# Patient Record
Sex: FEMALE | Race: WHITE | Employment: OTHER | URBAN - METROPOLITAN AREA
[De-identification: names, ages, dates, MRNs, and addresses within clinical notes are randomized per-mention and may not be internally consistent; named-entity substitution may affect disease eponyms.]

---

## 2019-04-12 ENCOUNTER — HOSPITAL ENCOUNTER (OUTPATIENT)
Dept: MAMMOGRAPHY | Age: 64
Discharge: HOME | End: 2019-04-14
Payer: COMMERCIAL

## 2019-04-12 ENCOUNTER — HOSPITAL ENCOUNTER (OUTPATIENT)
Age: 64
Discharge: HOME | End: 2019-04-14
Payer: COMMERCIAL

## 2019-04-12 DIAGNOSIS — Z12.31 ENCOUNTER FOR SCREENING MAMMOGRAM FOR MALIGNANT NEOPLASM OF BREAST: ICD-10-CM

## 2019-04-12 PROCEDURE — 77063 BREAST TOMOSYNTHESIS BI: CPT

## 2019-04-22 ENCOUNTER — TELEPHONE (OUTPATIENT)
Dept: ONCOLOGY | Age: 64
End: 2019-04-22

## 2019-04-22 NOTE — TELEPHONE ENCOUNTER
Call to patient in reference to her mammogram performed at [facility] on April 12, 2019. Instructed patient that the radiologist has recommended some additional breast imaging, in order to make a final determination/result. Informed her that a request for Rx has been faxed to the ordering physician. Once we receive the script a  from [facility] will contact her to schedule the additional imaging study/studies. Verbalizes understanding and is agreeable to proceed.        Beatrice Nelson, RN, BSN, [address]

## 2019-05-06 ENCOUNTER — HOSPITAL ENCOUNTER (OUTPATIENT)
Dept: GENERAL RADIOLOGY | Age: 64
Discharge: HOME | End: 2019-05-08
Payer: COMMERCIAL

## 2019-05-06 DIAGNOSIS — N63.20 MASS OF BREAST, LEFT: ICD-10-CM

## 2019-05-06 PROCEDURE — 77065 DX MAMMO INCL CAD UNI: CPT

## 2019-05-06 PROCEDURE — A4648 IMPLANTABLE TISSUE MARKER: HCPCS

## 2019-05-06 PROCEDURE — 2500000003 HC RX 250 WO HCPCS

## 2019-05-06 PROCEDURE — 88360 TUMOR IMMUNOHISTOCHEM/MANUAL: CPT

## 2019-05-06 PROCEDURE — 76642 ULTRASOUND BREAST LIMITED: CPT

## 2019-05-06 PROCEDURE — 88305 TISSUE EXAM BY PATHOLOGIST: CPT

## 2019-05-06 NOTE — PROGRESS NOTES
Met with patient and her  prior to her breast biopsy. Instructed on role of breast navigator and on breast biopsy procedure. Denies use of blood thinners or aspirin products within the past 5 days. I remained with her during the biopsy to provide instruction and emotional support. She tolerated procedure well. Upon questioning regarding results notification, patient indicates that she would like to receive breast biopsy results by phone via the breast navigator. Instructed that results will be available in approximately 3-5 days. Instructed that her physician will also be notified of results. Provided with folder containing my contact information, monthly breast self exam card, and post biopsy discharge instructions. Instructed to call me if she has any questions or concerns about her biopsy. Verbalizes understanding.  BUD Betancourt, OCN, CN-BN

## 2019-05-07 ENCOUNTER — TELEPHONE (OUTPATIENT)
Dept: GENERAL RADIOLOGY | Age: 64
End: 2019-05-07

## 2019-05-07 NOTE — TELEPHONE ENCOUNTER
Post breast biopsy call to patient. States she's feeling well since her breast biopsy yesterday. Denies bleeding or other breast problems. Instructed that she may resume her normal activity today. Instructed that she may remove breast dressing and shower (no baths) 24 hours after her procedure time. Instructed that bruising at the biopsy site may occur, and may last up to 30 days. Instructed to observe the site closely, and to call me if she notices an increase in redness, swelling, heat or any drainage from the breast.  Instructed that pathology results are typically available within 3-5 days, and per her request,  I will call her with the results once they are available. Instructed to call me prn with any questions or concerns. Verbalizes understanding.  Electronically signed by Maricel Laura RN, BSN on 5/7/2019 at 9:41 AM

## 2019-05-10 ENCOUNTER — TELEPHONE (OUTPATIENT)
Dept: GENERAL RADIOLOGY | Age: 64
End: 2019-05-10

## 2019-05-10 NOTE — TELEPHONE ENCOUNTER
Per patient request, I call with her recent left breast biopsy results. Instructed in detail on her breast biopsy pathology findings including cancer type (Invasive ductal carcinoma) and hormone receptor status (ER- [place]- Her2+). Instructed on next steps including following up with Dr. Gregorio Arroyo regarding a referral to surgeon and /or oncologist.   Carmen Bauer per NCCN guidelines that treatment recommendations may include surgery and chemotherapy with targeted therapy for Her2 (instructed that this may be recommended before surgery). She states her daughter is getting  in 1 month, and she expresses concern about timing of treatment. Instructed that she should have her consultations soon, but may be able to start treatment right after the wedding. Instructed to discuss at her consultation. She is agreeable to receive a packet of literature on Her2+ breast cancer treatment and resources. Biopsy results faxed to Dr. Byrnes Arm office. Electronically signed by Kaden Cruz RN, BSN on 5/10/2019 at 11:46 AM

## 2019-05-13 ENCOUNTER — CLINICAL DOCUMENTATION (OUTPATIENT)
Dept: GENERAL RADIOLOGY | Age: 64
End: 2019-05-13

## 2019-05-13 DIAGNOSIS — C50.912 INVASIVE DUCTAL CARCINOMA OF LEFT BREAST (HCC): Primary | ICD-10-CM

## 2019-05-13 NOTE — PROGRESS NOTES
Packet with information about the diagnosis and treatment of Her2 positive breast cancer mailed placed in outgoing mail for Kathy Keith.  Electronically signed by Farshad Gibson RN, BSN on 5/13/2019 at 10:05 AM

## 2019-05-16 ENCOUNTER — TELEPHONE (OUTPATIENT)
Dept: CASE MANAGEMENT | Age: 64
End: 2019-05-16

## 2019-05-16 NOTE — PROGRESS NOTES
Current Outpatient Medications   Medication Sig Dispense Refill    metoprolol succinate (TOPROL XL) 50 MG extended release tablet TAKE ONE TABLET BY MOUTH EVERY DAY  3    valsartan-hydrochlorothiazide (DIOVAN-HCT) 320-25 MG per tablet TAKE ONE TABLET BY MOUTH EVERY DAY  1    clonazePAM (KLONOPIN) 0.5 MG tablet Take by mouth as needed      Ospemifene 60 MG TABS Take 60 mg by mouth daily 30 tablet 3    aspirin 81 MG tablet Take 81 mg by mouth daily       No current facility-administered medications for this visit. Allergies   Allergen Reactions    Tetracyclines & Related Hives       Family History   Problem Relation Age of Onset    Bleeding Prob Sister     Bleeding Prob Sister     Heart Disease Father     Heart Attack Father 79    High Blood Pressure Father     Cancer Mother         lung    Stroke Maternal Grandmother     Stroke Paternal Grandfather        Social History     Socioeconomic History    Marital status:      Spouse name: Not on file    Number of children: Not on file    Years of education: Not on file    Highest education level: Not on file   Occupational History    Not on file   Social Needs    Financial resource strain: Not on file    Food insecurity:     Worry: Not on file     Inability: Not on file    Transportation needs:     Medical: Not on file     Non-medical: Not on file   Tobacco Use    Smoking status: Never Smoker    Smokeless tobacco: Never Used   Substance and Sexual Activity    Alcohol use:  Yes     Alcohol/week: 0.0 oz     Comment: socially    Drug use: No    Sexual activity: Not Currently     Partners: Male     Birth control/protection: Post-menopausal   Lifestyle    Physical activity:     Days per week: Not on file     Minutes per session: Not on file    Stress: Not on file   Relationships    Social connections:     Talks on phone: Not on file     Gets together: Not on file     Attends Scientologist service: Not on file     Active member of club or organization: Not on file     Attends meetings of clubs or organizations: Not on file     Relationship status: Not on file    Intimate partner violence:     Fear of current or ex partner: Not on file     Emotionally abused: Not on file     Physically abused: Not on file     Forced sexual activity: Not on file   Other Topics Concern    Not on file   Social History Narrative    Not on file       Occupation: Retired. 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 Review of Systems  CONSTITUTIONAL: No fever, chills. Good appetite and energy level. ENMT: Eyes: No diplopia; Nose: No epistaxis. Mouth: No sore throat. RESPIRATORY: No hemoptysis, shortness of breath, cough. CARDIOVASCULAR: No chest pain, pressure, or palpitations. GASTROINTESTINAL: No nausea/vomiting, abdominal pain, diarrhea/constipation. No blood in the stools. GENITOURINARY: No dysuria, urinary frequency, hematuria. NEURO: No syncope, presyncope, headache. Remainder:  ROS NEGATIVE      Patient denies previous history of DVT/PE. Objective:   Physical Exam   Constitutional: She is oriented to person, place, and time. She appears well-developed and well-nourished. No distress. HENT:   Head: Normocephalic and atraumatic. Eyes: Pupils are equal, round, and reactive to light. Conjunctivae and EOM are normal.   Neck: Normal range of motion. Neck supple. No tracheal deviation present. No thyromegaly present. Cardiovascular: Normal rate, regular rhythm and normal heart sounds. Pulmonary/Chest: Effort normal and breath sounds normal. No respiratory distress. Right breast exhibits no inverted nipple, no mass, no nipple discharge, no skin change and no tenderness. Left breast exhibits mass. Left breast exhibits no inverted nipple, no nipple discharge, no skin change and no tenderness. Breasts are symmetrical.       Abdominal: Soft. She exhibits no distension.    Musculoskeletal:        Right shoulder: She exhibits normal range of motion. Left shoulder: She exhibits normal range of motion. Lymphadenopathy:     She has no cervical adenopathy. She has no axillary adenopathy. Right: No supraclavicular adenopathy present. Left: No supraclavicular adenopathy present. Neurological: She is alert and oriented to person, place, and time. Skin: Skin is warm and dry. She is not diaphoretic. No erythema. No pallor. Psychiatric: She has a normal mood and affect. Her behavior is normal. Judgment and thought content normal.       Assessment:      62 y.o. woman who underwent an US guided left breast core biopsy at 2 o'clock position on May 6, 2019. Pathological evaluation completed at [facility]):  Mass, left breast at 2:00, core needle biopsy: Invasive ductal carcinoma, nuclear grade 3, see comment. 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 Breast Cancer Marker Studies:  Estrogen Receptors (ER): Negative  Progesterone Receptors (OH): Negative  Her-2/francisco (c-erb B-2) protein expression:  Positive/3+      4/18/19 - Screening Mammogram - [facility]:  TISSUE DENSITY:   The breasts are heterogeneously dense (Type 3 density).       MAMMOGRAM FINDINGS:   In the right breast, no suspicious masses, areas of suspicious architectural distortion, suspicious calcifications, or additional suspicious findings are identified.       Finding 1:   There is a high density, irregular mass with indistinct margins seen in the posterior upper outer quadrant of the left breast.       IMPRESSION:   Mass in the left breast requires additional evaluation.    An ultrasound is recommended with consideration for an ultrasound guided biopsy if the finding is considered suspicious.       =======================================   BI-RADS Category 0:  Incomplete: Need Additional Imaging Evaluation   =======================================       RISK ASSESSMENT:   During this patient's visit, information obtained was used to generate a lifetime risk assessment using the Tyrer-Cuzick model (also called the TERESE [International Breast Cancer Intervention Study] Breast Cancer Risk Evaluation Tool).     - LIFETIME RISK -   Patient has a Drew Parrot score of 9.9%       5/6/19 - Left breast ultrasound - [facility]:  Finding 1:   Sonography was performed in the left breast using a radial and anti-radial approach. Additional evaluation was performed for the irregular mass in the left breast, upper outer quadrant seen on 04/12/2019. On the present examination, there is an irregular    solid mass with angular margins measuring 25 x 20 x 21 mm in the left breast at 2 o'clock located 8 centimeters from the nipple. Internal echogenicity is hypoechoic.       There is no axillary lymphadenopathy.       IMPRESSION:   Solid mass in the left breast is highly suggestive of malignancy. An ultrasound guided biopsy is recommended.       =======================================   BI-RADS Category 5:  Highly Suggestive of Malignancy       Clinical stage II left upper outer quadrant HER-2 overexpression positive breast cancer. MRI indicated to evaluate extent of disease. We will place a referral for oncology consultation to discuss neoadjuvant chemotherapy. Patient would be an excellent candidate for lumpectomy and sentinel lymph node biopsy in the future. Questions answered to patient's satisfaction. Plan:      1) Metastatic workup to include CXR, CBC, CMP, completed with this visit. 2) Patient will meet with our Breast Navigator for information and to receive literature. 3) MRI will be ordered  4) She would likely be a candidate for neoadjuvant chemotherapy followed by conservative therapy. 5) We had a discussion of the treatment options for breast cancer including risks, benefits, and recurrence rates for breast conservation therapy versus mastectomy. We discussed reconstruction options.  We discussed the indications and risks of sentinel lymph node biopsy and possibility of axillary lymph node dissection. We also discussed the possible role of systemic and radiation therapy. The patient was given the appropriate contact numbers and will call with any questions or concerns. 6) Face-to-face time 50 minutes, greater than 50% in counseling, education, and coordination of care. Exercise encouraged. 7) My final recommendation will be communicated back to the referring physician by way of shared medical record and/or written letter via [address] [facility] mail. I personally and independently saw and examined patient and reviewed all pertinent laboratory data and imaging studies. I have reviewed and agree with the CNP history and review of systems as documented in the above note. Sana Yeung MD FACS  May 20, 2019

## 2019-05-16 NOTE — TELEPHONE ENCOUNTER
Patient breast surgery education packet assembled. Nurse Navigator is scheduled to met with patient at new patient appointment on 5/20/2019 with Dr. Adolfo Vieira.

## 2019-05-20 ENCOUNTER — HOSPITAL ENCOUNTER (OUTPATIENT)
Dept: GENERAL RADIOLOGY | Age: 64
Discharge: HOME | End: 2019-05-22
Payer: COMMERCIAL

## 2019-05-20 ENCOUNTER — TELEPHONE (OUTPATIENT)
Dept: CASE MANAGEMENT | Age: 64
End: 2019-05-20

## 2019-05-20 ENCOUNTER — HOSPITAL ENCOUNTER (OUTPATIENT)
Age: 64
Discharge: HOME | End: 2019-05-22
Payer: COMMERCIAL

## 2019-05-20 ENCOUNTER — OFFICE VISIT (OUTPATIENT)
Dept: BREAST CENTER | Age: 64
End: 2019-05-20
Payer: COMMERCIAL

## 2019-05-20 VITALS
HEIGHT: 63 IN | TEMPERATURE: 98 F | HEART RATE: 62 BPM | BODY MASS INDEX: 39.64 KG/M2 | WEIGHT: 223.7 LBS | OXYGEN SATURATION: 98 % | RESPIRATION RATE: 16 BRPM | SYSTOLIC BLOOD PRESSURE: 138 MMHG | DIASTOLIC BLOOD PRESSURE: 80 MMHG

## 2019-05-20 DIAGNOSIS — C50.912 INVASIVE DUCTAL CARCINOMA OF LEFT BREAST (HCC): ICD-10-CM

## 2019-05-20 LAB
ALBUMIN SERPL-MCNC: 4.4 G/DL (ref 3.5–5.2)
ALP BLD-CCNC: 67 U/L (ref 35–104)
ALT SERPL-CCNC: 26 U/L (ref 0–32)
ANION GAP SERPL CALCULATED.3IONS-SCNC: 15 MMOL/L (ref 7–16)
AST SERPL-CCNC: 24 U/L (ref 0–31)
BASOPHILS ABSOLUTE: 0.04 E9/L (ref 0–0.2)
BASOPHILS RELATIVE PERCENT: 0.6 % (ref 0–2)
BILIRUB SERPL-MCNC: 0.3 MG/DL (ref 0–1.2)
BUN BLDV-MCNC: 14 MG/DL (ref 8–23)
CALCIUM SERPL-MCNC: 10.6 MG/DL (ref 8.6–10.2)
CHLORIDE BLD-SCNC: 96 MMOL/L (ref 98–107)
CO2: 26 MMOL/L (ref 22–29)
CREAT SERPL-MCNC: 0.9 MG/DL (ref 0.5–1)
EOSINOPHILS ABSOLUTE: 0.09 E9/L (ref 0.05–0.5)
EOSINOPHILS RELATIVE PERCENT: 1.3 % (ref 0–6)
GFR AFRICAN AMERICAN: >60
GFR NON-AFRICAN AMERICAN: >60 ML/MIN/1.73
GLUCOSE BLD-MCNC: 106 MG/DL (ref 74–99)
HCT VFR BLD CALC: 40.2 % (ref 34–48)
HEMOGLOBIN: 13.3 G/DL (ref 11.5–15.5)
IMMATURE GRANULOCYTES #: 0.02 E9/L
IMMATURE GRANULOCYTES %: 0.3 % (ref 0–5)
LYMPHOCYTES ABSOLUTE: 2.34 E9/L (ref 1.5–4)
LYMPHOCYTES RELATIVE PERCENT: 33.6 % (ref 20–42)
MCH RBC QN AUTO: 28.6 PG (ref 26–35)
MCHC RBC AUTO-ENTMCNC: 33.1 % (ref 32–34.5)
MCV RBC AUTO: 86.5 FL (ref 80–99.9)
MONOCYTES ABSOLUTE: 0.47 E9/L (ref 0.1–0.95)
MONOCYTES RELATIVE PERCENT: 6.8 % (ref 2–12)
NEUTROPHILS ABSOLUTE: 4 E9/L (ref 1.8–7.3)
NEUTROPHILS RELATIVE PERCENT: 57.4 % (ref 43–80)
PDW BLD-RTO: 15.7 FL (ref 11.5–15)
PLATELET # BLD: 275 E9/L (ref 130–450)
PMV BLD AUTO: 11.6 FL (ref 7–12)
POTASSIUM SERPL-SCNC: 3.5 MMOL/L (ref 3.5–5)
RBC # BLD: 4.65 E12/L (ref 3.5–5.5)
SODIUM BLD-SCNC: 137 MMOL/L (ref 132–146)
TOTAL PROTEIN: 7.4 G/DL (ref 6.4–8.3)
WBC # BLD: 7 E9/L (ref 4.5–11.5)

## 2019-05-20 PROCEDURE — 85025 COMPLETE CBC W/AUTO DIFF WBC: CPT

## 2019-05-20 PROCEDURE — 80053 COMPREHEN METABOLIC PANEL: CPT

## 2019-05-20 PROCEDURE — 99204 OFFICE O/P NEW MOD 45 MIN: CPT | Performed by: SURGERY

## 2019-05-20 PROCEDURE — 71046 X-RAY EXAM CHEST 2 VIEWS: CPT

## 2019-05-20 PROCEDURE — 99203 OFFICE O/P NEW LOW 30 MIN: CPT | Performed by: SURGERY

## 2019-05-20 PROCEDURE — 36415 COLL VENOUS BLD VENIPUNCTURE: CPT | Performed by: SURGERY

## 2019-05-20 NOTE — LETTER
During this patient's visit, information obtained was used to generate a lifetime risk assessment using the Tyrer-Cuzick model (also called the TERESE [International Breast Cancer Intervention Study] Breast Cancer Risk Evaluation Tool).        LIFETIME RISK    Patient has a Tyrer Cuzick score of 9.9%       5/6/19 - Left breast ultrasound - [facility]:  Finding 1:   Sonography was performed in the left breast using a radial and anti-radial approach. Additional evaluation was performed for the irregular mass in the left breast, upper outer quadrant seen on 04/12/2019. On the present examination, there is an irregular    solid mass with angular margins measuring 25 x 20 x 21 mm in the left breast at 2 o'clock located 8 centimeters from the nipple. Internal echogenicity is hypoechoic.       There is no axillary lymphadenopathy.       IMPRESSION:   Solid mass in the left breast is highly suggestive of malignancy. An ultrasound guided biopsy is recommended.       =======================================   BI-RADS Category 5:  Highly Suggestive of Malignancy       Clinical stage II left upper outer quadrant HER-2 overexpression positive breast cancer. MRI indicated to evaluate extent of disease. We will place a referral for oncology consultation to discuss neoadjuvant chemotherapy. Patient would be an excellent candidate for lumpectomy and sentinel lymph node biopsy in the future. Questions answered to patient's satisfaction. Plan:     1) Metastatic workup to include CXR, CBC, CMP, completed with this visit. 2) Patient will meet with our Breast Navigator for information and to receive literature. 3) MRI will be ordered  4) She would likely be a candidate for neoadjuvant chemotherapy followed by conservative therapy.   5) We had a discussion of the treatment options for breast cancer including risks, benefits, and recurrence rates for breast conservation

## 2019-05-20 NOTE — PATIENT INSTRUCTIONS
Referral to medical oncology [facility]). Labs drawn today. Please call us if you have any questions or concerns.

## 2019-05-20 NOTE — LETTER
[facility] Breast  [address] [id number]  Phone: [phone]  Fax: [phone]    Julianne Fierro MD        May 20, 2019     Gerardo Myrick MD  [address] [facility], [address] [facility] [address]    Patient: Serena Foster  MR Number: [id number]  YOB: 1955  Date of Visit: 5/20/2019    Dear Dr. Gerardo Myrick: Thank you for the request for consultation for SABRINA Steinberg to me for the evaluation of her left breast cancer. Below are the relevant portions of my assessment and plan of care. Assessment:     62 y.o. woman who underwent an US guided left breast core biopsy at 2 o'clock position on May 6, 2019. Pathological evaluation completed at [facility]):  Mass, left breast at 2:00, core needle biopsy: Invasive ductal carcinoma, nuclear grade 3, see comment. 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 Breast Cancer Marker Studies:  Estrogen Receptors (ER): Negative  Progesterone Receptors (IN): Negative  Her-2/francisco (c-erb B-2) protein expression:  Positive/3+      4/18/19 - Screening Mammogram - [facility]:  TISSUE DENSITY:   The breasts are heterogeneously dense (Type 3 density).       MAMMOGRAM FINDINGS:   In the right breast, no suspicious masses, areas of suspicious architectural distortion, suspicious calcifications, or additional suspicious findings are identified.       Finding 1:   There is a high density, irregular mass with indistinct margins seen in the posterior upper outer quadrant of the left breast.       IMPRESSION:   Mass in the left breast requires additional evaluation.    An ultrasound is recommended with consideration for an ultrasound guided biopsy if the finding is considered suspicious.       =======================================   BI-RADS Category 0:  Incomplete: Need Additional Imaging Evaluation   =======================================       RISK ASSESSMENT: During this patient's visit, information obtained was used to generate a lifetime risk assessment using the Tyrer-Cuzick model (also called the TERESE [International Breast Cancer Intervention Study] Breast Cancer Risk Evaluation Tool).        LIFETIME RISK    Patient has a Tyrer Cuzick score of 9.9%       5/6/19 - Left breast ultrasound - [facility]:  Finding 1:   Sonography was performed in the left breast using a radial and anti-radial approach. Additional evaluation was performed for the irregular mass in the left breast, upper outer quadrant seen on 04/12/2019. On the present examination, there is an irregular    solid mass with angular margins measuring 25 x 20 x 21 mm in the left breast at 2 o'clock located 8 centimeters from the nipple. Internal echogenicity is hypoechoic.       There is no axillary lymphadenopathy.       IMPRESSION:   Solid mass in the left breast is highly suggestive of malignancy. An ultrasound guided biopsy is recommended.       =======================================   BI-RADS Category 5:  Highly Suggestive of Malignancy       Clinical stage II left upper outer quadrant HER-2 overexpression positive breast cancer. MRI indicated to evaluate extent of disease. We will place a referral for oncology consultation to discuss neoadjuvant chemotherapy. Patient would be an excellent candidate for lumpectomy and sentinel lymph node biopsy in the future. Questions answered to patient's satisfaction. Plan:     1) Metastatic workup to include CXR, CBC, CMP, completed with this visit. 2) Patient will meet with our Breast Navigator for information and to receive literature. 3) MRI will be ordered  4) She would likely be a candidate for neoadjuvant chemotherapy followed by conservative therapy.   5) We had a discussion of the treatment options for breast cancer including risks, benefits, and recurrence rates for breast conservation

## 2019-05-20 NOTE — COMMUNICATION BODY
present examination, there is an irregular    solid mass with angular margins measuring 25 x 20 x 21 mm in the left breast at 2 o'clock located 8 centimeters from the nipple. Internal echogenicity is hypoechoic.       There is no axillary lymphadenopathy.       IMPRESSION:   Solid mass in the left breast is highly suggestive of malignancy. An ultrasound guided biopsy is recommended.       =======================================   BI-RADS Category 5:  Highly Suggestive of Malignancy       Clinical stage II left upper outer quadrant HER-2 overexpression positive breast cancer. MRI indicated to evaluate extent of disease. We will place a referral for oncology consultation to discuss neoadjuvant chemotherapy. Patient would be an excellent candidate for lumpectomy and sentinel lymph node biopsy in the future. Questions answered to patient's satisfaction. Plan:     1) Metastatic workup to include CXR, CBC, CMP, completed with this visit. 2) Patient will meet with our Breast Navigator for information and to receive literature. 3) MRI will be ordered  4) She would likely be a candidate for neoadjuvant chemotherapy followed by conservative therapy. 5) We had a discussion of the treatment options for breast cancer including risks, benefits, and recurrence rates for breast conservation therapy versus mastectomy. We discussed reconstruction options. We discussed the indications and risks of sentinel lymph node biopsy and possibility of axillary lymph node dissection. We also discussed the possible role of systemic and radiation therapy. The patient was given the appropriate contact numbers and will call with any questions or concerns.

## 2019-05-21 ENCOUNTER — TELEPHONE (OUTPATIENT)
Dept: BREAST CENTER | Age: 64
End: 2019-05-21

## 2019-05-21 NOTE — TELEPHONE ENCOUNTER
Authorization has been obtained for breast MRI [id number] -- Authorization # [id number]  Valid until 7/5/19 per Julisa Mayberry Tommie/ [employer] [place]

## 2019-05-22 DIAGNOSIS — Z79.899 NEED FOR PROPHYLACTIC CHEMOTHERAPY: Primary | ICD-10-CM

## 2019-05-23 ENCOUNTER — HOSPITAL ENCOUNTER (OUTPATIENT)
Dept: MRI IMAGING | Age: 64
Discharge: HOME | End: 2019-05-25
Payer: COMMERCIAL

## 2019-05-23 ENCOUNTER — TELEPHONE (OUTPATIENT)
Dept: SURGERY | Age: 64
End: 2019-05-23

## 2019-05-23 DIAGNOSIS — C50.912 INVASIVE DUCTAL CARCINOMA OF LEFT BREAST (HCC): ICD-10-CM

## 2019-05-23 PROCEDURE — A9577 INJ MULTIHANCE: HCPCS | Performed by: RADIOLOGY

## 2019-05-23 PROCEDURE — 77049 MRI BREAST C-+ W/CAD BI: CPT

## 2019-05-23 PROCEDURE — 6360000004 HC RX CONTRAST MEDICATION: Performed by: RADIOLOGY

## 2019-05-23 RX ADMIN — GADOBENATE DIMEGLUMINE 20 ML: 529 INJECTION, SOLUTION INTRAVENOUS at 09:10

## 2019-05-23 NOTE — TELEPHONE ENCOUNTER
Called with imaging results-isolated left breast lesion. Next step neoadjuvant chemotherapy referral/[facility].

## 2019-06-03 ENCOUNTER — OFFICE VISIT (OUTPATIENT)
Dept: ONCOLOGY | Age: 64
End: 2019-06-03
Payer: COMMERCIAL

## 2019-06-03 ENCOUNTER — HOSPITAL ENCOUNTER (OUTPATIENT)
Dept: INFUSION THERAPY | Age: 64
Discharge: HOME | End: 2019-06-03
Payer: COMMERCIAL

## 2019-06-03 VITALS
HEIGHT: 63 IN | HEART RATE: 74 BPM | SYSTOLIC BLOOD PRESSURE: 177 MMHG | BODY MASS INDEX: 38.96 KG/M2 | DIASTOLIC BLOOD PRESSURE: 81 MMHG | TEMPERATURE: 97.6 F | WEIGHT: 219.9 LBS

## 2019-06-03 DIAGNOSIS — C50.912 INVASIVE DUCTAL CARCINOMA OF LEFT BREAST (HCC): Primary | ICD-10-CM

## 2019-06-03 PROCEDURE — 99244 OFF/OP CNSLTJ NEW/EST MOD 40: CPT | Performed by: INTERNAL MEDICINE

## 2019-06-03 PROCEDURE — 99214 OFFICE O/P EST MOD 30 MIN: CPT | Performed by: INTERNAL MEDICINE

## 2019-06-03 RX ORDER — SODIUM CHLORIDE 0.9 % (FLUSH) 0.9 %
10 SYRINGE (ML) INJECTION PRN
Status: CANCELLED | OUTPATIENT
Start: 2019-06-17

## 2019-06-03 RX ORDER — HEPARIN SODIUM (PORCINE) LOCK FLUSH IV SOLN 100 UNIT/ML 100 UNIT/ML
500 SOLUTION INTRAVENOUS PRN
Status: CANCELLED | OUTPATIENT
Start: 2019-06-17

## 2019-06-03 SDOH — ECONOMIC STABILITY: HOUSING INSECURITY: PLEASE ASSESS YOUR PATIENT'S LEVEL OF DISTRESS CONCERNING HOUSING (SCALE FROM 1-10): 0 (NONE)

## 2019-06-03 NOTE — PROGRESS NOTES
Department of [facility] Oncology  Attending Consult Note    Reason for Visit:  Consultation on a patient with Left Breast Cancer    Referring Physician:  Julianne Fierro MD    PCP:  Gerardo Myrick MD    History of Present Illness: The mass was located in the 2 o'clock position of left breast.     Breast cancer risk factors include age, gender, menarche before age 16. Age of menarche was 7. Age of menopause was 48. Patient was on birth control for 6 months in her 25s. Patient is . Age of first live birth was 30. Patient did breast feed. Bilateral screening mammogram on 2019: There is a high density, irregular mass with indistinct margins seen in the posterior upper outer quadrant of left breast.     Left Breast U/S on 2019:  Irregular solid mass with angular margins measuring 25 x 20 x 21 mm in the left breast at 2 o'clock located 8 centimeters from the nipple. No axillary LN. On 2019:  Mass, left breast at 2:00, core needle biopsy: Invasive ductal carcinoma,nuclear grade 3, see comment. 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 Breast Cancer Marker Studies:  ER: Negative  NH: Negative  Her-2/francisco Positive/3+    CXR PA/Lateral on 2019:  Normal chest.    MRI Bilateral Breast 2019: An irregular, enhancing mass is again noted in the left breast 2:00 which measures 2.0 x 2.2 x 2.6 cm. No axillary LN. No evidence of neoplasm on right. Review of Systems;  CONSTITUTIONAL: No fever, chills. Good appetite and energy level. ENMT: Eyes: No diplopia; Nose: No epistaxis. Mouth: No sore throat. RESPIRATORY: No hemoptysis, shortness of breath, cough. CARDIOVASCULAR: No chest pain, palpitations. GASTROINTESTINAL: No nausea/vomiting, abdominal pain, diarrhea/constipation. GENITOURINARY: No dysuria, urinary frequency, hematuria. NEURO: No syncope, presyncope, headache.   Remainder:  ROS NEGATIVE    Past Medical History:

## 2019-06-03 NOTE — PROGRESS NOTES
Rakesh Ish   62 y.o. Referring Physician: Dr. Payton    PCP: Alvin Newberry MD    Vitals:    19 1330   BP: (!) 177/81   Pulse: 74   Temp: 97.6 °F (36.4 °C)        Wt Readings from Last 3 Encounters:   19 219 lb 14.4 oz (99.7 kg)   19 223 lb 11.2 oz (101.5 kg)   17 229 lb 6.4 oz (104.1 kg)        Body mass index is 38.95 kg/m². Chief Complaint: Patient referred d/t left breast invasive ductal carcinoma ER negative, UT negative, Her 2 positive. Chief Complaint   Patient presents with    New Patient         Cancer Staging  No matching staging information was found for the patient. Prior Radiation Therapy? NO    Concurrent Chemo/radiation? NO    Prior Chemotherapy? NO    Prior Hormonal Therapy? NO    Head and Neck Cancer? No, patient does NOT have HN cancer.       LMP:     Age at first Menses: 7 yrs    : 3    Para: 2          Current Outpatient Medications:     metoprolol succinate (TOPROL XL) 50 MG extended release tablet, TAKE ONE TABLET BY MOUTH EVERY DAY, Disp: , Rfl: 3    valsartan-hydrochlorothiazide (DIOVAN-HCT) 320-25 MG per tablet, TAKE ONE TABLET BY MOUTH EVERY DAY, Disp: , Rfl: 1    clonazePAM (KLONOPIN) 0.5 MG tablet, Take by mouth as needed, Disp: , Rfl:     aspirin 81 MG tablet, Take 81 mg by mouth daily, Disp: , Rfl:        Past Medical History:   Diagnosis Date    Anxiety     Cancer ([facility] [address]     Heart murmur     Hypertension     Post-menopausal bleeding     S/P hyst/ polypectomy 6/10/14       Past Surgical History:   Procedure Laterality Date    BREAST BIOPSY       SECTION      COLONOSCOPY      normal - Dr Bill Flowers x 10 years    DILATION AND CURETTAGE         Family History   Problem Relation Age of Onset    Bleeding Prob Sister     Bleeding Prob Sister     Heart Disease Father     Heart Attack Father 79    High Blood Pressure Father     Cancer Mother 61        lung    Stroke Maternal Grandmother     Stroke Paternal Grandfather     Cancer Paternal Grandfather 48        GI cancer       Social History     Socioeconomic History    Marital status:      Spouse name: Not on file    Number of children: Not on file    Years of education: Not on file    Highest education level: Not on file   Occupational History    Not on file   Social Needs    Financial resource strain: Not on file    Food insecurity:     Worry: Not on file     Inability: Not on file    Transportation needs:     Medical: Not on file     Non-medical: Not on file   Tobacco Use    Smoking status: Never Smoker    Smokeless tobacco: Never Used   Substance and Sexual Activity    Alcohol use: Yes     Alcohol/week: 0.0 oz     Comment: socially    Drug use: No    Sexual activity: Not Currently     Partners: Male     Birth control/protection: Post-menopausal   Lifestyle    Physical activity:     Days per week: Not on file     Minutes per session: Not on file    Stress: Not on file   Relationships    Social connections:     Talks on phone: Not on file     Gets together: Not on file     Attends Hoahaoism service: Not on file     Active member of club or organization: Not on file     Attends meetings of clubs or organizations: Not on file     Relationship status: Not on file    Intimate partner violence:     Fear of current or ex partner: Not on file     Emotionally abused: Not on file     Physically abused: Not on file     Forced sexual activity: Not on file   Other Topics Concern    Not on file   Social History Narrative    Not on file           Occupation:   Retired:  YES: Patient is retired from [employer]. REVIEW OF SYSTEMS: Patient states a history of hypertension, anxiety, breast cancer. Pacemaker/Defibulator/ICD:  No    Mediport: No           FALLS RISK SCREENING ASSESSMENT    Instructions:  Assess the patient and [place] the appropriate indicators that are present for fall risk identification.    Total the numbers circled and assign a fall risk score from Table 2.  Reassess patient at a minimum every 12 weeks or with status change. Assessment   Date  6/3/2019     1. Mental Ability: confusion/cognitively impaired No - 0       2. Elimination Issues: incontinence, frequency No - 0       3. Ambulatory: use of assistive devices (walker, cane, off-loading devices), attached to equipment (IV pole, oxygen) No - 0     4. Sensory Limitations: dizziness, vertigo, impaired vision No - 0       5. Age Less than 65 years - 0       6. Medication: diuretics, strong analgesics, hypnotics, sedatives, antihypertensive agents   Yes - 3   7. Falls:  recent history of falls within the last 3 months (not to include slipping or tripping)   No - 0   TOTAL 3    If score of 4 or greater was education given? No       TABLE 2   Risk Score Risk Level Plan of Care   0-3 Little or  No Risk 1. Provide assistance as indicated for ambulation activities  2. Reorient confused/cognitively impaired patient  3. Call-light/bell within patient's reach  4. Chair/bed in low position, stretcher/bed with siderails up except when performing patient care activities  5. Educate patient/family/caregiver on falls prevention  6.  Reassess in 12 weeks or with any noted change in patient condition which places them at a risk for a fall   4-6 Moderate Risk 1. Provide assistance as indicated for ambulation activities  2. Reorient confused/cognitively impaired patient  3. Call-light/bell within patient's reach  4. Chair/bed in low position, stretcher/bed with siderails up except when performing patient care activities  5. Educate patient/family/caregiver on falls prevention  6. Falls risk precaution (Yellow sticker Level II) placed on patient chart   7 or   Higher High Risk 1. Place patient in easily observable treatment room  2. Patient attended at all times by family member or staff  3. Provide assistance as indicated for ambulation activities  4.   Reorient confused/cognitively impaired patient  5. Call-light/bell within patient's reach  6. Chair/bed in low position, stretcher/bed with siderails up except when performing patient care activities  7. Educate patient/family/caregiver on falls prevention  8. Falls risk precaution (Yellow sticker Level III) placed on patient chart           MALNUTRITION RISK SCREENING ASSESSMENT    Instructions:  Assess the patient and enter the appropriate indicators that are present for nutrition risk identification. Total the numbers entered and assign a risk score. Follow the appropriate action for total score listed below. Assessment   Date  6/3/2019     1. Have you lost weight without trying? 0- No     2. Have you been eating poorly because of a decreased appetite? 0- No   3. Do you have a diagnosis of head and neck cancer?       0- No                                                                                    TOTAL 0        Score of 0-1: No action  Score 2 or greater:  · For Non-Diabetic Patient: Recommend adding Ensure Complete 2 x daily and provide patient with Ensure wellness bag with coupons  · For Diabetic Patient: Recommend adding Glucerna Shake 2 x daily and provide patient with Glucerna Wellness bag with coupons  · Route to the dietitian via [address]

## 2019-06-05 ENCOUNTER — TELEPHONE (OUTPATIENT)
Dept: SURGERY | Age: 64
End: 2019-06-05

## 2019-06-05 NOTE — TELEPHONE ENCOUNTER
JERAD Jack called and spoke to [facility] and scheduled PT for medi-port placement on 06/18/19 @ 9am with Dr. Margaux Anderson. PT has stopped taking her ASA products. PT verbalized she understood prep instructions, and NPO after midnight. PT verbalized that she understood appointment date/time, as well as to arrive 2 hours prior to procedure. PT advised on where to park and enter the hospital at for procedure. PT has been told to make sure they have a ride to and from procedure as they are not allowed to drive after procedure. PT given procedure letter in office or mailed to them with all instructions also on it. MA instructed PT to call the office at [phone] with any questions, comments, or concerns about the procedure.        Electronically signed by Jeannine Tyler MA on 6/5/19 at 9:05 AM

## 2019-06-06 ENCOUNTER — TELEPHONE (OUTPATIENT)
Dept: BREAST CENTER | Age: 64
End: 2019-06-06

## 2019-06-06 DIAGNOSIS — C50.912 INVASIVE DUCTAL CARCINOMA OF LEFT BREAST (HCC): Primary | ICD-10-CM

## 2019-06-06 NOTE — TELEPHONE ENCOUNTER
Patient is scheduled for 2D Echo 6/13/19 @ 7:30am [facility] - Arrival 7:00am -- No prior authorization required - call Ref# [id number] -- patient has been notified.

## 2019-06-13 ENCOUNTER — HOSPITAL ENCOUNTER (OUTPATIENT)
Dept: NON INVASIVE DIAGNOSTICS | Age: 64
Discharge: HOME | End: 2019-06-13
Payer: COMMERCIAL

## 2019-06-13 LAB
LV EF: 53 %
LVEF MODALITY: NORMAL

## 2019-06-13 PROCEDURE — 93306 TTE W/DOPPLER COMPLETE: CPT

## 2019-06-14 RX ORDER — ANTIARTHRITIC COMBINATION NO.2 900 MG
5000 TABLET ORAL 2 TIMES DAILY
COMMUNITY

## 2019-06-14 NOTE — PROGRESS NOTES
Josefina 37 PRE-ADMISSION TESTING GENERAL INSTRUCTIONS- [facility]-phone number:[phone]    GENERAL INSTRUCTIONS  [x] Antibacterial Soap shower Night before and/or AM of Surgery  [] Loi wipe instruction sheet and wipes given. [x] Nothing by mouth after midnight, including gum, candy, mints, or water. [x] You may brush your teeth, gargle, but do NOT swallow water. []Hibiclens shower  the night before and the morning of surgery. Do not use             Hibiclens on your face or head. [x]No smoking, chewing tobacco, illegal drugs, or alcohol within 24 hours of your surgery. [x] Jewelry, valuables or body piercing's should not be brought to the hospital. All body and/or tongue piercing's must be removed prior to arriving to hospital.  ALL hair pins must be removed. [x] Do not wear makeup, lotions, powders, deodorant. Nail polish as directed by the nurse. [x] Arrange transportation with a responsible adult  to and from the hospital. If you do not have a responsible adult  to transport you, you will need to make arrangements with a medical transportation company (i.e. [employer]. A Uber/taxi/bus is not appropriate unless you are accompanied by a responsible adult ). Arrange for someone to be with you for the remainder of the day and for 24 hours after your procedure due to having had anesthesia. Who will be your  for transportation?__________husband______   Who will be staying with you for 24 hrs after your procedure?____husband______________  [x] Bring insurance card and photo ID.  [] Transfusion Bracelet: Please bring with you to hospital, day of surgery  [] Bring urine specimen day of surgery. Any small container is acceptable. [] Use inhalers the morning of surgery and bring with you to hospital.  [] Bring copy of living will or healthcare power of  papers to be placed in your electronic record.   [] CPAP/BI-PAP: Please bring your machine if you are to spend the night in the hospital.     PARKING INSTRUCTIONS:   [x] Arrival Time:__0730___________  · [x] Parking lot '\"I\"  is located on [facility] (the corner of [facility] and [facility]). To enter, press the button and the gate will lift. A free token will be provided to exit the lot. One car per patient is allowed to park in this lot. All other cars are to park on [address] [place] [address] either in the parking garage or the handicap lot. [] To reach the [facility] lobby from [address] [place] [address], upon entering the hospital, take elevator B to the 3rd floor. EDUCATION INSTRUCTIONS:      [] Knee or hip replacement booklet & exercise pamphlets given. [] [address] placed in chart. [] Pre-admission Testing educational folder given  [] Incentive Spirometry,coughing & deep breathing exercises reviewed. []Medication information sheet(s)   []Fluoroscopy-Xray used in surgery reviewed with patient. Educational pamphlet placed in chart. []Pain: Post-op pain is normal and to be expected. You will be asked to rate your pain from 0-10(a zero is not acceptable-education is needed). Your post-op pain goal is:  [] Ask your nurse for your pain medication. [] Joint camp offered. [] Joint replacement booklets given. [] Other:___________________________    MEDICATION INSTRUCTIONS:   [x]Bring a complete list of your medications, please write the last time you took the medicine, give this list to the nurse. [x] Take the following medications the morning of surgery with 1-2 ounces of water:   [x] Stop herbal supplements and vitamins 5 days before your surgery. [] DO NOT take any diabetic medicine the morning of surgery. Follow instructions for insulin the day before surgery. [] If you are diabetic and your blood sugar is low or you feel symptomatic, you may drink 1-2 ounces of apple juice or take a glucose tablet.   The morning of your procedure, you may call the pre-op area

## 2019-06-15 ENCOUNTER — PREP FOR PROCEDURE (OUTPATIENT)
Dept: SURGERY | Age: 64
End: 2019-06-15

## 2019-06-15 RX ORDER — SODIUM CHLORIDE 9 MG/ML
INJECTION, SOLUTION INTRAVENOUS CONTINUOUS
Status: CANCELLED | OUTPATIENT
Start: 2019-06-15

## 2019-06-15 RX ORDER — SODIUM CHLORIDE 0.9 % (FLUSH) 0.9 %
10 SYRINGE (ML) INJECTION PRN
Status: CANCELLED | OUTPATIENT
Start: 2019-06-15

## 2019-06-15 RX ORDER — SODIUM CHLORIDE 0.9 % (FLUSH) 0.9 %
10 SYRINGE (ML) INJECTION EVERY 12 HOURS SCHEDULED
Status: CANCELLED | OUTPATIENT
Start: 2019-06-15

## 2019-06-18 ENCOUNTER — ANESTHESIA (OUTPATIENT)
Dept: OPERATING ROOM | Age: 64
End: 2019-06-18
Payer: COMMERCIAL

## 2019-06-18 ENCOUNTER — ANESTHESIA EVENT (OUTPATIENT)
Dept: OPERATING ROOM | Age: 64
End: 2019-06-18
Payer: COMMERCIAL

## 2019-06-18 ENCOUNTER — APPOINTMENT (OUTPATIENT)
Dept: GENERAL RADIOLOGY | Age: 64
End: 2019-06-18
Attending: SURGERY
Payer: COMMERCIAL

## 2019-06-18 ENCOUNTER — HOSPITAL ENCOUNTER (OUTPATIENT)
Age: 64
Setting detail: OUTPATIENT SURGERY
Discharge: HOME | End: 2019-06-18
Attending: SURGERY | Admitting: SURGERY
Payer: COMMERCIAL

## 2019-06-18 VITALS
HEART RATE: 69 BPM | OXYGEN SATURATION: 96 % | DIASTOLIC BLOOD PRESSURE: 64 MMHG | TEMPERATURE: 98 F | WEIGHT: 219 LBS | BODY MASS INDEX: 38.8 KG/M2 | RESPIRATION RATE: 18 BRPM | SYSTOLIC BLOOD PRESSURE: 132 MMHG | HEIGHT: 63 IN

## 2019-06-18 VITALS — SYSTOLIC BLOOD PRESSURE: 113 MMHG | DIASTOLIC BLOOD PRESSURE: 52 MMHG | OXYGEN SATURATION: 99 %

## 2019-06-18 DIAGNOSIS — G89.18 POST-OP PAIN: Primary | ICD-10-CM

## 2019-06-18 PROCEDURE — 6360000002 HC RX W HCPCS: Performed by: SURGERY

## 2019-06-18 PROCEDURE — 77001 FLUOROGUIDE FOR VEIN DEVICE: CPT | Performed by: SURGERY

## 2019-06-18 PROCEDURE — 3700000001 HC ADD 15 MINUTES (ANESTHESIA): Performed by: SURGERY

## 2019-06-18 PROCEDURE — 2580000003 HC RX 258: Performed by: NURSE ANESTHETIST, CERTIFIED REGISTERED

## 2019-06-18 PROCEDURE — 76937 US GUIDE VASCULAR ACCESS: CPT | Performed by: SURGERY

## 2019-06-18 PROCEDURE — C1788 PORT, INDWELLING, IMP: HCPCS | Performed by: SURGERY

## 2019-06-18 PROCEDURE — 36561 INSERT TUNNELED CV CATH: CPT | Performed by: SURGERY

## 2019-06-18 PROCEDURE — 2500000003 HC RX 250 WO HCPCS: Performed by: SURGERY

## 2019-06-18 PROCEDURE — 7100000011 HC PHASE II RECOVERY - ADDTL 15 MIN: Performed by: SURGERY

## 2019-06-18 PROCEDURE — 6360000002 HC RX W HCPCS: Performed by: NURSE ANESTHETIST, CERTIFIED REGISTERED

## 2019-06-18 PROCEDURE — 7100000010 HC PHASE II RECOVERY - FIRST 15 MIN: Performed by: SURGERY

## 2019-06-18 PROCEDURE — 3600000003 HC SURGERY LEVEL 3 BASE: Performed by: SURGERY

## 2019-06-18 PROCEDURE — 3700000000 HC ANESTHESIA ATTENDED CARE: Performed by: SURGERY

## 2019-06-18 PROCEDURE — 3600000013 HC SURGERY LEVEL 3 ADDTL 15MIN: Performed by: SURGERY

## 2019-06-18 PROCEDURE — 3209999900 FLUORO FOR SURGICAL PROCEDURES

## 2019-06-18 PROCEDURE — 71045 X-RAY EXAM CHEST 1 VIEW: CPT

## 2019-06-18 PROCEDURE — 2580000003 HC RX 258: Performed by: SURGERY

## 2019-06-18 DEVICE — PORT SMARTPORT 8FR CT TI W/VLV SHTH: Type: IMPLANTABLE DEVICE | Site: CHEST | Status: FUNCTIONAL

## 2019-06-18 RX ORDER — BUPIVACAINE HYDROCHLORIDE AND EPINEPHRINE 2.5; 5 MG/ML; UG/ML
INJECTION, SOLUTION EPIDURAL; INFILTRATION; INTRACAUDAL; PERINEURAL PRN
Status: DISCONTINUED | OUTPATIENT
Start: 2019-06-18 | End: 2019-06-18 | Stop reason: ALTCHOICE

## 2019-06-18 RX ORDER — PROPOFOL 10 MG/ML
INJECTION, EMULSION INTRAVENOUS CONTINUOUS PRN
Status: DISCONTINUED | OUTPATIENT
Start: 2019-06-18 | End: 2019-06-18 | Stop reason: SDUPTHER

## 2019-06-18 RX ORDER — SODIUM CHLORIDE 0.9 % (FLUSH) 0.9 %
10 SYRINGE (ML) INJECTION EVERY 12 HOURS SCHEDULED
Status: DISCONTINUED | OUTPATIENT
Start: 2019-06-18 | End: 2019-06-18 | Stop reason: HOSPADM

## 2019-06-18 RX ORDER — PROPOFOL 10 MG/ML
INJECTION, EMULSION INTRAVENOUS PRN
Status: DISCONTINUED | OUTPATIENT
Start: 2019-06-18 | End: 2019-06-18 | Stop reason: SDUPTHER

## 2019-06-18 RX ORDER — MIDAZOLAM HYDROCHLORIDE 1 MG/ML
INJECTION INTRAMUSCULAR; INTRAVENOUS PRN
Status: DISCONTINUED | OUTPATIENT
Start: 2019-06-18 | End: 2019-06-18 | Stop reason: SDUPTHER

## 2019-06-18 RX ORDER — SODIUM CHLORIDE 0.9 % (FLUSH) 0.9 %
10 SYRINGE (ML) INJECTION PRN
Status: DISCONTINUED | OUTPATIENT
Start: 2019-06-18 | End: 2019-06-18 | Stop reason: HOSPADM

## 2019-06-18 RX ORDER — OXYCODONE HYDROCHLORIDE AND ACETAMINOPHEN 5; 325 MG/1; MG/1
1 TABLET ORAL EVERY 6 HOURS PRN
Qty: 5 TABLET | Refills: 0 | Status: SHIPPED | OUTPATIENT
Start: 2019-06-18 | End: 2019-06-20

## 2019-06-18 RX ORDER — SODIUM CHLORIDE 9 MG/ML
INJECTION, SOLUTION INTRAVENOUS CONTINUOUS
Status: DISCONTINUED | OUTPATIENT
Start: 2019-06-18 | End: 2019-06-18 | Stop reason: HOSPADM

## 2019-06-18 RX ORDER — SODIUM CHLORIDE 9 MG/ML
INJECTION, SOLUTION INTRAVENOUS CONTINUOUS PRN
Status: DISCONTINUED | OUTPATIENT
Start: 2019-06-18 | End: 2019-06-18 | Stop reason: SDUPTHER

## 2019-06-18 RX ADMIN — SODIUM CHLORIDE: 9 INJECTION, SOLUTION INTRAVENOUS at 08:48

## 2019-06-18 RX ADMIN — PROPOFOL 40 MG: 10 INJECTION, EMULSION INTRAVENOUS at 10:39

## 2019-06-18 RX ADMIN — SODIUM CHLORIDE: 0.9 INJECTION, SOLUTION INTRAVENOUS at 10:32

## 2019-06-18 RX ADMIN — Medication 2 G: at 10:37

## 2019-06-18 RX ADMIN — PROPOFOL 100 MCG/KG/MIN: 10 INJECTION, EMULSION INTRAVENOUS at 10:39

## 2019-06-18 RX ADMIN — PROPOFOL 30 MG: 10 INJECTION, EMULSION INTRAVENOUS at 10:57

## 2019-06-18 RX ADMIN — MIDAZOLAM HYDROCHLORIDE 2 MG: 1 INJECTION, SOLUTION INTRAMUSCULAR; INTRAVENOUS at 10:32

## 2019-06-18 RX ADMIN — PROPOFOL 50 MG: 10 INJECTION, EMULSION INTRAVENOUS at 10:51

## 2019-06-18 ASSESSMENT — PAIN SCALES - GENERAL
PAINLEVEL_OUTOF10: 0

## 2019-06-18 ASSESSMENT — PULMONARY FUNCTION TESTS
PIF_VALUE: 0

## 2019-06-18 ASSESSMENT — PAIN - FUNCTIONAL ASSESSMENT: PAIN_FUNCTIONAL_ASSESSMENT: 0-10

## 2019-06-18 NOTE — ANESTHESIA POSTPROCEDURE EVALUATION
Department of Anesthesiology  Postprocedure Note    Patient: Tania Fam  MRN: [id number]  YOB: 1955  Date of evaluation: 6/18/2019  Time:  11:38 AM     Procedure Summary     Date:  06/18/19 Room / Location:  [facility] OR 12 / [facility] OR    Anesthesia Start:  1032 Anesthesia Stop:      Procedure:  MEDI PORT PLACEMENT (N/A Chest) Diagnosis:  (BREAST CANCER)    Surgeon:  Donis Lennox, MD Responsible Provider:  Jas Servin MD    Anesthesia Type:  MAC ASA Status:  3          Anesthesia Type: MAC    Alisa Phase I: Alisa Score: 10    Alisa Phase II:      Last vitals: Reviewed and per EMR flowsheets.        Anesthesia Post Evaluation    Patient location during evaluation: PACU  Patient participation: complete - patient participated  Level of consciousness: awake and alert  Pain score: 0  Airway patency: patent  Nausea & Vomiting: no vomiting and no nausea  Complications: no  Cardiovascular status: hemodynamically stable  Respiratory status: spontaneous ventilation  Hydration status: stable

## 2019-06-18 NOTE — H&P
GENERAL SURGERY  HISTORY AND PHYSICAL  2019        HPI  Marva Ji is a 62 y.o. female who presents for Mediport placement. She has a hx of left breast invasive ductal carcinoma. She denies any current complains. Denies any blood thinners and has been off ASA for > 1 week. Past Medical History:   Diagnosis Date    Anxiety     Cancer [facility])     breast    Heart murmur     Hypertension     Post-menopausal bleeding     S/P hyst/ polypectomy 6/10/14       Past Surgical History:   Procedure Laterality Date    BREAST BIOPSY       SECTION      COLONOSCOPY      normal - Dr Julio Negro x 10 years    DILATION AND CURETTAGE         Prior to Admission medications    Medication Sig Start Date End Date Taking?  Authorizing Provider   Multiple Vitamins-Minerals (MULTIVITAMIN WOMEN 50+ PO) Take by mouth STOP PREOP MED   Yes Historical Provider, MD   BIOTIN PO Take by mouth STOP PREOP MED   Yes Historical Provider, MD   metoprolol succinate (TOPROL XL) 50 MG extended release tablet TAKE ONE TABLET BY MOUTH EVERY night 3/7/17  Yes Historical Provider, MD   valsartan-hydrochlorothiazide (DIOVAN-HCT) 320-25 MG per tablet TAKE ONE TABLET BY MOUTH EVERY DAY 3/7/17  Yes Historical Provider, MD   clonazePAM (KLONOPIN) 0.5 MG tablet Take by mouth as needed   Yes Historical Provider, MD   aspirin 81 MG tablet Take 81 mg by mouth daily   Yes Historical Provider, MD       Allergies   Allergen Reactions    Tetracyclines & Related Hives       Family History   Problem Relation Age of Onset    Bleeding Prob Sister     Bleeding Prob Sister     Heart Disease Father     Heart Attack Father 79    High Blood Pressure Father     Cancer Mother 61        lung    Stroke Maternal Grandmother     Stroke Paternal Grandfather     Cancer Paternal Grandfather 48        GI cancer       Social History     Tobacco Use    Smoking status: Never Smoker    Smokeless tobacco: Never Used   Substance Use Topics    Alcohol use: Yes     Alcohol/week: 0.0 oz     Comment: socially    Drug use: No         Review of Systems - History obtained from the patient  General ROS: negative for - chills, fatigue or fever  Breast ROS: Lt sided breast cancer  Respiratory ROS: no cough, shortness of breath, or wheezing  Cardiovascular ROS: no chest pain or dyspnea on exertion  Gastrointestinal ROS: no abdominal pain, change in bowel habits, or black or bloody stools  Genito-Urinary ROS: negative        PHYSICAL EXAM:    Vitals:    06/18/19 0818   BP: (!) 154/75   Pulse: 65   Resp: 20   Temp: 97.3 °F (36.3 °C)   SpO2: 97%       General Appearance:  awake, alert, oriented, in no acute distress  Skin:  Skin color, texture, turgor normal. No rashes or lesions. Head/face:  NCAT  Eyes:  No gross abnormalities. Lungs:  Normal expansion. Clear to auscultation. Heart:  Heart regular rate  No scars or prior operations to upper chest    LABS:  CBC  No results for input(s): WBC, HGB, HCT, PLT in the last 72 hours. BMP  No results for input(s): NA, K, CL, CO2, BUN, CREATININE, CALCIUM in the last 72 hours. Invalid input(s): GLU  Liver Function  No results for input(s): AMYLASE, LIPASE, BILITOT, BILIDIR, AST, ALT, ALKPHOS, PROT, LABALBU in the last 72 hours. No results found for: LACTA      No results for input(s): INR, PTT in the last 72 hours. Invalid input(s): PT    RADIOLOGY    No results found. ASSESSMENT:  62 y.o. female with left sided breast cancer    PLAN:  Mediport placement  All questions answered.       Electronically signed by Shira Rodriguez MD on 6/18/19 at 8:51 AM

## 2019-06-18 NOTE — OP NOTE
Clista Prom  [birth year]      DATE OF PROCEDURE: 6/18/2019     SURGEON: Augusta Casey M.D.     ASSISTANT: Sampson Spear PGY-1     PREOPERATIVE DIAGNOSIS: left sided breast cancer. POSTOPERATIVE DIAGNOSIS: Same    OPERATION: Insertion of right internal jugular vein central venous catheter with subcutaneous reservoir using ultrasound guidance, fluoroscopy      ANESTHESIA: LMAC     ESTIMATED BLOOD LOSS: Minimal     COMPLICATIONS: None    DESCRIPTION OF PROCEDURE: The patient was identified and the procedure was confirmed. The right neck and chest were prepped and draped in the usual sterile fashion. Next, 1% lidocaine mixed with 0.25% Marcaine was used for local anesthesia. The right internal jugular vein was percutaneously entered using ultrasound guidance and followed into the internal jugular vein, then the guidewire was advanced into the superior vena cava under fluoroscopic guidance. A small incision was made around the wire. A second skin incision was made below the clavicle and a pocket was made in the subcutaneous tissue for the reservoir. The catheter was pulled through a subcutaneous tunnel from the inferior chest incision to the neck incision. The introducer was passed over the wire then the catheter was passed through the introducer and the tip was positioned in the superior vena cava right atrial junction under fluoroscopic guidance. The catheter was connected to the reservoir and the locking hub was engaged. The port was noted to withdrawal and flush blood easily and was flushed with heparinized saline solution. The reservoir was secured in the pocket with two Prolene sutures. Hemostasis was assured and the incisions were irrigated with saline solution. The incisions were approximated with Vicryl sutures and Dermabond was applied to the incisions in the operating room. Needle, sponge, and instrument counts were reported as correct times two.   The patient tolerated the procedure and was transferred to the recovery area in satisfactory condition.      Electronically signed by Ravindra Wong MD on 6/18/2019 at 11:43 AM

## 2019-06-18 NOTE — ANESTHESIA PRE PROCEDURE
Department of Anesthesiology  Preprocedure Note       Name:  Everett Dominguez   Age:  62 y.o.  :  1955                                          MRN:  [id number]         Date:  2019      Surgeon: Jose Roberto Hollins):  Yuri Martinez MD    Procedure: MEDI PORT PLACEMENT (N/A )    Medications prior to admission:   Prior to Admission medications    Medication Sig Start Date End Date Taking? Authorizing Provider   Multiple Vitamins-Minerals (MULTIVITAMIN WOMEN 50+ PO) Take by mouth STOP PREOP MED   Yes Historical Provider, MD   BIOTIN PO Take by mouth STOP PREOP MED   Yes Historical Provider, MD   metoprolol succinate (TOPROL XL) 50 MG extended release tablet TAKE ONE TABLET BY MOUTH EVERY night 3/7/17  Yes Historical Provider, MD   valsartan-hydrochlorothiazide (DIOVAN-HCT) 320-25 MG per tablet TAKE ONE TABLET BY MOUTH EVERY DAY 3/7/17  Yes Historical Provider, MD   clonazePAM (KLONOPIN) 0.5 MG tablet Take by mouth as needed   Yes Historical Provider, MD   aspirin 81 MG tablet Take 81 mg by mouth daily   Yes Historical Provider, MD       Current medications:    Current Facility-Administered Medications   Medication Dose Route Frequency Provider Last Rate Last Dose    0.9 % sodium chloride infusion   Intravenous Continuous Yuri Martinez MD        ceFAZolin (ANCEF) 2 g in dextrose 5 % 50 mL IVPB  2 g Intravenous On Call to [address], MD        sodium chloride flush 0.9 % injection 10 mL  10 mL Intravenous 2 times per day Yuri Martinez MD        sodium chloride flush 0.9 % injection 10 mL  10 mL Intravenous PRN Yuri Martinez MD           Allergies:     Allergies   Allergen Reactions    Tetracyclines & Related Hives       Problem List:    Patient Active Problem List   Diagnosis Code    Invasive ductal carcinoma of left breast ([facility] [address] C50.912       Past Medical History:        Diagnosis Date    Anxiety     Cancer ([facility] [address] 2019    breast    Heart murmur     Hypertension     Post-menopausal bleeding     S/P hyst/ polypectomy 6/10/14       Past Surgical History:        Procedure Laterality Date    BREAST BIOPSY       SECTION      COLONOSCOPY  2008    normal - Dr Kaitlynn Grover x 10 years    DILATION AND CURETTAGE         Social History:    Social History     Tobacco Use    Smoking status: Never Smoker    Smokeless tobacco: Never Used   Substance Use Topics    Alcohol use: Yes     Alcohol/week: 0.0 oz     Comment: socially                                Counseling given: Not Answered      Vital Signs (Current):   Vitals:    19 0818   BP: (!) 154/75   Pulse: 65   Resp: 20   Temp: 97.3 °F (36.3 °C)   TempSrc: Temporal   SpO2: 97%   Weight: 219 lb (99.3 kg)   Height: 5' 3\" (1.6 m)                                              BP Readings from Last 3 Encounters:   19 (!) 154/75   19 (!) 177/81   19 138/80       NPO Status:                                                                                 BMI:   Wt Readings from Last 3 Encounters:   19 219 lb (99.3 kg)   19 219 lb 14.4 oz (99.7 kg)   19 223 lb 11.2 oz (101.5 kg)     Body mass index is 38.79 kg/m². CBC:   Lab Results   Component Value Date    WBC 7.0 2019    RBC 4.65 2019    HGB 13.3 2019    HCT 40.2 2019    MCV 86.5 2019    RDW 15.7 2019     2019       CMP:   Lab Results   Component Value Date     2019    K 3.5 2019    CL 96 2019    CO2 26 2019    BUN 14 2019    CREATININE 0.9 2019    GFRAA >60 2019    LABGLOM >60 2019    GLUCOSE 106 2019    GLUCOSE 96 2011    PROT 7.4 2019    CALCIUM 10.6 2019    BILITOT 0.3 2019    ALKPHOS 67 2019    AST 24 2019    ALT 26 2019       POC Tests: No results for input(s): POCGLU, POCNA, POCK, POCCL, POCBUN, POCHEMO, POCHCT in the last 72 hours.     Coags: No results found for: PROTIME, INR, APTT    HCG (If Applicable): No results found for: PREGTESTUR, PREGSERUM, HCG, HCGQUANT     ABGs: No results found for: PHART, PO2ART, ATX6IUA, ZMD3AAJ, BEART, C5BUPHZC     Type & Screen (If Applicable):  No results found for: LABABO, [address]    Anesthesia Evaluation  Patient summary reviewed and Nursing notes reviewed no history of anesthetic complications:   Airway: Mallampati: I  TM distance: >3 FB   Neck ROM: full  Mouth opening: > = 3 FB Dental: normal exam         Pulmonary:Negative Pulmonary ROS breath sounds clear to auscultation                             Cardiovascular:    (+) hypertension:,         Rhythm: regular  Rate: normal                    Neuro/Psych:   (+) depression/anxiety             GI/Hepatic/Renal: Neg GI/Hepatic/Renal ROS            Endo/Other:    (+) malignancy/cancer (breast). Abdominal:           Vascular:                                      Anesthesia Plan      MAC     ASA 3       Induction: intravenous. Anesthetic plan and risks discussed with patient. Plan discussed with CRNA.                   Dale Galvez MD   6/18/2019

## 2019-06-19 ENCOUNTER — OFFICE VISIT (OUTPATIENT)
Dept: ONCOLOGY | Age: 64
End: 2019-06-19
Payer: COMMERCIAL

## 2019-06-19 ENCOUNTER — TELEPHONE (OUTPATIENT)
Dept: CASE MANAGEMENT | Age: 64
End: 2019-06-19

## 2019-06-19 DIAGNOSIS — T45.1X5A CHEMOTHERAPY-INDUCED NAUSEA AND VOMITING: ICD-10-CM

## 2019-06-19 DIAGNOSIS — R11.2 CHEMOTHERAPY-INDUCED NAUSEA AND VOMITING: ICD-10-CM

## 2019-06-19 DIAGNOSIS — C50.912 INVASIVE DUCTAL CARCINOMA OF LEFT BREAST (HCC): Primary | ICD-10-CM

## 2019-06-19 PROCEDURE — 99999 PR OFFICE/OUTPT VISIT,PROCEDURE ONLY: CPT | Performed by: INTERNAL MEDICINE

## 2019-06-19 PROCEDURE — 99215 OFFICE O/P EST HI 40 MIN: CPT | Performed by: INTERNAL MEDICINE

## 2019-06-19 RX ORDER — AMOXICILLIN 500 MG
1 CAPSULE ORAL DAILY
COMMUNITY
End: 2019-12-16

## 2019-06-19 RX ORDER — LORATADINE 10 MG/1
10 TABLET ORAL DAILY
Qty: 30 TABLET | Refills: 5 | Status: SHIPPED | OUTPATIENT
Start: 2019-06-19 | End: 2019-12-16

## 2019-06-19 RX ORDER — LIDOCAINE AND PRILOCAINE 25; 25 MG/G; MG/G
CREAM TOPICAL
Qty: 30 G | Refills: 5 | Status: SHIPPED | OUTPATIENT
Start: 2019-06-19 | End: 2019-12-16

## 2019-06-19 RX ORDER — DEXAMETHASONE 4 MG/1
4 TABLET ORAL 2 TIMES DAILY
Qty: 40 TABLET | Refills: 0 | Status: SHIPPED | OUTPATIENT
Start: 2019-06-19 | End: 2019-12-16

## 2019-06-19 RX ORDER — ONDANSETRON 4 MG/1
4 TABLET, FILM COATED ORAL EVERY 8 HOURS PRN
Qty: 60 TABLET | Refills: 1 | Status: SHIPPED | OUTPATIENT
Start: 2019-06-19 | End: 2019-10-21

## 2019-06-19 RX ORDER — PROCHLORPERAZINE MALEATE 10 MG
10 TABLET ORAL EVERY 6 HOURS PRN
Qty: 120 TABLET | Refills: 1 | Status: SHIPPED | OUTPATIENT
Start: 2019-06-19 | End: 2019-12-16

## 2019-06-19 RX ORDER — VITAMIN B COMPLEX
1 CAPSULE ORAL DAILY
COMMUNITY

## 2019-06-19 NOTE — TELEPHONE ENCOUNTER
Met with patient regarding chemotherapy for their recent breast cancer diagnosis. Reviewed Hair loss side effects. Patient also provided with Cranial prothesis script, TLC booklet, [facility] pamphlet, and local list of wig suppliers. ACS cook book provided to patient as well as your beauty program information from [facility]. Patient had the opportunity to ask questions with all questions being answered to their satisfaction. Patient recieved navigator contact information to take home and encouraged patient/family to call if they had any additional questions or concerns. The patient verbalized understanding of hair loss side effects and was appreciative of visit. NN Will continue to follow.       Electronically signed by Mary Beth Lees RN on 6/19/2019 at 12:04 PM

## 2019-06-19 NOTE — PROGRESS NOTES
Spoke with patient about NACT chemotherapy (TC). We went over the protocol to be used, what to expect as far as side effects, and when to call with problems. This was intended to reinforce the education done by Dr. Nathalie Grady. Patient was given an [address] provided by [employer]. Patient was provide lab requisitions for blood work prior to chemotherapy, Rx for cranial prothsis, and sent in the following prescriptions for patient to have prior to start of chemotherapy - compazine, zofran, emla cream for port access for treatment days. Patient was provided medication and dietary handouts to the patient to take home and told patient to call if there are any questions once they had a chance to absorb the information. Patient had the opportunity to ask questions with all questions being answered to their satisfaction. The patient expresses understanding and acceptance of instructions.     Electronically signed by Flora Adrian [address] [place] [address] on 6/19/2019 at 12:55 PM

## 2019-06-19 NOTE — PROGRESS NOTES
Met with patient today briefly while NN giving patient a tour of the facility for her chemotherapy teaching. Introduced myself and spent time reviewing the safe protocol for eating during chemo, as well as ways to help combat side effects. She will need monitored for side effects that arise and need for intervention. Mailed patient information on diarrhea and diet as well as hydration options. She states she does have Ensure high protein on hand at home ready to drink. Follow.  Fahad Heath RD,,lD

## 2019-06-22 ENCOUNTER — HOSPITAL ENCOUNTER (OUTPATIENT)
Age: 64
Discharge: HOME | End: 2019-06-22
Payer: COMMERCIAL

## 2019-06-22 DIAGNOSIS — C50.912 INVASIVE DUCTAL CARCINOMA OF LEFT BREAST (HCC): ICD-10-CM

## 2019-06-22 LAB
ALBUMIN SERPL-MCNC: 4.3 G/DL (ref 3.5–5.2)
ALP BLD-CCNC: 67 U/L (ref 35–104)
ALT SERPL-CCNC: 24 U/L (ref 0–32)
ANION GAP SERPL CALCULATED.3IONS-SCNC: 11 MMOL/L (ref 7–16)
AST SERPL-CCNC: 21 U/L (ref 0–31)
BASOPHILS ABSOLUTE: 0.05 E9/L (ref 0–0.2)
BASOPHILS RELATIVE PERCENT: 0.5 % (ref 0–2)
BILIRUB SERPL-MCNC: 0.3 MG/DL (ref 0–1.2)
BUN BLDV-MCNC: 17 MG/DL (ref 8–23)
CALCIUM SERPL-MCNC: 9.8 MG/DL (ref 8.6–10.2)
CHLORIDE BLD-SCNC: 96 MMOL/L (ref 98–107)
CO2: 28 MMOL/L (ref 22–29)
CREAT SERPL-MCNC: 1 MG/DL (ref 0.5–1)
EOSINOPHILS ABSOLUTE: 0.24 E9/L (ref 0.05–0.5)
EOSINOPHILS RELATIVE PERCENT: 2.4 % (ref 0–6)
GFR AFRICAN AMERICAN: >60
GFR NON-AFRICAN AMERICAN: 56 ML/MIN/1.73
GLUCOSE BLD-MCNC: 92 MG/DL (ref 74–99)
HCT VFR BLD CALC: 38.4 % (ref 34–48)
HEMOGLOBIN: 12.8 G/DL (ref 11.5–15.5)
IMMATURE GRANULOCYTES #: 0.04 E9/L
IMMATURE GRANULOCYTES %: 0.4 % (ref 0–5)
LYMPHOCYTES ABSOLUTE: 3.4 E9/L (ref 1.5–4)
LYMPHOCYTES RELATIVE PERCENT: 33.8 % (ref 20–42)
MAGNESIUM: 1.9 MG/DL (ref 1.6–2.6)
MCH RBC QN AUTO: 28.8 PG (ref 26–35)
MCHC RBC AUTO-ENTMCNC: 33.3 % (ref 32–34.5)
MCV RBC AUTO: 86.3 FL (ref 80–99.9)
MONOCYTES ABSOLUTE: 0.74 E9/L (ref 0.1–0.95)
MONOCYTES RELATIVE PERCENT: 7.4 % (ref 2–12)
NEUTROPHILS ABSOLUTE: 5.59 E9/L (ref 1.8–7.3)
NEUTROPHILS RELATIVE PERCENT: 55.5 % (ref 43–80)
PDW BLD-RTO: 16 FL (ref 11.5–15)
PLATELET # BLD: 252 E9/L (ref 130–450)
PMV BLD AUTO: 11.5 FL (ref 7–12)
POTASSIUM SERPL-SCNC: 3.9 MMOL/L (ref 3.5–5)
RBC # BLD: 4.45 E12/L (ref 3.5–5.5)
SODIUM BLD-SCNC: 135 MMOL/L (ref 132–146)
TOTAL PROTEIN: 7 G/DL (ref 6.4–8.3)
WBC # BLD: 10.1 E9/L (ref 4.5–11.5)

## 2019-06-22 PROCEDURE — 85025 COMPLETE CBC W/AUTO DIFF WBC: CPT

## 2019-06-22 PROCEDURE — 83735 ASSAY OF MAGNESIUM: CPT

## 2019-06-22 PROCEDURE — 80053 COMPREHEN METABOLIC PANEL: CPT

## 2019-06-22 PROCEDURE — 36415 COLL VENOUS BLD VENIPUNCTURE: CPT

## 2019-06-24 ENCOUNTER — OFFICE VISIT (OUTPATIENT)
Dept: ONCOLOGY | Age: 64
End: 2019-06-24
Payer: COMMERCIAL

## 2019-06-24 ENCOUNTER — TELEPHONE (OUTPATIENT)
Dept: CASE MANAGEMENT | Age: 64
End: 2019-06-24

## 2019-06-24 ENCOUNTER — HOSPITAL ENCOUNTER (OUTPATIENT)
Dept: INFUSION THERAPY | Age: 64
Discharge: HOME | End: 2019-06-24
Payer: COMMERCIAL

## 2019-06-24 VITALS
RESPIRATION RATE: 18 BRPM | SYSTOLIC BLOOD PRESSURE: 137 MMHG | TEMPERATURE: 97.5 F | DIASTOLIC BLOOD PRESSURE: 63 MMHG | HEART RATE: 53 BPM

## 2019-06-24 VITALS
BODY MASS INDEX: 38.97 KG/M2 | HEART RATE: 68 BPM | SYSTOLIC BLOOD PRESSURE: 164 MMHG | DIASTOLIC BLOOD PRESSURE: 77 MMHG | WEIGHT: 220 LBS

## 2019-06-24 DIAGNOSIS — C50.912 INVASIVE DUCTAL CARCINOMA OF LEFT BREAST (HCC): Primary | ICD-10-CM

## 2019-06-24 DIAGNOSIS — T45.1X5A ALOPECIA DUE TO CYTOTOXIC DRUG: ICD-10-CM

## 2019-06-24 DIAGNOSIS — Z09 FOLLOW UP: Primary | ICD-10-CM

## 2019-06-24 DIAGNOSIS — L65.8 ALOPECIA DUE TO CYTOTOXIC DRUG: ICD-10-CM

## 2019-06-24 PROCEDURE — 2580000003 HC RX 258: Performed by: INTERNAL MEDICINE

## 2019-06-24 PROCEDURE — 1036F TOBACCO NON-USER: CPT | Performed by: INTERNAL MEDICINE

## 2019-06-24 PROCEDURE — 6370000000 HC RX 637 (ALT 250 FOR IP): Performed by: INTERNAL MEDICINE

## 2019-06-24 PROCEDURE — G8427 DOCREV CUR MEDS BY ELIG CLIN: HCPCS | Performed by: INTERNAL MEDICINE

## 2019-06-24 PROCEDURE — 96375 TX/PRO/DX INJ NEW DRUG ADDON: CPT

## 2019-06-24 PROCEDURE — 96366 THER/PROPH/DIAG IV INF ADDON: CPT

## 2019-06-24 PROCEDURE — G8417 CALC BMI ABV UP PARAM F/U: HCPCS | Performed by: INTERNAL MEDICINE

## 2019-06-24 PROCEDURE — 2580000003 HC RX 258

## 2019-06-24 PROCEDURE — 96417 CHEMO IV INFUS EACH ADDL SEQ: CPT

## 2019-06-24 PROCEDURE — 96415 CHEMO IV INFUSION ADDL HR: CPT

## 2019-06-24 PROCEDURE — 96413 CHEMO IV INFUSION 1 HR: CPT

## 2019-06-24 PROCEDURE — 3017F COLORECTAL CA SCREEN DOC REV: CPT | Performed by: INTERNAL MEDICINE

## 2019-06-24 PROCEDURE — 99214 OFFICE O/P EST MOD 30 MIN: CPT | Performed by: INTERNAL MEDICINE

## 2019-06-24 PROCEDURE — 6360000002 HC RX W HCPCS: Performed by: INTERNAL MEDICINE

## 2019-06-24 RX ORDER — PALONOSETRON HYDROCHLORIDE 0.05 MG/ML
0.25 INJECTION, SOLUTION INTRAVENOUS ONCE
Status: COMPLETED | OUTPATIENT
Start: 2019-06-24 | End: 2019-06-24

## 2019-06-24 RX ORDER — 0.9 % SODIUM CHLORIDE 0.9 %
10 VIAL (ML) INJECTION ONCE
Status: CANCELLED | OUTPATIENT
Start: 2019-06-24

## 2019-06-24 RX ORDER — LOPERAMIDE HYDROCHLORIDE 2 MG/1
4 CAPSULE ORAL ONCE
Status: COMPLETED | OUTPATIENT
Start: 2019-06-24 | End: 2019-06-24

## 2019-06-24 RX ORDER — MEPERIDINE HYDROCHLORIDE 25 MG/ML
12.5 INJECTION INTRAMUSCULAR; INTRAVENOUS; SUBCUTANEOUS ONCE
Status: CANCELLED | OUTPATIENT
Start: 2019-06-24

## 2019-06-24 RX ORDER — DEXAMETHASONE SODIUM PHOSPHATE 10 MG/ML
10 INJECTION INTRAMUSCULAR; INTRAVENOUS ONCE
Status: COMPLETED | OUTPATIENT
Start: 2019-06-24 | End: 2019-06-24

## 2019-06-24 RX ORDER — SODIUM CHLORIDE 9 MG/ML
20 INJECTION, SOLUTION INTRAVENOUS ONCE
Status: CANCELLED | OUTPATIENT
Start: 2019-06-24

## 2019-06-24 RX ORDER — SODIUM CHLORIDE 0.9 % (FLUSH) 0.9 %
10 SYRINGE (ML) INJECTION PRN
Status: DISCONTINUED | OUTPATIENT
Start: 2019-06-24 | End: 2019-06-25 | Stop reason: HOSPADM

## 2019-06-24 RX ORDER — HEPARIN SODIUM (PORCINE) LOCK FLUSH IV SOLN 100 UNIT/ML 100 UNIT/ML
500 SOLUTION INTRAVENOUS PRN
Status: CANCELLED | OUTPATIENT
Start: 2019-06-24

## 2019-06-24 RX ORDER — DIPHENHYDRAMINE HYDROCHLORIDE 50 MG/ML
50 INJECTION INTRAMUSCULAR; INTRAVENOUS ONCE
Status: CANCELLED | OUTPATIENT
Start: 2019-06-24

## 2019-06-24 RX ORDER — PALONOSETRON 0.05 MG/ML
0.25 INJECTION, SOLUTION INTRAVENOUS ONCE
Status: CANCELLED | OUTPATIENT
Start: 2019-06-24

## 2019-06-24 RX ORDER — METHYLPREDNISOLONE SODIUM SUCCINATE 125 MG/2ML
125 INJECTION, POWDER, LYOPHILIZED, FOR SOLUTION INTRAMUSCULAR; INTRAVENOUS ONCE
Status: CANCELLED | OUTPATIENT
Start: 2019-06-24

## 2019-06-24 RX ORDER — SODIUM CHLORIDE 9 MG/ML
20 INJECTION, SOLUTION INTRAVENOUS ONCE
Status: DISCONTINUED | OUTPATIENT
Start: 2019-06-24 | End: 2019-06-25 | Stop reason: HOSPADM

## 2019-06-24 RX ORDER — DEXAMETHASONE SODIUM PHOSPHATE 10 MG/ML
10 INJECTION, SOLUTION INTRAMUSCULAR; INTRAVENOUS ONCE
Status: CANCELLED | OUTPATIENT
Start: 2019-06-24

## 2019-06-24 RX ORDER — HEPARIN SODIUM (PORCINE) LOCK FLUSH IV SOLN 100 UNIT/ML 100 UNIT/ML
500 SOLUTION INTRAVENOUS PRN
Status: DISCONTINUED | OUTPATIENT
Start: 2019-06-24 | End: 2019-06-25 | Stop reason: HOSPADM

## 2019-06-24 RX ORDER — LOPERAMIDE HYDROCHLORIDE 2 MG/1
4 CAPSULE ORAL ONCE
Status: CANCELLED
Start: 2019-06-24

## 2019-06-24 RX ORDER — EPINEPHRINE 1 MG/ML
0.3 INJECTION, SOLUTION, CONCENTRATE INTRAVENOUS PRN
Status: CANCELLED | OUTPATIENT
Start: 2019-06-24

## 2019-06-24 RX ORDER — SODIUM CHLORIDE 9 MG/ML
INJECTION, SOLUTION INTRAVENOUS CONTINUOUS
Status: CANCELLED | OUTPATIENT
Start: 2019-06-24

## 2019-06-24 RX ORDER — SODIUM CHLORIDE 9 MG/ML
INJECTION, SOLUTION INTRAVENOUS
Status: DISCONTINUED
Start: 2019-06-24 | End: 2019-06-25 | Stop reason: HOSPADM

## 2019-06-24 RX ORDER — SODIUM CHLORIDE 0.9 % (FLUSH) 0.9 %
10 SYRINGE (ML) INJECTION PRN
Status: CANCELLED | OUTPATIENT
Start: 2019-06-24

## 2019-06-24 RX ORDER — SODIUM CHLORIDE 0.9 % (FLUSH) 0.9 %
5 SYRINGE (ML) INJECTION PRN
Status: CANCELLED | OUTPATIENT
Start: 2019-06-24

## 2019-06-24 RX ADMIN — Medication 10 ML: at 15:54

## 2019-06-24 RX ADMIN — PERTUZUMAB 840 MG: 30 INJECTION, SOLUTION, CONCENTRATE INTRAVENOUS at 11:12

## 2019-06-24 RX ADMIN — DOCETAXEL 160 MG: 20 INJECTION, SOLUTION INTRAVENOUS at 13:46

## 2019-06-24 RX ADMIN — TRASTUZUMAB 798 MG: 150 INJECTION, POWDER, LYOPHILIZED, FOR SOLUTION INTRAVENOUS at 09:17

## 2019-06-24 RX ADMIN — PALONOSETRON 0.25 MG: 0.25 INJECTION, SOLUTION INTRAVENOUS at 09:01

## 2019-06-24 RX ADMIN — SODIUM CHLORIDE 20 ML/HR: 9 INJECTION, SOLUTION INTRAVENOUS at 08:50

## 2019-06-24 RX ADMIN — Medication 10 ML: at 09:04

## 2019-06-24 RX ADMIN — SODIUM CHLORIDE 150 MG: 9 INJECTION, SOLUTION INTRAVENOUS at 12:30

## 2019-06-24 RX ADMIN — CARBOPLATIN 650 MG: 10 INJECTION, SOLUTION INTRAVENOUS at 15:04

## 2019-06-24 RX ADMIN — SODIUM CHLORIDE, PRESERVATIVE FREE 500 UNITS: 5 INJECTION INTRAVENOUS at 15:54

## 2019-06-24 RX ADMIN — Medication 10 ML: at 08:49

## 2019-06-24 RX ADMIN — DEXAMETHASONE SODIUM PHOSPHATE 10 MG: 10 INJECTION INTRAMUSCULAR; INTRAVENOUS at 09:03

## 2019-06-24 RX ADMIN — LOPERAMIDE HYDROCHLORIDE 4 MG: 2 CAPSULE ORAL at 08:56

## 2019-06-24 NOTE — TELEPHONE ENCOUNTER
Met with patient and  during her follow up with Dr. Kira Dias for her recent breast cancer diagnosis. Reviewed with patient symptom management and resources available. Patient was friendly and receptive. Today is cycle #1 TCHP, patient prepared for side effect management at home. She was in good spirits today. Encouraged patient to call with questions or concerns between office visits. Verbalizes understanding. Patient appreciative of visit. NN Will continue to follow.

## 2019-06-25 ENCOUNTER — HOSPITAL ENCOUNTER (OUTPATIENT)
Dept: INFUSION THERAPY | Age: 64
Discharge: HOME | End: 2019-06-25
Payer: COMMERCIAL

## 2019-06-25 ENCOUNTER — TELEPHONE (OUTPATIENT)
Dept: CASE MANAGEMENT | Age: 64
End: 2019-06-25

## 2019-06-25 DIAGNOSIS — C50.912 INVASIVE DUCTAL CARCINOMA OF LEFT BREAST (HCC): Primary | ICD-10-CM

## 2019-06-25 PROCEDURE — 6360000002 HC RX W HCPCS: Performed by: INTERNAL MEDICINE

## 2019-06-25 PROCEDURE — 96372 THER/PROPH/DIAG INJ SC/IM: CPT

## 2019-06-25 RX ADMIN — PEGFILGRASTIM-CBQV 6 MG: 6 INJECTION, SOLUTION SUBCUTANEOUS at 16:16

## 2019-06-27 ENCOUNTER — TELEPHONE (OUTPATIENT)
Dept: RADIATION ONCOLOGY | Age: 64
End: 2019-06-27

## 2019-06-27 NOTE — TELEPHONE ENCOUNTER
Called pt for follow up, pt started [facility] on Monday. Pt reported that she feels like she has the flu today. Discussed that the steroid and anti-emetic medications given to her via IV generally wear off after three days. Pt stated that she is slightly nauseous but that it is not very bad and she is able to eat and drink still. She commented that she initially started to take anti-emetics as instructed to get ahead of the nausea but that the Compazine caused her to become very drowsy and the Zofran caused her to become lightheaded. She is now just drinking na tea and eating crackers to control the nausea. Encouraged her to continue this and also discussed making sure that she is getting protein in her diet. Encouraged her to avoid spicy, acidic, or high fat foods and to eat small frequent meals. Additionally discussed [employer] pressure wrist bands. Pt also reported that she has been constipated since Monday. She added that she took Imodium as directed on Monday to prevent diarrhea as she tends to already have loose stools twice daily. However she stated that she has not taken any since and that today her had her first BM in 4 days. Encouraged pt to not take any more unless she does start to have diarrhea. Informed Mely Case, pharmacist of this. All questions were answered.      Cnog Major

## 2019-07-02 DIAGNOSIS — K52.1 CHEMOTHERAPY-INDUCED DIARRHEA: ICD-10-CM

## 2019-07-02 DIAGNOSIS — T45.1X5A CHEMOTHERAPY-INDUCED DIARRHEA: ICD-10-CM

## 2019-07-02 DIAGNOSIS — C50.912 INVASIVE DUCTAL CARCINOMA OF LEFT BREAST (HCC): Primary | ICD-10-CM

## 2019-07-02 RX ORDER — DIPHENOXYLATE HYDROCHLORIDE AND ATROPINE SULFATE 2.5; .025 MG/1; MG/1
2 TABLET ORAL 4 TIMES DAILY PRN
Qty: 120 TABLET | Refills: 0 | OUTPATIENT
Start: 2019-07-02 | End: 2019-07-17

## 2019-07-23 ENCOUNTER — HOSPITAL ENCOUNTER (OUTPATIENT)
Age: 64
Discharge: HOME | End: 2019-07-23
Payer: COMMERCIAL

## 2019-07-23 DIAGNOSIS — C50.912 INVASIVE DUCTAL CARCINOMA OF LEFT BREAST (HCC): ICD-10-CM

## 2019-07-23 LAB
ALBUMIN SERPL-MCNC: 4.3 G/DL (ref 3.5–5.2)
ALP BLD-CCNC: 72 U/L (ref 35–104)
ALT SERPL-CCNC: 32 U/L (ref 0–32)
ANION GAP SERPL CALCULATED.3IONS-SCNC: 12 MMOL/L (ref 7–16)
AST SERPL-CCNC: 24 U/L (ref 0–31)
BASOPHILS ABSOLUTE: 0.06 E9/L (ref 0–0.2)
BASOPHILS RELATIVE PERCENT: 0.9 % (ref 0–2)
BILIRUB SERPL-MCNC: 0.3 MG/DL (ref 0–1.2)
BUN BLDV-MCNC: 16 MG/DL (ref 8–23)
CALCIUM SERPL-MCNC: 9.8 MG/DL (ref 8.6–10.2)
CHLORIDE BLD-SCNC: 100 MMOL/L (ref 98–107)
CO2: 29 MMOL/L (ref 22–29)
CREAT SERPL-MCNC: 1 MG/DL (ref 0.5–1)
EOSINOPHILS ABSOLUTE: 0.45 E9/L (ref 0.05–0.5)
EOSINOPHILS RELATIVE PERCENT: 6.5 % (ref 0–6)
GFR AFRICAN AMERICAN: >60
GFR NON-AFRICAN AMERICAN: 56 ML/MIN/1.73
GLUCOSE BLD-MCNC: 115 MG/DL (ref 74–99)
HCT VFR BLD CALC: 35.5 % (ref 34–48)
HEMOGLOBIN: 11.8 G/DL (ref 11.5–15.5)
IMMATURE GRANULOCYTES #: 0.02 E9/L
IMMATURE GRANULOCYTES %: 0.3 % (ref 0–5)
LYMPHOCYTES ABSOLUTE: 2.82 E9/L (ref 1.5–4)
LYMPHOCYTES RELATIVE PERCENT: 40.5 % (ref 20–42)
MAGNESIUM: 2.2 MG/DL (ref 1.6–2.6)
MCH RBC QN AUTO: 29.3 PG (ref 26–35)
MCHC RBC AUTO-ENTMCNC: 33.2 % (ref 32–34.5)
MCV RBC AUTO: 88.1 FL (ref 80–99.9)
MONOCYTES ABSOLUTE: 0.59 E9/L (ref 0.1–0.95)
MONOCYTES RELATIVE PERCENT: 8.5 % (ref 2–12)
NEUTROPHILS ABSOLUTE: 3.02 E9/L (ref 1.8–7.3)
NEUTROPHILS RELATIVE PERCENT: 43.3 % (ref 43–80)
PDW BLD-RTO: 17.3 FL (ref 11.5–15)
PLATELET # BLD: 329 E9/L (ref 130–450)
PMV BLD AUTO: 10.2 FL (ref 7–12)
POTASSIUM SERPL-SCNC: 3.5 MMOL/L (ref 3.5–5)
RBC # BLD: 4.03 E12/L (ref 3.5–5.5)
SODIUM BLD-SCNC: 141 MMOL/L (ref 132–146)
TOTAL PROTEIN: 7.1 G/DL (ref 6.4–8.3)
WBC # BLD: 7 E9/L (ref 4.5–11.5)

## 2019-07-23 PROCEDURE — 36415 COLL VENOUS BLD VENIPUNCTURE: CPT

## 2019-07-23 PROCEDURE — 85025 COMPLETE CBC W/AUTO DIFF WBC: CPT

## 2019-07-23 PROCEDURE — 83735 ASSAY OF MAGNESIUM: CPT

## 2019-07-23 PROCEDURE — 80053 COMPREHEN METABOLIC PANEL: CPT

## 2019-07-24 ENCOUNTER — TELEPHONE (OUTPATIENT)
Dept: CASE MANAGEMENT | Age: 64
End: 2019-07-24

## 2019-07-24 ENCOUNTER — OFFICE VISIT (OUTPATIENT)
Dept: ONCOLOGY | Age: 64
End: 2019-07-24
Payer: COMMERCIAL

## 2019-07-24 ENCOUNTER — HOSPITAL ENCOUNTER (OUTPATIENT)
Dept: INFUSION THERAPY | Age: 64
Discharge: HOME | End: 2019-07-24
Payer: COMMERCIAL

## 2019-07-24 VITALS
HEIGHT: 63 IN | HEART RATE: 85 BPM | WEIGHT: 213.3 LBS | SYSTOLIC BLOOD PRESSURE: 170 MMHG | DIASTOLIC BLOOD PRESSURE: 75 MMHG | TEMPERATURE: 97.9 F | BODY MASS INDEX: 37.79 KG/M2 | RESPIRATION RATE: 16 BRPM

## 2019-07-24 VITALS
TEMPERATURE: 97.9 F | DIASTOLIC BLOOD PRESSURE: 65 MMHG | SYSTOLIC BLOOD PRESSURE: 139 MMHG | HEART RATE: 54 BPM | RESPIRATION RATE: 16 BRPM

## 2019-07-24 DIAGNOSIS — T45.1X5A CHEMOTHERAPY-INDUCED NAUSEA AND VOMITING: Primary | ICD-10-CM

## 2019-07-24 DIAGNOSIS — R11.2 CHEMOTHERAPY-INDUCED NAUSEA AND VOMITING: Primary | ICD-10-CM

## 2019-07-24 DIAGNOSIS — C50.912 INVASIVE DUCTAL CARCINOMA OF LEFT BREAST (HCC): Primary | ICD-10-CM

## 2019-07-24 PROCEDURE — 1036F TOBACCO NON-USER: CPT | Performed by: INTERNAL MEDICINE

## 2019-07-24 PROCEDURE — 3017F COLORECTAL CA SCREEN DOC REV: CPT | Performed by: INTERNAL MEDICINE

## 2019-07-24 PROCEDURE — 2580000003 HC RX 258: Performed by: INTERNAL MEDICINE

## 2019-07-24 PROCEDURE — 6360000002 HC RX W HCPCS: Performed by: INTERNAL MEDICINE

## 2019-07-24 PROCEDURE — G8417 CALC BMI ABV UP PARAM F/U: HCPCS | Performed by: INTERNAL MEDICINE

## 2019-07-24 PROCEDURE — G8427 DOCREV CUR MEDS BY ELIG CLIN: HCPCS | Performed by: INTERNAL MEDICINE

## 2019-07-24 PROCEDURE — 96366 THER/PROPH/DIAG IV INF ADDON: CPT

## 2019-07-24 PROCEDURE — 6360000002 HC RX W HCPCS

## 2019-07-24 PROCEDURE — 96417 CHEMO IV INFUS EACH ADDL SEQ: CPT

## 2019-07-24 PROCEDURE — 96375 TX/PRO/DX INJ NEW DRUG ADDON: CPT

## 2019-07-24 PROCEDURE — 96413 CHEMO IV INFUSION 1 HR: CPT

## 2019-07-24 PROCEDURE — 2580000003 HC RX 258

## 2019-07-24 PROCEDURE — 99214 OFFICE O/P EST MOD 30 MIN: CPT | Performed by: INTERNAL MEDICINE

## 2019-07-24 RX ORDER — MEPERIDINE HYDROCHLORIDE 25 MG/ML
12.5 INJECTION INTRAMUSCULAR; INTRAVENOUS; SUBCUTANEOUS ONCE
Status: CANCELLED | OUTPATIENT
Start: 2019-07-24

## 2019-07-24 RX ORDER — EPINEPHRINE 1 MG/ML
0.3 INJECTION, SOLUTION, CONCENTRATE INTRAVENOUS PRN
Status: CANCELLED | OUTPATIENT
Start: 2019-07-24

## 2019-07-24 RX ORDER — HEPARIN SODIUM (PORCINE) LOCK FLUSH IV SOLN 100 UNIT/ML 100 UNIT/ML
500 SOLUTION INTRAVENOUS PRN
Status: DISCONTINUED | OUTPATIENT
Start: 2019-07-24 | End: 2019-07-25 | Stop reason: HOSPADM

## 2019-07-24 RX ORDER — SODIUM CHLORIDE 0.9 % (FLUSH) 0.9 %
10 SYRINGE (ML) INJECTION PRN
Status: DISCONTINUED | OUTPATIENT
Start: 2019-07-24 | End: 2019-07-25 | Stop reason: HOSPADM

## 2019-07-24 RX ORDER — DEXAMETHASONE SODIUM PHOSPHATE 10 MG/ML
10 INJECTION INTRAMUSCULAR; INTRAVENOUS ONCE
Status: COMPLETED | OUTPATIENT
Start: 2019-07-24 | End: 2019-07-24

## 2019-07-24 RX ORDER — SODIUM CHLORIDE 0.9 % (FLUSH) 0.9 %
SYRINGE (ML) INJECTION
Status: COMPLETED
Start: 2019-07-24 | End: 2019-07-24

## 2019-07-24 RX ORDER — SODIUM CHLORIDE 9 MG/ML
20 INJECTION, SOLUTION INTRAVENOUS ONCE
Status: DISCONTINUED | OUTPATIENT
Start: 2019-07-24 | End: 2019-07-25 | Stop reason: HOSPADM

## 2019-07-24 RX ORDER — 0.9 % SODIUM CHLORIDE 0.9 %
10 VIAL (ML) INJECTION ONCE
Status: CANCELLED | OUTPATIENT
Start: 2019-07-24

## 2019-07-24 RX ORDER — SCOLOPAMINE TRANSDERMAL SYSTEM 1 MG/1
1 PATCH, EXTENDED RELEASE TRANSDERMAL
Qty: 10 PATCH | Refills: 11 | Status: SHIPPED
Start: 2019-07-24 | End: 2020-05-27

## 2019-07-24 RX ORDER — SODIUM CHLORIDE 9 MG/ML
20 INJECTION, SOLUTION INTRAVENOUS ONCE
Status: CANCELLED | OUTPATIENT
Start: 2019-07-24

## 2019-07-24 RX ORDER — SODIUM CHLORIDE 0.9 % (FLUSH) 0.9 %
10 SYRINGE (ML) INJECTION PRN
Status: CANCELLED | OUTPATIENT
Start: 2019-07-24

## 2019-07-24 RX ORDER — PALONOSETRON 0.05 MG/ML
0.25 INJECTION, SOLUTION INTRAVENOUS ONCE
Status: CANCELLED | OUTPATIENT
Start: 2019-07-24

## 2019-07-24 RX ORDER — SODIUM CHLORIDE 9 MG/ML
INJECTION, SOLUTION INTRAVENOUS CONTINUOUS
Status: CANCELLED | OUTPATIENT
Start: 2019-07-24

## 2019-07-24 RX ORDER — PALONOSETRON HYDROCHLORIDE 0.05 MG/ML
0.25 INJECTION, SOLUTION INTRAVENOUS ONCE
Status: COMPLETED | OUTPATIENT
Start: 2019-07-24 | End: 2019-07-24

## 2019-07-24 RX ORDER — DEXAMETHASONE SODIUM PHOSPHATE 10 MG/ML
10 INJECTION, SOLUTION INTRAMUSCULAR; INTRAVENOUS ONCE
Status: CANCELLED | OUTPATIENT
Start: 2019-07-24

## 2019-07-24 RX ORDER — HEPARIN SODIUM (PORCINE) LOCK FLUSH IV SOLN 100 UNIT/ML 100 UNIT/ML
500 SOLUTION INTRAVENOUS PRN
Status: CANCELLED | OUTPATIENT
Start: 2019-07-24

## 2019-07-24 RX ORDER — DIPHENHYDRAMINE HYDROCHLORIDE 50 MG/ML
50 INJECTION INTRAMUSCULAR; INTRAVENOUS ONCE
Status: CANCELLED | OUTPATIENT
Start: 2019-07-24

## 2019-07-24 RX ORDER — METHYLPREDNISOLONE SODIUM SUCCINATE 125 MG/2ML
125 INJECTION, POWDER, LYOPHILIZED, FOR SOLUTION INTRAMUSCULAR; INTRAVENOUS ONCE
Status: CANCELLED | OUTPATIENT
Start: 2019-07-24

## 2019-07-24 RX ORDER — SODIUM CHLORIDE 0.9 % (FLUSH) 0.9 %
5 SYRINGE (ML) INJECTION PRN
Status: CANCELLED | OUTPATIENT
Start: 2019-07-24

## 2019-07-24 RX ORDER — HEPARIN SODIUM (PORCINE) LOCK FLUSH IV SOLN 100 UNIT/ML 100 UNIT/ML
SOLUTION INTRAVENOUS
Status: COMPLETED
Start: 2019-07-24 | End: 2019-07-24

## 2019-07-24 RX ADMIN — DOCETAXEL 160 MG: 20 INJECTION, SOLUTION INTRAVENOUS at 12:17

## 2019-07-24 RX ADMIN — Medication 10 ML: at 08:50

## 2019-07-24 RX ADMIN — PALONOSETRON 0.25 MG: 0.25 INJECTION, SOLUTION INTRAVENOUS at 08:52

## 2019-07-24 RX ADMIN — PERTUZUMAB 420 MG: 30 INJECTION, SOLUTION, CONCENTRATE INTRAVENOUS at 11:31

## 2019-07-24 RX ADMIN — SODIUM CHLORIDE 150 MG: 9 INJECTION, SOLUTION INTRAVENOUS at 10:40

## 2019-07-24 RX ADMIN — Medication 10 ML: at 09:03

## 2019-07-24 RX ADMIN — TRASTUZUMAB 588 MG: 150 INJECTION, POWDER, LYOPHILIZED, FOR SOLUTION INTRAVENOUS at 09:11

## 2019-07-24 RX ADMIN — HEPARIN 500 UNITS: 100 SYRINGE at 14:19

## 2019-07-24 RX ADMIN — SODIUM CHLORIDE 20 ML/HR: 9 INJECTION, SOLUTION INTRAVENOUS at 08:56

## 2019-07-24 RX ADMIN — DEXAMETHASONE SODIUM PHOSPHATE 10 MG: 10 INJECTION INTRAMUSCULAR; INTRAVENOUS at 08:54

## 2019-07-24 RX ADMIN — CARBOPLATIN 650 MG: 10 INJECTION, SOLUTION INTRAVENOUS at 13:33

## 2019-07-25 ENCOUNTER — HOSPITAL ENCOUNTER (OUTPATIENT)
Dept: INFUSION THERAPY | Age: 64
Discharge: HOME | End: 2019-07-25
Payer: COMMERCIAL

## 2019-07-25 DIAGNOSIS — C50.912 INVASIVE DUCTAL CARCINOMA OF LEFT BREAST (HCC): Primary | ICD-10-CM

## 2019-07-25 PROCEDURE — 96372 THER/PROPH/DIAG INJ SC/IM: CPT

## 2019-07-25 PROCEDURE — 6360000002 HC RX W HCPCS: Performed by: INTERNAL MEDICINE

## 2019-07-25 RX ADMIN — PEGFILGRASTIM-CBQV 6 MG: 6 INJECTION, SOLUTION SUBCUTANEOUS at 14:21

## 2019-08-13 ENCOUNTER — HOSPITAL ENCOUNTER (OUTPATIENT)
Age: 64
Discharge: HOME | End: 2019-08-13
Payer: COMMERCIAL

## 2019-08-13 DIAGNOSIS — C50.912 INVASIVE DUCTAL CARCINOMA OF LEFT BREAST (HCC): ICD-10-CM

## 2019-08-13 LAB
ALBUMIN SERPL-MCNC: 4.4 G/DL (ref 3.5–5.2)
ALP BLD-CCNC: 72 U/L (ref 35–104)
ALT SERPL-CCNC: 41 U/L (ref 0–32)
ANION GAP SERPL CALCULATED.3IONS-SCNC: 11 MMOL/L (ref 7–16)
AST SERPL-CCNC: 27 U/L (ref 0–31)
BASOPHILS ABSOLUTE: 0.05 E9/L (ref 0–0.2)
BASOPHILS RELATIVE PERCENT: 0.8 % (ref 0–2)
BILIRUB SERPL-MCNC: 0.3 MG/DL (ref 0–1.2)
BUN BLDV-MCNC: 13 MG/DL (ref 8–23)
CALCIUM SERPL-MCNC: 10 MG/DL (ref 8.6–10.2)
CHLORIDE BLD-SCNC: 94 MMOL/L (ref 98–107)
CO2: 28 MMOL/L (ref 22–29)
CREAT SERPL-MCNC: 0.9 MG/DL (ref 0.5–1)
EOSINOPHILS ABSOLUTE: 0.02 E9/L (ref 0.05–0.5)
EOSINOPHILS RELATIVE PERCENT: 0.3 % (ref 0–6)
GFR AFRICAN AMERICAN: >60
GFR NON-AFRICAN AMERICAN: >60 ML/MIN/1.73
GLUCOSE BLD-MCNC: 124 MG/DL (ref 74–99)
HCT VFR BLD CALC: 36.2 % (ref 34–48)
HEMOGLOBIN: 12.2 G/DL (ref 11.5–15.5)
IMMATURE GRANULOCYTES #: 0.01 E9/L
IMMATURE GRANULOCYTES %: 0.2 % (ref 0–5)
LYMPHOCYTES ABSOLUTE: 1.87 E9/L (ref 1.5–4)
LYMPHOCYTES RELATIVE PERCENT: 28.7 % (ref 20–42)
MAGNESIUM: 2 MG/DL (ref 1.6–2.6)
MCH RBC QN AUTO: 29.5 PG (ref 26–35)
MCHC RBC AUTO-ENTMCNC: 33.7 % (ref 32–34.5)
MCV RBC AUTO: 87.4 FL (ref 80–99.9)
MONOCYTES ABSOLUTE: 0.34 E9/L (ref 0.1–0.95)
MONOCYTES RELATIVE PERCENT: 5.2 % (ref 2–12)
NEUTROPHILS ABSOLUTE: 4.23 E9/L (ref 1.8–7.3)
NEUTROPHILS RELATIVE PERCENT: 64.8 % (ref 43–80)
PDW BLD-RTO: 18.5 FL (ref 11.5–15)
PLATELET # BLD: 294 E9/L (ref 130–450)
PMV BLD AUTO: 9.4 FL (ref 7–12)
POTASSIUM SERPL-SCNC: 3.5 MMOL/L (ref 3.5–5)
RBC # BLD: 4.14 E12/L (ref 3.5–5.5)
SODIUM BLD-SCNC: 133 MMOL/L (ref 132–146)
TOTAL PROTEIN: 7.3 G/DL (ref 6.4–8.3)
WBC # BLD: 6.5 E9/L (ref 4.5–11.5)

## 2019-08-13 PROCEDURE — 83735 ASSAY OF MAGNESIUM: CPT

## 2019-08-13 PROCEDURE — 85025 COMPLETE CBC W/AUTO DIFF WBC: CPT

## 2019-08-13 PROCEDURE — 80053 COMPREHEN METABOLIC PANEL: CPT

## 2019-08-13 PROCEDURE — 36415 COLL VENOUS BLD VENIPUNCTURE: CPT

## 2019-08-14 ENCOUNTER — HOSPITAL ENCOUNTER (OUTPATIENT)
Dept: INFUSION THERAPY | Age: 64
Discharge: HOME | End: 2019-08-14
Payer: COMMERCIAL

## 2019-08-14 ENCOUNTER — OFFICE VISIT (OUTPATIENT)
Dept: ONCOLOGY | Age: 64
End: 2019-08-14
Payer: COMMERCIAL

## 2019-08-14 VITALS
SYSTOLIC BLOOD PRESSURE: 166 MMHG | TEMPERATURE: 98.4 F | RESPIRATION RATE: 18 BRPM | WEIGHT: 210.6 LBS | BODY MASS INDEX: 37.32 KG/M2 | HEIGHT: 63 IN | DIASTOLIC BLOOD PRESSURE: 74 MMHG | HEART RATE: 72 BPM

## 2019-08-14 VITALS
SYSTOLIC BLOOD PRESSURE: 156 MMHG | DIASTOLIC BLOOD PRESSURE: 66 MMHG | TEMPERATURE: 97.7 F | RESPIRATION RATE: 18 BRPM | HEART RATE: 57 BPM

## 2019-08-14 DIAGNOSIS — C50.912 INVASIVE DUCTAL CARCINOMA OF LEFT BREAST (HCC): Primary | ICD-10-CM

## 2019-08-14 PROCEDURE — 6360000002 HC RX W HCPCS: Performed by: INTERNAL MEDICINE

## 2019-08-14 PROCEDURE — 96417 CHEMO IV INFUS EACH ADDL SEQ: CPT

## 2019-08-14 PROCEDURE — G8417 CALC BMI ABV UP PARAM F/U: HCPCS | Performed by: INTERNAL MEDICINE

## 2019-08-14 PROCEDURE — G8427 DOCREV CUR MEDS BY ELIG CLIN: HCPCS | Performed by: INTERNAL MEDICINE

## 2019-08-14 PROCEDURE — 96413 CHEMO IV INFUSION 1 HR: CPT

## 2019-08-14 PROCEDURE — 99214 OFFICE O/P EST MOD 30 MIN: CPT | Performed by: INTERNAL MEDICINE

## 2019-08-14 PROCEDURE — 3017F COLORECTAL CA SCREEN DOC REV: CPT | Performed by: INTERNAL MEDICINE

## 2019-08-14 PROCEDURE — 96366 THER/PROPH/DIAG IV INF ADDON: CPT

## 2019-08-14 PROCEDURE — 96375 TX/PRO/DX INJ NEW DRUG ADDON: CPT

## 2019-08-14 PROCEDURE — 1036F TOBACCO NON-USER: CPT | Performed by: INTERNAL MEDICINE

## 2019-08-14 PROCEDURE — 2580000003 HC RX 258: Performed by: INTERNAL MEDICINE

## 2019-08-14 RX ORDER — SODIUM CHLORIDE 0.9 % (FLUSH) 0.9 %
10 SYRINGE (ML) INJECTION PRN
Status: CANCELLED | OUTPATIENT
Start: 2019-08-14

## 2019-08-14 RX ORDER — MEPERIDINE HYDROCHLORIDE 25 MG/ML
12.5 INJECTION INTRAMUSCULAR; INTRAVENOUS; SUBCUTANEOUS ONCE
Status: CANCELLED | OUTPATIENT
Start: 2019-08-14

## 2019-08-14 RX ORDER — SODIUM CHLORIDE 0.9 % (FLUSH) 0.9 %
10 SYRINGE (ML) INJECTION PRN
Status: DISCONTINUED | OUTPATIENT
Start: 2019-08-14 | End: 2019-08-15 | Stop reason: HOSPADM

## 2019-08-14 RX ORDER — PALONOSETRON 0.05 MG/ML
0.25 INJECTION, SOLUTION INTRAVENOUS ONCE
Status: CANCELLED | OUTPATIENT
Start: 2019-08-14

## 2019-08-14 RX ORDER — DEXAMETHASONE SODIUM PHOSPHATE 10 MG/ML
10 INJECTION, SOLUTION INTRAMUSCULAR; INTRAVENOUS ONCE
Status: CANCELLED | OUTPATIENT
Start: 2019-08-14

## 2019-08-14 RX ORDER — METHYLPREDNISOLONE SODIUM SUCCINATE 125 MG/2ML
125 INJECTION, POWDER, LYOPHILIZED, FOR SOLUTION INTRAMUSCULAR; INTRAVENOUS ONCE
Status: CANCELLED | OUTPATIENT
Start: 2019-08-14

## 2019-08-14 RX ORDER — SODIUM CHLORIDE 0.9 % (FLUSH) 0.9 %
5 SYRINGE (ML) INJECTION PRN
Status: CANCELLED | OUTPATIENT
Start: 2019-08-14

## 2019-08-14 RX ORDER — EPINEPHRINE 1 MG/ML
0.3 INJECTION, SOLUTION, CONCENTRATE INTRAVENOUS PRN
Status: CANCELLED | OUTPATIENT
Start: 2019-08-14

## 2019-08-14 RX ORDER — HEPARIN SODIUM (PORCINE) LOCK FLUSH IV SOLN 100 UNIT/ML 100 UNIT/ML
500 SOLUTION INTRAVENOUS PRN
Status: DISCONTINUED | OUTPATIENT
Start: 2019-08-14 | End: 2019-08-15 | Stop reason: HOSPADM

## 2019-08-14 RX ORDER — PALONOSETRON HYDROCHLORIDE 0.05 MG/ML
0.25 INJECTION, SOLUTION INTRAVENOUS ONCE
Status: COMPLETED | OUTPATIENT
Start: 2019-08-14 | End: 2019-08-14

## 2019-08-14 RX ORDER — SODIUM CHLORIDE 9 MG/ML
INJECTION, SOLUTION INTRAVENOUS CONTINUOUS
Status: CANCELLED | OUTPATIENT
Start: 2019-08-14

## 2019-08-14 RX ORDER — HEPARIN SODIUM (PORCINE) LOCK FLUSH IV SOLN 100 UNIT/ML 100 UNIT/ML
500 SOLUTION INTRAVENOUS PRN
Status: CANCELLED | OUTPATIENT
Start: 2019-08-14

## 2019-08-14 RX ORDER — DEXAMETHASONE SODIUM PHOSPHATE 10 MG/ML
10 INJECTION INTRAMUSCULAR; INTRAVENOUS ONCE
Status: COMPLETED | OUTPATIENT
Start: 2019-08-14 | End: 2019-08-14

## 2019-08-14 RX ORDER — SODIUM CHLORIDE 9 MG/ML
20 INJECTION, SOLUTION INTRAVENOUS ONCE
Status: COMPLETED | OUTPATIENT
Start: 2019-08-14 | End: 2019-08-14

## 2019-08-14 RX ORDER — 0.9 % SODIUM CHLORIDE 0.9 %
10 VIAL (ML) INJECTION ONCE
Status: CANCELLED | OUTPATIENT
Start: 2019-08-14

## 2019-08-14 RX ORDER — SODIUM CHLORIDE 9 MG/ML
20 INJECTION, SOLUTION INTRAVENOUS ONCE
Status: CANCELLED | OUTPATIENT
Start: 2019-08-14

## 2019-08-14 RX ORDER — DIPHENHYDRAMINE HYDROCHLORIDE 50 MG/ML
50 INJECTION INTRAMUSCULAR; INTRAVENOUS ONCE
Status: CANCELLED | OUTPATIENT
Start: 2019-08-14

## 2019-08-14 RX ADMIN — DEXAMETHASONE SODIUM PHOSPHATE 10 MG: 10 INJECTION INTRAMUSCULAR; INTRAVENOUS at 08:57

## 2019-08-14 RX ADMIN — HEPARIN 500 UNITS: 100 SYRINGE at 13:27

## 2019-08-14 RX ADMIN — Medication 10 ML: at 09:43

## 2019-08-14 RX ADMIN — Medication 10 ML: at 11:05

## 2019-08-14 RX ADMIN — TRASTUZUMAB 588 MG: 150 INJECTION, POWDER, LYOPHILIZED, FOR SOLUTION INTRAVENOUS at 09:05

## 2019-08-14 RX ADMIN — PALONOSETRON 0.25 MG: 0.25 INJECTION, SOLUTION INTRAVENOUS at 08:56

## 2019-08-14 RX ADMIN — Medication 10 ML: at 12:38

## 2019-08-14 RX ADMIN — Medication 10 ML: at 08:45

## 2019-08-14 RX ADMIN — CARBOPLATIN 700 MG: 10 INJECTION, SOLUTION INTRAVENOUS at 12:40

## 2019-08-14 RX ADMIN — SODIUM CHLORIDE 150 MG: 9 INJECTION, SOLUTION INTRAVENOUS at 10:28

## 2019-08-14 RX ADMIN — SODIUM CHLORIDE 20 ML/HR: 9 INJECTION, SOLUTION INTRAVENOUS at 08:50

## 2019-08-14 RX ADMIN — DOCETAXEL 160 MG: 20 INJECTION, SOLUTION INTRAVENOUS at 11:06

## 2019-08-14 RX ADMIN — Medication 10 ML: at 08:57

## 2019-08-14 RX ADMIN — Medication 10 ML: at 13:25

## 2019-08-14 RX ADMIN — Medication 10 ML: at 08:49

## 2019-08-14 RX ADMIN — PERTUZUMAB 420 MG: 30 INJECTION, SOLUTION, CONCENTRATE INTRAVENOUS at 09:41

## 2019-08-14 NOTE — PROGRESS NOTES
Department of [facility] Oncology  Attending Clinic Note    Reason for Visit: Follow-up on a patient with Left Breast Cancer    PCP:  Sherri Way MD    History of Present Illness: The mass was located in the 2 o'clock position of left breast.     Breast cancer risk factors include age, gender, menarche before age 16. Age of menarche was 7. Age of menopause was 48. Patient was on birth control for 6 months in her 25s. Patient is . Age of first live birth was 30. Patient did breast feed. Bilateral screening mammogram on 2019: There is a high density, irregular mass with indistinct margins seen in the posterior upper outer quadrant of left breast.     Left Breast U/S on 2019:  Irregular solid mass with angular margins measuring 25 x 20 x 21 mm in the left breast at 2 o'clock located 8 centimeters from the nipple. No axillary LN. On 2019:  Mass, left breast at 2:00, core needle biopsy: Invasive ductal carcinoma,nuclear grade 3, see comment. 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 Breast Cancer Marker Studies:  ER: Negative  NV: Negative  Her-2/francisco Positive/3+    CXR PA/Lateral on 2019:  Normal chest.    MRI Bilateral Breast 2019: An irregular, enhancing mass is again noted in the left breast 2:00 which measures 2.0 x 2.2 x 2.6 cm. No axillary LN. No evidence of neoplasm on right. T2 N0 M0  We recommended neoadjuvant chemotherapy consisting of 6 cycles of TCHP. Side effects of TCHP reviewed with patient. She agreed to proceed. 2D-ECHO EF50-55%. Mediport placed. Cycle # 1 TCHP was on 2019. Cycle # 2 TCHP was on 2019. Cycle # 3 TCHP is today 2019. Review of Systems;  CONSTITUTIONAL: No fever, chills. Good appetite and energy level. ENMT: Eyes: No diplopia; Nose: No epistaxis. Mouth: No sore throat. RESPIRATORY: No hemoptysis, shortness of breath, cough.    CARDIOVASCULAR: No chest on 07/24/2019. Cycle # 3 TCHP is today 08/14/2019. Continue Lomotil for diarrhea. Scopolamine patch for nausea. Labs reviewed, ok to proceed. RTC in 3 weeks for Cycle # 4 TCHP. 2D-echo after next visit.     Trino Hughes MD   [id number]  Board Certified Medical Oncologist

## 2019-08-14 NOTE — PROGRESS NOTES
Patient here for anti-cancer therapy. Tolerated well, denies any complaints. Laboratory requisition printed and given to patient. Instructed patient to obtain labs one to 2 days prior to next treatment. Verbalized understanding. Patient discharged in stable condition with belongings.  Tangela Reeves RN, OCN

## 2019-08-15 ENCOUNTER — HOSPITAL ENCOUNTER (OUTPATIENT)
Dept: INFUSION THERAPY | Age: 64
Discharge: HOME | End: 2019-08-15
Payer: COMMERCIAL

## 2019-08-15 DIAGNOSIS — C50.912 INVASIVE DUCTAL CARCINOMA OF LEFT BREAST (HCC): Primary | ICD-10-CM

## 2019-08-15 PROCEDURE — 96372 THER/PROPH/DIAG INJ SC/IM: CPT

## 2019-08-15 PROCEDURE — 6360000002 HC RX W HCPCS: Performed by: INTERNAL MEDICINE

## 2019-08-15 RX ADMIN — PEGFILGRASTIM-CBQV 6 MG: 6 INJECTION, SOLUTION SUBCUTANEOUS at 14:15

## 2019-08-16 ENCOUNTER — TELEPHONE (OUTPATIENT)
Dept: PHARMACY | Age: 64
End: 2019-08-16

## 2019-08-23 ENCOUNTER — TELEPHONE (OUTPATIENT)
Dept: BREAST CENTER | Age: 64
End: 2019-08-23

## 2019-08-23 ENCOUNTER — HOSPITAL ENCOUNTER (OUTPATIENT)
Dept: CARDIOLOGY | Age: 64
Discharge: HOME | End: 2019-08-23
Payer: COMMERCIAL

## 2019-08-23 DIAGNOSIS — C50.912 INVASIVE DUCTAL CARCINOMA OF LEFT BREAST (HCC): ICD-10-CM

## 2019-08-23 PROCEDURE — 93308 TTE F-UP OR LMTD: CPT | Performed by: PSYCHIATRY & NEUROLOGY

## 2019-08-23 NOTE — TELEPHONE ENCOUNTER
Left message on patient voice mail -- patient need to schedule a midpoint appointment for neoadjuvant -- awaiting a return call -- appointment is ready to be scheduled from waiting list tab.

## 2019-08-30 NOTE — PROGRESS NOTES
Subjective:      Patient ID: Carolina Gibson is a 62 y.o. female. HPI  History and Physical    Patient's Name/Date of Birth: Carolina Gibson / [birth year]    Date: September 3, 2019     Carolina Gibson presents for evaluation of left breast cancer. She is assisted through neoadjuvant chemotherapy. PCP: Jeff Kearney MD, Gynecologist: Dr. Rosie Sales. The mass is located in the 2 o'clock position of the left breast. Patient does routinely do self breast exams. Patient denies nipple discharge. Breast cancer risk factors include age, gender, menarche before age 16. Age of menarche was 7. Age of menopause was 48. Patient was on birth control for 6 months in her 25s. Patient is . Age of first live birth was 30. Patient did breast feed. Estimated body mass index is 37.31 kg/m² as calculated from the following:    Height as of 19: 5' 3\" (1.6 m). Weight as of 19: 210 lb 9.6 oz (95.5 kg). Bra Size: 40C    Because violence is so common, we ask all our patients: are you in a relationship or do you live with a person who threatens, hurts, or controls you:  Patient denies. 19 - Screening Mammogram - [facility]:  TISSUE DENSITY:   The breasts are heterogeneously dense (Type 3 density).       MAMMOGRAM FINDINGS:   In the right breast, no suspicious masses, areas of suspicious architectural distortion, suspicious calcifications, or additional suspicious findings are identified.       Finding 1:   There is a high density, irregular mass with indistinct margins seen in the posterior upper outer quadrant of the left breast.       IMPRESSION:   Mass in the left breast requires additional evaluation.    An ultrasound is recommended with consideration for an ultrasound guided biopsy if the finding is considered suspicious.       =======================================   BI-RADS Category 0:  Incomplete: Need Additional Imaging Evaluation   =======================================     RISK ASSESSMENT:   During this patient's visit, information obtained was used to generate a lifetime risk assessment using the Tyrer-Cuzick model (also called the TERESE [International Breast Cancer Intervention Study] Breast Cancer Risk Evaluation Tool).     - LIFETIME RISK -   Patient has a Jarrett Gabriel score of 9.9%       5/6/19 - Left breast ultrasound - [facility]:  Finding 1:   Sonography was performed in the left breast using a radial and anti-radial approach. Additional evaluation was performed for the irregular mass in the left breast, upper outer quadrant seen on 04/12/2019. On the present examination, there is an irregular    solid mass with angular margins measuring 25 x 20 x 21 mm in the left breast at 2 o'clock located 8 centimeters from the nipple. Internal echogenicity is hypoechoic.       There is no axillary lymphadenopathy.       IMPRESSION:   Solid mass in the left breast is highly suggestive of malignancy. An ultrasound guided biopsy is recommended.       =======================================   BI-RADS Category 5:  Highly Suggestive of Malignancy     All imaging reviewed with patient. She underwent an US guided left breast core biopsy at 2 o'clock position on May 6 , 2019. Pathological evaluation completed at [facility]):  Mass, left breast at 2:00, core needle biopsy: Invasive ductal carcinoma, nuclear grade 3, see comment. 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 Breast Cancer Marker Studies:  Estrogen Receptors (ER): Negative  Progesterone Receptors (IN): Negative  Her-2/francisco (c-erb B-2) protein expression:  Positive/3+    Patient denies  prior breast biopsy. 8/14/19 - Medical Oncology - Dr. Decker Gone:  Impression/Plan:  61 y/o female with Left Breast Cancer     Mass, left breast at 2:00, core needle biopsy: Invasive ductal carcinoma,nuclear grade 3, see comment.   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 Breast Cancer Marker Studies:  ER: Negative  MI: Negative  Her-2/francisco Positive/3+     MRI Bilateral Breast 2019: An irregular, enhancing mass is again noted in the left breast 2:00 which measures 2.0 x 2.2 x 2.6 cm. No axillary LN. No evidence of neoplasm on right.     T2 N0 M0  We recommended neoadjuvant chemotherapy consisting of 6 cycles of TCHP. Side effects of TCHP reviewed with patient. She agreed to proceed. 2D-ECHO EF50-55%. Mediport placed. Cycle # 1 TCHP was on 2019. Cycle # 2 TCHP was on 2019. Cycle # 3 TCHP is today 2019. Continue Lomotil for diarrhea. Scopolamine patch for nausea. Labs reviewed, ok to proceed.      RTC in 3 weeks for Cycle # 4 TCHP. 2D-echo after next visit. Receiving 4th cycle of TCHP this week. Diarrhea manageable with Lomotil; imodium ineffective. Reports 5 \"bad days\" per cycle, each one progressively more tolerable.        Past Medical History:   Diagnosis Date    Anxiety     Cancer ([address]     breast    Heart murmur     Hypertension     Post-menopausal bleeding     S/P hyst/ polypectomy 6/10/14       Past Surgical History:   Procedure Laterality Date    BREAST BIOPSY       SECTION      COLONOSCOPY      normal - Dr Gucci Stark x 10 years    DILATION AND CURETTAGE      INSERTION / REMOVAL / REPLACEMENT VENOUS ACCESS CATHETER N/A 2019    MEDI PORT PLACEMENT performed by Neftali Robbins MD at [facility] OR       Current Outpatient Medications   Medication Sig Dispense Refill    Diphenoxylate-Atropine (LOMOTIL PO) Take by mouth      scopolamine (TRANSDERM-SCOP, 1.5 MG,) transdermal patch Place 1 patch onto the skin every 72 hours 10 patch 11    prochlorperazine (COMPAZINE) 10 MG tablet Take 1 tablet by mouth every 6 hours as needed (nausea) 120 tablet 1    ondansetron (ZOFRAN) 4 MG tablet Take 1 tablet by mouth every 8 hours as needed for Nausea or Vomiting 60 tablet 1    dexamethasone Inability: Not on file    Transportation needs:     Medical: Not on file     Non-medical: Not on file   Tobacco Use    Smoking status: Never Smoker    Smokeless tobacco: Never Used   Substance and Sexual Activity    Alcohol use: Yes     Alcohol/week: 0.0 standard drinks     Comment: socially    Drug use: No    Sexual activity: Not Currently     Partners: Male     Birth control/protection: Post-menopausal   Lifestyle    Physical activity:     Days per week: Not on file     Minutes per session: Not on file    Stress: Not on file   Relationships    Social connections:     Talks on phone: Not on file     Gets together: Not on file     Attends Anabaptist service: Not on file     Active member of club or organization: Not on file     Attends meetings of clubs or organizations: Not on file     Relationship status: Not on file    Intimate partner violence:     Fear of current or ex partner: Not on file     Emotionally abused: Not on file     Physically abused: Not on file     Forced sexual activity: Not on file   Other Topics Concern    Not on file   Social History Narrative    Not on file       Occupation: Retired. 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 Good appetite and energy level. ENMT: Eyes: No diplopia; Nose: No epistaxis. Mouth: No sore throat. RESPIRATORY: No hemoptysis, shortness of breath, cough. CARDIOVASCULAR: No chest pain, pressure, or palpitations. GASTROINTESTINAL: No nausea/vomiting, abdominal pain, diarrhea/constipation. No blood in the stools. GENITOURINARY: No dysuria, urinary frequency, hematuria. NEURO: No syncope, presyncope, headache. Remainder:  ROS NEGATIVE      Patient denies previous history of DVT/PE. Objective:   Physical Exam   Constitutional: She is oriented to person, place, and time. She appears well-developed and well-nourished. No distress.    HENT:   Head:

## 2019-09-03 ENCOUNTER — HOSPITAL ENCOUNTER (OUTPATIENT)
Dept: INFUSION THERAPY | Age: 64
Discharge: HOME | End: 2019-09-03
Payer: COMMERCIAL

## 2019-09-03 ENCOUNTER — OFFICE VISIT (OUTPATIENT)
Dept: BREAST CENTER | Age: 64
End: 2019-09-03
Payer: COMMERCIAL

## 2019-09-03 VITALS
DIASTOLIC BLOOD PRESSURE: 70 MMHG | RESPIRATION RATE: 17 BRPM | TEMPERATURE: 98.1 F | OXYGEN SATURATION: 98 % | WEIGHT: 210 LBS | BODY MASS INDEX: 37.21 KG/M2 | HEIGHT: 63 IN | SYSTOLIC BLOOD PRESSURE: 140 MMHG | HEART RATE: 87 BPM

## 2019-09-03 DIAGNOSIS — C50.912 INVASIVE DUCTAL CARCINOMA OF LEFT BREAST (HCC): ICD-10-CM

## 2019-09-03 LAB
ALBUMIN SERPL-MCNC: 4.2 G/DL (ref 3.5–5.2)
ALP BLD-CCNC: 71 U/L (ref 35–104)
ALT SERPL-CCNC: 41 U/L (ref 0–32)
ANION GAP SERPL CALCULATED.3IONS-SCNC: 11 MMOL/L (ref 7–16)
ANISOCYTOSIS: ABNORMAL
AST SERPL-CCNC: 25 U/L (ref 0–31)
BASOPHILS ABSOLUTE: 0.04 E9/L (ref 0–0.2)
BASOPHILS RELATIVE PERCENT: 0.7 % (ref 0–2)
BILIRUB SERPL-MCNC: 0.3 MG/DL (ref 0–1.2)
BUN BLDV-MCNC: 17 MG/DL (ref 8–23)
CALCIUM SERPL-MCNC: 10.1 MG/DL (ref 8.6–10.2)
CHLORIDE BLD-SCNC: 102 MMOL/L (ref 98–107)
CO2: 29 MMOL/L (ref 22–29)
CREAT SERPL-MCNC: 0.9 MG/DL (ref 0.5–1)
EOSINOPHILS ABSOLUTE: 0.01 E9/L (ref 0.05–0.5)
EOSINOPHILS RELATIVE PERCENT: 0.2 % (ref 0–6)
GFR AFRICAN AMERICAN: >60
GFR NON-AFRICAN AMERICAN: >60 ML/MIN/1.73
GLUCOSE BLD-MCNC: 118 MG/DL (ref 74–99)
HCT VFR BLD CALC: 35 % (ref 34–48)
HEMOGLOBIN: 11.6 G/DL (ref 11.5–15.5)
IMMATURE GRANULOCYTES #: 0.02 E9/L
IMMATURE GRANULOCYTES %: 0.4 % (ref 0–5)
LYMPHOCYTES ABSOLUTE: 1.91 E9/L (ref 1.5–4)
LYMPHOCYTES RELATIVE PERCENT: 34.5 % (ref 20–42)
MAGNESIUM: 1.8 MG/DL (ref 1.6–2.6)
MCH RBC QN AUTO: 30.1 PG (ref 26–35)
MCHC RBC AUTO-ENTMCNC: 33.1 % (ref 32–34.5)
MCV RBC AUTO: 90.9 FL (ref 80–99.9)
MONOCYTES ABSOLUTE: 0.35 E9/L (ref 0.1–0.95)
MONOCYTES RELATIVE PERCENT: 6.3 % (ref 2–12)
NEUTROPHILS ABSOLUTE: 3.2 E9/L (ref 1.8–7.3)
NEUTROPHILS RELATIVE PERCENT: 57.9 % (ref 43–80)
PDW BLD-RTO: 20.4 FL (ref 11.5–15)
PLATELET # BLD: 192 E9/L (ref 130–450)
PMV BLD AUTO: 9.4 FL (ref 7–12)
POTASSIUM SERPL-SCNC: 3.9 MMOL/L (ref 3.5–5)
RBC # BLD: 3.85 E12/L (ref 3.5–5.5)
SODIUM BLD-SCNC: 142 MMOL/L (ref 132–146)
TOTAL PROTEIN: 7 G/DL (ref 6.4–8.3)
WBC # BLD: 5.5 E9/L (ref 4.5–11.5)

## 2019-09-03 PROCEDURE — 85025 COMPLETE CBC W/AUTO DIFF WBC: CPT

## 2019-09-03 PROCEDURE — 83735 ASSAY OF MAGNESIUM: CPT

## 2019-09-03 PROCEDURE — 1036F TOBACCO NON-USER: CPT | Performed by: SURGERY

## 2019-09-03 PROCEDURE — G8427 DOCREV CUR MEDS BY ELIG CLIN: HCPCS | Performed by: SURGERY

## 2019-09-03 PROCEDURE — 99213 OFFICE O/P EST LOW 20 MIN: CPT | Performed by: SURGERY

## 2019-09-03 PROCEDURE — 80053 COMPREHEN METABOLIC PANEL: CPT

## 2019-09-03 PROCEDURE — 3017F COLORECTAL CA SCREEN DOC REV: CPT | Performed by: SURGERY

## 2019-09-03 PROCEDURE — G8417 CALC BMI ABV UP PARAM F/U: HCPCS | Performed by: SURGERY

## 2019-09-03 PROCEDURE — 99214 OFFICE O/P EST MOD 30 MIN: CPT | Performed by: SURGERY

## 2019-09-04 ENCOUNTER — HOSPITAL ENCOUNTER (OUTPATIENT)
Dept: INFUSION THERAPY | Age: 64
Discharge: HOME | End: 2019-09-04
Payer: COMMERCIAL

## 2019-09-04 ENCOUNTER — OFFICE VISIT (OUTPATIENT)
Dept: ONCOLOGY | Age: 64
End: 2019-09-04
Payer: COMMERCIAL

## 2019-09-04 VITALS
HEIGHT: 63 IN | SYSTOLIC BLOOD PRESSURE: 143 MMHG | BODY MASS INDEX: 37.32 KG/M2 | TEMPERATURE: 98 F | WEIGHT: 210.6 LBS | DIASTOLIC BLOOD PRESSURE: 82 MMHG | HEART RATE: 76 BPM

## 2019-09-04 VITALS
RESPIRATION RATE: 20 BRPM | DIASTOLIC BLOOD PRESSURE: 74 MMHG | HEART RATE: 65 BPM | SYSTOLIC BLOOD PRESSURE: 149 MMHG | TEMPERATURE: 97.7 F

## 2019-09-04 DIAGNOSIS — C50.912 INVASIVE DUCTAL CARCINOMA OF LEFT BREAST (HCC): Primary | ICD-10-CM

## 2019-09-04 DIAGNOSIS — Z09 FOLLOW UP: Primary | ICD-10-CM

## 2019-09-04 PROCEDURE — 96413 CHEMO IV INFUSION 1 HR: CPT

## 2019-09-04 PROCEDURE — 96367 TX/PROPH/DG ADDL SEQ IV INF: CPT

## 2019-09-04 PROCEDURE — 3017F COLORECTAL CA SCREEN DOC REV: CPT | Performed by: INTERNAL MEDICINE

## 2019-09-04 PROCEDURE — 99215 OFFICE O/P EST HI 40 MIN: CPT | Performed by: INTERNAL MEDICINE

## 2019-09-04 PROCEDURE — G8427 DOCREV CUR MEDS BY ELIG CLIN: HCPCS | Performed by: INTERNAL MEDICINE

## 2019-09-04 PROCEDURE — 6360000002 HC RX W HCPCS: Performed by: INTERNAL MEDICINE

## 2019-09-04 PROCEDURE — G8417 CALC BMI ABV UP PARAM F/U: HCPCS | Performed by: INTERNAL MEDICINE

## 2019-09-04 PROCEDURE — 96417 CHEMO IV INFUS EACH ADDL SEQ: CPT

## 2019-09-04 PROCEDURE — 1036F TOBACCO NON-USER: CPT | Performed by: INTERNAL MEDICINE

## 2019-09-04 PROCEDURE — 96375 TX/PRO/DX INJ NEW DRUG ADDON: CPT

## 2019-09-04 PROCEDURE — 2580000003 HC RX 258: Performed by: INTERNAL MEDICINE

## 2019-09-04 RX ORDER — DIPHENHYDRAMINE HYDROCHLORIDE 50 MG/ML
50 INJECTION INTRAMUSCULAR; INTRAVENOUS ONCE
Status: CANCELLED | OUTPATIENT
Start: 2019-09-04

## 2019-09-04 RX ORDER — SODIUM CHLORIDE 0.9 % (FLUSH) 0.9 %
10 SYRINGE (ML) INJECTION PRN
Status: DISCONTINUED | OUTPATIENT
Start: 2019-09-04 | End: 2019-09-05 | Stop reason: HOSPADM

## 2019-09-04 RX ORDER — SODIUM CHLORIDE 9 MG/ML
INJECTION, SOLUTION INTRAVENOUS CONTINUOUS
Status: CANCELLED | OUTPATIENT
Start: 2019-09-04

## 2019-09-04 RX ORDER — MEPERIDINE HYDROCHLORIDE 25 MG/ML
12.5 INJECTION INTRAMUSCULAR; INTRAVENOUS; SUBCUTANEOUS ONCE
Status: CANCELLED | OUTPATIENT
Start: 2019-09-04

## 2019-09-04 RX ORDER — EPINEPHRINE 1 MG/ML
0.3 INJECTION, SOLUTION, CONCENTRATE INTRAVENOUS PRN
Status: CANCELLED | OUTPATIENT
Start: 2019-09-04

## 2019-09-04 RX ORDER — SODIUM CHLORIDE 0.9 % (FLUSH) 0.9 %
5 SYRINGE (ML) INJECTION PRN
Status: CANCELLED | OUTPATIENT
Start: 2019-09-04

## 2019-09-04 RX ORDER — SODIUM CHLORIDE 9 MG/ML
20 INJECTION, SOLUTION INTRAVENOUS ONCE
Status: COMPLETED | OUTPATIENT
Start: 2019-09-04 | End: 2019-09-04

## 2019-09-04 RX ORDER — PALONOSETRON 0.05 MG/ML
0.25 INJECTION, SOLUTION INTRAVENOUS ONCE
Status: CANCELLED | OUTPATIENT
Start: 2019-09-04

## 2019-09-04 RX ORDER — SODIUM CHLORIDE 9 MG/ML
20 INJECTION, SOLUTION INTRAVENOUS ONCE
Status: CANCELLED | OUTPATIENT
Start: 2019-09-04

## 2019-09-04 RX ORDER — METHYLPREDNISOLONE SODIUM SUCCINATE 125 MG/2ML
125 INJECTION, POWDER, LYOPHILIZED, FOR SOLUTION INTRAMUSCULAR; INTRAVENOUS ONCE
Status: CANCELLED | OUTPATIENT
Start: 2019-09-04

## 2019-09-04 RX ORDER — DEXAMETHASONE SODIUM PHOSPHATE 10 MG/ML
10 INJECTION INTRAMUSCULAR; INTRAVENOUS ONCE
Status: COMPLETED | OUTPATIENT
Start: 2019-09-04 | End: 2019-09-04

## 2019-09-04 RX ORDER — SODIUM CHLORIDE 0.9 % (FLUSH) 0.9 %
10 SYRINGE (ML) INJECTION PRN
Status: CANCELLED | OUTPATIENT
Start: 2019-09-04

## 2019-09-04 RX ORDER — HEPARIN SODIUM (PORCINE) LOCK FLUSH IV SOLN 100 UNIT/ML 100 UNIT/ML
500 SOLUTION INTRAVENOUS PRN
Status: DISCONTINUED | OUTPATIENT
Start: 2019-09-04 | End: 2019-09-05 | Stop reason: HOSPADM

## 2019-09-04 RX ORDER — HEPARIN SODIUM (PORCINE) LOCK FLUSH IV SOLN 100 UNIT/ML 100 UNIT/ML
500 SOLUTION INTRAVENOUS PRN
Status: CANCELLED | OUTPATIENT
Start: 2019-09-04

## 2019-09-04 RX ORDER — DEXAMETHASONE SODIUM PHOSPHATE 10 MG/ML
10 INJECTION, SOLUTION INTRAMUSCULAR; INTRAVENOUS ONCE
Status: CANCELLED | OUTPATIENT
Start: 2019-09-04

## 2019-09-04 RX ORDER — PALONOSETRON HYDROCHLORIDE 0.05 MG/ML
0.25 INJECTION, SOLUTION INTRAVENOUS ONCE
Status: COMPLETED | OUTPATIENT
Start: 2019-09-04 | End: 2019-09-04

## 2019-09-04 RX ORDER — 0.9 % SODIUM CHLORIDE 0.9 %
10 VIAL (ML) INJECTION ONCE
Status: CANCELLED | OUTPATIENT
Start: 2019-09-04

## 2019-09-04 RX ADMIN — Medication 10 ML: at 13:45

## 2019-09-04 RX ADMIN — DOCETAXEL 160 MG: 20 INJECTION, SOLUTION INTRAVENOUS at 11:40

## 2019-09-04 RX ADMIN — PERTUZUMAB 420 MG: 30 INJECTION, SOLUTION, CONCENTRATE INTRAVENOUS at 10:51

## 2019-09-04 RX ADMIN — TRASTUZUMAB 588 MG: 150 INJECTION, POWDER, LYOPHILIZED, FOR SOLUTION INTRAVENOUS at 10:07

## 2019-09-04 RX ADMIN — DEXAMETHASONE SODIUM PHOSPHATE 10 MG: 10 INJECTION INTRAMUSCULAR; INTRAVENOUS at 09:19

## 2019-09-04 RX ADMIN — HEPARIN 500 UNITS: 100 SYRINGE at 13:45

## 2019-09-04 RX ADMIN — Medication 10 ML: at 09:19

## 2019-09-04 RX ADMIN — Medication 10 ML: at 12:51

## 2019-09-04 RX ADMIN — Medication 10 ML: at 11:40

## 2019-09-04 RX ADMIN — Medication 10 ML: at 10:50

## 2019-09-04 RX ADMIN — Medication 10 ML: at 10:07

## 2019-09-04 RX ADMIN — Medication 10 ML: at 09:02

## 2019-09-04 RX ADMIN — PALONOSETRON 0.25 MG: 0.25 INJECTION, SOLUTION INTRAVENOUS at 09:18

## 2019-09-04 RX ADMIN — SODIUM CHLORIDE 150 MG: 9 INJECTION, SOLUTION INTRAVENOUS at 09:28

## 2019-09-04 RX ADMIN — SODIUM CHLORIDE 20 ML/HR: 9 INJECTION, SOLUTION INTRAVENOUS at 09:15

## 2019-09-04 RX ADMIN — CARBOPLATIN 700 MG: 10 INJECTION, SOLUTION INTRAVENOUS at 12:53

## 2019-09-05 ENCOUNTER — HOSPITAL ENCOUNTER (OUTPATIENT)
Dept: INFUSION THERAPY | Age: 64
Discharge: HOME | End: 2019-09-05
Payer: COMMERCIAL

## 2019-09-05 DIAGNOSIS — C50.912 INVASIVE DUCTAL CARCINOMA OF LEFT BREAST (HCC): Primary | ICD-10-CM

## 2019-09-05 PROCEDURE — 6360000002 HC RX W HCPCS: Performed by: INTERNAL MEDICINE

## 2019-09-05 PROCEDURE — 96372 THER/PROPH/DIAG INJ SC/IM: CPT

## 2019-09-05 RX ADMIN — PEGFILGRASTIM-CBQV 6 MG: 6 INJECTION, SOLUTION SUBCUTANEOUS at 14:56

## 2019-09-24 ENCOUNTER — HOSPITAL ENCOUNTER (OUTPATIENT)
Dept: INFUSION THERAPY | Age: 64
Discharge: HOME | End: 2019-09-24
Payer: COMMERCIAL

## 2019-09-24 DIAGNOSIS — C50.912 INVASIVE DUCTAL CARCINOMA OF LEFT BREAST (HCC): Primary | ICD-10-CM

## 2019-09-24 LAB
ALBUMIN SERPL-MCNC: 4.1 G/DL (ref 3.5–5.2)
ALP BLD-CCNC: 63 U/L (ref 35–104)
ALT SERPL-CCNC: 42 U/L (ref 0–32)
ANION GAP SERPL CALCULATED.3IONS-SCNC: 9 MMOL/L (ref 7–16)
ANISOCYTOSIS: ABNORMAL
AST SERPL-CCNC: 32 U/L (ref 0–31)
BASOPHILS ABSOLUTE: 0.03 E9/L (ref 0–0.2)
BASOPHILS RELATIVE PERCENT: 0.6 % (ref 0–2)
BILIRUB SERPL-MCNC: 0.3 MG/DL (ref 0–1.2)
BUN BLDV-MCNC: 14 MG/DL (ref 8–23)
CALCIUM SERPL-MCNC: 9.9 MG/DL (ref 8.6–10.2)
CHLORIDE BLD-SCNC: 99 MMOL/L (ref 98–107)
CO2: 30 MMOL/L (ref 22–29)
CREAT SERPL-MCNC: 0.8 MG/DL (ref 0.5–1)
EOSINOPHILS ABSOLUTE: 0 E9/L (ref 0.05–0.5)
EOSINOPHILS RELATIVE PERCENT: 0 % (ref 0–6)
GFR AFRICAN AMERICAN: >60
GFR NON-AFRICAN AMERICAN: >60 ML/MIN/1.73
GLUCOSE BLD-MCNC: 124 MG/DL (ref 74–99)
HCT VFR BLD CALC: 32.4 % (ref 34–48)
HEMOGLOBIN: 10.8 G/DL (ref 11.5–15.5)
IMMATURE GRANULOCYTES #: 0.01 E9/L
IMMATURE GRANULOCYTES %: 0.2 % (ref 0–5)
LYMPHOCYTES ABSOLUTE: 1.76 E9/L (ref 1.5–4)
LYMPHOCYTES RELATIVE PERCENT: 35.6 % (ref 20–42)
MAGNESIUM: 1.8 MG/DL (ref 1.6–2.6)
MCH RBC QN AUTO: 31 PG (ref 26–35)
MCHC RBC AUTO-ENTMCNC: 33.3 % (ref 32–34.5)
MCV RBC AUTO: 93.1 FL (ref 80–99.9)
MONOCYTES ABSOLUTE: 0.33 E9/L (ref 0.1–0.95)
MONOCYTES RELATIVE PERCENT: 6.7 % (ref 2–12)
NEUTROPHILS ABSOLUTE: 2.81 E9/L (ref 1.8–7.3)
NEUTROPHILS RELATIVE PERCENT: 56.9 % (ref 43–80)
OVALOCYTES: ABNORMAL
PDW BLD-RTO: 21.3 FL (ref 11.5–15)
PLATELET # BLD: 185 E9/L (ref 130–450)
PMV BLD AUTO: 9.4 FL (ref 7–12)
POIKILOCYTES: ABNORMAL
POLYCHROMASIA: ABNORMAL
POTASSIUM SERPL-SCNC: 3.4 MMOL/L (ref 3.5–5)
RBC # BLD: 3.48 E12/L (ref 3.5–5.5)
SODIUM BLD-SCNC: 138 MMOL/L (ref 132–146)
TEAR DROP CELLS: ABNORMAL
TOTAL PROTEIN: 6.8 G/DL (ref 6.4–8.3)
WBC # BLD: 4.9 E9/L (ref 4.5–11.5)

## 2019-09-24 PROCEDURE — 80053 COMPREHEN METABOLIC PANEL: CPT

## 2019-09-24 PROCEDURE — 85025 COMPLETE CBC W/AUTO DIFF WBC: CPT

## 2019-09-24 PROCEDURE — 83735 ASSAY OF MAGNESIUM: CPT

## 2019-09-24 RX ORDER — SODIUM CHLORIDE 0.9 % (FLUSH) 0.9 %
10 SYRINGE (ML) INJECTION PRN
Status: DISCONTINUED | OUTPATIENT
Start: 2019-09-24 | End: 2019-09-25 | Stop reason: HOSPADM

## 2019-09-24 RX ORDER — HEPARIN SODIUM (PORCINE) LOCK FLUSH IV SOLN 100 UNIT/ML 100 UNIT/ML
500 SOLUTION INTRAVENOUS PRN
Status: DISCONTINUED | OUTPATIENT
Start: 2019-09-24 | End: 2019-09-25 | Stop reason: HOSPADM

## 2019-09-24 RX ORDER — HEPARIN SODIUM (PORCINE) LOCK FLUSH IV SOLN 100 UNIT/ML 100 UNIT/ML
500 SOLUTION INTRAVENOUS PRN
Status: CANCELLED | OUTPATIENT
Start: 2019-09-24

## 2019-09-24 RX ORDER — SODIUM CHLORIDE 0.9 % (FLUSH) 0.9 %
10 SYRINGE (ML) INJECTION PRN
Status: CANCELLED | OUTPATIENT
Start: 2019-09-24

## 2019-09-24 NOTE — PROGRESS NOTES
throat. RESPIRATORY: No hemoptysis, shortness of breath, cough. CARDIOVASCULAR: No chest pain, palpitations. GASTROINTESTINAL: No nausea/vomiting abdominal pain. Intermittent diarrhea  GENITOURINARY: No dysuria, urinary frequency, hematuria. NEURO: No syncope, presyncope, headache. Remainder:  ROS NEGATIVE    Past Medical History:      Diagnosis Date    Anxiety     Cancer ([facility] [address] 2019    breast    Heart murmur     Hypertension     Post-menopausal bleeding     S/P hyst/ polypectomy 6/10/14     Medications:  Reviewed and reconciled. Allergies: Allergies   Allergen Reactions    Tetracyclines & Related Hives     Physical Exam:  BP (!) 151/71 (Site: Left Upper Arm, Position: Sitting, Cuff Size: Medium Adult)   Pulse 74   Temp 97.5 °F (36.4 °C) (Temporal)   Ht 5' 3\" (1.6 m)   Wt 208 lb 12.8 oz (94.7 kg)   BMI 36.99 kg/m²   GENERAL: Alert, oriented x 3, not in acute distress. HEENT: PERRLA; EOMI. Oropharynx clear. NECK: Supple. Without lymphadenopathy. LUNGS: Good air entry bilaterally. No wheezing, crackles or ronchi. CARDIOVASCULAR: Regular rate. No murmurs, rubs or gallops. ABDOMEN: Soft. Non-tender, non-distended. Positive bowel sounds. EXTREMITIES: Without clubbing, cyanosis, or edema. NEUROLOGIC: No focal deficits. ECOG PS 1    Impression/Plan:  62 y.o. female with Left Breast Cancer    Mass, left breast at 2:00, core needle biopsy: Invasive ductal carcinoma,nuclear grade 3, see comment. 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 Breast Cancer Marker Studies:  ER: Negative  CA: Negative  Her-2/francisco Positive/3+    MRI Bilateral Breast 05/23/2019: An irregular, enhancing mass is again noted in the left breast 2:00 which measures 2.0 x 2.2 x 2.6 cm. No axillary LN. No evidence of neoplasm on right. T2 N0 M0  We recommended neoadjuvant chemotherapy consisting of 6 cycles of TCHP. Side effects of TCHP reviewed with patient.  She agreed to proceed. 2D-ECHO EF50-55%. Mediport placed. Cycle # 1 TCHP was on 06/24/2019. Cycle # 2 TCHP was on 07/24/2019. Cycle # 3 TCHP was on 08/14/2019. 2D-ECHO 08/23/2019 EF 65%  Cycle # 4 TCHP was on 09/04/2019. Cycle # 5 TCHP is today 09/25/2019. HypoK to be replaced. Refilled her Lomotil    RTC in 3 weeks for Cycle # 6 TCHP.     Joan Walter MD   [id number]  Board Certified Medical Oncologist

## 2019-09-25 ENCOUNTER — OFFICE VISIT (OUTPATIENT)
Dept: ONCOLOGY | Age: 64
End: 2019-09-25
Payer: COMMERCIAL

## 2019-09-25 ENCOUNTER — HOSPITAL ENCOUNTER (OUTPATIENT)
Dept: INFUSION THERAPY | Age: 64
Discharge: HOME | End: 2019-09-25
Payer: COMMERCIAL

## 2019-09-25 VITALS
SYSTOLIC BLOOD PRESSURE: 161 MMHG | HEART RATE: 60 BPM | RESPIRATION RATE: 18 BRPM | TEMPERATURE: 98.4 F | DIASTOLIC BLOOD PRESSURE: 70 MMHG

## 2019-09-25 VITALS
TEMPERATURE: 97.5 F | SYSTOLIC BLOOD PRESSURE: 151 MMHG | WEIGHT: 208.8 LBS | HEIGHT: 63 IN | DIASTOLIC BLOOD PRESSURE: 71 MMHG | BODY MASS INDEX: 37 KG/M2 | HEART RATE: 74 BPM

## 2019-09-25 DIAGNOSIS — K52.1 DIARRHEA DUE TO DRUG: ICD-10-CM

## 2019-09-25 DIAGNOSIS — C50.912 INVASIVE DUCTAL CARCINOMA OF LEFT BREAST (HCC): Primary | ICD-10-CM

## 2019-09-25 DIAGNOSIS — Z09 FOLLOW UP: Primary | ICD-10-CM

## 2019-09-25 PROCEDURE — 96415 CHEMO IV INFUSION ADDL HR: CPT

## 2019-09-25 PROCEDURE — G8417 CALC BMI ABV UP PARAM F/U: HCPCS | Performed by: INTERNAL MEDICINE

## 2019-09-25 PROCEDURE — 96417 CHEMO IV INFUS EACH ADDL SEQ: CPT

## 2019-09-25 PROCEDURE — 96366 THER/PROPH/DIAG IV INF ADDON: CPT

## 2019-09-25 PROCEDURE — 6360000002 HC RX W HCPCS: Performed by: INTERNAL MEDICINE

## 2019-09-25 PROCEDURE — 99214 OFFICE O/P EST MOD 30 MIN: CPT | Performed by: INTERNAL MEDICINE

## 2019-09-25 PROCEDURE — 96413 CHEMO IV INFUSION 1 HR: CPT

## 2019-09-25 PROCEDURE — 2580000003 HC RX 258: Performed by: INTERNAL MEDICINE

## 2019-09-25 PROCEDURE — 6370000000 HC RX 637 (ALT 250 FOR IP): Performed by: NURSE PRACTITIONER

## 2019-09-25 PROCEDURE — 96375 TX/PRO/DX INJ NEW DRUG ADDON: CPT

## 2019-09-25 PROCEDURE — 1036F TOBACCO NON-USER: CPT | Performed by: INTERNAL MEDICINE

## 2019-09-25 PROCEDURE — 3017F COLORECTAL CA SCREEN DOC REV: CPT | Performed by: INTERNAL MEDICINE

## 2019-09-25 PROCEDURE — G8427 DOCREV CUR MEDS BY ELIG CLIN: HCPCS | Performed by: INTERNAL MEDICINE

## 2019-09-25 RX ORDER — SODIUM CHLORIDE 9 MG/ML
INJECTION, SOLUTION INTRAVENOUS CONTINUOUS
Status: CANCELLED | OUTPATIENT
Start: 2019-09-25

## 2019-09-25 RX ORDER — SODIUM CHLORIDE 0.9 % (FLUSH) 0.9 %
10 SYRINGE (ML) INJECTION PRN
Status: DISCONTINUED | OUTPATIENT
Start: 2019-09-25 | End: 2019-09-26 | Stop reason: HOSPADM

## 2019-09-25 RX ORDER — HEPARIN SODIUM (PORCINE) LOCK FLUSH IV SOLN 100 UNIT/ML 100 UNIT/ML
500 SOLUTION INTRAVENOUS PRN
Status: CANCELLED | OUTPATIENT
Start: 2019-09-25

## 2019-09-25 RX ORDER — SODIUM CHLORIDE 9 MG/ML
20 INJECTION, SOLUTION INTRAVENOUS ONCE
Status: CANCELLED | OUTPATIENT
Start: 2019-09-25

## 2019-09-25 RX ORDER — DEXAMETHASONE SODIUM PHOSPHATE 10 MG/ML
10 INJECTION, SOLUTION INTRAMUSCULAR; INTRAVENOUS ONCE
Status: CANCELLED | OUTPATIENT
Start: 2019-09-25

## 2019-09-25 RX ORDER — EPINEPHRINE 1 MG/ML
0.3 INJECTION, SOLUTION, CONCENTRATE INTRAVENOUS PRN
Status: CANCELLED | OUTPATIENT
Start: 2019-09-25

## 2019-09-25 RX ORDER — MEPERIDINE HYDROCHLORIDE 25 MG/ML
12.5 INJECTION INTRAMUSCULAR; INTRAVENOUS; SUBCUTANEOUS ONCE
Status: CANCELLED | OUTPATIENT
Start: 2019-09-25

## 2019-09-25 RX ORDER — PALONOSETRON HYDROCHLORIDE 0.05 MG/ML
0.25 INJECTION, SOLUTION INTRAVENOUS ONCE
Status: COMPLETED | OUTPATIENT
Start: 2019-09-25 | End: 2019-09-25

## 2019-09-25 RX ORDER — SODIUM CHLORIDE 0.9 % (FLUSH) 0.9 %
10 SYRINGE (ML) INJECTION PRN
Status: CANCELLED | OUTPATIENT
Start: 2019-09-25

## 2019-09-25 RX ORDER — 0.9 % SODIUM CHLORIDE 0.9 %
10 VIAL (ML) INJECTION ONCE
Status: CANCELLED | OUTPATIENT
Start: 2019-09-25

## 2019-09-25 RX ORDER — PALONOSETRON 0.05 MG/ML
0.25 INJECTION, SOLUTION INTRAVENOUS ONCE
Status: CANCELLED | OUTPATIENT
Start: 2019-09-25

## 2019-09-25 RX ORDER — METHYLPREDNISOLONE SODIUM SUCCINATE 125 MG/2ML
125 INJECTION, POWDER, LYOPHILIZED, FOR SOLUTION INTRAMUSCULAR; INTRAVENOUS ONCE
Status: CANCELLED | OUTPATIENT
Start: 2019-09-25

## 2019-09-25 RX ORDER — SODIUM CHLORIDE 9 MG/ML
20 INJECTION, SOLUTION INTRAVENOUS ONCE
Status: COMPLETED | OUTPATIENT
Start: 2019-09-25 | End: 2019-09-25

## 2019-09-25 RX ORDER — DIPHENHYDRAMINE HYDROCHLORIDE 50 MG/ML
50 INJECTION INTRAMUSCULAR; INTRAVENOUS ONCE
Status: CANCELLED | OUTPATIENT
Start: 2019-09-25

## 2019-09-25 RX ORDER — HEPARIN SODIUM (PORCINE) LOCK FLUSH IV SOLN 100 UNIT/ML 100 UNIT/ML
500 SOLUTION INTRAVENOUS PRN
Status: DISCONTINUED | OUTPATIENT
Start: 2019-09-25 | End: 2019-09-26 | Stop reason: HOSPADM

## 2019-09-25 RX ORDER — DEXAMETHASONE SODIUM PHOSPHATE 10 MG/ML
10 INJECTION INTRAMUSCULAR; INTRAVENOUS ONCE
Status: COMPLETED | OUTPATIENT
Start: 2019-09-25 | End: 2019-09-25

## 2019-09-25 RX ORDER — POTASSIUM CHLORIDE 20 MEQ/1
40 TABLET, EXTENDED RELEASE ORAL ONCE
Status: COMPLETED | OUTPATIENT
Start: 2019-09-25 | End: 2019-09-25

## 2019-09-25 RX ORDER — SODIUM CHLORIDE 0.9 % (FLUSH) 0.9 %
5 SYRINGE (ML) INJECTION PRN
Status: CANCELLED | OUTPATIENT
Start: 2019-09-25

## 2019-09-25 RX ORDER — POTASSIUM CHLORIDE 20 MEQ/1
40 TABLET, EXTENDED RELEASE ORAL PRN
Status: CANCELLED
Start: 2019-09-25

## 2019-09-25 RX ORDER — DIPHENOXYLATE HYDROCHLORIDE AND ATROPINE SULFATE 2.5; .025 MG/1; MG/1
2 TABLET ORAL 4 TIMES DAILY PRN
Qty: 120 TABLET | Refills: 0 | Status: SHIPPED | OUTPATIENT
Start: 2019-09-25 | End: 2019-10-10

## 2019-09-25 RX ADMIN — Medication 10 ML: at 09:20

## 2019-09-25 RX ADMIN — SODIUM CHLORIDE 20 ML/HR: 9 INJECTION, SOLUTION INTRAVENOUS at 09:21

## 2019-09-25 RX ADMIN — Medication 10 ML: at 10:17

## 2019-09-25 RX ADMIN — DOCETAXEL 160 MG: 20 INJECTION, SOLUTION INTRAVENOUS at 11:01

## 2019-09-25 RX ADMIN — TRASTUZUMAB 588 MG: 150 INJECTION, POWDER, LYOPHILIZED, FOR SOLUTION INTRAVENOUS at 10:17

## 2019-09-25 RX ADMIN — POTASSIUM CHLORIDE 40 MEQ: 20 TABLET, EXTENDED RELEASE ORAL at 09:38

## 2019-09-25 RX ADMIN — CARBOPLATIN 700 MG: 10 INJECTION, SOLUTION INTRAVENOUS at 12:17

## 2019-09-25 RX ADMIN — Medication 10 ML: at 12:17

## 2019-09-25 RX ADMIN — Medication 10 ML: at 13:08

## 2019-09-25 RX ADMIN — PALONOSETRON 0.25 MG: 0.25 INJECTION, SOLUTION INTRAVENOUS at 09:23

## 2019-09-25 RX ADMIN — Medication 10 ML: at 09:27

## 2019-09-25 RX ADMIN — Medication 500 UNITS: at 13:08

## 2019-09-25 RX ADMIN — DEXAMETHASONE SODIUM PHOSPHATE 10 MG: 10 INJECTION INTRAMUSCULAR; INTRAVENOUS at 09:27

## 2019-09-25 RX ADMIN — PERTUZUMAB 420 MG: 30 INJECTION, SOLUTION, CONCENTRATE INTRAVENOUS at 10:17

## 2019-09-25 RX ADMIN — SODIUM CHLORIDE 150 MG: 9 INJECTION, SOLUTION INTRAVENOUS at 09:41

## 2019-09-26 ENCOUNTER — TELEPHONE (OUTPATIENT)
Dept: INFUSION THERAPY | Age: 64
End: 2019-09-26

## 2019-09-26 ENCOUNTER — HOSPITAL ENCOUNTER (OUTPATIENT)
Dept: INFUSION THERAPY | Age: 64
Discharge: HOME | End: 2019-09-26
Payer: COMMERCIAL

## 2019-09-26 DIAGNOSIS — C50.912 INVASIVE DUCTAL CARCINOMA OF LEFT BREAST (HCC): Primary | ICD-10-CM

## 2019-09-26 PROCEDURE — 6360000002 HC RX W HCPCS: Performed by: INTERNAL MEDICINE

## 2019-09-26 PROCEDURE — 96372 THER/PROPH/DIAG INJ SC/IM: CPT

## 2019-09-26 RX ADMIN — PEGFILGRASTIM-CBQV 6 MG: 6 INJECTION, SOLUTION SUBCUTANEOUS at 15:25

## 2019-10-21 ENCOUNTER — HOSPITAL ENCOUNTER (OUTPATIENT)
Dept: RADIATION ONCOLOGY | Age: 64
Discharge: HOME | End: 2019-10-21
Payer: COMMERCIAL

## 2019-10-21 VITALS
TEMPERATURE: 98 F | DIASTOLIC BLOOD PRESSURE: 68 MMHG | RESPIRATION RATE: 18 BRPM | WEIGHT: 204.56 LBS | HEART RATE: 94 BPM | SYSTOLIC BLOOD PRESSURE: 126 MMHG | BODY MASS INDEX: 36.24 KG/M2 | OXYGEN SATURATION: 95 %

## 2019-10-21 DIAGNOSIS — C50.919 MALIGNANT NEOPLASM OF FEMALE BREAST, UNSPECIFIED ESTROGEN RECEPTOR STATUS, UNSPECIFIED LATERALITY, UNSPECIFIED SITE OF BREAST (HCC): Primary | ICD-10-CM

## 2019-10-21 PROCEDURE — 99205 OFFICE O/P NEW HI 60 MIN: CPT | Performed by: RADIOLOGY

## 2019-10-21 PROCEDURE — 99205 OFFICE O/P NEW HI 60 MIN: CPT

## 2019-10-21 RX ORDER — DIPHENOXYLATE HYDROCHLORIDE AND ATROPINE SULFATE 2.5; .025 MG/1; MG/1
1 TABLET ORAL 4 TIMES DAILY PRN
COMMUNITY
End: 2021-08-23

## 2019-10-22 ENCOUNTER — HOSPITAL ENCOUNTER (OUTPATIENT)
Dept: INFUSION THERAPY | Age: 64
Discharge: HOME | End: 2019-10-22
Payer: COMMERCIAL

## 2019-10-22 DIAGNOSIS — C50.912 INVASIVE DUCTAL CARCINOMA OF LEFT BREAST (HCC): ICD-10-CM

## 2019-10-22 LAB
ALBUMIN SERPL-MCNC: 4.2 G/DL (ref 3.5–5.2)
ALP BLD-CCNC: 70 U/L (ref 35–104)
ALT SERPL-CCNC: 32 U/L (ref 0–32)
ANION GAP SERPL CALCULATED.3IONS-SCNC: 9 MMOL/L (ref 7–16)
AST SERPL-CCNC: 24 U/L (ref 0–31)
BASOPHILS ABSOLUTE: 0.04 E9/L (ref 0–0.2)
BASOPHILS RELATIVE PERCENT: 0.8 % (ref 0–2)
BILIRUB SERPL-MCNC: 0.2 MG/DL (ref 0–1.2)
BUN BLDV-MCNC: 19 MG/DL (ref 8–23)
CALCIUM SERPL-MCNC: 9.9 MG/DL (ref 8.6–10.2)
CHLORIDE BLD-SCNC: 103 MMOL/L (ref 98–107)
CO2: 32 MMOL/L (ref 22–29)
CREAT SERPL-MCNC: 1 MG/DL (ref 0.5–1)
EOSINOPHILS ABSOLUTE: 0.12 E9/L (ref 0.05–0.5)
EOSINOPHILS RELATIVE PERCENT: 2.4 % (ref 0–6)
GFR AFRICAN AMERICAN: >60
GFR NON-AFRICAN AMERICAN: 56 ML/MIN/1.73
GLUCOSE BLD-MCNC: 123 MG/DL (ref 74–99)
HCT VFR BLD CALC: 33.8 % (ref 34–48)
HEMOGLOBIN: 11.3 G/DL (ref 11.5–15.5)
IMMATURE GRANULOCYTES #: 0.01 E9/L
IMMATURE GRANULOCYTES %: 0.2 % (ref 0–5)
LYMPHOCYTES ABSOLUTE: 2.15 E9/L (ref 1.5–4)
LYMPHOCYTES RELATIVE PERCENT: 43.4 % (ref 20–42)
MAGNESIUM: 1.8 MG/DL (ref 1.6–2.6)
MCH RBC QN AUTO: 32.9 PG (ref 26–35)
MCHC RBC AUTO-ENTMCNC: 33.4 % (ref 32–34.5)
MCV RBC AUTO: 98.5 FL (ref 80–99.9)
MONOCYTES ABSOLUTE: 0.43 E9/L (ref 0.1–0.95)
MONOCYTES RELATIVE PERCENT: 8.7 % (ref 2–12)
NEUTROPHILS ABSOLUTE: 2.2 E9/L (ref 1.8–7.3)
NEUTROPHILS RELATIVE PERCENT: 44.5 % (ref 43–80)
PDW BLD-RTO: 19.1 FL (ref 11.5–15)
PLATELET # BLD: 337 E9/L (ref 130–450)
PMV BLD AUTO: 9.4 FL (ref 7–12)
POTASSIUM SERPL-SCNC: 3.8 MMOL/L (ref 3.5–5)
RBC # BLD: 3.43 E12/L (ref 3.5–5.5)
SODIUM BLD-SCNC: 144 MMOL/L (ref 132–146)
TOTAL PROTEIN: 6.9 G/DL (ref 6.4–8.3)
WBC # BLD: 5 E9/L (ref 4.5–11.5)

## 2019-10-22 PROCEDURE — 80053 COMPREHEN METABOLIC PANEL: CPT

## 2019-10-22 PROCEDURE — 83735 ASSAY OF MAGNESIUM: CPT

## 2019-10-22 PROCEDURE — 85025 COMPLETE CBC W/AUTO DIFF WBC: CPT

## 2019-10-23 ENCOUNTER — OFFICE VISIT (OUTPATIENT)
Dept: ONCOLOGY | Age: 64
End: 2019-10-23
Payer: COMMERCIAL

## 2019-10-23 ENCOUNTER — HOSPITAL ENCOUNTER (OUTPATIENT)
Dept: INFUSION THERAPY | Age: 64
Discharge: HOME | End: 2019-10-23
Payer: COMMERCIAL

## 2019-10-23 ENCOUNTER — TELEPHONE (OUTPATIENT)
Dept: CASE MANAGEMENT | Age: 64
End: 2019-10-23

## 2019-10-23 VITALS
TEMPERATURE: 98.4 F | HEART RATE: 77 BPM | SYSTOLIC BLOOD PRESSURE: 174 MMHG | WEIGHT: 208.9 LBS | HEIGHT: 63 IN | DIASTOLIC BLOOD PRESSURE: 78 MMHG | BODY MASS INDEX: 37.02 KG/M2

## 2019-10-23 VITALS
TEMPERATURE: 98.8 F | DIASTOLIC BLOOD PRESSURE: 72 MMHG | SYSTOLIC BLOOD PRESSURE: 185 MMHG | RESPIRATION RATE: 18 BRPM | HEART RATE: 64 BPM

## 2019-10-23 DIAGNOSIS — Z09 FOLLOW UP: Primary | ICD-10-CM

## 2019-10-23 DIAGNOSIS — C50.912 INVASIVE DUCTAL CARCINOMA OF LEFT BREAST (HCC): Primary | ICD-10-CM

## 2019-10-23 PROCEDURE — G8427 DOCREV CUR MEDS BY ELIG CLIN: HCPCS | Performed by: INTERNAL MEDICINE

## 2019-10-23 PROCEDURE — 96375 TX/PRO/DX INJ NEW DRUG ADDON: CPT

## 2019-10-23 PROCEDURE — 2580000003 HC RX 258: Performed by: INTERNAL MEDICINE

## 2019-10-23 PROCEDURE — G8417 CALC BMI ABV UP PARAM F/U: HCPCS | Performed by: INTERNAL MEDICINE

## 2019-10-23 PROCEDURE — G8484 FLU IMMUNIZE NO ADMIN: HCPCS | Performed by: INTERNAL MEDICINE

## 2019-10-23 PROCEDURE — 3017F COLORECTAL CA SCREEN DOC REV: CPT | Performed by: INTERNAL MEDICINE

## 2019-10-23 PROCEDURE — 6360000002 HC RX W HCPCS: Performed by: INTERNAL MEDICINE

## 2019-10-23 PROCEDURE — 96417 CHEMO IV INFUS EACH ADDL SEQ: CPT

## 2019-10-23 PROCEDURE — 99214 OFFICE O/P EST MOD 30 MIN: CPT | Performed by: INTERNAL MEDICINE

## 2019-10-23 PROCEDURE — 96367 TX/PROPH/DG ADDL SEQ IV INF: CPT

## 2019-10-23 PROCEDURE — 96549 UNLISTED CHEMOTHERAPY PX: CPT

## 2019-10-23 PROCEDURE — 1036F TOBACCO NON-USER: CPT | Performed by: INTERNAL MEDICINE

## 2019-10-23 PROCEDURE — 96413 CHEMO IV INFUSION 1 HR: CPT

## 2019-10-23 RX ORDER — SODIUM CHLORIDE 9 MG/ML
INJECTION, SOLUTION INTRAVENOUS CONTINUOUS
Status: CANCELLED | OUTPATIENT
Start: 2019-10-23

## 2019-10-23 RX ORDER — HEPARIN SODIUM (PORCINE) LOCK FLUSH IV SOLN 100 UNIT/ML 100 UNIT/ML
500 SOLUTION INTRAVENOUS PRN
Status: CANCELLED | OUTPATIENT
Start: 2019-10-23

## 2019-10-23 RX ORDER — METHYLPREDNISOLONE SODIUM SUCCINATE 125 MG/2ML
125 INJECTION, POWDER, LYOPHILIZED, FOR SOLUTION INTRAMUSCULAR; INTRAVENOUS ONCE
Status: CANCELLED | OUTPATIENT
Start: 2019-10-23

## 2019-10-23 RX ORDER — PALONOSETRON 0.05 MG/ML
0.25 INJECTION, SOLUTION INTRAVENOUS ONCE
Status: CANCELLED | OUTPATIENT
Start: 2019-10-23

## 2019-10-23 RX ORDER — MEPERIDINE HYDROCHLORIDE 25 MG/ML
12.5 INJECTION INTRAMUSCULAR; INTRAVENOUS; SUBCUTANEOUS ONCE
Status: CANCELLED | OUTPATIENT
Start: 2019-10-23

## 2019-10-23 RX ORDER — SODIUM CHLORIDE 9 MG/ML
20 INJECTION, SOLUTION INTRAVENOUS ONCE
Status: CANCELLED | OUTPATIENT
Start: 2019-10-23

## 2019-10-23 RX ORDER — EPINEPHRINE 1 MG/ML
0.3 INJECTION, SOLUTION, CONCENTRATE INTRAVENOUS PRN
Status: CANCELLED | OUTPATIENT
Start: 2019-10-23

## 2019-10-23 RX ORDER — DEXAMETHASONE SODIUM PHOSPHATE 10 MG/ML
10 INJECTION, SOLUTION INTRAMUSCULAR; INTRAVENOUS ONCE
Status: CANCELLED | OUTPATIENT
Start: 2019-10-23

## 2019-10-23 RX ORDER — HEPARIN SODIUM (PORCINE) LOCK FLUSH IV SOLN 100 UNIT/ML 100 UNIT/ML
500 SOLUTION INTRAVENOUS PRN
Status: DISCONTINUED | OUTPATIENT
Start: 2019-10-23 | End: 2019-10-24 | Stop reason: HOSPADM

## 2019-10-23 RX ORDER — SODIUM CHLORIDE 0.9 % (FLUSH) 0.9 %
10 SYRINGE (ML) INJECTION PRN
Status: DISCONTINUED | OUTPATIENT
Start: 2019-10-23 | End: 2019-10-24 | Stop reason: HOSPADM

## 2019-10-23 RX ORDER — DEXAMETHASONE SODIUM PHOSPHATE 10 MG/ML
10 INJECTION INTRAMUSCULAR; INTRAVENOUS ONCE
Status: COMPLETED | OUTPATIENT
Start: 2019-10-23 | End: 2019-10-23

## 2019-10-23 RX ORDER — SODIUM CHLORIDE 0.9 % (FLUSH) 0.9 %
10 SYRINGE (ML) INJECTION PRN
Status: CANCELLED | OUTPATIENT
Start: 2019-10-23

## 2019-10-23 RX ORDER — PALONOSETRON HYDROCHLORIDE 0.05 MG/ML
0.25 INJECTION, SOLUTION INTRAVENOUS ONCE
Status: COMPLETED | OUTPATIENT
Start: 2019-10-23 | End: 2019-10-23

## 2019-10-23 RX ORDER — SODIUM CHLORIDE 0.9 % (FLUSH) 0.9 %
5 SYRINGE (ML) INJECTION PRN
Status: CANCELLED | OUTPATIENT
Start: 2019-10-23

## 2019-10-23 RX ORDER — 0.9 % SODIUM CHLORIDE 0.9 %
10 VIAL (ML) INJECTION ONCE
Status: CANCELLED | OUTPATIENT
Start: 2019-10-23

## 2019-10-23 RX ORDER — DIPHENHYDRAMINE HYDROCHLORIDE 50 MG/ML
50 INJECTION INTRAMUSCULAR; INTRAVENOUS ONCE
Status: CANCELLED | OUTPATIENT
Start: 2019-10-23

## 2019-10-23 RX ORDER — SODIUM CHLORIDE 9 MG/ML
20 INJECTION, SOLUTION INTRAVENOUS ONCE
Status: DISCONTINUED | OUTPATIENT
Start: 2019-10-23 | End: 2019-10-24 | Stop reason: HOSPADM

## 2019-10-23 RX ADMIN — FOSAPREPITANT 150 MG: 150 INJECTION, POWDER, LYOPHILIZED, FOR SOLUTION INTRAVENOUS at 09:39

## 2019-10-23 RX ADMIN — TRASTUZUMAB 588 MG: 150 INJECTION, POWDER, LYOPHILIZED, FOR SOLUTION INTRAVENOUS at 10:22

## 2019-10-23 RX ADMIN — SODIUM CHLORIDE 160 MG: 900 INJECTION, SOLUTION INTRAVENOUS at 11:11

## 2019-10-23 RX ADMIN — PERTUZUMAB 420 MG: 30 INJECTION, SOLUTION, CONCENTRATE INTRAVENOUS at 10:22

## 2019-10-23 RX ADMIN — DEXAMETHASONE SODIUM PHOSPHATE 10 MG: 10 INJECTION INTRAMUSCULAR; INTRAVENOUS at 09:22

## 2019-10-23 RX ADMIN — PALONOSETRON 0.25 MG: 0.25 INJECTION, SOLUTION INTRAVENOUS at 09:19

## 2019-10-23 RX ADMIN — SODIUM CHLORIDE 20 ML/HR: 9 INJECTION, SOLUTION INTRAVENOUS at 09:17

## 2019-10-23 RX ADMIN — Medication 10 ML: at 09:17

## 2019-10-23 RX ADMIN — CARBOPLATIN 700 MG: 10 INJECTION, SOLUTION INTRAVENOUS at 12:24

## 2019-10-23 RX ADMIN — Medication 10 ML: at 09:22

## 2019-10-23 RX ADMIN — Medication 10 ML: at 13:19

## 2019-10-23 RX ADMIN — HEPARIN 500 UNITS: 100 SYRINGE at 13:19

## 2019-10-23 NOTE — PROGRESS NOTES
Patient tolerated infusion well without complaint. Port flushed per protocol and de-accessed. Patient discharged ambulatory. Patient aware to return at 1:15 tomorrow for injection.

## 2019-10-24 ENCOUNTER — HOSPITAL ENCOUNTER (OUTPATIENT)
Dept: INFUSION THERAPY | Age: 64
Discharge: HOME | End: 2019-10-24
Payer: COMMERCIAL

## 2019-10-24 DIAGNOSIS — C50.912 INVASIVE DUCTAL CARCINOMA OF LEFT BREAST (HCC): Primary | ICD-10-CM

## 2019-10-24 PROCEDURE — 6360000002 HC RX W HCPCS: Performed by: INTERNAL MEDICINE

## 2019-10-24 PROCEDURE — 96372 THER/PROPH/DIAG INJ SC/IM: CPT

## 2019-10-24 RX ADMIN — PEGFILGRASTIM-CBQV 6 MG: 6 INJECTION, SOLUTION SUBCUTANEOUS at 14:44

## 2019-10-28 ENCOUNTER — TELEPHONE (OUTPATIENT)
Dept: BREAST CENTER | Age: 64
End: 2019-10-28

## 2019-10-30 ENCOUNTER — TELEPHONE (OUTPATIENT)
Dept: BREAST CENTER | Age: 64
End: 2019-10-30

## 2019-11-07 ENCOUNTER — HOSPITAL ENCOUNTER (OUTPATIENT)
Dept: MRI IMAGING | Age: 64
Discharge: HOME | End: 2019-11-09
Payer: COMMERCIAL

## 2019-11-07 DIAGNOSIS — C50.912 INVASIVE DUCTAL CARCINOMA OF LEFT BREAST (HCC): ICD-10-CM

## 2019-11-07 PROCEDURE — A9577 INJ MULTIHANCE: HCPCS | Performed by: RADIOLOGY

## 2019-11-07 PROCEDURE — 77049 MRI BREAST C-+ W/CAD BI: CPT

## 2019-11-07 PROCEDURE — 6360000004 HC RX CONTRAST MEDICATION: Performed by: RADIOLOGY

## 2019-11-07 RX ADMIN — GADOBENATE DIMEGLUMINE 19 ML: 529 INJECTION, SOLUTION INTRAVENOUS at 11:19

## 2019-11-12 ENCOUNTER — HOSPITAL ENCOUNTER (OUTPATIENT)
Dept: INFUSION THERAPY | Age: 64
Discharge: HOME | End: 2019-11-12
Payer: COMMERCIAL

## 2019-11-12 DIAGNOSIS — C50.912 INVASIVE DUCTAL CARCINOMA OF LEFT BREAST (HCC): Primary | ICD-10-CM

## 2019-11-12 LAB
ALBUMIN SERPL-MCNC: 4.1 G/DL (ref 3.5–5.2)
ALP BLD-CCNC: 71 U/L (ref 35–104)
ALT SERPL-CCNC: 44 U/L (ref 0–32)
ANION GAP SERPL CALCULATED.3IONS-SCNC: 6 MMOL/L (ref 7–16)
AST SERPL-CCNC: 32 U/L (ref 0–31)
BASOPHILS ABSOLUTE: 0.03 E9/L (ref 0–0.2)
BASOPHILS RELATIVE PERCENT: 0.6 % (ref 0–2)
BILIRUB SERPL-MCNC: <0.2 MG/DL (ref 0–1.2)
BUN BLDV-MCNC: 20 MG/DL (ref 8–23)
CALCIUM SERPL-MCNC: 9.8 MG/DL (ref 8.6–10.2)
CHLORIDE BLD-SCNC: 102 MMOL/L (ref 98–107)
CO2: 33 MMOL/L (ref 22–29)
CREAT SERPL-MCNC: 0.9 MG/DL (ref 0.5–1)
EOSINOPHILS ABSOLUTE: 0.01 E9/L (ref 0.05–0.5)
EOSINOPHILS RELATIVE PERCENT: 0.2 % (ref 0–6)
GFR AFRICAN AMERICAN: >60
GFR NON-AFRICAN AMERICAN: >60 ML/MIN/1.73
GLUCOSE BLD-MCNC: 112 MG/DL (ref 74–99)
HCT VFR BLD CALC: 32.6 % (ref 34–48)
HEMOGLOBIN: 10.5 G/DL (ref 11.5–15.5)
IMMATURE GRANULOCYTES #: 0.01 E9/L
IMMATURE GRANULOCYTES %: 0.2 % (ref 0–5)
LYMPHOCYTES ABSOLUTE: 2.2 E9/L (ref 1.5–4)
LYMPHOCYTES RELATIVE PERCENT: 43.7 % (ref 20–42)
MAGNESIUM: 1.7 MG/DL (ref 1.6–2.6)
MCH RBC QN AUTO: 32.2 PG (ref 26–35)
MCHC RBC AUTO-ENTMCNC: 32.2 % (ref 32–34.5)
MCV RBC AUTO: 100 FL (ref 80–99.9)
MONOCYTES ABSOLUTE: 0.57 E9/L (ref 0.1–0.95)
MONOCYTES RELATIVE PERCENT: 11.3 % (ref 2–12)
NEUTROPHILS ABSOLUTE: 2.22 E9/L (ref 1.8–7.3)
NEUTROPHILS RELATIVE PERCENT: 44 % (ref 43–80)
PDW BLD-RTO: 17.5 FL (ref 11.5–15)
PLATELET # BLD: 219 E9/L (ref 130–450)
PMV BLD AUTO: 9.8 FL (ref 7–12)
POTASSIUM SERPL-SCNC: 3.7 MMOL/L (ref 3.5–5)
RBC # BLD: 3.26 E12/L (ref 3.5–5.5)
SODIUM BLD-SCNC: 141 MMOL/L (ref 132–146)
TOTAL PROTEIN: 6.8 G/DL (ref 6.4–8.3)
WBC # BLD: 5 E9/L (ref 4.5–11.5)

## 2019-11-12 PROCEDURE — 80053 COMPREHEN METABOLIC PANEL: CPT

## 2019-11-12 PROCEDURE — 85025 COMPLETE CBC W/AUTO DIFF WBC: CPT

## 2019-11-12 PROCEDURE — 83735 ASSAY OF MAGNESIUM: CPT

## 2019-11-12 RX ORDER — HEPARIN SODIUM (PORCINE) LOCK FLUSH IV SOLN 100 UNIT/ML 100 UNIT/ML
500 SOLUTION INTRAVENOUS PRN
Status: DISCONTINUED | OUTPATIENT
Start: 2019-11-12 | End: 2019-11-13 | Stop reason: HOSPADM

## 2019-11-12 RX ORDER — SODIUM CHLORIDE 0.9 % (FLUSH) 0.9 %
10 SYRINGE (ML) INJECTION PRN
Status: CANCELLED | OUTPATIENT
Start: 2019-11-12

## 2019-11-12 RX ORDER — SODIUM CHLORIDE 0.9 % (FLUSH) 0.9 %
10 SYRINGE (ML) INJECTION PRN
Status: DISCONTINUED | OUTPATIENT
Start: 2019-11-12 | End: 2019-11-13 | Stop reason: HOSPADM

## 2019-11-12 RX ORDER — POTASSIUM CHLORIDE 20 MEQ/1
40 TABLET, EXTENDED RELEASE ORAL PRN
Status: CANCELLED
Start: 2019-11-12

## 2019-11-12 RX ORDER — HEPARIN SODIUM (PORCINE) LOCK FLUSH IV SOLN 100 UNIT/ML 100 UNIT/ML
500 SOLUTION INTRAVENOUS PRN
Status: CANCELLED | OUTPATIENT
Start: 2019-11-12

## 2019-11-13 ENCOUNTER — HOSPITAL ENCOUNTER (OUTPATIENT)
Dept: INFUSION THERAPY | Age: 64
Discharge: HOME | End: 2019-11-13
Payer: COMMERCIAL

## 2019-11-13 ENCOUNTER — OFFICE VISIT (OUTPATIENT)
Dept: ONCOLOGY | Age: 64
End: 2019-11-13
Payer: COMMERCIAL

## 2019-11-13 VITALS
DIASTOLIC BLOOD PRESSURE: 69 MMHG | TEMPERATURE: 97.8 F | RESPIRATION RATE: 18 BRPM | HEART RATE: 65 BPM | SYSTOLIC BLOOD PRESSURE: 148 MMHG

## 2019-11-13 VITALS
HEIGHT: 63 IN | TEMPERATURE: 97.7 F | HEART RATE: 83 BPM | BODY MASS INDEX: 37.33 KG/M2 | DIASTOLIC BLOOD PRESSURE: 71 MMHG | SYSTOLIC BLOOD PRESSURE: 157 MMHG | WEIGHT: 210.7 LBS

## 2019-11-13 DIAGNOSIS — C50.912 INVASIVE DUCTAL CARCINOMA OF LEFT BREAST (HCC): Primary | ICD-10-CM

## 2019-11-13 DIAGNOSIS — Z79.899 ENCOUNTER FOR MONITORING CARDIOTOXIC DRUG THERAPY: ICD-10-CM

## 2019-11-13 DIAGNOSIS — Z51.81 ENCOUNTER FOR MONITORING CARDIOTOXIC DRUG THERAPY: ICD-10-CM

## 2019-11-13 PROCEDURE — 3017F COLORECTAL CA SCREEN DOC REV: CPT | Performed by: INTERNAL MEDICINE

## 2019-11-13 PROCEDURE — G8484 FLU IMMUNIZE NO ADMIN: HCPCS | Performed by: INTERNAL MEDICINE

## 2019-11-13 PROCEDURE — 96413 CHEMO IV INFUSION 1 HR: CPT

## 2019-11-13 PROCEDURE — 6360000002 HC RX W HCPCS: Performed by: INTERNAL MEDICINE

## 2019-11-13 PROCEDURE — 99214 OFFICE O/P EST MOD 30 MIN: CPT | Performed by: INTERNAL MEDICINE

## 2019-11-13 PROCEDURE — 2580000003 HC RX 258: Performed by: INTERNAL MEDICINE

## 2019-11-13 PROCEDURE — G8427 DOCREV CUR MEDS BY ELIG CLIN: HCPCS | Performed by: INTERNAL MEDICINE

## 2019-11-13 PROCEDURE — 96549 UNLISTED CHEMOTHERAPY PX: CPT

## 2019-11-13 PROCEDURE — 1036F TOBACCO NON-USER: CPT | Performed by: INTERNAL MEDICINE

## 2019-11-13 PROCEDURE — G8417 CALC BMI ABV UP PARAM F/U: HCPCS | Performed by: INTERNAL MEDICINE

## 2019-11-13 RX ORDER — SODIUM CHLORIDE 9 MG/ML
20 INJECTION, SOLUTION INTRAVENOUS ONCE
Status: CANCELLED | OUTPATIENT
Start: 2019-11-13

## 2019-11-13 RX ORDER — MEPERIDINE HYDROCHLORIDE 25 MG/ML
12.5 INJECTION INTRAMUSCULAR; INTRAVENOUS; SUBCUTANEOUS ONCE
Status: CANCELLED | OUTPATIENT
Start: 2019-11-13

## 2019-11-13 RX ORDER — SODIUM CHLORIDE 0.9 % (FLUSH) 0.9 %
10 SYRINGE (ML) INJECTION PRN
Status: CANCELLED | OUTPATIENT
Start: 2019-11-13

## 2019-11-13 RX ORDER — SODIUM CHLORIDE 9 MG/ML
20 INJECTION, SOLUTION INTRAVENOUS ONCE
Status: DISCONTINUED | OUTPATIENT
Start: 2019-11-13 | End: 2019-11-14 | Stop reason: HOSPADM

## 2019-11-13 RX ORDER — SODIUM CHLORIDE 9 MG/ML
INJECTION, SOLUTION INTRAVENOUS CONTINUOUS
Status: CANCELLED | OUTPATIENT
Start: 2019-11-13

## 2019-11-13 RX ORDER — 0.9 % SODIUM CHLORIDE 0.9 %
10 VIAL (ML) INJECTION ONCE
Status: CANCELLED | OUTPATIENT
Start: 2019-11-13

## 2019-11-13 RX ORDER — EPINEPHRINE 1 MG/ML
0.3 INJECTION, SOLUTION, CONCENTRATE INTRAVENOUS PRN
Status: CANCELLED | OUTPATIENT
Start: 2019-11-13

## 2019-11-13 RX ORDER — METHYLPREDNISOLONE SODIUM SUCCINATE 125 MG/2ML
125 INJECTION, POWDER, LYOPHILIZED, FOR SOLUTION INTRAMUSCULAR; INTRAVENOUS ONCE
Status: CANCELLED | OUTPATIENT
Start: 2019-11-13

## 2019-11-13 RX ORDER — HEPARIN SODIUM (PORCINE) LOCK FLUSH IV SOLN 100 UNIT/ML 100 UNIT/ML
500 SOLUTION INTRAVENOUS PRN
Status: DISCONTINUED | OUTPATIENT
Start: 2019-11-13 | End: 2019-11-14 | Stop reason: HOSPADM

## 2019-11-13 RX ORDER — HEPARIN SODIUM (PORCINE) LOCK FLUSH IV SOLN 100 UNIT/ML 100 UNIT/ML
500 SOLUTION INTRAVENOUS PRN
Status: CANCELLED | OUTPATIENT
Start: 2019-11-13

## 2019-11-13 RX ORDER — SODIUM CHLORIDE 0.9 % (FLUSH) 0.9 %
5 SYRINGE (ML) INJECTION PRN
Status: CANCELLED | OUTPATIENT
Start: 2019-11-13

## 2019-11-13 RX ORDER — DIPHENHYDRAMINE HYDROCHLORIDE 50 MG/ML
50 INJECTION INTRAMUSCULAR; INTRAVENOUS ONCE
Status: CANCELLED | OUTPATIENT
Start: 2019-11-13

## 2019-11-13 RX ORDER — SODIUM CHLORIDE 0.9 % (FLUSH) 0.9 %
10 SYRINGE (ML) INJECTION PRN
Status: DISCONTINUED | OUTPATIENT
Start: 2019-11-13 | End: 2019-11-14 | Stop reason: HOSPADM

## 2019-11-13 RX ADMIN — PERTUZUMAB 420 MG: 30 INJECTION, SOLUTION, CONCENTRATE INTRAVENOUS at 14:41

## 2019-11-13 RX ADMIN — SODIUM CHLORIDE 20 ML/HR: 9 INJECTION, SOLUTION INTRAVENOUS at 15:30

## 2019-11-13 RX ADMIN — HEPARIN SODIUM (PORCINE) LOCK FLUSH IV SOLN 100 UNIT/ML 500 UNITS: 100 SOLUTION at 15:30

## 2019-11-13 RX ADMIN — Medication 10 ML: at 14:41

## 2019-11-13 RX ADMIN — TRASTUZUMAB 588 MG: 150 INJECTION, POWDER, LYOPHILIZED, FOR SOLUTION INTRAVENOUS at 14:41

## 2019-11-14 ENCOUNTER — TELEPHONE (OUTPATIENT)
Dept: BREAST CENTER | Age: 64
End: 2019-11-14

## 2019-11-21 ENCOUNTER — OFFICE VISIT (OUTPATIENT)
Dept: BREAST CENTER | Age: 64
End: 2019-11-21
Payer: COMMERCIAL

## 2019-11-21 VITALS
DIASTOLIC BLOOD PRESSURE: 74 MMHG | BODY MASS INDEX: 36.68 KG/M2 | TEMPERATURE: 97.9 F | SYSTOLIC BLOOD PRESSURE: 150 MMHG | OXYGEN SATURATION: 98 % | HEIGHT: 63 IN | RESPIRATION RATE: 16 BRPM | WEIGHT: 207 LBS | HEART RATE: 74 BPM

## 2019-11-21 DIAGNOSIS — C50.912 INVASIVE DUCTAL CARCINOMA OF LEFT BREAST (HCC): ICD-10-CM

## 2019-11-21 PROCEDURE — G8427 DOCREV CUR MEDS BY ELIG CLIN: HCPCS | Performed by: SURGERY

## 2019-11-21 PROCEDURE — G8417 CALC BMI ABV UP PARAM F/U: HCPCS | Performed by: SURGERY

## 2019-11-21 PROCEDURE — 1036F TOBACCO NON-USER: CPT | Performed by: SURGERY

## 2019-11-21 PROCEDURE — G8484 FLU IMMUNIZE NO ADMIN: HCPCS | Performed by: SURGERY

## 2019-11-21 PROCEDURE — 99213 OFFICE O/P EST LOW 20 MIN: CPT | Performed by: SURGERY

## 2019-11-21 PROCEDURE — 99214 OFFICE O/P EST MOD 30 MIN: CPT | Performed by: SURGERY

## 2019-11-21 PROCEDURE — 3017F COLORECTAL CA SCREEN DOC REV: CPT | Performed by: SURGERY

## 2019-11-25 DIAGNOSIS — C50.912 INVASIVE DUCTAL CARCINOMA OF LEFT BREAST (HCC): Primary | ICD-10-CM

## 2019-12-03 ENCOUNTER — HOSPITAL ENCOUNTER (OUTPATIENT)
Dept: INFUSION THERAPY | Age: 64
Discharge: HOME | End: 2019-12-03
Payer: COMMERCIAL

## 2019-12-03 DIAGNOSIS — C50.912 INVASIVE DUCTAL CARCINOMA OF LEFT BREAST (HCC): ICD-10-CM

## 2019-12-03 LAB
ALBUMIN SERPL-MCNC: 4.3 G/DL (ref 3.5–5.2)
ALP BLD-CCNC: 76 U/L (ref 35–104)
ALT SERPL-CCNC: 38 U/L (ref 0–32)
ANION GAP SERPL CALCULATED.3IONS-SCNC: 12 MMOL/L (ref 7–16)
AST SERPL-CCNC: 32 U/L (ref 0–31)
BASOPHILS ABSOLUTE: 0.04 E9/L (ref 0–0.2)
BASOPHILS RELATIVE PERCENT: 0.6 % (ref 0–2)
BILIRUB SERPL-MCNC: 0.3 MG/DL (ref 0–1.2)
BUN BLDV-MCNC: 19 MG/DL (ref 8–23)
CALCIUM SERPL-MCNC: 10.1 MG/DL (ref 8.6–10.2)
CHLORIDE BLD-SCNC: 102 MMOL/L (ref 98–107)
CO2: 27 MMOL/L (ref 22–29)
CREAT SERPL-MCNC: 0.9 MG/DL (ref 0.5–1)
EOSINOPHILS ABSOLUTE: 0.16 E9/L (ref 0.05–0.5)
EOSINOPHILS RELATIVE PERCENT: 2.4 % (ref 0–6)
GFR AFRICAN AMERICAN: >60
GFR NON-AFRICAN AMERICAN: >60 ML/MIN/1.73
GLUCOSE BLD-MCNC: 105 MG/DL (ref 74–99)
HCT VFR BLD CALC: 35.5 % (ref 34–48)
HEMOGLOBIN: 11.6 G/DL (ref 11.5–15.5)
IMMATURE GRANULOCYTES #: 0.01 E9/L
IMMATURE GRANULOCYTES %: 0.1 % (ref 0–5)
LYMPHOCYTES ABSOLUTE: 2.86 E9/L (ref 1.5–4)
LYMPHOCYTES RELATIVE PERCENT: 42.2 % (ref 20–42)
MAGNESIUM: 1.8 MG/DL (ref 1.6–2.6)
MCH RBC QN AUTO: 31.9 PG (ref 26–35)
MCHC RBC AUTO-ENTMCNC: 32.7 % (ref 32–34.5)
MCV RBC AUTO: 97.5 FL (ref 80–99.9)
MONOCYTES ABSOLUTE: 0.51 E9/L (ref 0.1–0.95)
MONOCYTES RELATIVE PERCENT: 7.5 % (ref 2–12)
NEUTROPHILS ABSOLUTE: 3.2 E9/L (ref 1.8–7.3)
NEUTROPHILS RELATIVE PERCENT: 47.2 % (ref 43–80)
PDW BLD-RTO: 14.4 FL (ref 11.5–15)
PLATELET # BLD: 232 E9/L (ref 130–450)
PMV BLD AUTO: 10.1 FL (ref 7–12)
POTASSIUM SERPL-SCNC: 3.5 MMOL/L (ref 3.5–5)
RBC # BLD: 3.64 E12/L (ref 3.5–5.5)
SODIUM BLD-SCNC: 141 MMOL/L (ref 132–146)
TOTAL PROTEIN: 7.2 G/DL (ref 6.4–8.3)
WBC # BLD: 6.8 E9/L (ref 4.5–11.5)

## 2019-12-03 PROCEDURE — 80053 COMPREHEN METABOLIC PANEL: CPT

## 2019-12-03 PROCEDURE — 83735 ASSAY OF MAGNESIUM: CPT

## 2019-12-03 PROCEDURE — 85025 COMPLETE CBC W/AUTO DIFF WBC: CPT

## 2019-12-04 ENCOUNTER — TELEPHONE (OUTPATIENT)
Dept: ONCOLOGY | Age: 64
End: 2019-12-04

## 2019-12-04 ENCOUNTER — HOSPITAL ENCOUNTER (OUTPATIENT)
Dept: INFUSION THERAPY | Age: 64
Discharge: HOME | End: 2019-12-04
Payer: COMMERCIAL

## 2019-12-04 ENCOUNTER — TELEPHONE (OUTPATIENT)
Dept: BREAST CENTER | Age: 64
End: 2019-12-04

## 2019-12-04 ENCOUNTER — OFFICE VISIT (OUTPATIENT)
Dept: ONCOLOGY | Age: 64
End: 2019-12-04
Payer: COMMERCIAL

## 2019-12-04 VITALS
OXYGEN SATURATION: 98 % | TEMPERATURE: 98.1 F | SYSTOLIC BLOOD PRESSURE: 180 MMHG | RESPIRATION RATE: 14 BRPM | HEART RATE: 78 BPM | DIASTOLIC BLOOD PRESSURE: 72 MMHG

## 2019-12-04 VITALS
HEART RATE: 85 BPM | TEMPERATURE: 98.3 F | OXYGEN SATURATION: 95 % | HEIGHT: 63 IN | DIASTOLIC BLOOD PRESSURE: 72 MMHG | BODY MASS INDEX: 36.94 KG/M2 | WEIGHT: 208.5 LBS | SYSTOLIC BLOOD PRESSURE: 175 MMHG

## 2019-12-04 DIAGNOSIS — C50.912 INVASIVE DUCTAL CARCINOMA OF LEFT BREAST (HCC): Primary | ICD-10-CM

## 2019-12-04 DIAGNOSIS — Z09 FOLLOW UP: Primary | ICD-10-CM

## 2019-12-04 PROCEDURE — 96413 CHEMO IV INFUSION 1 HR: CPT

## 2019-12-04 PROCEDURE — 6360000002 HC RX W HCPCS: Performed by: INTERNAL MEDICINE

## 2019-12-04 PROCEDURE — 99214 OFFICE O/P EST MOD 30 MIN: CPT | Performed by: INTERNAL MEDICINE

## 2019-12-04 PROCEDURE — G8427 DOCREV CUR MEDS BY ELIG CLIN: HCPCS | Performed by: INTERNAL MEDICINE

## 2019-12-04 PROCEDURE — 3017F COLORECTAL CA SCREEN DOC REV: CPT | Performed by: INTERNAL MEDICINE

## 2019-12-04 PROCEDURE — 96549 UNLISTED CHEMOTHERAPY PX: CPT

## 2019-12-04 PROCEDURE — 1036F TOBACCO NON-USER: CPT | Performed by: INTERNAL MEDICINE

## 2019-12-04 PROCEDURE — 2580000003 HC RX 258: Performed by: INTERNAL MEDICINE

## 2019-12-04 PROCEDURE — G8484 FLU IMMUNIZE NO ADMIN: HCPCS | Performed by: INTERNAL MEDICINE

## 2019-12-04 PROCEDURE — G8417 CALC BMI ABV UP PARAM F/U: HCPCS | Performed by: INTERNAL MEDICINE

## 2019-12-04 RX ORDER — METHYLPREDNISOLONE SODIUM SUCCINATE 125 MG/2ML
125 INJECTION, POWDER, LYOPHILIZED, FOR SOLUTION INTRAMUSCULAR; INTRAVENOUS ONCE
Status: CANCELLED | OUTPATIENT
Start: 2019-12-04

## 2019-12-04 RX ORDER — DIPHENHYDRAMINE HYDROCHLORIDE 50 MG/ML
50 INJECTION INTRAMUSCULAR; INTRAVENOUS ONCE
Status: CANCELLED | OUTPATIENT
Start: 2019-12-04

## 2019-12-04 RX ORDER — SODIUM CHLORIDE 9 MG/ML
20 INJECTION, SOLUTION INTRAVENOUS ONCE
Status: CANCELLED | OUTPATIENT
Start: 2019-12-04

## 2019-12-04 RX ORDER — HEPARIN SODIUM (PORCINE) LOCK FLUSH IV SOLN 100 UNIT/ML 100 UNIT/ML
500 SOLUTION INTRAVENOUS PRN
Status: CANCELLED | OUTPATIENT
Start: 2019-12-04

## 2019-12-04 RX ORDER — 0.9 % SODIUM CHLORIDE 0.9 %
10 VIAL (ML) INJECTION ONCE
Status: CANCELLED | OUTPATIENT
Start: 2019-12-04

## 2019-12-04 RX ORDER — SODIUM CHLORIDE 9 MG/ML
20 INJECTION, SOLUTION INTRAVENOUS ONCE
Status: DISCONTINUED | OUTPATIENT
Start: 2019-12-04 | End: 2019-12-05 | Stop reason: HOSPADM

## 2019-12-04 RX ORDER — EPINEPHRINE 1 MG/ML
0.3 INJECTION, SOLUTION, CONCENTRATE INTRAVENOUS PRN
Status: CANCELLED | OUTPATIENT
Start: 2019-12-04

## 2019-12-04 RX ORDER — SODIUM CHLORIDE 0.9 % (FLUSH) 0.9 %
10 SYRINGE (ML) INJECTION PRN
Status: DISCONTINUED | OUTPATIENT
Start: 2019-12-04 | End: 2019-12-05 | Stop reason: HOSPADM

## 2019-12-04 RX ORDER — SODIUM CHLORIDE 9 MG/ML
INJECTION, SOLUTION INTRAVENOUS CONTINUOUS
Status: CANCELLED | OUTPATIENT
Start: 2019-12-04

## 2019-12-04 RX ORDER — MEPERIDINE HYDROCHLORIDE 25 MG/ML
12.5 INJECTION INTRAMUSCULAR; INTRAVENOUS; SUBCUTANEOUS ONCE
Status: CANCELLED | OUTPATIENT
Start: 2019-12-04

## 2019-12-04 RX ORDER — SODIUM CHLORIDE 0.9 % (FLUSH) 0.9 %
5 SYRINGE (ML) INJECTION PRN
Status: CANCELLED | OUTPATIENT
Start: 2019-12-04

## 2019-12-04 RX ORDER — SODIUM CHLORIDE 0.9 % (FLUSH) 0.9 %
10 SYRINGE (ML) INJECTION PRN
Status: CANCELLED | OUTPATIENT
Start: 2019-12-04

## 2019-12-04 RX ORDER — HEPARIN SODIUM (PORCINE) LOCK FLUSH IV SOLN 100 UNIT/ML 100 UNIT/ML
500 SOLUTION INTRAVENOUS PRN
Status: DISCONTINUED | OUTPATIENT
Start: 2019-12-04 | End: 2019-12-05 | Stop reason: HOSPADM

## 2019-12-04 RX ADMIN — SODIUM CHLORIDE 20 ML/HR: 9 INJECTION, SOLUTION INTRAVENOUS at 14:35

## 2019-12-04 RX ADMIN — Medication 10 ML: at 14:35

## 2019-12-04 RX ADMIN — TRASTUZUMAB 588 MG: 150 INJECTION, POWDER, LYOPHILIZED, FOR SOLUTION INTRAVENOUS at 14:37

## 2019-12-04 RX ADMIN — PERTUZUMAB 420 MG: 30 INJECTION, SOLUTION, CONCENTRATE INTRAVENOUS at 14:37

## 2019-12-07 ENCOUNTER — PREP FOR PROCEDURE (OUTPATIENT)
Dept: SURGERY | Age: 64
End: 2019-12-07

## 2019-12-07 RX ORDER — SODIUM CHLORIDE 0.9 % (FLUSH) 0.9 %
10 SYRINGE (ML) INJECTION PRN
Status: CANCELLED | OUTPATIENT
Start: 2019-12-07

## 2019-12-07 RX ORDER — SODIUM CHLORIDE, SODIUM LACTATE, POTASSIUM CHLORIDE, CALCIUM CHLORIDE 600; 310; 30; 20 MG/100ML; MG/100ML; MG/100ML; MG/100ML
INJECTION, SOLUTION INTRAVENOUS CONTINUOUS
Status: CANCELLED | OUTPATIENT
Start: 2019-12-07

## 2019-12-07 RX ORDER — SODIUM CHLORIDE 0.9 % (FLUSH) 0.9 %
10 SYRINGE (ML) INJECTION EVERY 12 HOURS SCHEDULED
Status: CANCELLED | OUTPATIENT
Start: 2019-12-07

## 2019-12-16 ENCOUNTER — HOSPITAL ENCOUNTER (OUTPATIENT)
Dept: NON INVASIVE DIAGNOSTICS | Age: 64
Discharge: HOME | End: 2019-12-16
Payer: COMMERCIAL

## 2019-12-16 DIAGNOSIS — C50.912 INVASIVE DUCTAL CARCINOMA OF LEFT BREAST (HCC): ICD-10-CM

## 2019-12-16 DIAGNOSIS — Z79.899 ENCOUNTER FOR MONITORING CARDIOTOXIC DRUG THERAPY: ICD-10-CM

## 2019-12-16 DIAGNOSIS — Z51.81 ENCOUNTER FOR MONITORING CARDIOTOXIC DRUG THERAPY: ICD-10-CM

## 2019-12-16 PROCEDURE — 93308 TTE F-UP OR LMTD: CPT

## 2019-12-18 ENCOUNTER — HOSPITAL ENCOUNTER (OUTPATIENT)
Dept: GENERAL RADIOLOGY | Age: 64
Setting detail: OUTPATIENT SURGERY
Discharge: HOME | End: 2019-12-20
Attending: SURGERY
Payer: COMMERCIAL

## 2019-12-18 ENCOUNTER — HOSPITAL ENCOUNTER (OUTPATIENT)
Dept: NUCLEAR MEDICINE | Age: 64
Discharge: HOME | End: 2019-12-20
Payer: COMMERCIAL

## 2019-12-18 ENCOUNTER — ANESTHESIA (OUTPATIENT)
Dept: OPERATING ROOM | Age: 64
End: 2019-12-18
Payer: COMMERCIAL

## 2019-12-18 ENCOUNTER — HOSPITAL ENCOUNTER (OUTPATIENT)
Age: 64
Setting detail: OUTPATIENT SURGERY
Discharge: HOME | End: 2019-12-18
Attending: SURGERY | Admitting: SURGERY
Payer: COMMERCIAL

## 2019-12-18 ENCOUNTER — ANESTHESIA EVENT (OUTPATIENT)
Dept: OPERATING ROOM | Age: 64
End: 2019-12-18
Payer: COMMERCIAL

## 2019-12-18 ENCOUNTER — APPOINTMENT (OUTPATIENT)
Dept: GENERAL RADIOLOGY | Age: 64
End: 2019-12-18
Attending: SURGERY
Payer: COMMERCIAL

## 2019-12-18 VITALS
WEIGHT: 206 LBS | HEIGHT: 63 IN | SYSTOLIC BLOOD PRESSURE: 155 MMHG | BODY MASS INDEX: 36.5 KG/M2 | HEART RATE: 66 BPM | TEMPERATURE: 98 F | RESPIRATION RATE: 18 BRPM | DIASTOLIC BLOOD PRESSURE: 68 MMHG | OXYGEN SATURATION: 100 %

## 2019-12-18 VITALS — OXYGEN SATURATION: 94 % | SYSTOLIC BLOOD PRESSURE: 137 MMHG | DIASTOLIC BLOOD PRESSURE: 69 MMHG

## 2019-12-18 DIAGNOSIS — C50.912 INVASIVE DUCTAL CARCINOMA OF LEFT BREAST (HCC): ICD-10-CM

## 2019-12-18 DIAGNOSIS — G89.18 POST-OP PAIN: Primary | ICD-10-CM

## 2019-12-18 DIAGNOSIS — C50.912 MALIGNANT NEOPLASM OF LEFT FEMALE BREAST, UNSPECIFIED ESTROGEN RECEPTOR STATUS, UNSPECIFIED SITE OF BREAST (HCC): ICD-10-CM

## 2019-12-18 LAB
ANION GAP SERPL CALCULATED.3IONS-SCNC: 11 MMOL/L (ref 7–16)
BUN BLDV-MCNC: 16 MG/DL (ref 8–23)
CALCIUM SERPL-MCNC: 9.9 MG/DL (ref 8.6–10.2)
CHLORIDE BLD-SCNC: 103 MMOL/L (ref 98–107)
CO2: 30 MMOL/L (ref 22–29)
CREAT SERPL-MCNC: 0.8 MG/DL (ref 0.5–1)
GFR AFRICAN AMERICAN: >60
GFR NON-AFRICAN AMERICAN: >60 ML/MIN/1.73
GLUCOSE BLD-MCNC: 111 MG/DL (ref 74–99)
HCT VFR BLD CALC: 36.3 % (ref 34–48)
HEMOGLOBIN: 11.6 G/DL (ref 11.5–15.5)
MCH RBC QN AUTO: 30.6 PG (ref 26–35)
MCHC RBC AUTO-ENTMCNC: 32 % (ref 32–34.5)
MCV RBC AUTO: 95.8 FL (ref 80–99.9)
PDW BLD-RTO: 13.4 FL (ref 11.5–15)
PLATELET # BLD: 267 E9/L (ref 130–450)
PMV BLD AUTO: 10.2 FL (ref 7–12)
POTASSIUM SERPL-SCNC: 3.3 MMOL/L (ref 3.5–5)
RBC # BLD: 3.79 E12/L (ref 3.5–5.5)
SODIUM BLD-SCNC: 144 MMOL/L (ref 132–146)
WBC # BLD: 5.6 E9/L (ref 4.5–11.5)

## 2019-12-18 PROCEDURE — 38525 BIOPSY/REMOVAL LYMPH NODES: CPT | Performed by: SURGERY

## 2019-12-18 PROCEDURE — A9541 TC99M SULFUR COLLOID: HCPCS | Performed by: RADIOLOGY

## 2019-12-18 PROCEDURE — 3600000013 HC SURGERY LEVEL 3 ADDTL 15MIN: Performed by: SURGERY

## 2019-12-18 PROCEDURE — 2720000010 HC SURG SUPPLY STERILE: Performed by: SURGERY

## 2019-12-18 PROCEDURE — 6360000002 HC RX W HCPCS

## 2019-12-18 PROCEDURE — 7100000010 HC PHASE II RECOVERY - FIRST 15 MIN: Performed by: SURGERY

## 2019-12-18 PROCEDURE — 38900 IO MAP OF SENT LYMPH NODE: CPT | Performed by: SURGERY

## 2019-12-18 PROCEDURE — A4648 IMPLANTABLE TISSUE MARKER: HCPCS | Performed by: SURGERY

## 2019-12-18 PROCEDURE — 7100000000 HC PACU RECOVERY - FIRST 15 MIN: Performed by: SURGERY

## 2019-12-18 PROCEDURE — 3430000000 HC RX DIAGNOSTIC RADIOPHARMACEUTICAL: Performed by: RADIOLOGY

## 2019-12-18 PROCEDURE — 2500000003 HC RX 250 WO HCPCS

## 2019-12-18 PROCEDURE — 6360000002 HC RX W HCPCS: Performed by: SURGERY

## 2019-12-18 PROCEDURE — 88305 TISSUE EXAM BY PATHOLOGIST: CPT

## 2019-12-18 PROCEDURE — 76098 X-RAY EXAM SURGICAL SPECIMEN: CPT

## 2019-12-18 PROCEDURE — 6370000000 HC RX 637 (ALT 250 FOR IP): Performed by: ANESTHESIOLOGY

## 2019-12-18 PROCEDURE — 3700000001 HC ADD 15 MINUTES (ANESTHESIA): Performed by: SURGERY

## 2019-12-18 PROCEDURE — 88333 PATH CONSLTJ SURG CYTO XM 1: CPT

## 2019-12-18 PROCEDURE — 7100000011 HC PHASE II RECOVERY - ADDTL 15 MIN: Performed by: SURGERY

## 2019-12-18 PROCEDURE — 2709999900 HC NON-CHARGEABLE SUPPLY: Performed by: SURGERY

## 2019-12-18 PROCEDURE — 88307 TISSUE EXAM BY PATHOLOGIST: CPT

## 2019-12-18 PROCEDURE — 85027 COMPLETE CBC AUTOMATED: CPT

## 2019-12-18 PROCEDURE — 2580000003 HC RX 258: Performed by: SURGERY

## 2019-12-18 PROCEDURE — 19281 PERQ DEVICE BREAST 1ST IMAG: CPT

## 2019-12-18 PROCEDURE — 38792 RA TRACER ID OF SENTINL NODE: CPT

## 2019-12-18 PROCEDURE — 80048 BASIC METABOLIC PNL TOTAL CA: CPT

## 2019-12-18 PROCEDURE — 3600000003 HC SURGERY LEVEL 3 BASE: Performed by: SURGERY

## 2019-12-18 PROCEDURE — 19125 EXCISION BREAST LESION: CPT | Performed by: SURGERY

## 2019-12-18 PROCEDURE — 36415 COLL VENOUS BLD VENIPUNCTURE: CPT

## 2019-12-18 PROCEDURE — 7100000001 HC PACU RECOVERY - ADDTL 15 MIN: Performed by: SURGERY

## 2019-12-18 PROCEDURE — 88329 PATH CONSLTJ DRG SURG: CPT

## 2019-12-18 PROCEDURE — 3700000000 HC ANESTHESIA ATTENDED CARE: Performed by: SURGERY

## 2019-12-18 DEVICE — MARKER TISS W3XL4CM RADIOGRAPHIC BIOABSRB SPCR HLD RADPQ: Type: IMPLANTABLE DEVICE | Site: BREAST | Status: FUNCTIONAL

## 2019-12-18 RX ORDER — FENTANYL CITRATE 50 UG/ML
INJECTION, SOLUTION INTRAMUSCULAR; INTRAVENOUS PRN
Status: DISCONTINUED | OUTPATIENT
Start: 2019-12-18 | End: 2019-12-18 | Stop reason: SDUPTHER

## 2019-12-18 RX ORDER — NEOSTIGMINE METHYLSULFATE 1 MG/ML
INJECTION, SOLUTION INTRAVENOUS PRN
Status: DISCONTINUED | OUTPATIENT
Start: 2019-12-18 | End: 2019-12-18 | Stop reason: SDUPTHER

## 2019-12-18 RX ORDER — ROCURONIUM BROMIDE 10 MG/ML
INJECTION, SOLUTION INTRAVENOUS PRN
Status: DISCONTINUED | OUTPATIENT
Start: 2019-12-18 | End: 2019-12-18 | Stop reason: SDUPTHER

## 2019-12-18 RX ORDER — PROPOFOL 10 MG/ML
INJECTION, EMULSION INTRAVENOUS PRN
Status: DISCONTINUED | OUTPATIENT
Start: 2019-12-18 | End: 2019-12-18 | Stop reason: SDUPTHER

## 2019-12-18 RX ORDER — MIDAZOLAM HYDROCHLORIDE 1 MG/ML
INJECTION INTRAMUSCULAR; INTRAVENOUS PRN
Status: DISCONTINUED | OUTPATIENT
Start: 2019-12-18 | End: 2019-12-18 | Stop reason: SDUPTHER

## 2019-12-18 RX ORDER — DIPHENHYDRAMINE HYDROCHLORIDE 50 MG/ML
12.5 INJECTION INTRAMUSCULAR; INTRAVENOUS
Status: DISCONTINUED | OUTPATIENT
Start: 2019-12-18 | End: 2019-12-18 | Stop reason: HOSPADM

## 2019-12-18 RX ORDER — PROMETHAZINE HYDROCHLORIDE 25 MG/ML
6.25 INJECTION, SOLUTION INTRAMUSCULAR; INTRAVENOUS
Status: DISCONTINUED | OUTPATIENT
Start: 2019-12-18 | End: 2019-12-19 | Stop reason: HOSPADM

## 2019-12-18 RX ORDER — HYDROCODONE BITARTRATE AND ACETAMINOPHEN 5; 325 MG/1; MG/1
1 TABLET ORAL EVERY 6 HOURS PRN
Qty: 12 TABLET | Refills: 0 | Status: SHIPPED | OUTPATIENT
Start: 2019-12-18 | End: 2019-12-21

## 2019-12-18 RX ORDER — SODIUM CHLORIDE 0.9 % (FLUSH) 0.9 %
10 SYRINGE (ML) INJECTION PRN
Status: DISCONTINUED | OUTPATIENT
Start: 2019-12-18 | End: 2019-12-19 | Stop reason: HOSPADM

## 2019-12-18 RX ORDER — LABETALOL HYDROCHLORIDE 5 MG/ML
INJECTION, SOLUTION INTRAVENOUS PRN
Status: DISCONTINUED | OUTPATIENT
Start: 2019-12-18 | End: 2019-12-18 | Stop reason: SDUPTHER

## 2019-12-18 RX ORDER — SODIUM CHLORIDE 0.9 % (FLUSH) 0.9 %
10 SYRINGE (ML) INJECTION EVERY 12 HOURS SCHEDULED
Status: DISCONTINUED | OUTPATIENT
Start: 2019-12-18 | End: 2019-12-19 | Stop reason: HOSPADM

## 2019-12-18 RX ORDER — LIDOCAINE HYDROCHLORIDE 20 MG/ML
INJECTION, SOLUTION INTRAVENOUS PRN
Status: DISCONTINUED | OUTPATIENT
Start: 2019-12-18 | End: 2019-12-18 | Stop reason: SDUPTHER

## 2019-12-18 RX ORDER — ONDANSETRON 2 MG/ML
INJECTION INTRAMUSCULAR; INTRAVENOUS PRN
Status: DISCONTINUED | OUTPATIENT
Start: 2019-12-18 | End: 2019-12-18 | Stop reason: SDUPTHER

## 2019-12-18 RX ORDER — MEPERIDINE HYDROCHLORIDE 50 MG/ML
12.5 INJECTION INTRAMUSCULAR; INTRAVENOUS; SUBCUTANEOUS EVERY 5 MIN PRN
Status: DISCONTINUED | OUTPATIENT
Start: 2019-12-18 | End: 2019-12-19 | Stop reason: HOSPADM

## 2019-12-18 RX ORDER — OXYCODONE HYDROCHLORIDE AND ACETAMINOPHEN 5; 325 MG/1; MG/1
1 TABLET ORAL
Status: COMPLETED | OUTPATIENT
Start: 2019-12-18 | End: 2019-12-18

## 2019-12-18 RX ORDER — DEXAMETHASONE SODIUM PHOSPHATE 10 MG/ML
INJECTION INTRAMUSCULAR; INTRAVENOUS PRN
Status: DISCONTINUED | OUTPATIENT
Start: 2019-12-18 | End: 2019-12-18 | Stop reason: SDUPTHER

## 2019-12-18 RX ORDER — FENTANYL CITRATE 50 UG/ML
25 INJECTION, SOLUTION INTRAMUSCULAR; INTRAVENOUS EVERY 5 MIN PRN
Status: DISCONTINUED | OUTPATIENT
Start: 2019-12-18 | End: 2019-12-19 | Stop reason: HOSPADM

## 2019-12-18 RX ORDER — GLYCOPYRROLATE 1 MG/5 ML
SYRINGE (ML) INTRAVENOUS PRN
Status: DISCONTINUED | OUTPATIENT
Start: 2019-12-18 | End: 2019-12-18 | Stop reason: SDUPTHER

## 2019-12-18 RX ORDER — METHYLENE BLUE 10 MG/ML
INJECTION INTRAVENOUS PRN
Status: DISCONTINUED | OUTPATIENT
Start: 2019-12-18 | End: 2019-12-18 | Stop reason: ALTCHOICE

## 2019-12-18 RX ORDER — FENTANYL CITRATE 50 UG/ML
50 INJECTION, SOLUTION INTRAMUSCULAR; INTRAVENOUS EVERY 5 MIN PRN
Status: DISCONTINUED | OUTPATIENT
Start: 2019-12-18 | End: 2019-12-19 | Stop reason: HOSPADM

## 2019-12-18 RX ORDER — SODIUM CHLORIDE, SODIUM LACTATE, POTASSIUM CHLORIDE, CALCIUM CHLORIDE 600; 310; 30; 20 MG/100ML; MG/100ML; MG/100ML; MG/100ML
INJECTION, SOLUTION INTRAVENOUS CONTINUOUS
Status: DISCONTINUED | OUTPATIENT
Start: 2019-12-18 | End: 2019-12-19 | Stop reason: HOSPADM

## 2019-12-18 RX ORDER — CEFAZOLIN SODIUM 2 G/50ML
2 SOLUTION INTRAVENOUS
Status: COMPLETED | OUTPATIENT
Start: 2019-12-18 | End: 2019-12-18

## 2019-12-18 RX ADMIN — MIDAZOLAM 1 MG: 1 INJECTION INTRAMUSCULAR; INTRAVENOUS at 17:46

## 2019-12-18 RX ADMIN — ONDANSETRON HYDROCHLORIDE 4 MG: 2 INJECTION, SOLUTION INTRAMUSCULAR; INTRAVENOUS at 19:41

## 2019-12-18 RX ADMIN — DEXAMETHASONE SODIUM PHOSPHATE 10 MG: 10 INJECTION INTRAMUSCULAR; INTRAVENOUS at 17:56

## 2019-12-18 RX ADMIN — Medication 3 MG: at 19:48

## 2019-12-18 RX ADMIN — LIDOCAINE HYDROCHLORIDE 100 MG: 20 INJECTION, SOLUTION INTRAVENOUS at 17:46

## 2019-12-18 RX ADMIN — MIDAZOLAM 1 MG: 1 INJECTION INTRAMUSCULAR; INTRAVENOUS at 17:41

## 2019-12-18 RX ADMIN — FENTANYL CITRATE 25 MCG: 50 INJECTION, SOLUTION INTRAMUSCULAR; INTRAVENOUS at 19:51

## 2019-12-18 RX ADMIN — Medication 0.6 MG: at 19:48

## 2019-12-18 RX ADMIN — Medication 500 MICRO CURIE: at 12:15

## 2019-12-18 RX ADMIN — CEFAZOLIN SODIUM 2 G: 2 SOLUTION INTRAVENOUS at 17:54

## 2019-12-18 RX ADMIN — OXYCODONE HYDROCHLORIDE AND ACETAMINOPHEN 1 TABLET: 5; 325 TABLET ORAL at 21:07

## 2019-12-18 RX ADMIN — FENTANYL CITRATE 50 MCG: 50 INJECTION, SOLUTION INTRAMUSCULAR; INTRAVENOUS at 18:18

## 2019-12-18 RX ADMIN — ROCURONIUM BROMIDE 40 MG: 10 INJECTION, SOLUTION INTRAVENOUS at 17:46

## 2019-12-18 RX ADMIN — FENTANYL CITRATE 50 MCG: 50 INJECTION, SOLUTION INTRAMUSCULAR; INTRAVENOUS at 17:46

## 2019-12-18 RX ADMIN — SODIUM CHLORIDE, POTASSIUM CHLORIDE, SODIUM LACTATE AND CALCIUM CHLORIDE: 600; 310; 30; 20 INJECTION, SOLUTION INTRAVENOUS at 17:41

## 2019-12-18 RX ADMIN — PROPOFOL 200 MG: 10 INJECTION, EMULSION INTRAVENOUS at 17:46

## 2019-12-18 RX ADMIN — ROCURONIUM BROMIDE 10 MG: 10 INJECTION, SOLUTION INTRAVENOUS at 18:22

## 2019-12-18 RX ADMIN — LABETALOL HYDROCHLORIDE 5 MG: 5 INJECTION INTRAVENOUS at 18:51

## 2019-12-18 RX ADMIN — FENTANYL CITRATE 50 MCG: 50 INJECTION, SOLUTION INTRAMUSCULAR; INTRAVENOUS at 18:04

## 2019-12-18 ASSESSMENT — PULMONARY FUNCTION TESTS
PIF_VALUE: 25
PIF_VALUE: 24
PIF_VALUE: 24
PIF_VALUE: 23
PIF_VALUE: 25
PIF_VALUE: 23
PIF_VALUE: 24
PIF_VALUE: 26
PIF_VALUE: 24
PIF_VALUE: 2
PIF_VALUE: 0
PIF_VALUE: 22
PIF_VALUE: 24
PIF_VALUE: 25
PIF_VALUE: 24
PIF_VALUE: 26
PIF_VALUE: 24
PIF_VALUE: 23
PIF_VALUE: 24
PIF_VALUE: 24
PIF_VALUE: 4
PIF_VALUE: 23
PIF_VALUE: 23
PIF_VALUE: 24
PIF_VALUE: 26
PIF_VALUE: 24
PIF_VALUE: 24
PIF_VALUE: 23
PIF_VALUE: 2
PIF_VALUE: 24
PIF_VALUE: 23
PIF_VALUE: 26
PIF_VALUE: 24
PIF_VALUE: 24
PIF_VALUE: 1
PIF_VALUE: 23
PIF_VALUE: 22
PIF_VALUE: 23
PIF_VALUE: 24
PIF_VALUE: 23
PIF_VALUE: 23
PIF_VALUE: 0
PIF_VALUE: 24
PIF_VALUE: 24
PIF_VALUE: 0
PIF_VALUE: 12
PIF_VALUE: 15
PIF_VALUE: 23
PIF_VALUE: 27
PIF_VALUE: 1
PIF_VALUE: 27
PIF_VALUE: 23
PIF_VALUE: 24
PIF_VALUE: 24
PIF_VALUE: 26
PIF_VALUE: 27
PIF_VALUE: 24
PIF_VALUE: 25
PIF_VALUE: 24
PIF_VALUE: 23
PIF_VALUE: 1
PIF_VALUE: 24
PIF_VALUE: 23
PIF_VALUE: 23
PIF_VALUE: 24
PIF_VALUE: 23
PIF_VALUE: 24
PIF_VALUE: 24
PIF_VALUE: 26
PIF_VALUE: 24
PIF_VALUE: 25
PIF_VALUE: 24
PIF_VALUE: 25
PIF_VALUE: 25
PIF_VALUE: 24
PIF_VALUE: 31
PIF_VALUE: 25
PIF_VALUE: 23
PIF_VALUE: 0
PIF_VALUE: 29
PIF_VALUE: 24
PIF_VALUE: 23
PIF_VALUE: 24
PIF_VALUE: 23
PIF_VALUE: 22
PIF_VALUE: 37
PIF_VALUE: 23
PIF_VALUE: 24
PIF_VALUE: 25
PIF_VALUE: 3
PIF_VALUE: 0
PIF_VALUE: 23
PIF_VALUE: 1
PIF_VALUE: 23
PIF_VALUE: 24
PIF_VALUE: 24
PIF_VALUE: 25
PIF_VALUE: 2
PIF_VALUE: 24
PIF_VALUE: 24
PIF_VALUE: 1
PIF_VALUE: 23
PIF_VALUE: 24
PIF_VALUE: 3
PIF_VALUE: 28
PIF_VALUE: 24
PIF_VALUE: 25
PIF_VALUE: 24
PIF_VALUE: 25
PIF_VALUE: 24
PIF_VALUE: 23
PIF_VALUE: 24
PIF_VALUE: 25
PIF_VALUE: 23
PIF_VALUE: 24
PIF_VALUE: 23
PIF_VALUE: 30
PIF_VALUE: 0
PIF_VALUE: 0
PIF_VALUE: 24
PIF_VALUE: 24
PIF_VALUE: 25
PIF_VALUE: 2
PIF_VALUE: 24
PIF_VALUE: 25
PIF_VALUE: 2
PIF_VALUE: 2

## 2019-12-18 ASSESSMENT — PAIN SCALES - GENERAL
PAINLEVEL_OUTOF10: 7
PAINLEVEL_OUTOF10: 0
PAINLEVEL_OUTOF10: 0

## 2019-12-18 ASSESSMENT — PAIN DESCRIPTION - LOCATION: LOCATION: ABDOMEN

## 2019-12-18 ASSESSMENT — PAIN DESCRIPTION - DESCRIPTORS: DESCRIPTORS: ACHING;BURNING

## 2019-12-18 ASSESSMENT — PAIN DESCRIPTION - PAIN TYPE: TYPE: SURGICAL PAIN

## 2019-12-18 ASSESSMENT — PAIN - FUNCTIONAL ASSESSMENT: PAIN_FUNCTIONAL_ASSESSMENT: 0-10

## 2019-12-19 ENCOUNTER — TELEPHONE (OUTPATIENT)
Dept: SURGERY | Age: 64
End: 2019-12-19

## 2019-12-23 ENCOUNTER — HOSPITAL ENCOUNTER (OUTPATIENT)
Dept: INFUSION THERAPY | Age: 64
Discharge: HOME | End: 2019-12-23
Payer: COMMERCIAL

## 2019-12-23 DIAGNOSIS — C50.912 INVASIVE DUCTAL CARCINOMA OF LEFT BREAST (HCC): ICD-10-CM

## 2019-12-23 LAB
ALBUMIN SERPL-MCNC: 3.8 G/DL (ref 3.5–5.2)
ALP BLD-CCNC: 71 U/L (ref 35–104)
ALT SERPL-CCNC: 24 U/L (ref 0–32)
ANION GAP SERPL CALCULATED.3IONS-SCNC: 10 MMOL/L (ref 7–16)
AST SERPL-CCNC: 21 U/L (ref 0–31)
BASOPHILS ABSOLUTE: 0.03 E9/L (ref 0–0.2)
BASOPHILS RELATIVE PERCENT: 0.4 % (ref 0–2)
BILIRUB SERPL-MCNC: 0.2 MG/DL (ref 0–1.2)
BUN BLDV-MCNC: 16 MG/DL (ref 8–23)
CALCIUM SERPL-MCNC: 10.1 MG/DL (ref 8.6–10.2)
CHLORIDE BLD-SCNC: 100 MMOL/L (ref 98–107)
CO2: 32 MMOL/L (ref 22–29)
CREAT SERPL-MCNC: 1 MG/DL (ref 0.5–1)
EOSINOPHILS ABSOLUTE: 0.19 E9/L (ref 0.05–0.5)
EOSINOPHILS RELATIVE PERCENT: 2.8 % (ref 0–6)
GFR AFRICAN AMERICAN: >60
GFR NON-AFRICAN AMERICAN: 56 ML/MIN/1.73
GLUCOSE BLD-MCNC: 103 MG/DL (ref 74–99)
HCT VFR BLD CALC: 37.4 % (ref 34–48)
HEMOGLOBIN: 11.9 G/DL (ref 11.5–15.5)
IMMATURE GRANULOCYTES #: 0.02 E9/L
IMMATURE GRANULOCYTES %: 0.3 % (ref 0–5)
LYMPHOCYTES ABSOLUTE: 2.75 E9/L (ref 1.5–4)
LYMPHOCYTES RELATIVE PERCENT: 40.5 % (ref 20–42)
MAGNESIUM: 1.8 MG/DL (ref 1.6–2.6)
MCH RBC QN AUTO: 30.7 PG (ref 26–35)
MCHC RBC AUTO-ENTMCNC: 31.8 % (ref 32–34.5)
MCV RBC AUTO: 96.6 FL (ref 80–99.9)
MONOCYTES ABSOLUTE: 0.45 E9/L (ref 0.1–0.95)
MONOCYTES RELATIVE PERCENT: 6.6 % (ref 2–12)
NEUTROPHILS ABSOLUTE: 3.35 E9/L (ref 1.8–7.3)
NEUTROPHILS RELATIVE PERCENT: 49.4 % (ref 43–80)
PDW BLD-RTO: 13.3 FL (ref 11.5–15)
PLATELET # BLD: 265 E9/L (ref 130–450)
PMV BLD AUTO: 10.1 FL (ref 7–12)
POTASSIUM SERPL-SCNC: 3.9 MMOL/L (ref 3.5–5)
RBC # BLD: 3.87 E12/L (ref 3.5–5.5)
SODIUM BLD-SCNC: 142 MMOL/L (ref 132–146)
TOTAL PROTEIN: 6.8 G/DL (ref 6.4–8.3)
WBC # BLD: 6.8 E9/L (ref 4.5–11.5)

## 2019-12-23 PROCEDURE — 80053 COMPREHEN METABOLIC PANEL: CPT

## 2019-12-23 PROCEDURE — 83735 ASSAY OF MAGNESIUM: CPT

## 2019-12-23 PROCEDURE — 85025 COMPLETE CBC W/AUTO DIFF WBC: CPT

## 2019-12-26 ENCOUNTER — OFFICE VISIT (OUTPATIENT)
Dept: ONCOLOGY | Age: 64
End: 2019-12-26
Payer: COMMERCIAL

## 2019-12-26 ENCOUNTER — TELEPHONE (OUTPATIENT)
Dept: SURGERY | Age: 64
End: 2019-12-26

## 2019-12-26 ENCOUNTER — HOSPITAL ENCOUNTER (OUTPATIENT)
Dept: INFUSION THERAPY | Age: 64
Discharge: HOME | End: 2019-12-26
Payer: COMMERCIAL

## 2019-12-26 VITALS
DIASTOLIC BLOOD PRESSURE: 70 MMHG | HEART RATE: 89 BPM | SYSTOLIC BLOOD PRESSURE: 133 MMHG | WEIGHT: 209 LBS | BODY MASS INDEX: 37.03 KG/M2 | TEMPERATURE: 98.4 F | HEIGHT: 63 IN

## 2019-12-26 DIAGNOSIS — C50.912 INVASIVE DUCTAL CARCINOMA OF LEFT BREAST (HCC): Primary | ICD-10-CM

## 2019-12-26 PROCEDURE — G8417 CALC BMI ABV UP PARAM F/U: HCPCS | Performed by: INTERNAL MEDICINE

## 2019-12-26 PROCEDURE — 3017F COLORECTAL CA SCREEN DOC REV: CPT | Performed by: INTERNAL MEDICINE

## 2019-12-26 PROCEDURE — G8427 DOCREV CUR MEDS BY ELIG CLIN: HCPCS | Performed by: INTERNAL MEDICINE

## 2019-12-26 PROCEDURE — 1036F TOBACCO NON-USER: CPT | Performed by: INTERNAL MEDICINE

## 2019-12-26 PROCEDURE — 99215 OFFICE O/P EST HI 40 MIN: CPT | Performed by: INTERNAL MEDICINE

## 2019-12-26 PROCEDURE — 99212 OFFICE O/P EST SF 10 MIN: CPT

## 2019-12-26 PROCEDURE — G8484 FLU IMMUNIZE NO ADMIN: HCPCS | Performed by: INTERNAL MEDICINE

## 2019-12-26 RX ORDER — MEPERIDINE HYDROCHLORIDE 25 MG/ML
12.5 INJECTION INTRAMUSCULAR; INTRAVENOUS; SUBCUTANEOUS ONCE
Status: CANCELLED | OUTPATIENT
Start: 2020-01-06

## 2019-12-26 RX ORDER — EPINEPHRINE 1 MG/ML
0.3 INJECTION, SOLUTION, CONCENTRATE INTRAVENOUS PRN
Status: CANCELLED | OUTPATIENT
Start: 2020-01-06

## 2019-12-26 RX ORDER — HEPARIN SODIUM (PORCINE) LOCK FLUSH IV SOLN 100 UNIT/ML 100 UNIT/ML
500 SOLUTION INTRAVENOUS PRN
Status: DISCONTINUED | OUTPATIENT
Start: 2019-12-26 | End: 2019-12-27 | Stop reason: HOSPADM

## 2019-12-26 RX ORDER — DEXAMETHASONE SODIUM PHOSPHATE 10 MG/ML
8 INJECTION, SOLUTION INTRAMUSCULAR; INTRAVENOUS ONCE
Status: CANCELLED | OUTPATIENT
Start: 2020-01-06

## 2019-12-26 RX ORDER — SODIUM CHLORIDE 0.9 % (FLUSH) 0.9 %
10 SYRINGE (ML) INJECTION PRN
Status: DISCONTINUED | OUTPATIENT
Start: 2019-12-26 | End: 2019-12-27 | Stop reason: HOSPADM

## 2019-12-26 RX ORDER — SODIUM CHLORIDE 9 MG/ML
20 INJECTION, SOLUTION INTRAVENOUS ONCE
Status: CANCELLED | OUTPATIENT
Start: 2020-01-06

## 2019-12-26 RX ORDER — METHYLPREDNISOLONE SODIUM SUCCINATE 125 MG/2ML
125 INJECTION, POWDER, LYOPHILIZED, FOR SOLUTION INTRAMUSCULAR; INTRAVENOUS ONCE
Status: CANCELLED | OUTPATIENT
Start: 2020-01-06

## 2019-12-26 RX ORDER — HEPARIN SODIUM (PORCINE) LOCK FLUSH IV SOLN 100 UNIT/ML 100 UNIT/ML
500 SOLUTION INTRAVENOUS PRN
Status: CANCELLED | OUTPATIENT
Start: 2020-01-06

## 2019-12-26 RX ORDER — SODIUM CHLORIDE 0.9 % (FLUSH) 0.9 %
5 SYRINGE (ML) INJECTION PRN
Status: CANCELLED | OUTPATIENT
Start: 2020-01-06

## 2019-12-26 RX ORDER — HEPARIN SODIUM (PORCINE) LOCK FLUSH IV SOLN 100 UNIT/ML 100 UNIT/ML
500 SOLUTION INTRAVENOUS PRN
Status: CANCELLED | OUTPATIENT
Start: 2019-12-26

## 2019-12-26 RX ORDER — POTASSIUM CHLORIDE 20 MEQ/1
40 TABLET, EXTENDED RELEASE ORAL PRN
Status: CANCELLED
Start: 2019-12-26

## 2019-12-26 RX ORDER — SODIUM CHLORIDE 0.9 % (FLUSH) 0.9 %
10 SYRINGE (ML) INJECTION PRN
Status: CANCELLED | OUTPATIENT
Start: 2019-12-26

## 2019-12-26 RX ORDER — DIPHENHYDRAMINE HYDROCHLORIDE 50 MG/ML
50 INJECTION INTRAMUSCULAR; INTRAVENOUS ONCE
Status: CANCELLED | OUTPATIENT
Start: 2020-01-06

## 2019-12-26 RX ORDER — SODIUM CHLORIDE 0.9 % (FLUSH) 0.9 %
10 SYRINGE (ML) INJECTION PRN
Status: CANCELLED | OUTPATIENT
Start: 2020-01-06

## 2019-12-26 RX ORDER — SODIUM CHLORIDE 9 MG/ML
INJECTION, SOLUTION INTRAVENOUS CONTINUOUS
Status: CANCELLED | OUTPATIENT
Start: 2020-01-06

## 2020-01-03 ENCOUNTER — HOSPITAL ENCOUNTER (OUTPATIENT)
Dept: INFUSION THERAPY | Age: 65
Discharge: HOME | End: 2020-01-03
Payer: COMMERCIAL

## 2020-01-03 LAB
ALBUMIN SERPL-MCNC: 4.2 G/DL (ref 3.5–5.2)
ALP BLD-CCNC: 79 U/L (ref 35–104)
ALT SERPL-CCNC: 27 U/L (ref 0–32)
ANION GAP SERPL CALCULATED.3IONS-SCNC: 10 MMOL/L (ref 7–16)
AST SERPL-CCNC: 23 U/L (ref 0–31)
BASOPHILS ABSOLUTE: 0.03 E9/L (ref 0–0.2)
BASOPHILS RELATIVE PERCENT: 0.4 % (ref 0–2)
BILIRUB SERPL-MCNC: <0.2 MG/DL (ref 0–1.2)
BUN BLDV-MCNC: 16 MG/DL (ref 8–23)
CALCIUM SERPL-MCNC: 10.4 MG/DL (ref 8.6–10.2)
CHLORIDE BLD-SCNC: 99 MMOL/L (ref 98–107)
CO2: 32 MMOL/L (ref 22–29)
CREAT SERPL-MCNC: 0.9 MG/DL (ref 0.5–1)
EOSINOPHILS ABSOLUTE: 0.15 E9/L (ref 0.05–0.5)
EOSINOPHILS RELATIVE PERCENT: 2.2 % (ref 0–6)
GFR AFRICAN AMERICAN: >60
GFR NON-AFRICAN AMERICAN: >60 ML/MIN/1.73
GLUCOSE BLD-MCNC: 130 MG/DL (ref 74–99)
HCT VFR BLD CALC: 37.2 % (ref 34–48)
HEMOGLOBIN: 12.3 G/DL (ref 11.5–15.5)
IMMATURE GRANULOCYTES #: 0.01 E9/L
IMMATURE GRANULOCYTES %: 0.1 % (ref 0–5)
LYMPHOCYTES ABSOLUTE: 2.56 E9/L (ref 1.5–4)
LYMPHOCYTES RELATIVE PERCENT: 38.1 % (ref 20–42)
MAGNESIUM: 1.8 MG/DL (ref 1.6–2.6)
MCH RBC QN AUTO: 31.1 PG (ref 26–35)
MCHC RBC AUTO-ENTMCNC: 33.1 % (ref 32–34.5)
MCV RBC AUTO: 94.2 FL (ref 80–99.9)
MONOCYTES ABSOLUTE: 0.43 E9/L (ref 0.1–0.95)
MONOCYTES RELATIVE PERCENT: 6.4 % (ref 2–12)
NEUTROPHILS ABSOLUTE: 3.54 E9/L (ref 1.8–7.3)
NEUTROPHILS RELATIVE PERCENT: 52.8 % (ref 43–80)
PDW BLD-RTO: 13.2 FL (ref 11.5–15)
PLATELET # BLD: 295 E9/L (ref 130–450)
PMV BLD AUTO: 9.8 FL (ref 7–12)
POTASSIUM SERPL-SCNC: 3.4 MMOL/L (ref 3.5–5)
RBC # BLD: 3.95 E12/L (ref 3.5–5.5)
SODIUM BLD-SCNC: 141 MMOL/L (ref 132–146)
TOTAL PROTEIN: 7.5 G/DL (ref 6.4–8.3)
WBC # BLD: 6.7 E9/L (ref 4.5–11.5)

## 2020-01-03 PROCEDURE — 83735 ASSAY OF MAGNESIUM: CPT

## 2020-01-03 PROCEDURE — 80053 COMPREHEN METABOLIC PANEL: CPT

## 2020-01-03 PROCEDURE — 85025 COMPLETE CBC W/AUTO DIFF WBC: CPT

## 2020-01-06 ENCOUNTER — HOSPITAL ENCOUNTER (OUTPATIENT)
Dept: INFUSION THERAPY | Age: 65
Discharge: HOME | End: 2020-01-06
Payer: COMMERCIAL

## 2020-01-06 ENCOUNTER — OFFICE VISIT (OUTPATIENT)
Dept: ONCOLOGY | Age: 65
End: 2020-01-06
Payer: COMMERCIAL

## 2020-01-06 VITALS
HEART RATE: 84 BPM | OXYGEN SATURATION: 96 % | WEIGHT: 209 LBS | HEIGHT: 63 IN | DIASTOLIC BLOOD PRESSURE: 71 MMHG | BODY MASS INDEX: 37.03 KG/M2 | TEMPERATURE: 97.8 F | SYSTOLIC BLOOD PRESSURE: 159 MMHG

## 2020-01-06 VITALS
RESPIRATION RATE: 20 BRPM | TEMPERATURE: 98.9 F | SYSTOLIC BLOOD PRESSURE: 157 MMHG | DIASTOLIC BLOOD PRESSURE: 73 MMHG | HEART RATE: 71 BPM

## 2020-01-06 DIAGNOSIS — C50.912 INVASIVE DUCTAL CARCINOMA OF LEFT BREAST (HCC): Primary | ICD-10-CM

## 2020-01-06 PROCEDURE — 6360000002 HC RX W HCPCS: Performed by: INTERNAL MEDICINE

## 2020-01-06 PROCEDURE — 1036F TOBACCO NON-USER: CPT | Performed by: INTERNAL MEDICINE

## 2020-01-06 PROCEDURE — G8427 DOCREV CUR MEDS BY ELIG CLIN: HCPCS | Performed by: INTERNAL MEDICINE

## 2020-01-06 PROCEDURE — G8417 CALC BMI ABV UP PARAM F/U: HCPCS | Performed by: INTERNAL MEDICINE

## 2020-01-06 PROCEDURE — 96375 TX/PRO/DX INJ NEW DRUG ADDON: CPT

## 2020-01-06 PROCEDURE — 2580000003 HC RX 258: Performed by: INTERNAL MEDICINE

## 2020-01-06 PROCEDURE — 3017F COLORECTAL CA SCREEN DOC REV: CPT | Performed by: INTERNAL MEDICINE

## 2020-01-06 PROCEDURE — 96413 CHEMO IV INFUSION 1 HR: CPT

## 2020-01-06 PROCEDURE — 96415 CHEMO IV INFUSION ADDL HR: CPT

## 2020-01-06 PROCEDURE — 99214 OFFICE O/P EST MOD 30 MIN: CPT | Performed by: INTERNAL MEDICINE

## 2020-01-06 PROCEDURE — G8484 FLU IMMUNIZE NO ADMIN: HCPCS | Performed by: INTERNAL MEDICINE

## 2020-01-06 RX ORDER — DEXAMETHASONE SODIUM PHOSPHATE 10 MG/ML
8 INJECTION INTRAMUSCULAR; INTRAVENOUS ONCE
Status: COMPLETED | OUTPATIENT
Start: 2020-01-06 | End: 2020-01-06

## 2020-01-06 RX ORDER — SODIUM CHLORIDE 0.9 % (FLUSH) 0.9 %
10 SYRINGE (ML) INJECTION PRN
Status: DISCONTINUED | OUTPATIENT
Start: 2020-01-06 | End: 2020-01-07 | Stop reason: HOSPADM

## 2020-01-06 RX ORDER — HEPARIN SODIUM (PORCINE) LOCK FLUSH IV SOLN 100 UNIT/ML 100 UNIT/ML
500 SOLUTION INTRAVENOUS PRN
Status: DISCONTINUED | OUTPATIENT
Start: 2020-01-06 | End: 2020-01-07 | Stop reason: HOSPADM

## 2020-01-06 RX ORDER — SODIUM CHLORIDE 9 MG/ML
20 INJECTION, SOLUTION INTRAVENOUS ONCE
Status: COMPLETED | OUTPATIENT
Start: 2020-01-06 | End: 2020-01-06

## 2020-01-06 RX ADMIN — ADO-TRASTUZUMAB EMTANSINE 360 MG: 20 INJECTION, POWDER, LYOPHILIZED, FOR SOLUTION INTRAVENOUS at 15:20

## 2020-01-06 RX ADMIN — DEXAMETHASONE SODIUM PHOSPHATE 8 MG: 10 INJECTION INTRAMUSCULAR; INTRAVENOUS at 15:00

## 2020-01-06 RX ADMIN — HEPARIN 500 UNITS: 100 SYRINGE at 18:29

## 2020-01-06 RX ADMIN — SODIUM CHLORIDE, PRESERVATIVE FREE 10 ML: 5 INJECTION INTRAVENOUS at 18:29

## 2020-01-06 RX ADMIN — SODIUM CHLORIDE, PRESERVATIVE FREE 10 ML: 5 INJECTION INTRAVENOUS at 14:45

## 2020-01-06 RX ADMIN — SODIUM CHLORIDE 20 ML/HR: 9 INJECTION, SOLUTION INTRAVENOUS at 14:46

## 2020-01-06 NOTE — PROGRESS NOTES
lymphadenopathy. No evidence of neoplasm on the right. 2D-ECHO 12/16/2019 EF 60%  Left needle localized lumpectomy, blue dye injection, and left axillary sentinel lymph node excision was done on 12/18/2019:  A.  Left breast, new superior-lateral margin, excision:   -Focal adenosis, columnar cell changes and stromal fibrosis/hyalinosis, negative for malignancy;   -Rare microcalcifications in benign tubules. B.  [place] lymph node #1, biopsy: Reactive node, negative for malignancy. C.  [place] lymph node #2, biopsy: Reactive node, negative for malignancy. D.  Left breast, lumpectomy with needle localization:   - Invasive adenocarcinoma of no special type (ductal, NOS), grade 3;   - Ductal carcinoma in situ, high nuclear grade, comedo/solid with necrosis;   - Scar of previous biopsy site, see comment. Comment:  Although the invasive carcinoma is very close to the superior margin in specimen D (3 mm), additional superior-lateral margins in specimen A (at least 2.0 cm in thickness) is completely negative for carcinoma.      CANCER CASE SUMMARY  Procedure: Excision  Specimen Laterality: Left  Tumor Site: Invasive Carcinoma: 12:00 (per report: HES-)  Tumor Size: Size of Largest Invasive Carcinoma: 1.4 X 1.3 X1.3 cm  Histologic Type of Invasive Carcinoma: Invasive carcinoma of no special type (ductal, NOS)  Histologic Grade: Cowley Histologic Score  Glandular/tubular differentiation: Score 3  Nuclear pleomorphism: Score 3  Mitotic rate: Score 3  Overall grade: Grade 3 (scores of 9)  Tumor Focality: Single focus of invasive carcinoma  Ductal Carcinoma In Situ (DCIS): Present  Size of DCIS: Intermingled with invasive carcinoma  Number of blocks with DCIS: 3  Number of blocks examined: 14  Architectural patternS: Comedo/solid  Nuclear grade: High  Ncrosis present: Central and focal  Lobular Carcinoma In Situ (LCIS): Not identified  Margins: Uninvolved by invasive carcinoma and DCIS  Distance from closest RT.  Recommended Ado-Trastuzumab alone for 14 cycles. Side effects of Ado-Trastuzumab reviewed with patient. She agreed to proceed. Authorization obtained. Cycle # 1 Ado-Trastuzumab today 01/06/2020.     RTC in 3 weeks for Cycle # 2 Ado-Trastuzumab    Aline Espinosa MD   [id number]  Board Certified Medical Oncologist

## 2020-01-06 NOTE — PROGRESS NOTES
Subjective:      Patient ID: Ru Russell is a 62 y.o. female. HPI  Patient is here for a post-operative visit. She underwent a left breast needle localized lumpectomy, blue dye injection, left axillary sentinel lymph node excision, insertion of biozorb on December 18, 2019. Pathological evaluation completed at [facility]):    A.  Left breast, new superior-lateral margin, excision:   -Focal adenosis, columnar cell changes and stromal fibrosis/hyalinosis,  negative for malignancy;   -Rare microcalcifications in benign tubules. B.  [place] lymph node #1, biopsy: Reactive node, negative for  malignancy. C.  [place] lymph node #2, biopsy: Reactive node, negative for  malignancy. D.  Left breast, lumpectomy with needle localization:   - Invasive adenocarcinoma of no special type (ductal, NOS), grade 3;   - Ductal carcinoma in situ, high nuclear grade, comedo/solid with  necrosis;   - Scar of previous biopsy site, see comment. Comment:   Although the invasive carcinoma is very close to the superior  margin in specimen D (3 mm), additional superior-lateral margins in  specimen A (at least 2.0 cm in thickness) is completely negative for  carcinoma.  Intradepartmental consultation is obtained.       CANCER CASE SUMMARY  Procedure: Excision  Specimen Laterality: Left  Tumor Site: Invasive Carcinoma: 12:00 (per report: [facility]-)  Tumor Size: Size of Largest Invasive Carcinoma: 1.4 X 1.3 X1.3 cm  Histologic Type of Invasive Carcinoma: Invasive carcinoma of no special  type (ductal, NOS)  Histologic Grade: [place] Histologic Score       Glandular/tubular differentiation: Score 3        Nuclear pleomorphism: Score 3        Mitotic rate: Score 3        Overall grade: Grade 3 (scores of 9)       Tumor Focality: Single focus of invasive carcinoma       Ductal Carcinoma In Situ (DCIS): Present        Size of DCIS: Intermingled with invasive carcinoma        Number of blocks with DCIS: 3         Number of Take 1 capsule by mouth daily      Multiple Vitamins-Minerals (MULTIVITAMIN WOMEN 50+ PO) Take 1 tablet by mouth daily       Biotin 5000 MCG TABS Take 5,000 mcg by mouth 2 times daily       metoprolol succinate (TOPROL XL) 50 MG extended release tablet TAKE ONE TABLET BY MOUTH EVERY night  3    valsartan-hydrochlorothiazide (DIOVAN-HCT) 320-25 MG per tablet TAKE ONE TABLET BY MOUTH EVERY DAY  1    clonazePAM (KLONOPIN) 0.5 MG tablet Take 0.5 mg by mouth daily as needed for Anxiety. No current facility-administered medications for this visit.       Facility-Administered Medications Ordered in Other Visits   Medication Dose Route Frequency Provider Last Rate Last Dose    0.9 % sodium chloride infusion  20 mL/hr Intravenous Once Deepa Pinedo MD 20 mL/hr at 01/06/20 1446 20 mL/hr at 01/06/20 1446    dexamethasone (DECADRON) injection 8 mg  8 mg Intravenous Once Deepa Pinedo MD        [facility]) 360 mg in sodium chloride 0.9 % 250 mL chemo infusion  360 mg Intravenous Once Deepa Pinedo MD        heparin flush 100 UNIT/ML injection 500 Units  500 Units Intracatheter PRN Deepa Pinedo MD        sodium chloride flush 0.9 % injection 10 mL  10 mL Intravenous PRN Deepa Pinedo MD   10 mL at 01/06/20 1445       Allergies   Allergen Reactions    Tetracyclines & Related Hives       Family History   Problem Relation Age of Onset    Bleeding Prob Sister     Bleeding Prob Sister     Heart Disease Father     Heart Attack Father 79    High Blood Pressure Father     Cancer Mother 61        lung    Stroke Maternal Grandmother     Stroke Paternal Grandfather     Cancer Paternal Grandfather 48        GI cancer       Social History     Socioeconomic History    Marital status:      Spouse name: Not on file    Number of children: Not on file    Years of education: Not on file    Highest education level: Not on file   Occupational History    Not on file   Social Needs    Financial resource strain: Not on file    Food insecurity:     Worry: Not on file     Inability: Not on file    Transportation needs:     Medical: Not on file     Non-medical: Not on file   Tobacco Use    Smoking status: Never Smoker    Smokeless tobacco: Never Used   Substance and Sexual Activity    Alcohol use: Yes     Alcohol/week: 0.0 standard drinks     Comment: socially    Drug use: No    Sexual activity: Not Currently     Partners: Male     Birth control/protection: Post-menopausal   Lifestyle    Physical activity:     Days per week: Not on file     Minutes per session: Not on file    Stress: Not on file   Relationships    Social connections:     Talks on phone: Not on file     Gets together: Not on file     Attends Nondenominational service: Not on file     Active member of club or organization: Not on file     Attends meetings of clubs or organizations: Not on file     Relationship status: Not on file    Intimate partner violence:     Fear of current or ex partner: Not on file     Emotionally abused: Not on file     Physically abused: Not on file     Forced sexual activity: Not on file   Other Topics Concern    Not on file   Social History Narrative    Lives in [place]. Review of Systems  CONSTITUTIONAL: No fever, chills. Good appetite and energy level. ENMT: Eyes: No diplopia; Nose: No epistaxis. Mouth: No sore throat. RESPIRATORY: No hemoptysis, shortness of breath, cough. CARDIOVASCULAR: No chest pain, pressure, or palpitations. GASTROINTESTINAL: No nausea/vomiting, abdominal pain, diarrhea/constipation. No blood in the stools. GENITOURINARY: No dysuria, urinary frequency, hematuria. NEURO: No syncope, presyncope, headache. Remainder:  ROS NEGATIVE        The patient voices no complaints. With questioning, she denies significant pain, fever, chills, wound drainage or discharge. Objective:   Physical Exam    Well-healed breast and axillary incisions. Firmness in upper outer quadrant left breast consistent with BioSorb in tumor cavity. Complete range of motion both shoulders. Assessment:      62 y.o. woman who underwent a left breast needle localized lumpectomy, blue dye injection, left axillary sentinel lymph node excision, insertion of biozorb on December 18, 2019. Pathological evaluation completed at [facility]):    A.  Left breast, new superior-lateral margin, excision:   -Focal adenosis, columnar cell changes and stromal fibrosis/hyalinosis,  negative for malignancy;   -Rare microcalcifications in benign tubules. B.  [place] lymph node #1, biopsy: Reactive node, negative for  malignancy. C.  [place] lymph node #2, biopsy: Reactive node, negative for  malignancy. D.  Left breast, lumpectomy with needle localization:   - Invasive adenocarcinoma of no special type (ductal, NOS), grade 3;   - Ductal carcinoma in situ, high nuclear grade, comedo/solid with  necrosis;   - Scar of previous biopsy site, see comment. Comment:   Although the invasive carcinoma is very close to the superior  margin in specimen D (3 mm), additional superior-lateral margins in  specimen A (at least 2.0 cm in thickness) is completely negative for  carcinoma.  Intradepartmental consultation is obtained.       CANCER CASE SUMMARY  Procedure: Excision  Specimen Laterality: Left  Tumor Site: Invasive Carcinoma: 12:00 (per report: [facility]-)  Tumor Size: Size of Largest Invasive Carcinoma: 1.4 X 1.3 X1.3 cm  Histologic Type of Invasive Carcinoma: Invasive carcinoma of no special  type (ductal, NOS)  Histologic Grade: [place] Histologic Score       Glandular/tubular differentiation: Score 3        Nuclear pleomorphism: Score 3        Mitotic rate: Score 3        Overall grade: Grade 3 (scores of 9)       Tumor Focality: Single focus of invasive carcinoma       Ductal Carcinoma In Situ (DCIS): Present        Size of DCIS: Intermingled with invasive carcinoma        Number of blocks with DCIS: 3         Number of blocks examined: 14        Architectural patternS: Comedo/solid        Nuclear grade: High        Ncrosis present: Central and focal       Lobular Carcinoma In Situ (LCIS): Not identified       Margins: Uninvolved by invasive carcinoma and DCIS        Distance from closest margin: 3 mm        Specify closest margin: surperior (see comment)       Regional lymph Nodes: Uninvolved by tumor cells       Total number of lymph nodes examined: 2        Number of sentinel nodes examined: 2       Treatment Effect: No known presurgical therapy       Lymph-Vascular Invasion: Not identified       Pathologic Staging (pTNM, AJCC 8TH Edition)        Primary tumor: pT1c        Regional lymph nodes (modifier- (sn) sentinel nodes examined: (sn)0       Ancillary Studies: see report [facility]-       Microcalcifications: Not identified    Pathology report discussed. Copy of report given to patient. Patient already has her radiation oncology appointment scheduled. Follow-up here in 6 months and PRN. Questions answered to patient satisfaction. Plan:        1. Patient instructed to keep incision clean and dry well after bathing. Patient can start scar massage once incision is completely healed and strong enough to handle the motion (usually 10 - 14 days post operatively). Rub in a circular motion on and around the scar with firm, even pressure for 5 minutes four times per day. Use lotion or moisturizer to do the scar massage to allow ease with motion over the scar and prevent friction at the area. 2. Continue monthly breast self examination. Bring any changes to your physician's attention. 3. Routine screening imaging in November 2020. -Bilateral screening mammogram  4. Range of motion exercises for left shoulder encouraged. Lymphedema precautions discussed. 5. Education/Lifestyle recommendations: A regular exercise program is encouraged. Avoid excessive caffeine intake.

## 2020-01-06 NOTE — PROGRESS NOTES
Patient tolerated first dose of Kadcyla, denies any complaints. Instructed patient on 90 wait time after first dose. Patient verbalizes understanding.  Lana Veliz RN

## 2020-01-13 ENCOUNTER — OFFICE VISIT (OUTPATIENT)
Dept: BREAST CENTER | Age: 65
End: 2020-01-13
Payer: COMMERCIAL

## 2020-01-13 VITALS
RESPIRATION RATE: 17 BRPM | HEIGHT: 63 IN | TEMPERATURE: 98.8 F | HEART RATE: 76 BPM | OXYGEN SATURATION: 98 % | WEIGHT: 206 LBS | BODY MASS INDEX: 36.5 KG/M2 | SYSTOLIC BLOOD PRESSURE: 120 MMHG | DIASTOLIC BLOOD PRESSURE: 70 MMHG

## 2020-01-13 PROCEDURE — 99024 POSTOP FOLLOW-UP VISIT: CPT | Performed by: SURGERY

## 2020-01-13 NOTE — COMMUNICATION BODY
Assessment:  62 y.o. woman who underwent a left breast needle localized lumpectomy, blue dye injection, left axillary sentinel lymph node excision, insertion of biozorb on December 18, 2019. Pathological evaluation completed at [facility]):    A.  Left breast, new superior-lateral margin, excision:   -Focal adenosis, columnar cell changes and stromal fibrosis/hyalinosis,  negative for malignancy;   -Rare microcalcifications in benign tubules. B.  [place] lymph node #1, biopsy: Reactive node, negative for  malignancy. C.  [place] lymph node #2, biopsy: Reactive node, negative for  malignancy. D.  Left breast, lumpectomy with needle localization:   - Invasive adenocarcinoma of no special type (ductal, NOS), grade 3;   - Ductal carcinoma in situ, high nuclear grade, comedo/solid with  necrosis;   - Scar of previous biopsy site, see comment. Comment:   Although the invasive carcinoma is very close to the superior  margin in specimen D (3 mm), additional superior-lateral margins in  specimen A (at least 2.0 cm in thickness) is completely negative for  carcinoma.  Intradepartmental consultation is obtained.       CANCER CASE SUMMARY  Procedure: Excision  Specimen Laterality: Left  Tumor Site: Invasive Carcinoma: 12:00 (per report: [facility]-)  Tumor Size: Size of Largest Invasive Carcinoma: 1.4 X 1.3 X1.3 cm  Histologic Type of Invasive Carcinoma: Invasive carcinoma of no special  type (ductal, NOS)  Histologic Grade: [place] Histologic Score       Glandular/tubular differentiation: Score 3        Nuclear pleomorphism: Score 3        Mitotic rate: Score 3        Overall grade: Grade 3 (scores of 9)       Tumor Focality: Single focus of invasive carcinoma       Ductal Carcinoma In Situ (DCIS): Present        Size of DCIS: Intermingled with invasive carcinoma        Number of blocks with DCIS: 3         Number of blocks examined: 14        Architectural patternS: Comedo/solid        Nuclear grade: High        Ncrosis present: Central and focal       Lobular Carcinoma In Situ (LCIS): Not identified       Margins: Uninvolved by invasive carcinoma and DCIS        Distance from closest margin: 3 mm        Specify closest margin: surperior (see comment)       Regional lymph Nodes: Uninvolved by tumor cells       Total number of lymph nodes examined: 2        Number of sentinel nodes examined: 2       Treatment Effect: No known presurgical therapy       Lymph-Vascular Invasion: Not identified       Pathologic Staging (pTNM, AJCC 8TH Edition)        Primary tumor: pT1c        Regional lymph nodes (modifier- (sn) sentinel nodes examined: (sn)0       Ancillary Studies: see report [facility]-       Microcalcifications: Not identified    Pathology report discussed. Copy of report given to patient. Patient already has her radiation oncology appointment scheduled. Follow-up here in 6 months and PRN. Questions answered to patient satisfaction. Plan:  1. Patient instructed to keep incision clean and dry well after bathing. Patient can start scar massage once incision is completely healed and strong enough to handle the motion (usually 10 - 14 days post operatively). Rub in a circular motion on and around the scar with firm, even pressure for 5 minutes four times per day. Use lotion or moisturizer to do the scar massage to allow ease with motion over the scar and prevent friction at the area. 2. Continue monthly breast self examination. Bring any changes to your physician's attention. 3. Routine screening imaging in November 2020. -Bilateral screening mammogram  4. Range of motion exercises for left shoulder encouraged. Lymphedema precautions discussed. 5. Education/Lifestyle recommendations: A regular exercise program is encouraged. Avoid excessive caffeine intake. Maintain a diet rich in vegetables and fruits avoiding processed and fast food. 6. Consultations: Oncology appointments as scheduled   7.  Continue regular

## 2020-01-13 NOTE — LETTER
[address] [address]. [id number]  Phone: [phone]  Fax: [phone]    Jackelyn Goff MD        January 13, 2020     Rin Jarrett MD  [address] [facility], [address] [facility] [address]    Patient: Alvin Munoz  MR Number: [id number]  YOB: 1955  Date of Visit: 1/13/2020    Dear Dr. Rin Jarrett: Thank you for the request for consultation for Alvin Munoz to me for the evaluation and management of her left breast cancer. Below are the relevant portions of my assessment and plan of care. Assessment:  62 y.o. woman who underwent a left breast needle localized lumpectomy, blue dye injection, left axillary sentinel lymph node excision, insertion of biozorb on December 18, 2019. Pathological evaluation completed at [facility]):    A.  Left breast, new superior-lateral margin, excision:   -Focal adenosis, columnar cell changes and stromal fibrosis/hyalinosis,  negative for malignancy;   -Rare microcalcifications in benign tubules. B.  [place] lymph node #1, biopsy: Reactive node, negative for  malignancy. C.  [place] lymph node #2, biopsy: Reactive node, negative for  malignancy. D.  Left breast, lumpectomy with needle localization:   - Invasive adenocarcinoma of no special type (ductal, NOS), grade 3;   - Ductal carcinoma in situ, high nuclear grade, comedo/solid with  necrosis;   - Scar of previous biopsy site, see comment. Comment:   Although the invasive carcinoma is very close to the superior  margin in specimen D (3 mm), additional superior-lateral margins in  specimen A (at least 2.0 cm in thickness) is completely negative for  carcinoma.  Intradepartmental consultation is obtained.       CANCER CASE SUMMARY  Procedure: Excision  Specimen Laterality: Left  Tumor Site: Invasive Carcinoma: 12:00 (per report: [facility]-)  Tumor Size: Size of Largest Invasive Carcinoma: 1.4 X 1.3 X1.3 cm to do the scar massage to allow ease with motion over the scar and prevent friction at the area. 2. Continue monthly breast self examination. Bring any changes to your physician's attention. 3. Routine screening imaging in November 2020. -Bilateral screening mammogram  4. Range of motion exercises for left shoulder encouraged. Lymphedema precautions discussed. 5. Education/Lifestyle recommendations: A regular exercise program is encouraged. Avoid excessive caffeine intake. Maintain a diet rich in vegetables and fruits avoiding processed and fast food. 6. Consultations: Oncology appointments as scheduled   7. Continue regular appointments with PCP & Gynecologist.  8. Office follow-up visit should be scheduled in 6  months and PRN. If you have questions, please do not hesitate to call me. I look forward to following Ziyad Dasilva along with you.     Lydia Valverde MD

## 2020-01-17 ENCOUNTER — HOSPITAL ENCOUNTER (OUTPATIENT)
Dept: RADIATION ONCOLOGY | Age: 65
Discharge: HOME | End: 2020-01-17
Attending: RADIOLOGY
Payer: COMMERCIAL

## 2020-01-17 ENCOUNTER — TELEPHONE (OUTPATIENT)
Dept: RADIATION ONCOLOGY | Age: 65
End: 2020-01-17

## 2020-01-17 PROCEDURE — 77290 THER RAD SIMULAJ FIELD CPLX: CPT | Performed by: RADIOLOGY

## 2020-01-17 PROCEDURE — 77334 RADIATION TREATMENT AID(S): CPT | Performed by: RADIOLOGY

## 2020-01-22 ENCOUNTER — HOSPITAL ENCOUNTER (OUTPATIENT)
Dept: RADIATION ONCOLOGY | Age: 65
Discharge: HOME | End: 2020-01-22
Attending: RADIOLOGY
Payer: COMMERCIAL

## 2020-01-22 PROCEDURE — 77295 3-D RADIOTHERAPY PLAN: CPT | Performed by: RADIOLOGY

## 2020-01-22 PROCEDURE — 77334 RADIATION TREATMENT AID(S): CPT | Performed by: RADIOLOGY

## 2020-01-22 PROCEDURE — 77300 RADIATION THERAPY DOSE PLAN: CPT | Performed by: RADIOLOGY

## 2020-01-22 PROCEDURE — 77293 RESPIRATOR MOTION MGMT SIMUL: CPT | Performed by: RADIOLOGY

## 2020-01-24 ENCOUNTER — HOSPITAL ENCOUNTER (OUTPATIENT)
Dept: INFUSION THERAPY | Age: 65
Discharge: HOME | End: 2020-01-24
Payer: COMMERCIAL

## 2020-01-24 DIAGNOSIS — C50.912 INVASIVE DUCTAL CARCINOMA OF LEFT BREAST (HCC): ICD-10-CM

## 2020-01-24 LAB
ALBUMIN SERPL-MCNC: 4.3 G/DL (ref 3.5–5.2)
ALP BLD-CCNC: 86 U/L (ref 35–104)
ALT SERPL-CCNC: 47 U/L (ref 0–32)
ANION GAP SERPL CALCULATED.3IONS-SCNC: 13 MMOL/L (ref 7–16)
AST SERPL-CCNC: 38 U/L (ref 0–31)
BASOPHILS ABSOLUTE: 0.05 E9/L (ref 0–0.2)
BASOPHILS RELATIVE PERCENT: 0.8 % (ref 0–2)
BILIRUB SERPL-MCNC: 0.2 MG/DL (ref 0–1.2)
BUN BLDV-MCNC: 16 MG/DL (ref 8–23)
CALCIUM SERPL-MCNC: 10.3 MG/DL (ref 8.6–10.2)
CHLORIDE BLD-SCNC: 99 MMOL/L (ref 98–107)
CO2: 28 MMOL/L (ref 22–29)
CREAT SERPL-MCNC: 1 MG/DL (ref 0.5–1)
EOSINOPHILS ABSOLUTE: 0.17 E9/L (ref 0.05–0.5)
EOSINOPHILS RELATIVE PERCENT: 2.6 % (ref 0–6)
GFR AFRICAN AMERICAN: >60
GFR NON-AFRICAN AMERICAN: 56 ML/MIN/1.73
GLUCOSE BLD-MCNC: 113 MG/DL (ref 74–99)
HCT VFR BLD CALC: 38.1 % (ref 34–48)
HEMOGLOBIN: 12 G/DL (ref 11.5–15.5)
IMMATURE GRANULOCYTES #: 0.02 E9/L
IMMATURE GRANULOCYTES %: 0.3 % (ref 0–5)
LYMPHOCYTES ABSOLUTE: 1.73 E9/L (ref 1.5–4)
LYMPHOCYTES RELATIVE PERCENT: 26.9 % (ref 20–42)
MAGNESIUM: 1.8 MG/DL (ref 1.6–2.6)
MCH RBC QN AUTO: 28.7 PG (ref 26–35)
MCHC RBC AUTO-ENTMCNC: 31.5 % (ref 32–34.5)
MCV RBC AUTO: 91.1 FL (ref 80–99.9)
MONOCYTES ABSOLUTE: 0.57 E9/L (ref 0.1–0.95)
MONOCYTES RELATIVE PERCENT: 8.9 % (ref 2–12)
NEUTROPHILS ABSOLUTE: 3.89 E9/L (ref 1.8–7.3)
NEUTROPHILS RELATIVE PERCENT: 60.5 % (ref 43–80)
PDW BLD-RTO: 13.7 FL (ref 11.5–15)
PLATELET # BLD: 318 E9/L (ref 130–450)
PMV BLD AUTO: 10 FL (ref 7–12)
POTASSIUM SERPL-SCNC: 3.9 MMOL/L (ref 3.5–5)
RBC # BLD: 4.18 E12/L (ref 3.5–5.5)
SODIUM BLD-SCNC: 140 MMOL/L (ref 132–146)
TOTAL PROTEIN: 7.5 G/DL (ref 6.4–8.3)
WBC # BLD: 6.4 E9/L (ref 4.5–11.5)

## 2020-01-24 PROCEDURE — 83735 ASSAY OF MAGNESIUM: CPT

## 2020-01-24 PROCEDURE — 85025 COMPLETE CBC W/AUTO DIFF WBC: CPT

## 2020-01-24 PROCEDURE — 80053 COMPREHEN METABOLIC PANEL: CPT

## 2020-01-27 ENCOUNTER — OFFICE VISIT (OUTPATIENT)
Dept: ONCOLOGY | Age: 65
End: 2020-01-27
Payer: COMMERCIAL

## 2020-01-27 ENCOUNTER — HOSPITAL ENCOUNTER (OUTPATIENT)
Dept: RADIATION ONCOLOGY | Age: 65
Discharge: HOME | End: 2020-01-27
Attending: RADIOLOGY
Payer: COMMERCIAL

## 2020-01-27 ENCOUNTER — HOSPITAL ENCOUNTER (OUTPATIENT)
Dept: INFUSION THERAPY | Age: 65
Discharge: HOME | End: 2020-01-27
Payer: COMMERCIAL

## 2020-01-27 VITALS
RESPIRATION RATE: 20 BRPM | DIASTOLIC BLOOD PRESSURE: 67 MMHG | HEART RATE: 74 BPM | TEMPERATURE: 98.3 F | SYSTOLIC BLOOD PRESSURE: 143 MMHG

## 2020-01-27 VITALS
DIASTOLIC BLOOD PRESSURE: 69 MMHG | BODY MASS INDEX: 36.87 KG/M2 | SYSTOLIC BLOOD PRESSURE: 152 MMHG | HEART RATE: 78 BPM | WEIGHT: 208.1 LBS | TEMPERATURE: 97.2 F | HEIGHT: 63 IN | OXYGEN SATURATION: 98 %

## 2020-01-27 DIAGNOSIS — C50.912 INVASIVE DUCTAL CARCINOMA OF LEFT BREAST (HCC): Primary | ICD-10-CM

## 2020-01-27 PROCEDURE — 6360000002 HC RX W HCPCS: Performed by: INTERNAL MEDICINE

## 2020-01-27 PROCEDURE — 1036F TOBACCO NON-USER: CPT | Performed by: INTERNAL MEDICINE

## 2020-01-27 PROCEDURE — G8427 DOCREV CUR MEDS BY ELIG CLIN: HCPCS | Performed by: INTERNAL MEDICINE

## 2020-01-27 PROCEDURE — 77412 RADIATION TX DELIVERY LVL 3: CPT | Performed by: RADIOLOGY

## 2020-01-27 PROCEDURE — 96375 TX/PRO/DX INJ NEW DRUG ADDON: CPT

## 2020-01-27 PROCEDURE — 96413 CHEMO IV INFUSION 1 HR: CPT

## 2020-01-27 PROCEDURE — G8417 CALC BMI ABV UP PARAM F/U: HCPCS | Performed by: INTERNAL MEDICINE

## 2020-01-27 PROCEDURE — 3017F COLORECTAL CA SCREEN DOC REV: CPT | Performed by: INTERNAL MEDICINE

## 2020-01-27 PROCEDURE — 2580000003 HC RX 258: Performed by: INTERNAL MEDICINE

## 2020-01-27 PROCEDURE — G8484 FLU IMMUNIZE NO ADMIN: HCPCS | Performed by: INTERNAL MEDICINE

## 2020-01-27 PROCEDURE — 99214 OFFICE O/P EST MOD 30 MIN: CPT | Performed by: INTERNAL MEDICINE

## 2020-01-27 PROCEDURE — 77417 THER RADIOLOGY PORT IMAGE(S): CPT | Performed by: RADIOLOGY

## 2020-01-27 RX ORDER — SODIUM CHLORIDE 0.9 % (FLUSH) 0.9 %
5 SYRINGE (ML) INJECTION PRN
Status: CANCELLED | OUTPATIENT
Start: 2020-01-27

## 2020-01-27 RX ORDER — SODIUM CHLORIDE 0.9 % (FLUSH) 0.9 %
10 SYRINGE (ML) INJECTION PRN
Status: DISCONTINUED | OUTPATIENT
Start: 2020-01-27 | End: 2020-01-28 | Stop reason: HOSPADM

## 2020-01-27 RX ORDER — SODIUM CHLORIDE 0.9 % (FLUSH) 0.9 %
10 SYRINGE (ML) INJECTION PRN
Status: CANCELLED | OUTPATIENT
Start: 2020-01-27

## 2020-01-27 RX ORDER — DEXAMETHASONE SODIUM PHOSPHATE 10 MG/ML
8 INJECTION, SOLUTION INTRAMUSCULAR; INTRAVENOUS ONCE
Status: CANCELLED | OUTPATIENT
Start: 2020-01-27

## 2020-01-27 RX ORDER — EPINEPHRINE 1 MG/ML
0.3 INJECTION, SOLUTION, CONCENTRATE INTRAVENOUS PRN
Status: CANCELLED | OUTPATIENT
Start: 2020-01-27

## 2020-01-27 RX ORDER — MEPERIDINE HYDROCHLORIDE 25 MG/ML
12.5 INJECTION INTRAMUSCULAR; INTRAVENOUS; SUBCUTANEOUS ONCE
Status: CANCELLED | OUTPATIENT
Start: 2020-01-27

## 2020-01-27 RX ORDER — POTASSIUM CHLORIDE 20 MEQ/1
40 TABLET, EXTENDED RELEASE ORAL PRN
Status: CANCELLED
Start: 2020-01-27

## 2020-01-27 RX ORDER — METHYLPREDNISOLONE SODIUM SUCCINATE 125 MG/2ML
125 INJECTION, POWDER, LYOPHILIZED, FOR SOLUTION INTRAMUSCULAR; INTRAVENOUS ONCE
Status: CANCELLED | OUTPATIENT
Start: 2020-01-27

## 2020-01-27 RX ORDER — SODIUM CHLORIDE 9 MG/ML
20 INJECTION, SOLUTION INTRAVENOUS ONCE
Status: COMPLETED | OUTPATIENT
Start: 2020-01-27 | End: 2020-01-27

## 2020-01-27 RX ORDER — HEPARIN SODIUM (PORCINE) LOCK FLUSH IV SOLN 100 UNIT/ML 100 UNIT/ML
500 SOLUTION INTRAVENOUS PRN
Status: CANCELLED | OUTPATIENT
Start: 2020-01-27

## 2020-01-27 RX ORDER — DEXAMETHASONE SODIUM PHOSPHATE 10 MG/ML
8 INJECTION INTRAMUSCULAR; INTRAVENOUS ONCE
Status: COMPLETED | OUTPATIENT
Start: 2020-01-27 | End: 2020-01-27

## 2020-01-27 RX ORDER — HEPARIN SODIUM (PORCINE) LOCK FLUSH IV SOLN 100 UNIT/ML 100 UNIT/ML
500 SOLUTION INTRAVENOUS PRN
Status: DISCONTINUED | OUTPATIENT
Start: 2020-01-27 | End: 2020-01-28 | Stop reason: HOSPADM

## 2020-01-27 RX ORDER — SODIUM CHLORIDE 9 MG/ML
20 INJECTION, SOLUTION INTRAVENOUS ONCE
Status: CANCELLED | OUTPATIENT
Start: 2020-01-27

## 2020-01-27 RX ORDER — SODIUM CHLORIDE 9 MG/ML
INJECTION, SOLUTION INTRAVENOUS CONTINUOUS
Status: CANCELLED | OUTPATIENT
Start: 2020-01-27

## 2020-01-27 RX ORDER — DIPHENHYDRAMINE HYDROCHLORIDE 50 MG/ML
50 INJECTION INTRAMUSCULAR; INTRAVENOUS ONCE
Status: CANCELLED | OUTPATIENT
Start: 2020-01-27

## 2020-01-27 RX ADMIN — SODIUM CHLORIDE, PRESERVATIVE FREE 10 ML: 5 INJECTION INTRAVENOUS at 11:14

## 2020-01-27 RX ADMIN — SODIUM CHLORIDE, PRESERVATIVE FREE 10 ML: 5 INJECTION INTRAVENOUS at 11:09

## 2020-01-27 RX ADMIN — DEXAMETHASONE SODIUM PHOSPHATE 8 MG: 10 INJECTION INTRAMUSCULAR; INTRAVENOUS at 11:14

## 2020-01-27 RX ADMIN — ADO-TRASTUZUMAB EMTANSINE 360 MG: 20 INJECTION, POWDER, LYOPHILIZED, FOR SOLUTION INTRAVENOUS at 11:38

## 2020-01-27 RX ADMIN — HEPARIN 500 UNITS: 100 SYRINGE at 13:03

## 2020-01-27 RX ADMIN — SODIUM CHLORIDE, PRESERVATIVE FREE 10 ML: 5 INJECTION INTRAVENOUS at 13:03

## 2020-01-27 RX ADMIN — SODIUM CHLORIDE 20 ML/HR: 9 INJECTION, SOLUTION INTRAVENOUS at 11:14

## 2020-01-27 NOTE — PROGRESS NOTES
Department of [facility] Oncology  Attending Clinic Note    Reason for Visit: Follow-up on a patient with Left Breast Cancer    PCP:  Vasu Villarreal MD    History of Present Illness: The mass was located in the 2 o'clock position of left breast.     Breast cancer risk factors include age, gender, menarche before age 16. Age of menarche was 7. Age of menopause was 48. Patient was on birth control for 6 months in her 25s. Patient is . Age of first live birth was 30. Patient did breast feed. Bilateral screening mammogram on 2019: There is a high density, irregular mass with indistinct margins seen in the posterior upper outer quadrant of left breast.     Left Breast U/S on 2019:  Irregular solid mass with angular margins measuring 25 x 20 x 21 mm in the left breast at 2 o'clock located 8 centimeters from the nipple. No axillary LN. On 2019:  Mass, left breast at 2:00, core needle biopsy: Invasive ductal carcinoma,nuclear grade 3, see comment. 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 Breast Cancer Marker Studies:  ER: Negative  CT: Negative  Her-2/francisco Positive/3+    CXR PA/Lateral on 2019:  Normal chest.    MRI Bilateral Breast 2019: An irregular, enhancing mass is again noted in the left breast 2:00 which measures 2.0 x 2.2 x 2.6 cm. No axillary LN. No evidence of neoplasm on right. T2 N0 M0  We recommended neoadjuvant chemotherapy consisting of 6 cycles of TCHP. Side effects of TCHP reviewed with patient. She agreed to proceed. 2D-ECHO EF50-55%. Mediport placed. Cycle # 1 TCHP was on 2019. Cycle # 2 TCHP was on 2019. Cycle # 3 TCHP was on 2019. 2D-ECHO 2019 EF 65%  Cycle # 4 TCHP was on 2019  Cycle # 5 TCHP was on 2019. Cycle # 6 TCHP was on 10/23/2019. MRI Breast Bilateral 2019:   Near complete response to therapy on the left.    There is no axillary lymphadenopathy. No evidence of neoplasm on the right. 2D-ECHO 12/16/2019 EF 60%  Left needle localized lumpectomy, blue dye injection, and left axillary sentinel lymph node excision was done on 12/18/2019:  A.  Left breast, new superior-lateral margin, excision:   -Focal adenosis, columnar cell changes and stromal fibrosis/hyalinosis, negative for malignancy;   -Rare microcalcifications in benign tubules. B.  [place] lymph node #1, biopsy: Reactive node, negative for malignancy. C.  [place] lymph node #2, biopsy: Reactive node, negative for malignancy. D.  Left breast, lumpectomy with needle localization:   - Invasive adenocarcinoma of no special type (ductal, NOS), grade 3;   - Ductal carcinoma in situ, high nuclear grade, comedo/solid with necrosis;   - Scar of previous biopsy site, see comment. Comment:  Although the invasive carcinoma is very close to the superior margin in specimen D (3 mm), additional superior-lateral margins in specimen A (at least 2.0 cm in thickness) is completely negative for carcinoma.      CANCER CASE SUMMARY  Procedure: Excision  Specimen Laterality: Left  Tumor Site: Invasive Carcinoma: 12:00 (per report: [facility]-)  Tumor Size: Size of Largest Invasive Carcinoma: 1.4 X 1.3 X1.3 cm  Histologic Type of Invasive Carcinoma: Invasive carcinoma of no special type (ductal, NOS)  Histologic Grade: [place] Histologic Score  Glandular/tubular differentiation: Score 3  Nuclear pleomorphism: Score 3  Mitotic rate: Score 3  Overall grade: Grade 3 (scores of 9)  Tumor Focality: Single focus of invasive carcinoma  Ductal Carcinoma In Situ (DCIS): Present  Size of DCIS: Intermingled with invasive carcinoma  Number of blocks with DCIS: 3  Number of blocks examined: 14  Architectural patternS: [place]/solid  Nuclear grade: High  Ncrosis present: Central and focal  Lobular Carcinoma In Situ (LCIS): Not identified  Margins: Uninvolved by invasive carcinoma and DCIS  Distance from closest with needle localization:   - Invasive adenocarcinoma of no special type (ductal, NOS), grade 3;   - Ductal carcinoma in situ, high nuclear grade, comedo/solid with necrosis;   - Scar of previous biopsy site, see comment. Comment:  Although the invasive carcinoma is very close to the superior margin in specimen D (3 mm), additional superior-lateral margins in specimen A (at least 2.0 cm in thickness) is completely negative for carcinoma. CANCER CASE SUMMARY  Procedure: Excision  Specimen Laterality: Left  Tumor Site: Invasive Carcinoma: 12:00 (per report: [facility]-)  Tumor Size: Size of Largest Invasive Carcinoma: 1.4 X 1.3 X1.3 cm  Histologic Type of Invasive Carcinoma: Invasive carcinoma of no special type (ductal, NOS)  Histologic Grade: [place] Histologic Score  Glandular/tubular differentiation: Score 3  Nuclear pleomorphism: Score 3  Mitotic rate: Score 3  Overall grade: Grade 3 (scores of 9)  Tumor Focality: Single focus of invasive carcinoma  Ductal Carcinoma In Situ (DCIS): Present  Size of DCIS: Intermingled with invasive carcinoma  Number of blocks with DCIS: 3  Number of blocks examined: 14  Architectural patternS: [place]/solid  Nuclear grade: High  Ncrosis present: Central and focal  Lobular Carcinoma In Situ (LCIS): Not identified  Margins: Uninvolved by invasive carcinoma and DCIS  Distance from closest margin: 3 mm  Specify closest margin: surperior (see comment)  Regional lymph Nodes: Uninvolved by tumor cells  Total number of lymph nodes examined: 2  Number of sentinel nodes examined: 2  Treatment Effect: No known presurgical therapy  Lymph-Vascular Invasion: Not identified  Pathologic Staging (pTNM, AJCC 8TH Edition)  Primary tumor: pT1c  Regional lymph nodes (modifier- (sn) sentinel nodes examined: (sn)0  ER: Negative  NC: Negative  Her-2/francisco Positive/3+    Pathology report reviewed extensively with patient. Radiation Oncology team consulted for adjuvant RT.   Recommended

## 2020-01-28 ENCOUNTER — HOSPITAL ENCOUNTER (OUTPATIENT)
Dept: RADIATION ONCOLOGY | Age: 65
Discharge: HOME | End: 2020-01-28
Attending: RADIOLOGY
Payer: COMMERCIAL

## 2020-01-28 PROCEDURE — 77412 RADIATION TX DELIVERY LVL 3: CPT | Performed by: RADIOLOGY

## 2020-01-29 ENCOUNTER — HOSPITAL ENCOUNTER (OUTPATIENT)
Dept: RADIATION ONCOLOGY | Age: 65
Discharge: HOME | End: 2020-01-29
Attending: RADIOLOGY
Payer: COMMERCIAL

## 2020-01-29 VITALS
OXYGEN SATURATION: 94 % | SYSTOLIC BLOOD PRESSURE: 128 MMHG | HEART RATE: 73 BPM | BODY MASS INDEX: 36.67 KG/M2 | WEIGHT: 207 LBS | DIASTOLIC BLOOD PRESSURE: 70 MMHG | RESPIRATION RATE: 18 BRPM

## 2020-01-29 PROCEDURE — 77412 RADIATION TX DELIVERY LVL 3: CPT | Performed by: RADIOLOGY

## 2020-01-29 PROCEDURE — 99999 PR OFFICE/OUTPT VISIT,PROCEDURE ONLY: CPT | Performed by: RADIOLOGY

## 2020-01-29 NOTE — ADDENDUM NOTE
Encounter addended by: Lonnie Santamaria RN on: 1/29/2020 10:12 AM   Actions taken: Order Reconciliation Section accessed

## 2020-01-29 NOTE — PROGRESS NOTES
DEPARTMENT OF RADIATION ONCOLOGY ON TREATMENT VISIT         1/29/2020      NAME:  John Velazquez    YOB: 1955    Diagnosis:  Left breast cancer    SUBJECTIVE:   John Velazquez has now received 0 cGy in 3/30 fractions directed to the left breast + bst.    Past medical, surgical, social and family histories reviewed and updated as indicated. Pain: controlled    ALLERGIES:  Tetracyclines & related         Current Outpatient Medications   Medication Sig Dispense Refill    diphenoxylate-atropine (LOMOTIL) 2.5-0.025 MG per tablet Take 1 tablet by mouth 4 times daily as needed for Diarrhea.  scopolamine (TRANSDERM-SCOP, 1.5 MG,) transdermal patch Place 1 patch onto the skin every 72 hours 10 patch 11    b complex vitamins capsule Take 1 capsule by mouth daily      Multiple Vitamins-Minerals (MULTIVITAMIN WOMEN 50+ PO) Take 1 tablet by mouth daily       Biotin 5000 MCG TABS Take 5,000 mcg by mouth 2 times daily       metoprolol succinate (TOPROL XL) 50 MG extended release tablet TAKE ONE TABLET BY MOUTH EVERY night  3    valsartan-hydrochlorothiazide (DIOVAN-HCT) 320-25 MG per tablet TAKE ONE TABLET BY MOUTH EVERY DAY  1    clonazePAM (KLONOPIN) 0.5 MG tablet Take 0.5 mg by mouth daily as needed for Anxiety. No current facility-administered medications for this encounter. OBJECTIVE:  Alert and fully ambulatory. Pleasant and conversant. Physical Examination: General appearance - alert, well appearing, and in no distress. Wt Readings from Last 3 Encounters:   01/29/20 207 lb (93.9 kg)   01/27/20 208 lb 1.6 oz (94.4 kg)   01/13/20 206 lb (93.4 kg)         ASSESSMENT/PLAN:     Patient is tolerating treatments well with expected toxicities. RBA were reviewed prior to first fraction and PRN. Current and planned dose reviewed. Goals of treatment and potential side effects were reviewed with the patient PRN.  Treatment imaging has been personally

## 2020-01-30 ENCOUNTER — HOSPITAL ENCOUNTER (OUTPATIENT)
Dept: RADIATION ONCOLOGY | Age: 65
Discharge: HOME | End: 2020-01-30
Attending: RADIOLOGY
Payer: COMMERCIAL

## 2020-01-30 PROCEDURE — 77412 RADIATION TX DELIVERY LVL 3: CPT | Performed by: RADIOLOGY

## 2020-01-31 ENCOUNTER — APPOINTMENT (OUTPATIENT)
Dept: RADIATION ONCOLOGY | Age: 65
End: 2020-01-31
Attending: RADIOLOGY
Payer: COMMERCIAL

## 2020-02-03 ENCOUNTER — HOSPITAL ENCOUNTER (OUTPATIENT)
Dept: RADIATION ONCOLOGY | Age: 65
Discharge: HOME | End: 2020-02-03
Attending: RADIOLOGY
Payer: COMMERCIAL

## 2020-02-03 PROCEDURE — 77412 RADIATION TX DELIVERY LVL 3: CPT | Performed by: RADIOLOGY

## 2020-02-03 PROCEDURE — 77336 RADIATION PHYSICS CONSULT: CPT | Performed by: RADIOLOGY

## 2020-02-04 ENCOUNTER — HOSPITAL ENCOUNTER (OUTPATIENT)
Dept: RADIATION ONCOLOGY | Age: 65
Discharge: HOME | End: 2020-02-04
Attending: RADIOLOGY
Payer: COMMERCIAL

## 2020-02-04 PROCEDURE — 77417 THER RADIOLOGY PORT IMAGE(S): CPT | Performed by: RADIOLOGY

## 2020-02-04 PROCEDURE — 77412 RADIATION TX DELIVERY LVL 3: CPT | Performed by: RADIOLOGY

## 2020-02-05 ENCOUNTER — HOSPITAL ENCOUNTER (OUTPATIENT)
Dept: RADIATION ONCOLOGY | Age: 65
Discharge: HOME | End: 2020-02-05
Attending: RADIOLOGY
Payer: COMMERCIAL

## 2020-02-05 VITALS
DIASTOLIC BLOOD PRESSURE: 66 MMHG | SYSTOLIC BLOOD PRESSURE: 138 MMHG | OXYGEN SATURATION: 97 % | WEIGHT: 204.5 LBS | BODY MASS INDEX: 36.23 KG/M2 | RESPIRATION RATE: 18 BRPM | HEART RATE: 82 BPM

## 2020-02-05 PROCEDURE — 77412 RADIATION TX DELIVERY LVL 3: CPT | Performed by: RADIOLOGY

## 2020-02-05 PROCEDURE — 99999 PR OFFICE/OUTPT VISIT,PROCEDURE ONLY: CPT | Performed by: RADIOLOGY

## 2020-02-05 NOTE — PROGRESS NOTES
DEPARTMENT OF RADIATION ONCOLOGY ON TREATMENT VISIT         2/5/2020      NAME:  Sangeeta Min    YOB: 1955    Diagnosis:  Left breast cancer    SUBJECTIVE:   Sangeeta Min has now received 1400 cGy in 7/30 fractions directed to the L breast.    Past medical, surgical, social and family histories reviewed and updated as indicated. Pain: controlled    ALLERGIES:  Tetracyclines & related         Current Outpatient Medications   Medication Sig Dispense Refill    diphenoxylate-atropine (LOMOTIL) 2.5-0.025 MG per tablet Take 1 tablet by mouth 4 times daily as needed for Diarrhea.  scopolamine (TRANSDERM-SCOP, 1.5 MG,) transdermal patch Place 1 patch onto the skin every 72 hours 10 patch 11    b complex vitamins capsule Take 1 capsule by mouth daily      Multiple Vitamins-Minerals (MULTIVITAMIN WOMEN 50+ PO) Take 1 tablet by mouth daily       Biotin 5000 MCG TABS Take 5,000 mcg by mouth 2 times daily       metoprolol succinate (TOPROL XL) 50 MG extended release tablet TAKE ONE TABLET BY MOUTH EVERY night  3    valsartan-hydrochlorothiazide (DIOVAN-HCT) 320-25 MG per tablet TAKE ONE TABLET BY MOUTH EVERY DAY  1    clonazePAM (KLONOPIN) 0.5 MG tablet Take 0.5 mg by mouth daily as needed for Anxiety. No current facility-administered medications for this encounter. OBJECTIVE:  Alert and fully ambulatory. Pleasant and conversant. Physical Examination: General appearance - alert, well appearing, and in no distress. Wt Readings from Last 3 Encounters:   02/05/20 204 lb 8 oz (92.8 kg)   01/29/20 207 lb (93.9 kg)   01/27/20 208 lb 1.6 oz (94.4 kg)         ASSESSMENT/PLAN:     Patient is tolerating treatments well with expected toxicities. RBA were reviewed prior to first fraction and PRN. Current and planned dose reviewed. Goals of treatment and potential side effects were reviewed with the patient PRN.  Treatment imaging has been personally reviewed

## 2020-02-05 NOTE — PROGRESS NOTES
Geovani Radi  2/5/2020  Wt Readings from Last 3 Encounters:   01/29/20 207 lb (93.9 kg)   01/27/20 208 lb 1.6 oz (94.4 kg)   01/13/20 206 lb (93.4 kg)     There is no height or weight on file to calculate BMI. Treatment Area:left breast with boost    Patient was seen today for weekly visit. Comfort Alteration  KPS:90%  Fatigue: Mild    Nutritional Alteration  Anorexia: Yes   Nausea: No   Vomiting: No     Skin Alteration   Sensation:na    Radiation Dermatitis:  na    Mucous Membrane Alteration  Drainage: No  Lymphedema: No    Emotional  Coping: effective    Sexuality Alteration  na    Injury, potential bleeding or infection: na        Lab Results   Component Value Date    WBC 6.4 01/24/2020     01/24/2020         There were no vitals taken for this visit. BP within normal range? yes         Assessment/Plan: Patient has completed 7/30 fractions, 1400 cGy/6000.      Braulio Gibson

## 2020-02-06 ENCOUNTER — HOSPITAL ENCOUNTER (OUTPATIENT)
Dept: RADIATION ONCOLOGY | Age: 65
Discharge: HOME | End: 2020-02-06
Attending: RADIOLOGY
Payer: COMMERCIAL

## 2020-02-06 PROCEDURE — 77412 RADIATION TX DELIVERY LVL 3: CPT | Performed by: RADIOLOGY

## 2020-02-07 ENCOUNTER — HOSPITAL ENCOUNTER (OUTPATIENT)
Dept: RADIATION ONCOLOGY | Age: 65
Discharge: HOME | End: 2020-02-07
Attending: RADIOLOGY
Payer: COMMERCIAL

## 2020-02-07 PROCEDURE — 77412 RADIATION TX DELIVERY LVL 3: CPT | Performed by: RADIOLOGY

## 2020-02-10 ENCOUNTER — HOSPITAL ENCOUNTER (OUTPATIENT)
Dept: RADIATION ONCOLOGY | Age: 65
Discharge: HOME | End: 2020-02-10
Attending: RADIOLOGY
Payer: COMMERCIAL

## 2020-02-10 PROCEDURE — 77336 RADIATION PHYSICS CONSULT: CPT | Performed by: RADIOLOGY

## 2020-02-10 PROCEDURE — 77412 RADIATION TX DELIVERY LVL 3: CPT | Performed by: RADIOLOGY

## 2020-02-11 ENCOUNTER — HOSPITAL ENCOUNTER (OUTPATIENT)
Dept: RADIATION ONCOLOGY | Age: 65
Discharge: HOME | End: 2020-02-11
Attending: RADIOLOGY
Payer: COMMERCIAL

## 2020-02-11 PROCEDURE — 77412 RADIATION TX DELIVERY LVL 3: CPT | Performed by: RADIOLOGY

## 2020-02-11 PROCEDURE — 77417 THER RADIOLOGY PORT IMAGE(S): CPT | Performed by: RADIOLOGY

## 2020-02-12 ENCOUNTER — HOSPITAL ENCOUNTER (OUTPATIENT)
Dept: RADIATION ONCOLOGY | Age: 65
Discharge: HOME | End: 2020-02-12
Attending: RADIOLOGY
Payer: COMMERCIAL

## 2020-02-12 VITALS
HEART RATE: 86 BPM | OXYGEN SATURATION: 94 % | DIASTOLIC BLOOD PRESSURE: 68 MMHG | BODY MASS INDEX: 36.67 KG/M2 | WEIGHT: 207 LBS | RESPIRATION RATE: 18 BRPM | SYSTOLIC BLOOD PRESSURE: 132 MMHG | TEMPERATURE: 97.5 F

## 2020-02-12 PROCEDURE — 77412 RADIATION TX DELIVERY LVL 3: CPT | Performed by: RADIOLOGY

## 2020-02-12 PROCEDURE — 99999 PR OFFICE/OUTPT VISIT,PROCEDURE ONLY: CPT | Performed by: RADIOLOGY

## 2020-02-12 NOTE — PATIENT INSTRUCTIONS
Continue daily fractionated radiation therapy as scheduled. Please see weekly OTV note and intial consultation letter in [facility] for clinical details. Dejon Orr.  Gabby Soria MD [address] [place] [address]  Radiation Oncology  [place]:  [phone]   FAX:    [phone] [facility]: [address] [place] [address] Road:  [phone]

## 2020-02-12 NOTE — PROGRESS NOTES
Rayna Merino  2/12/2020  Wt Readings from Last 3 Encounters:   02/12/20 207 lb (93.9 kg)   02/05/20 204 lb 8 oz (92.8 kg)   01/29/20 207 lb (93.9 kg)     Body mass index is 36.67 kg/m². Treatment Area:ctv LEFT BREAST WITH BOOST    Patient was seen today for weekly visit. Comfort Alteration  KPS:90%  Fatigue: None    Nutritional Alteration  Anorexia: No   Nausea: No   Vomiting: No     Skin Alteration   Sensation:better-itchiness has improved    Radiation Dermatitis:  na    Mucous Membrane Alteration  Drainage: No  Lymphedema: No    Emotional  Coping: effective    Sexuality Alteration  na    Injury, potential bleeding or infection: na        Lab Results   Component Value Date    WBC 6.4 01/24/2020     01/24/2020         /68   Pulse 86   Temp 97.5 °F (36.4 °C)   Resp 18   Wt 207 lb (93.9 kg)   SpO2 94%   BMI 36.67 kg/m²   BP within normal range? yes         Assessment/Plan: patirnt has completed 12/30 fractions, 2400 cGy/6000.     Herson See

## 2020-02-12 NOTE — PROGRESS NOTES
DEPARTMENT OF RADIATION ONCOLOGY ON TREATMENT VISIT         2/12/2020      NAME:  Satya Tolbert    YOB: 1955    Diagnosis:  Left breast cancer    SUBJECTIVE:   Satya Tolbert has now received 2400 cGy in 12/30 fractions directed to the left breast.    Past medical, surgical, social and family histories reviewed and updated as indicated. Pain: controlled    ALLERGIES:  Tetracyclines & related         Current Outpatient Medications   Medication Sig Dispense Refill    diphenoxylate-atropine (LOMOTIL) 2.5-0.025 MG per tablet Take 1 tablet by mouth 4 times daily as needed for Diarrhea.  scopolamine (TRANSDERM-SCOP, 1.5 MG,) transdermal patch Place 1 patch onto the skin every 72 hours 10 patch 11    b complex vitamins capsule Take 1 capsule by mouth daily      Multiple Vitamins-Minerals (MULTIVITAMIN WOMEN 50+ PO) Take 1 tablet by mouth daily       Biotin 5000 MCG TABS Take 5,000 mcg by mouth 2 times daily       metoprolol succinate (TOPROL XL) 50 MG extended release tablet TAKE ONE TABLET BY MOUTH EVERY night  3    valsartan-hydrochlorothiazide (DIOVAN-HCT) 320-25 MG per tablet TAKE ONE TABLET BY MOUTH EVERY DAY  1    clonazePAM (KLONOPIN) 0.5 MG tablet Take 0.5 mg by mouth daily as needed for Anxiety. No current facility-administered medications for this encounter. OBJECTIVE:  Alert and fully ambulatory. Pleasant and conversant. Physical Examination: General appearance - alert, well appearing, and in no distress.  -skin grade 1          Wt Readings from Last 3 Encounters:   02/12/20 207 lb (93.9 kg)   02/05/20 204 lb 8 oz (92.8 kg)   01/29/20 207 lb (93.9 kg)         ASSESSMENT/PLAN:     Patient is tolerating treatments well with expected toxicities. RBA were reviewed prior to first fraction and PRN. Current and planned dose reviewed. Goals of treatment and potential side effects were reviewed with the patient PRN.  Treatment imaging has been

## 2020-02-13 ENCOUNTER — HOSPITAL ENCOUNTER (OUTPATIENT)
Dept: RADIATION ONCOLOGY | Age: 65
Discharge: HOME | End: 2020-02-13
Attending: RADIOLOGY
Payer: COMMERCIAL

## 2020-02-13 PROCEDURE — 77412 RADIATION TX DELIVERY LVL 3: CPT | Performed by: RADIOLOGY

## 2020-02-14 ENCOUNTER — HOSPITAL ENCOUNTER (OUTPATIENT)
Dept: INFUSION THERAPY | Age: 65
Discharge: HOME | End: 2020-02-14
Payer: COMMERCIAL

## 2020-02-14 ENCOUNTER — HOSPITAL ENCOUNTER (OUTPATIENT)
Dept: RADIATION ONCOLOGY | Age: 65
Discharge: HOME | End: 2020-02-14
Attending: RADIOLOGY
Payer: COMMERCIAL

## 2020-02-14 DIAGNOSIS — C50.912 INVASIVE DUCTAL CARCINOMA OF LEFT BREAST (HCC): Primary | ICD-10-CM

## 2020-02-14 LAB
ALBUMIN SERPL-MCNC: 4.1 G/DL (ref 3.5–5.2)
ALP BLD-CCNC: 82 U/L (ref 35–104)
ALT SERPL-CCNC: 31 U/L (ref 0–32)
ANION GAP SERPL CALCULATED.3IONS-SCNC: 10 MMOL/L (ref 7–16)
AST SERPL-CCNC: 31 U/L (ref 0–31)
BASOPHILS ABSOLUTE: 0.04 E9/L (ref 0–0.2)
BASOPHILS RELATIVE PERCENT: 0.7 % (ref 0–2)
BILIRUB SERPL-MCNC: 0.2 MG/DL (ref 0–1.2)
BUN BLDV-MCNC: 12 MG/DL (ref 8–23)
CALCIUM SERPL-MCNC: 10.1 MG/DL (ref 8.6–10.2)
CHLORIDE BLD-SCNC: 98 MMOL/L (ref 98–107)
CO2: 32 MMOL/L (ref 22–29)
CREAT SERPL-MCNC: 0.9 MG/DL (ref 0.5–1)
EOSINOPHILS ABSOLUTE: 0.22 E9/L (ref 0.05–0.5)
EOSINOPHILS RELATIVE PERCENT: 3.7 % (ref 0–6)
GFR AFRICAN AMERICAN: >60
GFR NON-AFRICAN AMERICAN: >60 ML/MIN/1.73
GLUCOSE BLD-MCNC: 107 MG/DL (ref 74–99)
HCT VFR BLD CALC: 37.1 % (ref 34–48)
HEMOGLOBIN: 11.4 G/DL (ref 11.5–15.5)
IMMATURE GRANULOCYTES #: 0.02 E9/L
IMMATURE GRANULOCYTES %: 0.3 % (ref 0–5)
LYMPHOCYTES ABSOLUTE: 1.74 E9/L (ref 1.5–4)
LYMPHOCYTES RELATIVE PERCENT: 29.6 % (ref 20–42)
MAGNESIUM: 1.8 MG/DL (ref 1.6–2.6)
MCH RBC QN AUTO: 27.2 PG (ref 26–35)
MCHC RBC AUTO-ENTMCNC: 30.7 % (ref 32–34.5)
MCV RBC AUTO: 88.5 FL (ref 80–99.9)
MONOCYTES ABSOLUTE: 0.56 E9/L (ref 0.1–0.95)
MONOCYTES RELATIVE PERCENT: 9.5 % (ref 2–12)
NEUTROPHILS ABSOLUTE: 3.3 E9/L (ref 1.8–7.3)
NEUTROPHILS RELATIVE PERCENT: 56.2 % (ref 43–80)
PDW BLD-RTO: 14.5 FL (ref 11.5–15)
PLATELET # BLD: 295 E9/L (ref 130–450)
PMV BLD AUTO: 9.5 FL (ref 7–12)
POTASSIUM SERPL-SCNC: 4 MMOL/L (ref 3.5–5)
RBC # BLD: 4.19 E12/L (ref 3.5–5.5)
SODIUM BLD-SCNC: 140 MMOL/L (ref 132–146)
TOTAL PROTEIN: 7.3 G/DL (ref 6.4–8.3)
WBC # BLD: 5.9 E9/L (ref 4.5–11.5)

## 2020-02-14 PROCEDURE — 80053 COMPREHEN METABOLIC PANEL: CPT

## 2020-02-14 PROCEDURE — 83735 ASSAY OF MAGNESIUM: CPT

## 2020-02-14 PROCEDURE — 85025 COMPLETE CBC W/AUTO DIFF WBC: CPT

## 2020-02-14 PROCEDURE — 77412 RADIATION TX DELIVERY LVL 3: CPT | Performed by: RADIOLOGY

## 2020-02-14 RX ORDER — HEPARIN SODIUM (PORCINE) LOCK FLUSH IV SOLN 100 UNIT/ML 100 UNIT/ML
500 SOLUTION INTRAVENOUS PRN
Status: CANCELLED | OUTPATIENT
Start: 2020-02-14

## 2020-02-14 RX ORDER — POTASSIUM CHLORIDE 20 MEQ/1
40 TABLET, EXTENDED RELEASE ORAL PRN
Status: CANCELLED
Start: 2020-02-14

## 2020-02-14 RX ORDER — SODIUM CHLORIDE 0.9 % (FLUSH) 0.9 %
10 SYRINGE (ML) INJECTION PRN
Status: DISCONTINUED | OUTPATIENT
Start: 2020-02-14 | End: 2020-02-15 | Stop reason: HOSPADM

## 2020-02-14 RX ORDER — SODIUM CHLORIDE 0.9 % (FLUSH) 0.9 %
10 SYRINGE (ML) INJECTION PRN
Status: CANCELLED | OUTPATIENT
Start: 2020-02-14

## 2020-02-14 RX ORDER — HEPARIN SODIUM (PORCINE) LOCK FLUSH IV SOLN 100 UNIT/ML 100 UNIT/ML
500 SOLUTION INTRAVENOUS PRN
Status: DISCONTINUED | OUTPATIENT
Start: 2020-02-14 | End: 2020-02-15 | Stop reason: HOSPADM

## 2020-02-17 ENCOUNTER — HOSPITAL ENCOUNTER (OUTPATIENT)
Dept: RADIATION ONCOLOGY | Age: 65
Discharge: HOME | End: 2020-02-17
Attending: RADIOLOGY
Payer: COMMERCIAL

## 2020-02-17 ENCOUNTER — HOSPITAL ENCOUNTER (OUTPATIENT)
Dept: INFUSION THERAPY | Age: 65
Discharge: HOME | End: 2020-02-17
Payer: COMMERCIAL

## 2020-02-17 ENCOUNTER — OFFICE VISIT (OUTPATIENT)
Dept: ONCOLOGY | Age: 65
End: 2020-02-17
Payer: COMMERCIAL

## 2020-02-17 VITALS — SYSTOLIC BLOOD PRESSURE: 153 MMHG | RESPIRATION RATE: 16 BRPM | DIASTOLIC BLOOD PRESSURE: 70 MMHG | HEART RATE: 67 BPM

## 2020-02-17 VITALS
BODY MASS INDEX: 36.61 KG/M2 | HEIGHT: 63 IN | OXYGEN SATURATION: 96 % | SYSTOLIC BLOOD PRESSURE: 146 MMHG | HEART RATE: 76 BPM | WEIGHT: 206.6 LBS | TEMPERATURE: 98 F | DIASTOLIC BLOOD PRESSURE: 70 MMHG

## 2020-02-17 DIAGNOSIS — C50.912 INVASIVE DUCTAL CARCINOMA OF LEFT BREAST (HCC): Primary | ICD-10-CM

## 2020-02-17 PROCEDURE — 99214 OFFICE O/P EST MOD 30 MIN: CPT | Performed by: INTERNAL MEDICINE

## 2020-02-17 PROCEDURE — 77412 RADIATION TX DELIVERY LVL 3: CPT | Performed by: RADIOLOGY

## 2020-02-17 PROCEDURE — 2580000003 HC RX 258: Performed by: INTERNAL MEDICINE

## 2020-02-17 PROCEDURE — 1036F TOBACCO NON-USER: CPT | Performed by: INTERNAL MEDICINE

## 2020-02-17 PROCEDURE — G8427 DOCREV CUR MEDS BY ELIG CLIN: HCPCS | Performed by: INTERNAL MEDICINE

## 2020-02-17 PROCEDURE — 77307 TELETHX ISODOSE PLAN CPLX: CPT | Performed by: RADIOLOGY

## 2020-02-17 PROCEDURE — G8417 CALC BMI ABV UP PARAM F/U: HCPCS | Performed by: INTERNAL MEDICINE

## 2020-02-17 PROCEDURE — 3017F COLORECTAL CA SCREEN DOC REV: CPT | Performed by: INTERNAL MEDICINE

## 2020-02-17 PROCEDURE — 77334 RADIATION TREATMENT AID(S): CPT | Performed by: RADIOLOGY

## 2020-02-17 PROCEDURE — 96413 CHEMO IV INFUSION 1 HR: CPT

## 2020-02-17 PROCEDURE — 6360000002 HC RX W HCPCS: Performed by: INTERNAL MEDICINE

## 2020-02-17 PROCEDURE — G8484 FLU IMMUNIZE NO ADMIN: HCPCS | Performed by: INTERNAL MEDICINE

## 2020-02-17 PROCEDURE — 77336 RADIATION PHYSICS CONSULT: CPT | Performed by: RADIOLOGY

## 2020-02-17 PROCEDURE — 96375 TX/PRO/DX INJ NEW DRUG ADDON: CPT

## 2020-02-17 RX ORDER — MEPERIDINE HYDROCHLORIDE 25 MG/ML
12.5 INJECTION INTRAMUSCULAR; INTRAVENOUS; SUBCUTANEOUS ONCE
Status: CANCELLED | OUTPATIENT
Start: 2020-02-17

## 2020-02-17 RX ORDER — SODIUM CHLORIDE 9 MG/ML
INJECTION, SOLUTION INTRAVENOUS CONTINUOUS
Status: CANCELLED | OUTPATIENT
Start: 2020-02-17

## 2020-02-17 RX ORDER — SODIUM CHLORIDE 9 MG/ML
20 INJECTION, SOLUTION INTRAVENOUS ONCE
Status: COMPLETED | OUTPATIENT
Start: 2020-02-17 | End: 2020-02-17

## 2020-02-17 RX ORDER — METHYLPREDNISOLONE SODIUM SUCCINATE 125 MG/2ML
125 INJECTION, POWDER, LYOPHILIZED, FOR SOLUTION INTRAMUSCULAR; INTRAVENOUS ONCE
Status: CANCELLED | OUTPATIENT
Start: 2020-02-17

## 2020-02-17 RX ORDER — DIPHENHYDRAMINE HYDROCHLORIDE 50 MG/ML
50 INJECTION INTRAMUSCULAR; INTRAVENOUS ONCE
Status: CANCELLED | OUTPATIENT
Start: 2020-02-17

## 2020-02-17 RX ORDER — DEXAMETHASONE SODIUM PHOSPHATE 10 MG/ML
8 INJECTION, SOLUTION INTRAMUSCULAR; INTRAVENOUS ONCE
Status: CANCELLED | OUTPATIENT
Start: 2020-02-17

## 2020-02-17 RX ORDER — DEXAMETHASONE SODIUM PHOSPHATE 10 MG/ML
8 INJECTION INTRAMUSCULAR; INTRAVENOUS ONCE
Status: COMPLETED | OUTPATIENT
Start: 2020-02-17 | End: 2020-02-17

## 2020-02-17 RX ORDER — SODIUM CHLORIDE 0.9 % (FLUSH) 0.9 %
10 SYRINGE (ML) INJECTION PRN
Status: DISCONTINUED | OUTPATIENT
Start: 2020-02-17 | End: 2020-02-18 | Stop reason: HOSPADM

## 2020-02-17 RX ORDER — EPINEPHRINE 1 MG/ML
0.3 INJECTION, SOLUTION, CONCENTRATE INTRAVENOUS PRN
Status: CANCELLED | OUTPATIENT
Start: 2020-02-17

## 2020-02-17 RX ORDER — SODIUM CHLORIDE 0.9 % (FLUSH) 0.9 %
5 SYRINGE (ML) INJECTION PRN
Status: CANCELLED | OUTPATIENT
Start: 2020-02-17

## 2020-02-17 RX ORDER — SODIUM CHLORIDE 0.9 % (FLUSH) 0.9 %
10 SYRINGE (ML) INJECTION PRN
Status: CANCELLED | OUTPATIENT
Start: 2020-02-17

## 2020-02-17 RX ORDER — SODIUM CHLORIDE 9 MG/ML
20 INJECTION, SOLUTION INTRAVENOUS ONCE
Status: CANCELLED | OUTPATIENT
Start: 2020-02-17

## 2020-02-17 RX ORDER — HEPARIN SODIUM (PORCINE) LOCK FLUSH IV SOLN 100 UNIT/ML 100 UNIT/ML
500 SOLUTION INTRAVENOUS PRN
Status: CANCELLED | OUTPATIENT
Start: 2020-02-17

## 2020-02-17 RX ORDER — HEPARIN SODIUM (PORCINE) LOCK FLUSH IV SOLN 100 UNIT/ML 100 UNIT/ML
500 SOLUTION INTRAVENOUS PRN
Status: DISCONTINUED | OUTPATIENT
Start: 2020-02-17 | End: 2020-02-18 | Stop reason: HOSPADM

## 2020-02-17 RX ADMIN — HEPARIN 500 UNITS: 100 SYRINGE at 12:32

## 2020-02-17 RX ADMIN — ADO-TRASTUZUMAB EMTANSINE 360 MG: 20 INJECTION, POWDER, LYOPHILIZED, FOR SOLUTION INTRAVENOUS at 11:18

## 2020-02-17 RX ADMIN — SODIUM CHLORIDE, PRESERVATIVE FREE 10 ML: 5 INJECTION INTRAVENOUS at 12:32

## 2020-02-17 RX ADMIN — DEXAMETHASONE SODIUM PHOSPHATE 8 MG: 10 INJECTION INTRAMUSCULAR; INTRAVENOUS at 11:10

## 2020-02-17 RX ADMIN — SODIUM CHLORIDE, PRESERVATIVE FREE 10 ML: 5 INJECTION INTRAVENOUS at 11:11

## 2020-02-17 RX ADMIN — SODIUM CHLORIDE 20 ML/HR: 9 INJECTION, SOLUTION INTRAVENOUS at 11:09

## 2020-02-17 RX ADMIN — SODIUM CHLORIDE, PRESERVATIVE FREE 10 ML: 5 INJECTION INTRAVENOUS at 10:59

## 2020-02-18 ENCOUNTER — HOSPITAL ENCOUNTER (OUTPATIENT)
Dept: RADIATION ONCOLOGY | Age: 65
Discharge: HOME | End: 2020-02-18
Attending: RADIOLOGY
Payer: COMMERCIAL

## 2020-02-18 PROCEDURE — 77417 THER RADIOLOGY PORT IMAGE(S): CPT | Performed by: RADIOLOGY

## 2020-02-18 PROCEDURE — 77412 RADIATION TX DELIVERY LVL 3: CPT | Performed by: RADIOLOGY

## 2020-02-19 ENCOUNTER — HOSPITAL ENCOUNTER (OUTPATIENT)
Dept: RADIATION ONCOLOGY | Age: 65
Discharge: HOME | End: 2020-02-19
Attending: RADIOLOGY
Payer: COMMERCIAL

## 2020-02-19 VITALS
WEIGHT: 206.6 LBS | BODY MASS INDEX: 36.6 KG/M2 | TEMPERATURE: 97.1 F | DIASTOLIC BLOOD PRESSURE: 64 MMHG | OXYGEN SATURATION: 95 % | SYSTOLIC BLOOD PRESSURE: 130 MMHG | HEART RATE: 76 BPM | RESPIRATION RATE: 18 BRPM

## 2020-02-19 PROCEDURE — 77412 RADIATION TX DELIVERY LVL 3: CPT | Performed by: RADIOLOGY

## 2020-02-19 PROCEDURE — 99999 PR OFFICE/OUTPT VISIT,PROCEDURE ONLY: CPT | Performed by: RADIOLOGY

## 2020-02-19 NOTE — PATIENT INSTRUCTIONS
Continue daily fractionated radiation therapy as scheduled. Please see weekly OTV note and intial consultation letter in [facility] for clinical details. Moon Childress.  Ilya Zeng MD [address] [place] [address]  Radiation Oncology  [place]:  [phone]   FAX:    [phone] [facility]: [address] [place] [address] Road:  [phone]

## 2020-02-19 NOTE — PROGRESS NOTES
Jann Jose Alfredo  2/19/2020  Wt Readings from Last 3 Encounters:   02/17/20 206 lb 9.6 oz (93.7 kg)   02/12/20 207 lb (93.9 kg)   02/05/20 204 lb 8 oz (92.8 kg)     There is no height or weight on file to calculate BMI. Treatment Area:left breast with boost    Patient was seen today for weekly visit. Comfort Alteration  KPS:90%  Fatigue: None    Nutritional Alteration  Anorexia: No   Nausea: No   Vomiting: No     Skin Alteration   Sensation: intermittent itchiness    Radiation Dermatitis:  Darkened/slight redness    Mucous Membrane Alteration  Drainage: No  Lymphedema: No    Emotional  Coping: effective    Sexuality Alteration  na    Injury, potential bleeding or infection: patient is experiencing muscle spasms to left side that she notes is new with position changes        Lab Results   Component Value Date    WBC 5.9 02/14/2020     02/14/2020         There were no vitals taken for this visit. BP within normal range? yes         Assessment/Plan: patient has completed 17/30 fractions, 3400cGy/6000.     Irvin Prado

## 2020-02-20 ENCOUNTER — HOSPITAL ENCOUNTER (OUTPATIENT)
Dept: RADIATION ONCOLOGY | Age: 65
Discharge: HOME | End: 2020-02-20
Attending: RADIOLOGY
Payer: COMMERCIAL

## 2020-02-20 PROCEDURE — 77412 RADIATION TX DELIVERY LVL 3: CPT | Performed by: RADIOLOGY

## 2020-02-21 ENCOUNTER — TELEPHONE (OUTPATIENT)
Dept: RADIATION ONCOLOGY | Age: 65
End: 2020-02-21

## 2020-02-21 ENCOUNTER — HOSPITAL ENCOUNTER (OUTPATIENT)
Dept: RADIATION ONCOLOGY | Age: 65
End: 2020-02-21
Attending: RADIOLOGY
Payer: COMMERCIAL

## 2020-02-24 ENCOUNTER — HOSPITAL ENCOUNTER (OUTPATIENT)
Dept: RADIATION ONCOLOGY | Age: 65
Discharge: HOME | End: 2020-02-24
Attending: RADIOLOGY
Payer: COMMERCIAL

## 2020-02-24 PROCEDURE — 77412 RADIATION TX DELIVERY LVL 3: CPT | Performed by: RADIOLOGY

## 2020-02-25 ENCOUNTER — HOSPITAL ENCOUNTER (OUTPATIENT)
Dept: RADIATION ONCOLOGY | Age: 65
Discharge: HOME | End: 2020-02-25
Attending: RADIOLOGY
Payer: COMMERCIAL

## 2020-02-25 VITALS
DIASTOLIC BLOOD PRESSURE: 64 MMHG | OXYGEN SATURATION: 97 % | RESPIRATION RATE: 18 BRPM | WEIGHT: 205.8 LBS | TEMPERATURE: 98.1 F | SYSTOLIC BLOOD PRESSURE: 138 MMHG | BODY MASS INDEX: 36.46 KG/M2 | HEART RATE: 73 BPM

## 2020-02-25 PROCEDURE — 77412 RADIATION TX DELIVERY LVL 3: CPT | Performed by: RADIOLOGY

## 2020-02-25 PROCEDURE — 77336 RADIATION PHYSICS CONSULT: CPT | Performed by: RADIOLOGY

## 2020-02-25 NOTE — PROGRESS NOTES
John Velazquez  2/25/2020  Wt Readings from Last 3 Encounters:   02/19/20 206 lb 9.6 oz (93.7 kg)   02/17/20 206 lb 9.6 oz (93.7 kg)   02/12/20 207 lb (93.9 kg)     There is no height or weight on file to calculate BMI. Treatment Area:left breast with boost    Patient was seen today for weekly visit. Comfort Alteration  KPS:90%  Fatigue: Mild    Nutritional Alteration  Anorexia: yes-related to pain-   Nausea: yes  Vomiting: No     Skin Alteration   Sensation:redness/painful to supraclav-also under breast-patient is utilizing Silvadene TID at N's suggestion    Radiation Dermatitis:  See above    Mucous Membrane Alteration  Drainage: No  Lymphedema: No    Emotional  Coping: somewhat effective    Sexuality Alteration  na    Injury, potential bleeding or infection: na        Lab Results   Component Value Date    WBC 5.9 02/14/2020     02/14/2020         There were no vitals taken for this visit. BP within normal range? yes         Assessment/Plan:   patient has completed 20/30 fractions, 4000 cGy/6000.    Gurdeep Seaman

## 2020-02-25 NOTE — PROGRESS NOTES
Jd Shore  2/25/2020  10:34 AM      Chief Complaint   Patient presents with    Cancer          Wt Readings from Last 3 Encounters:   02/25/20 205 lb 12.8 oz (93.4 kg)   02/19/20 206 lb 9.6 oz (93.7 kg)   02/17/20 206 lb 9.6 oz (93.7 kg)       Comments: Dose 4000 cGy to the left breast.    Patient is doing well without any complaints. She has no headache nausea or vomiting. She has no shortness of breath cough or hemoptysis. She has no nipple discharge or bleeding. On examination an area of moist desquamation over the left lateral edge of the left breast.  Patient did not want to take a break. She will have to be watched closely.           Plan: Radiation to continue as planned      Electronically signed by Verona Perez MD on 2/25/20 at 10:34 AM

## 2020-02-26 ENCOUNTER — HOSPITAL ENCOUNTER (OUTPATIENT)
Dept: RADIATION ONCOLOGY | Age: 65
Discharge: HOME | End: 2020-02-26
Attending: RADIOLOGY
Payer: COMMERCIAL

## 2020-02-26 PROCEDURE — 77412 RADIATION TX DELIVERY LVL 3: CPT | Performed by: RADIOLOGY

## 2020-02-27 ENCOUNTER — HOSPITAL ENCOUNTER (OUTPATIENT)
Dept: RADIATION ONCOLOGY | Age: 65
Discharge: HOME | End: 2020-02-27
Attending: RADIOLOGY
Payer: COMMERCIAL

## 2020-02-27 PROCEDURE — 77412 RADIATION TX DELIVERY LVL 3: CPT | Performed by: RADIOLOGY

## 2020-02-27 PROCEDURE — 77417 THER RADIOLOGY PORT IMAGE(S): CPT | Performed by: RADIOLOGY

## 2020-02-28 ENCOUNTER — APPOINTMENT (OUTPATIENT)
Dept: RADIATION ONCOLOGY | Age: 65
End: 2020-02-28
Attending: RADIOLOGY
Payer: COMMERCIAL

## 2020-03-02 ENCOUNTER — HOSPITAL ENCOUNTER (OUTPATIENT)
Dept: RADIATION ONCOLOGY | Age: 65
Discharge: HOME | End: 2020-03-02
Attending: RADIOLOGY
Payer: COMMERCIAL

## 2020-03-02 PROCEDURE — 77412 RADIATION TX DELIVERY LVL 3: CPT | Performed by: RADIOLOGY

## 2020-03-03 ENCOUNTER — HOSPITAL ENCOUNTER (OUTPATIENT)
Dept: RADIATION ONCOLOGY | Age: 65
Discharge: HOME | End: 2020-03-03
Attending: RADIOLOGY
Payer: COMMERCIAL

## 2020-03-03 PROCEDURE — 77412 RADIATION TX DELIVERY LVL 3: CPT | Performed by: RADIOLOGY

## 2020-03-04 ENCOUNTER — HOSPITAL ENCOUNTER (OUTPATIENT)
Dept: RADIATION ONCOLOGY | Age: 65
Discharge: HOME | End: 2020-03-04
Attending: RADIOLOGY
Payer: COMMERCIAL

## 2020-03-04 VITALS
OXYGEN SATURATION: 97 % | HEART RATE: 71 BPM | SYSTOLIC BLOOD PRESSURE: 162 MMHG | TEMPERATURE: 97.6 F | RESPIRATION RATE: 16 BRPM | WEIGHT: 210 LBS | BODY MASS INDEX: 37.2 KG/M2 | DIASTOLIC BLOOD PRESSURE: 88 MMHG

## 2020-03-04 PROCEDURE — 77412 RADIATION TX DELIVERY LVL 3: CPT | Performed by: RADIOLOGY

## 2020-03-04 PROCEDURE — 77336 RADIATION PHYSICS CONSULT: CPT | Performed by: RADIOLOGY

## 2020-03-04 PROCEDURE — 99999 PR OFFICE/OUTPT VISIT,PROCEDURE ONLY: CPT | Performed by: RADIOLOGY

## 2020-03-04 RX ORDER — IBUPROFEN 200 MG
400 TABLET ORAL EVERY 6 HOURS PRN
COMMUNITY
End: 2020-08-12 | Stop reason: ALTCHOICE

## 2020-03-04 ASSESSMENT — PAIN SCALES - GENERAL: PAINLEVEL_OUTOF10: 5

## 2020-03-04 ASSESSMENT — PAIN DESCRIPTION - PAIN TYPE: TYPE: ACUTE PAIN

## 2020-03-04 NOTE — PROGRESS NOTES
DEPARTMENT OF RADIATION ONCOLOGY ON TREATMENT VISIT         3/4/2020      NAME:  Nicole Cai    YOB: 1955    Diagnosis: left breast cancer    SUBJECTIVE:   Nicole Cai has now received 5000 cGy in 25/30 fractions directed to the left breast.    Past medical, surgical, social and family histories reviewed and updated as indicated. Pain: controlled    ALLERGIES:  Tetracyclines & related         Current Outpatient Medications   Medication Sig Dispense Refill    ibuprofen (ADVIL;MOTRIN) 200 MG tablet Take 400 mg by mouth every 6 hours as needed for Pain      silver sulfADIAZINE (SILVADENE) 1 % cream Apply to affected area 2 times per day. (Patient taking differently: Apply to affected area 3 times per day.) 50 g 1    diphenoxylate-atropine (LOMOTIL) 2.5-0.025 MG per tablet Take 1 tablet by mouth 4 times daily as needed for Diarrhea.  scopolamine (TRANSDERM-SCOP, 1.5 MG,) transdermal patch Place 1 patch onto the skin every 72 hours 10 patch 11    b complex vitamins capsule Take 1 capsule by mouth daily      Multiple Vitamins-Minerals (MULTIVITAMIN WOMEN 50+ PO) Take 1 tablet by mouth daily       Biotin 5000 MCG TABS Take 5,000 mcg by mouth 2 times daily       metoprolol succinate (TOPROL XL) 50 MG extended release tablet TAKE ONE TABLET BY MOUTH EVERY night  3    valsartan-hydrochlorothiazide (DIOVAN-HCT) 320-25 MG per tablet TAKE ONE TABLET BY MOUTH EVERY DAY  1    clonazePAM (KLONOPIN) 0.5 MG tablet Take 0.5 mg by mouth daily as needed for Anxiety. No current facility-administered medications for this encounter. OBJECTIVE:  Alert and fully ambulatory. Pleasant and conversant. Physical Examination: General appearance - alert, well appearing, and in no distress.             Wt Readings from Last 3 Encounters:   03/04/20 210 lb (95.3 kg)   02/25/20 205 lb 12.8 oz (93.4 kg)   02/19/20 206 lb 9.6 oz (93.7 kg)         ASSESSMENT/PLAN:     Patient is

## 2020-03-05 ENCOUNTER — HOSPITAL ENCOUNTER (OUTPATIENT)
Dept: RADIATION ONCOLOGY | Age: 65
Discharge: HOME | End: 2020-03-05
Attending: RADIOLOGY
Payer: COMMERCIAL

## 2020-03-05 PROCEDURE — 77412 RADIATION TX DELIVERY LVL 3: CPT | Performed by: RADIOLOGY

## 2020-03-06 ENCOUNTER — HOSPITAL ENCOUNTER (OUTPATIENT)
Dept: RADIATION ONCOLOGY | Age: 65
Discharge: HOME | End: 2020-03-06
Attending: RADIOLOGY
Payer: COMMERCIAL

## 2020-03-06 PROCEDURE — 77412 RADIATION TX DELIVERY LVL 3: CPT | Performed by: RADIOLOGY

## 2020-03-06 PROCEDURE — 77417 THER RADIOLOGY PORT IMAGE(S): CPT | Performed by: RADIOLOGY

## 2020-03-09 ENCOUNTER — HOSPITAL ENCOUNTER (OUTPATIENT)
Dept: RADIATION ONCOLOGY | Age: 65
Discharge: HOME | End: 2020-03-09
Attending: RADIOLOGY
Payer: COMMERCIAL

## 2020-03-09 PROCEDURE — 77412 RADIATION TX DELIVERY LVL 3: CPT | Performed by: RADIOLOGY

## 2020-03-10 ENCOUNTER — HOSPITAL ENCOUNTER (OUTPATIENT)
Dept: RADIATION ONCOLOGY | Age: 65
Discharge: HOME | End: 2020-03-10
Attending: RADIOLOGY
Payer: COMMERCIAL

## 2020-03-10 PROCEDURE — 77412 RADIATION TX DELIVERY LVL 3: CPT | Performed by: RADIOLOGY

## 2020-03-11 ENCOUNTER — HOSPITAL ENCOUNTER (OUTPATIENT)
Dept: RADIATION ONCOLOGY | Age: 65
Discharge: HOME | End: 2020-03-11
Attending: RADIOLOGY
Payer: COMMERCIAL

## 2020-03-11 VITALS
OXYGEN SATURATION: 98 % | SYSTOLIC BLOOD PRESSURE: 138 MMHG | DIASTOLIC BLOOD PRESSURE: 74 MMHG | HEART RATE: 75 BPM | WEIGHT: 210 LBS | RESPIRATION RATE: 18 BRPM | BODY MASS INDEX: 37.2 KG/M2

## 2020-03-11 PROCEDURE — 77412 RADIATION TX DELIVERY LVL 3: CPT | Performed by: RADIOLOGY

## 2020-03-11 PROCEDURE — 77336 RADIATION PHYSICS CONSULT: CPT | Performed by: RADIOLOGY

## 2020-03-11 NOTE — PROGRESS NOTES
Radha Castle  3/11/2020  12:15 PM      Chief Complaint   Patient presents with    Cancer          Wt Readings from Last 3 Encounters:   03/11/20 210 lb (95.3 kg)   03/04/20 210 lb (95.3 kg)   02/25/20 205 lb 12.8 oz (93.4 kg)       Comments: Dose 6000 cGy to the left breast.    Patient is doing well without any complaints. She completed treatment today. She has no headache nausea or vomiting. She has no shortness of breath cough or hemoptysis. She denies any nipple discharge or bleeding. She has no bony pain. On examination the skin over the treated area looks healed. Patient tolerated her treatment well.     She completed her treatment today      Plan: She was given a return appointment for follow-up      Electronically signed by Zi Neves MD on 3/11/20 at 12:15 PM EDT

## 2020-03-11 NOTE — PROGRESS NOTES
Myrtice Pellet  3/11/2020  7:52 AM          Current Outpatient Medications   Medication Sig Dispense Refill    ibuprofen (ADVIL;MOTRIN) 200 MG tablet Take 400 mg by mouth every 6 hours as needed for Pain      silver sulfADIAZINE (SILVADENE) 1 % cream Apply to affected area 2 times per day. (Patient taking differently: Apply to affected area 3 times per day.) 50 g 1    diphenoxylate-atropine (LOMOTIL) 2.5-0.025 MG per tablet Take 1 tablet by mouth 4 times daily as needed for Diarrhea.  scopolamine (TRANSDERM-SCOP, 1.5 MG,) transdermal patch Place 1 patch onto the skin every 72 hours 10 patch 11    b complex vitamins capsule Take 1 capsule by mouth daily      Multiple Vitamins-Minerals (MULTIVITAMIN WOMEN 50+ PO) Take 1 tablet by mouth daily       Biotin 5000 MCG TABS Take 5,000 mcg by mouth 2 times daily       metoprolol succinate (TOPROL XL) 50 MG extended release tablet TAKE ONE TABLET BY MOUTH EVERY night  3    valsartan-hydrochlorothiazide (DIOVAN-HCT) 320-25 MG per tablet TAKE ONE TABLET BY MOUTH EVERY DAY  1    clonazePAM (KLONOPIN) 0.5 MG tablet Take 0.5 mg by mouth daily as needed for Anxiety. No current facility-administered medications for this encounter. This is an up-to-date medication list.    Please take this list to your next care provider, and discard any previous medication lists.

## 2020-03-13 ENCOUNTER — HOSPITAL ENCOUNTER (OUTPATIENT)
Dept: INFUSION THERAPY | Age: 65
Discharge: HOME | End: 2020-03-13
Payer: COMMERCIAL

## 2020-03-13 DIAGNOSIS — C50.912 INVASIVE DUCTAL CARCINOMA OF LEFT BREAST (HCC): Primary | ICD-10-CM

## 2020-03-13 LAB
ALBUMIN SERPL-MCNC: 4.1 G/DL (ref 3.5–5.2)
ALP BLD-CCNC: 85 U/L (ref 35–104)
ALT SERPL-CCNC: 24 U/L (ref 0–32)
ANION GAP SERPL CALCULATED.3IONS-SCNC: 14 MMOL/L (ref 7–16)
AST SERPL-CCNC: 28 U/L (ref 0–31)
BASOPHILS ABSOLUTE: 0.05 E9/L (ref 0–0.2)
BASOPHILS RELATIVE PERCENT: 1 % (ref 0–2)
BILIRUB SERPL-MCNC: 0.3 MG/DL (ref 0–1.2)
BUN BLDV-MCNC: 19 MG/DL (ref 8–23)
CALCIUM SERPL-MCNC: 10.7 MG/DL (ref 8.6–10.2)
CHLORIDE BLD-SCNC: 101 MMOL/L (ref 98–107)
CO2: 24 MMOL/L (ref 22–29)
CREAT SERPL-MCNC: 0.8 MG/DL (ref 0.5–1)
EOSINOPHILS ABSOLUTE: 0.4 E9/L (ref 0.05–0.5)
EOSINOPHILS RELATIVE PERCENT: 7.6 % (ref 0–6)
GFR AFRICAN AMERICAN: >60
GFR NON-AFRICAN AMERICAN: >60 ML/MIN/1.73
GLUCOSE BLD-MCNC: 108 MG/DL (ref 74–99)
HCT VFR BLD CALC: 36.5 % (ref 34–48)
HEMOGLOBIN: 11.6 G/DL (ref 11.5–15.5)
IMMATURE GRANULOCYTES #: 0.01 E9/L
IMMATURE GRANULOCYTES %: 0.2 % (ref 0–5)
LYMPHOCYTES ABSOLUTE: 1.82 E9/L (ref 1.5–4)
LYMPHOCYTES RELATIVE PERCENT: 34.6 % (ref 20–42)
MAGNESIUM: 1.8 MG/DL (ref 1.6–2.6)
MCH RBC QN AUTO: 27.1 PG (ref 26–35)
MCHC RBC AUTO-ENTMCNC: 31.8 % (ref 32–34.5)
MCV RBC AUTO: 85.3 FL (ref 80–99.9)
MONOCYTES ABSOLUTE: 0.59 E9/L (ref 0.1–0.95)
MONOCYTES RELATIVE PERCENT: 11.2 % (ref 2–12)
NEUTROPHILS ABSOLUTE: 2.39 E9/L (ref 1.8–7.3)
NEUTROPHILS RELATIVE PERCENT: 45.4 % (ref 43–80)
PDW BLD-RTO: 16.3 FL (ref 11.5–15)
PLATELET # BLD: 305 E9/L (ref 130–450)
PMV BLD AUTO: 9.6 FL (ref 7–12)
POTASSIUM SERPL-SCNC: 3.8 MMOL/L (ref 3.5–5)
RBC # BLD: 4.28 E12/L (ref 3.5–5.5)
SODIUM BLD-SCNC: 139 MMOL/L (ref 132–146)
TOTAL PROTEIN: 7.6 G/DL (ref 6.4–8.3)
WBC # BLD: 5.3 E9/L (ref 4.5–11.5)

## 2020-03-13 PROCEDURE — 83735 ASSAY OF MAGNESIUM: CPT

## 2020-03-13 PROCEDURE — 80053 COMPREHEN METABOLIC PANEL: CPT

## 2020-03-13 PROCEDURE — 85025 COMPLETE CBC W/AUTO DIFF WBC: CPT

## 2020-03-13 PROCEDURE — 36591 DRAW BLOOD OFF VENOUS DEVICE: CPT

## 2020-03-13 RX ORDER — SODIUM CHLORIDE 0.9 % (FLUSH) 0.9 %
10 SYRINGE (ML) INJECTION PRN
Status: CANCELLED | OUTPATIENT
Start: 2020-03-13

## 2020-03-13 RX ORDER — HEPARIN SODIUM (PORCINE) LOCK FLUSH IV SOLN 100 UNIT/ML 100 UNIT/ML
500 SOLUTION INTRAVENOUS PRN
Status: DISCONTINUED | OUTPATIENT
Start: 2020-03-13 | End: 2020-03-14 | Stop reason: HOSPADM

## 2020-03-13 RX ORDER — HEPARIN SODIUM (PORCINE) LOCK FLUSH IV SOLN 100 UNIT/ML 100 UNIT/ML
500 SOLUTION INTRAVENOUS PRN
Status: CANCELLED | OUTPATIENT
Start: 2020-03-13

## 2020-03-13 RX ORDER — POTASSIUM CHLORIDE 20 MEQ/1
40 TABLET, EXTENDED RELEASE ORAL PRN
Status: CANCELLED
Start: 2020-03-13

## 2020-03-13 RX ORDER — SODIUM CHLORIDE 0.9 % (FLUSH) 0.9 %
10 SYRINGE (ML) INJECTION PRN
Status: DISCONTINUED | OUTPATIENT
Start: 2020-03-13 | End: 2020-03-14 | Stop reason: HOSPADM

## 2020-03-15 NOTE — PROGRESS NOTES
Department of [facility] Oncology      Attending Clinic Note    Reason for Visit: Follow-up on a patient with Left Breast Cancer    PCP:  Akua Gerber MD    History of Present Illness: The mass was located in the 2 o'clock position of left breast.     Breast cancer risk factors include age, gender, menarche before age 16. Age of menarche was 7. Age of menopause was 48. Patient was on birth control for 6 months in her 25s. Patient is . Age of first live birth was 30. Patient did breast feed. Bilateral screening mammogram on 2019: There is a high density, irregular mass with indistinct margins seen in the posterior upper outer quadrant of left breast.     Left Breast U/S on 2019:  Irregular solid mass with angular margins measuring 25 x 20 x 21 mm in the left breast at 2 o'clock located 8 centimeters from the nipple. No axillary LN. On 2019:  Mass, left breast at 2:00, core needle biopsy: Invasive ductal carcinoma,nuclear grade 3, see comment. 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 Breast Cancer Marker Studies:  ER: Negative  NC: Negative  Her-2/francisco Positive/3+    CXR PA/Lateral on 2019:  Normal chest.    MRI Bilateral Breast 2019: An irregular, enhancing mass is again noted in the left breast 2:00 which measures 2.0 x 2.2 x 2.6 cm. No axillary LN. No evidence of neoplasm on right. T2 N0 M0  We recommended neoadjuvant chemotherapy consisting of 6 cycles of TCHP. Side effects of TCHP reviewed with patient. She agreed to proceed. 2D-ECHO EF50-55%. Mediport placed. Cycle # 1 TCHP was on 2019. Cycle # 2 TCHP was on 2019. Cycle # 3 TCHP was on 2019. 2D-ECHO 2019 EF 65%  Cycle # 4 TCHP was on 2019  Cycle # 5 TCHP was on 2019. Cycle # 6 TCHP was on 10/23/2019. MRI Breast Bilateral 2019:   Near complete response to therapy on the left.    There is no axillary lymphadenopathy. No evidence of neoplasm on the right. 2D-ECHO 12/16/2019 EF 60%  Left needle localized lumpectomy, blue dye injection, and left axillary sentinel lymph node excision was done on 12/18/2019:  A.  Left breast, new superior-lateral margin, excision:   -Focal adenosis, columnar cell changes and stromal fibrosis/hyalinosis, negative for malignancy;   -Rare microcalcifications in benign tubules. B.  Wilson lymph node #1, biopsy: Reactive node, negative for malignancy. C.  Wilson lymph node #2, biopsy: Reactive node, negative for malignancy. D.  Left breast, lumpectomy with needle localization:   - Invasive adenocarcinoma of no special type (ductal, NOS), grade 3;   - Ductal carcinoma in situ, high nuclear grade, comedo/solid with necrosis;   - Scar of previous biopsy site, see comment. Comment:  Although the invasive carcinoma is very close to the superior margin in specimen D (3 mm), additional superior-lateral margins in specimen A (at least 2.0 cm in thickness) is completely negative for carcinoma.      CANCER CASE SUMMARY  Procedure: Excision  Specimen Laterality: Left  Tumor Site: Invasive Carcinoma: 12:00 (per report: HES-)  Tumor Size: Size of Largest Invasive Carcinoma: 1.4 X 1.3 X1.3 cm  Histologic Type of Invasive Carcinoma: Invasive carcinoma of no special type (ductal, NOS)  Histologic Grade: [place] Histologic Score  Glandular/tubular differentiation: Score 3  Nuclear pleomorphism: Score 3  Mitotic rate: Score 3  Overall grade: Grade 3 (scores of 9)  Tumor Focality: Single focus of invasive carcinoma  Ductal Carcinoma In Situ (DCIS): Present  Size of DCIS: Intermingled with invasive carcinoma  Number of blocks with DCIS: 3  Number of blocks examined: 14  Architectural patternS: Comedo/solid  Nuclear grade: High  Ncrosis present: Central and focal  Lobular Carcinoma In Situ (LCIS): Not identified  Margins: Uninvolved by invasive carcinoma and DCIS  Distance from closest (Temporal)   Ht 5' 3\" (1.6 m)   Wt 205 lb 12.8 oz (93.4 kg)   SpO2 97%   BMI 36.46 kg/m²   GENERAL: Alert, oriented x 3, not in acute distress. HEENT: PERRLA; EOMI. Oropharynx clear. NECK: Supple. Without lymphadenopathy. LUNGS: Good air entry bilaterally. No wheezing, crackles or ronchi. CARDIOVASCULAR: Regular rate. No murmurs, rubs or gallops. ABDOMEN: Soft. Non-tender, non-distended. Positive bowel sounds. EXTREMITIES: Without clubbing, cyanosis, or edema. NEUROLOGIC: No focal deficits. ECOG PS 1    Impression/Plan:  62 y.o. female with Left Breast Cancer    Mass, left breast at 2:00, core needle biopsy: Invasive ductal carcinoma,nuclear grade 3, see comment. 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 Breast Cancer Marker Studies:  ER: Negative  NV: Negative  Her-2/francisco Positive/3+    MRI Bilateral Breast 05/23/2019: An irregular, enhancing mass is again noted in the left breast 2:00 which measures 2.0 x 2.2 x 2.6 cm. No axillary LN. No evidence of neoplasm on right. T2 N0 M0  We recommended neoadjuvant chemotherapy consisting of 6 cycles of TCHP. Side effects of TCHP reviewed with patient. She agreed to proceed. 2D-ECHO EF50-55%. Mediport placed. Cycle # 1 TCHP was on 06/24/2019. Cycle # 2 TCHP was on 07/24/2019. Cycle # 3 TCHP was on 08/14/2019. 2D-ECHO 08/23/2019 EF 65%  Cycle # 4 TCHP was on 09/04/2019. Cycle # 5 TCHP was on 09/25/2019. Cycle # 6 TCHP was on 10/23/2019. MRI Breast Bilateral 11/07/2019:   Near complete response to therapy on the left. There is no axillary lymphadenopathy. No evidence of neoplasm on the right. Herceptin/Perjeta was on 12/04/2019.   2D-ECHO 12/16/2019 EF 60%    Left needle localized lumpectomy, blue dye injection, and left axillary sentinel lymph node excision on 12/18/2019:  A.  Left breast, new superior-lateral margin, excision:   -Focal adenosis, columnar cell changes and stromal

## 2020-03-16 ENCOUNTER — HOSPITAL ENCOUNTER (OUTPATIENT)
Dept: INFUSION THERAPY | Age: 65
Discharge: HOME | End: 2020-03-16
Payer: COMMERCIAL

## 2020-03-16 ENCOUNTER — OFFICE VISIT (OUTPATIENT)
Dept: ONCOLOGY | Age: 65
End: 2020-03-16
Payer: COMMERCIAL

## 2020-03-16 VITALS
RESPIRATION RATE: 16 BRPM | OXYGEN SATURATION: 95 % | TEMPERATURE: 96.8 F | SYSTOLIC BLOOD PRESSURE: 143 MMHG | DIASTOLIC BLOOD PRESSURE: 69 MMHG | HEART RATE: 70 BPM

## 2020-03-16 VITALS
WEIGHT: 205.8 LBS | TEMPERATURE: 97.6 F | HEART RATE: 85 BPM | OXYGEN SATURATION: 97 % | HEIGHT: 63 IN | DIASTOLIC BLOOD PRESSURE: 71 MMHG | SYSTOLIC BLOOD PRESSURE: 161 MMHG | BODY MASS INDEX: 36.46 KG/M2

## 2020-03-16 DIAGNOSIS — C50.912 INVASIVE DUCTAL CARCINOMA OF LEFT BREAST (HCC): Primary | ICD-10-CM

## 2020-03-16 PROCEDURE — 96375 TX/PRO/DX INJ NEW DRUG ADDON: CPT

## 2020-03-16 PROCEDURE — 6360000002 HC RX W HCPCS: Performed by: INTERNAL MEDICINE

## 2020-03-16 PROCEDURE — 96413 CHEMO IV INFUSION 1 HR: CPT

## 2020-03-16 PROCEDURE — 1036F TOBACCO NON-USER: CPT | Performed by: INTERNAL MEDICINE

## 2020-03-16 PROCEDURE — G8417 CALC BMI ABV UP PARAM F/U: HCPCS | Performed by: INTERNAL MEDICINE

## 2020-03-16 PROCEDURE — 2580000003 HC RX 258: Performed by: INTERNAL MEDICINE

## 2020-03-16 PROCEDURE — 3017F COLORECTAL CA SCREEN DOC REV: CPT | Performed by: INTERNAL MEDICINE

## 2020-03-16 PROCEDURE — G8427 DOCREV CUR MEDS BY ELIG CLIN: HCPCS | Performed by: INTERNAL MEDICINE

## 2020-03-16 PROCEDURE — G8484 FLU IMMUNIZE NO ADMIN: HCPCS | Performed by: INTERNAL MEDICINE

## 2020-03-16 PROCEDURE — 99214 OFFICE O/P EST MOD 30 MIN: CPT | Performed by: INTERNAL MEDICINE

## 2020-03-16 RX ORDER — METHYLPREDNISOLONE SODIUM SUCCINATE 125 MG/2ML
125 INJECTION, POWDER, LYOPHILIZED, FOR SOLUTION INTRAMUSCULAR; INTRAVENOUS ONCE
Status: CANCELLED | OUTPATIENT
Start: 2020-03-16

## 2020-03-16 RX ORDER — DIPHENHYDRAMINE HYDROCHLORIDE 50 MG/ML
50 INJECTION INTRAMUSCULAR; INTRAVENOUS ONCE
Status: CANCELLED | OUTPATIENT
Start: 2020-03-16

## 2020-03-16 RX ORDER — SODIUM CHLORIDE 0.9 % (FLUSH) 0.9 %
5 SYRINGE (ML) INJECTION PRN
Status: CANCELLED | OUTPATIENT
Start: 2020-03-16

## 2020-03-16 RX ORDER — DEXAMETHASONE SODIUM PHOSPHATE 10 MG/ML
8 INJECTION, SOLUTION INTRAMUSCULAR; INTRAVENOUS ONCE
Status: CANCELLED | OUTPATIENT
Start: 2020-03-16

## 2020-03-16 RX ORDER — SODIUM CHLORIDE 0.9 % (FLUSH) 0.9 %
10 SYRINGE (ML) INJECTION PRN
Status: CANCELLED | OUTPATIENT
Start: 2020-03-16

## 2020-03-16 RX ORDER — SODIUM CHLORIDE 9 MG/ML
20 INJECTION, SOLUTION INTRAVENOUS ONCE
Status: CANCELLED | OUTPATIENT
Start: 2020-03-16

## 2020-03-16 RX ORDER — HEPARIN SODIUM (PORCINE) LOCK FLUSH IV SOLN 100 UNIT/ML 100 UNIT/ML
500 SOLUTION INTRAVENOUS PRN
Status: CANCELLED | OUTPATIENT
Start: 2020-03-16

## 2020-03-16 RX ORDER — MEPERIDINE HYDROCHLORIDE 25 MG/ML
12.5 INJECTION INTRAMUSCULAR; INTRAVENOUS; SUBCUTANEOUS ONCE
Status: CANCELLED | OUTPATIENT
Start: 2020-03-16

## 2020-03-16 RX ORDER — SODIUM CHLORIDE 9 MG/ML
20 INJECTION, SOLUTION INTRAVENOUS ONCE
Status: DISCONTINUED | OUTPATIENT
Start: 2020-03-16 | End: 2020-03-17 | Stop reason: HOSPADM

## 2020-03-16 RX ORDER — EPINEPHRINE 1 MG/ML
0.3 INJECTION, SOLUTION, CONCENTRATE INTRAVENOUS PRN
Status: CANCELLED | OUTPATIENT
Start: 2020-03-16

## 2020-03-16 RX ORDER — DEXAMETHASONE SODIUM PHOSPHATE 10 MG/ML
8 INJECTION INTRAMUSCULAR; INTRAVENOUS ONCE
Status: COMPLETED | OUTPATIENT
Start: 2020-03-16 | End: 2020-03-16

## 2020-03-16 RX ORDER — SODIUM CHLORIDE 0.9 % (FLUSH) 0.9 %
10 SYRINGE (ML) INJECTION PRN
Status: DISCONTINUED | OUTPATIENT
Start: 2020-03-16 | End: 2020-03-17 | Stop reason: HOSPADM

## 2020-03-16 RX ORDER — HEPARIN SODIUM (PORCINE) LOCK FLUSH IV SOLN 100 UNIT/ML 100 UNIT/ML
500 SOLUTION INTRAVENOUS PRN
Status: DISCONTINUED | OUTPATIENT
Start: 2020-03-16 | End: 2020-03-17 | Stop reason: HOSPADM

## 2020-03-16 RX ORDER — SODIUM CHLORIDE 9 MG/ML
INJECTION, SOLUTION INTRAVENOUS CONTINUOUS
Status: CANCELLED | OUTPATIENT
Start: 2020-03-16

## 2020-03-16 RX ADMIN — SODIUM CHLORIDE, PRESERVATIVE FREE 10 ML: 5 INJECTION INTRAVENOUS at 10:34

## 2020-03-16 RX ADMIN — SODIUM CHLORIDE 20 ML/HR: 9 INJECTION, SOLUTION INTRAVENOUS at 10:43

## 2020-03-16 RX ADMIN — ADO-TRASTUZUMAB EMTANSINE 360 MG: 20 INJECTION, POWDER, LYOPHILIZED, FOR SOLUTION INTRAVENOUS at 10:53

## 2020-03-16 RX ADMIN — SODIUM CHLORIDE, PRESERVATIVE FREE 10 ML: 5 INJECTION INTRAVENOUS at 11:51

## 2020-03-16 RX ADMIN — HEPARIN 500 UNITS: 100 SYRINGE at 11:51

## 2020-03-16 RX ADMIN — SODIUM CHLORIDE, PRESERVATIVE FREE 10 ML: 5 INJECTION INTRAVENOUS at 10:44

## 2020-03-16 RX ADMIN — DEXAMETHASONE SODIUM PHOSPHATE 8 MG: 10 INJECTION INTRAMUSCULAR; INTRAVENOUS at 10:44

## 2020-03-20 ENCOUNTER — TELEPHONE (OUTPATIENT)
Dept: PHARMACY | Age: 65
End: 2020-03-20

## 2020-04-03 ENCOUNTER — HOSPITAL ENCOUNTER (OUTPATIENT)
Dept: INFUSION THERAPY | Age: 65
End: 2020-04-03

## 2020-04-15 ENCOUNTER — HOSPITAL ENCOUNTER (OUTPATIENT)
Dept: NON INVASIVE DIAGNOSTICS | Age: 65
Discharge: HOME | End: 2020-04-15
Payer: COMMERCIAL

## 2020-04-15 PROCEDURE — 93308 TTE F-UP OR LMTD: CPT

## 2020-04-17 ENCOUNTER — HOSPITAL ENCOUNTER (OUTPATIENT)
Dept: INFUSION THERAPY | Age: 65
Discharge: HOME | End: 2020-04-17
Payer: COMMERCIAL

## 2020-04-17 DIAGNOSIS — C50.912 INVASIVE DUCTAL CARCINOMA OF LEFT BREAST (HCC): Primary | ICD-10-CM

## 2020-04-17 LAB
ALBUMIN SERPL-MCNC: 4.3 G/DL (ref 3.5–5.2)
ALP BLD-CCNC: 81 U/L (ref 35–104)
ALT SERPL-CCNC: 34 U/L (ref 0–32)
ANION GAP SERPL CALCULATED.3IONS-SCNC: 11 MMOL/L (ref 7–16)
AST SERPL-CCNC: 33 U/L (ref 0–31)
BASOPHILS ABSOLUTE: 0.04 E9/L (ref 0–0.2)
BASOPHILS RELATIVE PERCENT: 0.6 % (ref 0–2)
BILIRUB SERPL-MCNC: 0.3 MG/DL (ref 0–1.2)
BUN BLDV-MCNC: 17 MG/DL (ref 8–23)
CALCIUM SERPL-MCNC: 10.6 MG/DL (ref 8.6–10.2)
CHLORIDE BLD-SCNC: 100 MMOL/L (ref 98–107)
CO2: 29 MMOL/L (ref 22–29)
CREAT SERPL-MCNC: 0.8 MG/DL (ref 0.5–1)
EOSINOPHILS ABSOLUTE: 0.21 E9/L (ref 0.05–0.5)
EOSINOPHILS RELATIVE PERCENT: 3.3 % (ref 0–6)
GFR AFRICAN AMERICAN: >60
GFR NON-AFRICAN AMERICAN: >60 ML/MIN/1.73
GLUCOSE BLD-MCNC: 104 MG/DL (ref 74–99)
HCT VFR BLD CALC: 36.7 % (ref 34–48)
HEMOGLOBIN: 11.9 G/DL (ref 11.5–15.5)
IMMATURE GRANULOCYTES #: 0.01 E9/L
IMMATURE GRANULOCYTES %: 0.2 % (ref 0–5)
LYMPHOCYTES ABSOLUTE: 2.52 E9/L (ref 1.5–4)
LYMPHOCYTES RELATIVE PERCENT: 39.1 % (ref 20–42)
MAGNESIUM: 1.6 MG/DL (ref 1.6–2.6)
MCH RBC QN AUTO: 28.1 PG (ref 26–35)
MCHC RBC AUTO-ENTMCNC: 32.4 % (ref 32–34.5)
MCV RBC AUTO: 86.6 FL (ref 80–99.9)
MONOCYTES ABSOLUTE: 0.52 E9/L (ref 0.1–0.95)
MONOCYTES RELATIVE PERCENT: 8.1 % (ref 2–12)
NEUTROPHILS ABSOLUTE: 3.15 E9/L (ref 1.8–7.3)
NEUTROPHILS RELATIVE PERCENT: 48.7 % (ref 43–80)
PDW BLD-RTO: 18.7 FL (ref 11.5–15)
PLATELET # BLD: 265 E9/L (ref 130–450)
PMV BLD AUTO: 9.7 FL (ref 7–12)
POTASSIUM SERPL-SCNC: 3.7 MMOL/L (ref 3.5–5)
RBC # BLD: 4.24 E12/L (ref 3.5–5.5)
SODIUM BLD-SCNC: 140 MMOL/L (ref 132–146)
TOTAL PROTEIN: 7.6 G/DL (ref 6.4–8.3)
WBC # BLD: 6.5 E9/L (ref 4.5–11.5)

## 2020-04-17 PROCEDURE — 85025 COMPLETE CBC W/AUTO DIFF WBC: CPT

## 2020-04-17 PROCEDURE — 83735 ASSAY OF MAGNESIUM: CPT

## 2020-04-17 PROCEDURE — 80053 COMPREHEN METABOLIC PANEL: CPT

## 2020-04-17 RX ORDER — POTASSIUM CHLORIDE 20 MEQ/1
40 TABLET, EXTENDED RELEASE ORAL PRN
Status: CANCELLED
Start: 2020-04-17

## 2020-04-17 RX ORDER — SODIUM CHLORIDE 0.9 % (FLUSH) 0.9 %
10 SYRINGE (ML) INJECTION PRN
Status: CANCELLED | OUTPATIENT
Start: 2020-04-17

## 2020-04-17 RX ORDER — HEPARIN SODIUM (PORCINE) LOCK FLUSH IV SOLN 100 UNIT/ML 100 UNIT/ML
500 SOLUTION INTRAVENOUS PRN
Status: DISCONTINUED | OUTPATIENT
Start: 2020-04-17 | End: 2020-04-18 | Stop reason: HOSPADM

## 2020-04-17 RX ORDER — HEPARIN SODIUM (PORCINE) LOCK FLUSH IV SOLN 100 UNIT/ML 100 UNIT/ML
500 SOLUTION INTRAVENOUS PRN
Status: CANCELLED | OUTPATIENT
Start: 2020-04-17

## 2020-04-17 RX ORDER — SODIUM CHLORIDE 0.9 % (FLUSH) 0.9 %
10 SYRINGE (ML) INJECTION PRN
Status: DISCONTINUED | OUTPATIENT
Start: 2020-04-17 | End: 2020-04-18 | Stop reason: HOSPADM

## 2020-04-20 ENCOUNTER — HOSPITAL ENCOUNTER (OUTPATIENT)
Dept: INFUSION THERAPY | Age: 65
Discharge: HOME | End: 2020-04-20
Payer: COMMERCIAL

## 2020-04-20 ENCOUNTER — OFFICE VISIT (OUTPATIENT)
Dept: ONCOLOGY | Age: 65
End: 2020-04-20
Payer: COMMERCIAL

## 2020-04-20 VITALS
SYSTOLIC BLOOD PRESSURE: 168 MMHG | HEIGHT: 63 IN | OXYGEN SATURATION: 98 % | HEART RATE: 77 BPM | WEIGHT: 213.2 LBS | DIASTOLIC BLOOD PRESSURE: 70 MMHG | BODY MASS INDEX: 37.78 KG/M2 | TEMPERATURE: 98.4 F

## 2020-04-20 VITALS
SYSTOLIC BLOOD PRESSURE: 153 MMHG | DIASTOLIC BLOOD PRESSURE: 69 MMHG | HEART RATE: 67 BPM | TEMPERATURE: 97.9 F | RESPIRATION RATE: 18 BRPM

## 2020-04-20 DIAGNOSIS — C50.912 INVASIVE DUCTAL CARCINOMA OF LEFT BREAST (HCC): Primary | ICD-10-CM

## 2020-04-20 PROCEDURE — 2580000003 HC RX 258: Performed by: INTERNAL MEDICINE

## 2020-04-20 PROCEDURE — G8427 DOCREV CUR MEDS BY ELIG CLIN: HCPCS | Performed by: INTERNAL MEDICINE

## 2020-04-20 PROCEDURE — 6360000002 HC RX W HCPCS: Performed by: INTERNAL MEDICINE

## 2020-04-20 PROCEDURE — 96375 TX/PRO/DX INJ NEW DRUG ADDON: CPT

## 2020-04-20 PROCEDURE — 1036F TOBACCO NON-USER: CPT | Performed by: INTERNAL MEDICINE

## 2020-04-20 PROCEDURE — 99214 OFFICE O/P EST MOD 30 MIN: CPT | Performed by: INTERNAL MEDICINE

## 2020-04-20 PROCEDURE — G8417 CALC BMI ABV UP PARAM F/U: HCPCS | Performed by: INTERNAL MEDICINE

## 2020-04-20 PROCEDURE — 3017F COLORECTAL CA SCREEN DOC REV: CPT | Performed by: INTERNAL MEDICINE

## 2020-04-20 PROCEDURE — 96413 CHEMO IV INFUSION 1 HR: CPT

## 2020-04-20 RX ORDER — METHYLPREDNISOLONE SODIUM SUCCINATE 125 MG/2ML
125 INJECTION, POWDER, LYOPHILIZED, FOR SOLUTION INTRAMUSCULAR; INTRAVENOUS ONCE
Status: CANCELLED | OUTPATIENT
Start: 2020-04-20

## 2020-04-20 RX ORDER — MEPERIDINE HYDROCHLORIDE 25 MG/ML
12.5 INJECTION INTRAMUSCULAR; INTRAVENOUS; SUBCUTANEOUS ONCE
Status: CANCELLED | OUTPATIENT
Start: 2020-04-20

## 2020-04-20 RX ORDER — SODIUM CHLORIDE 0.9 % (FLUSH) 0.9 %
5 SYRINGE (ML) INJECTION PRN
Status: CANCELLED | OUTPATIENT
Start: 2020-04-20

## 2020-04-20 RX ORDER — SODIUM CHLORIDE 9 MG/ML
20 INJECTION, SOLUTION INTRAVENOUS ONCE
Status: DISCONTINUED | OUTPATIENT
Start: 2020-04-20 | End: 2020-04-21 | Stop reason: HOSPADM

## 2020-04-20 RX ORDER — SODIUM CHLORIDE 0.9 % (FLUSH) 0.9 %
10 SYRINGE (ML) INJECTION PRN
Status: DISCONTINUED | OUTPATIENT
Start: 2020-04-20 | End: 2020-04-21 | Stop reason: HOSPADM

## 2020-04-20 RX ORDER — SODIUM CHLORIDE 9 MG/ML
INJECTION, SOLUTION INTRAVENOUS CONTINUOUS
Status: CANCELLED | OUTPATIENT
Start: 2020-04-20

## 2020-04-20 RX ORDER — HEPARIN SODIUM (PORCINE) LOCK FLUSH IV SOLN 100 UNIT/ML 100 UNIT/ML
500 SOLUTION INTRAVENOUS PRN
Status: DISCONTINUED | OUTPATIENT
Start: 2020-04-20 | End: 2020-04-21 | Stop reason: HOSPADM

## 2020-04-20 RX ORDER — HEPARIN SODIUM (PORCINE) LOCK FLUSH IV SOLN 100 UNIT/ML 100 UNIT/ML
500 SOLUTION INTRAVENOUS PRN
Status: CANCELLED | OUTPATIENT
Start: 2020-04-20

## 2020-04-20 RX ORDER — DEXAMETHASONE SODIUM PHOSPHATE 10 MG/ML
8 INJECTION INTRAMUSCULAR; INTRAVENOUS ONCE
Status: COMPLETED | OUTPATIENT
Start: 2020-04-20 | End: 2020-04-20

## 2020-04-20 RX ORDER — DEXAMETHASONE SODIUM PHOSPHATE 10 MG/ML
8 INJECTION, SOLUTION INTRAMUSCULAR; INTRAVENOUS ONCE
Status: CANCELLED | OUTPATIENT
Start: 2020-04-20

## 2020-04-20 RX ORDER — SODIUM CHLORIDE 9 MG/ML
20 INJECTION, SOLUTION INTRAVENOUS ONCE
Status: CANCELLED | OUTPATIENT
Start: 2020-04-20

## 2020-04-20 RX ORDER — SODIUM CHLORIDE 0.9 % (FLUSH) 0.9 %
10 SYRINGE (ML) INJECTION PRN
Status: CANCELLED | OUTPATIENT
Start: 2020-04-20

## 2020-04-20 RX ORDER — EPINEPHRINE 1 MG/ML
0.3 INJECTION, SOLUTION, CONCENTRATE INTRAVENOUS PRN
Status: CANCELLED | OUTPATIENT
Start: 2020-04-20

## 2020-04-20 RX ORDER — DIPHENHYDRAMINE HYDROCHLORIDE 50 MG/ML
50 INJECTION INTRAMUSCULAR; INTRAVENOUS ONCE
Status: CANCELLED | OUTPATIENT
Start: 2020-04-20

## 2020-04-20 RX ADMIN — SODIUM CHLORIDE, PRESERVATIVE FREE 10 ML: 5 INJECTION INTRAVENOUS at 13:44

## 2020-04-20 RX ADMIN — SODIUM CHLORIDE, PRESERVATIVE FREE 10 ML: 5 INJECTION INTRAVENOUS at 12:39

## 2020-04-20 RX ADMIN — DEXAMETHASONE SODIUM PHOSPHATE 8 MG: 10 INJECTION INTRAMUSCULAR; INTRAVENOUS at 12:39

## 2020-04-20 RX ADMIN — ADO-TRASTUZUMAB EMTANSINE 360 MG: 20 INJECTION, POWDER, LYOPHILIZED, FOR SOLUTION INTRAVENOUS at 12:56

## 2020-04-20 RX ADMIN — SODIUM CHLORIDE 20 ML/HR: 9 INJECTION, SOLUTION INTRAVENOUS at 12:38

## 2020-04-20 RX ADMIN — SODIUM CHLORIDE, PRESERVATIVE FREE 10 ML: 5 INJECTION INTRAVENOUS at 12:23

## 2020-04-20 RX ADMIN — HEPARIN SODIUM (PORCINE) LOCK FLUSH IV SOLN 100 UNIT/ML 500 UNITS: 100 SOLUTION at 13:44

## 2020-04-20 NOTE — PROGRESS NOTES
excision:   -Focal adenosis, columnar cell changes and stromal fibrosis/hyalinosis, negative for malignancy;   -Rare microcalcifications in benign tubules. B.  O'Brien lymph node #1, biopsy: Reactive node, negative for malignancy. C.  O'Brien lymph node #2, biopsy: Reactive node, negative for malignancy. D.  Left breast, lumpectomy with needle localization:   - Invasive adenocarcinoma of no special type (ductal, NOS), grade 3;   - Ductal carcinoma in situ, high nuclear grade, comedo/solid with necrosis;   - Scar of previous biopsy site, see comment. Comment:  Although the invasive carcinoma is very close to the superior margin in specimen D (3 mm), additional superior-lateral margins in specimen A (at least 2.0 cm in thickness) is completely negative for carcinoma.      CANCER CASE SUMMARY  Procedure: Excision  Specimen Laterality: Left  Tumor Site: Invasive Carcinoma: 12:00 (per report: HES-)  Tumor Size: Size of Largest Invasive Carcinoma: 1.4 X 1.3 X1.3 cm  Histologic Type of Invasive Carcinoma: Invasive carcinoma of no special type (ductal, NOS)  Histologic Grade: [place] Histologic Score  Glandular/tubular differentiation: Score 3  Nuclear pleomorphism: Score 3  Mitotic rate: Score 3  Overall grade: Grade 3 (scores of 9)  Tumor Focality: Single focus of invasive carcinoma  Ductal Carcinoma In Situ (DCIS): Present  Size of DCIS: Intermingled with invasive carcinoma  Number of blocks with DCIS: 3  Number of blocks examined: 14  Architectural patternS: Comedo/solid  Nuclear grade: High  Ncrosis present: Central and focal  Lobular Carcinoma In Situ (LCIS): Not identified  Margins: Uninvolved by invasive carcinoma and DCIS  Distance from closest margin: 3 mm  Specify closest margin: surperior (see comment)  Regional lymph Nodes: Uninvolved by tumor cells  Total number of lymph nodes examined: 2  Number of sentinel nodes examined: 2  Treatment Effect: No known presurgical therapy  Lymph-Vascular

## 2020-05-08 ENCOUNTER — HOSPITAL ENCOUNTER (OUTPATIENT)
Dept: INFUSION THERAPY | Age: 65
Discharge: HOME | End: 2020-05-08
Payer: COMMERCIAL

## 2020-05-08 DIAGNOSIS — C50.912 INVASIVE DUCTAL CARCINOMA OF LEFT BREAST (HCC): ICD-10-CM

## 2020-05-08 LAB
ALBUMIN SERPL-MCNC: 4.4 G/DL (ref 3.5–5.2)
ALP BLD-CCNC: 93 U/L (ref 35–104)
ALT SERPL-CCNC: 37 U/L (ref 0–32)
ANION GAP SERPL CALCULATED.3IONS-SCNC: 8 MMOL/L (ref 7–16)
AST SERPL-CCNC: 35 U/L (ref 0–31)
BASOPHILS ABSOLUTE: 0.04 E9/L (ref 0–0.2)
BASOPHILS RELATIVE PERCENT: 0.7 % (ref 0–2)
BILIRUB SERPL-MCNC: 0.3 MG/DL (ref 0–1.2)
BUN BLDV-MCNC: 13 MG/DL (ref 8–23)
CALCIUM SERPL-MCNC: 10.7 MG/DL (ref 8.6–10.2)
CHLORIDE BLD-SCNC: 95 MMOL/L (ref 98–107)
CO2: 35 MMOL/L (ref 22–29)
CREAT SERPL-MCNC: 0.9 MG/DL (ref 0.5–1)
EOSINOPHILS ABSOLUTE: 0.13 E9/L (ref 0.05–0.5)
EOSINOPHILS RELATIVE PERCENT: 2.3 % (ref 0–6)
GFR AFRICAN AMERICAN: >60
GFR NON-AFRICAN AMERICAN: >60 ML/MIN/1.73
GLUCOSE BLD-MCNC: 101 MG/DL (ref 74–99)
HCT VFR BLD CALC: 39.3 % (ref 34–48)
HEMOGLOBIN: 12.9 G/DL (ref 11.5–15.5)
IMMATURE GRANULOCYTES #: 0.02 E9/L
IMMATURE GRANULOCYTES %: 0.3 % (ref 0–5)
LYMPHOCYTES ABSOLUTE: 2.44 E9/L (ref 1.5–4)
LYMPHOCYTES RELATIVE PERCENT: 42.4 % (ref 20–42)
MAGNESIUM: 1.8 MG/DL (ref 1.6–2.6)
MCH RBC QN AUTO: 28.1 PG (ref 26–35)
MCHC RBC AUTO-ENTMCNC: 32.8 % (ref 32–34.5)
MCV RBC AUTO: 85.6 FL (ref 80–99.9)
MONOCYTES ABSOLUTE: 0.62 E9/L (ref 0.1–0.95)
MONOCYTES RELATIVE PERCENT: 10.8 % (ref 2–12)
NEUTROPHILS ABSOLUTE: 2.5 E9/L (ref 1.8–7.3)
NEUTROPHILS RELATIVE PERCENT: 43.5 % (ref 43–80)
PDW BLD-RTO: 18.3 FL (ref 11.5–15)
PLATELET # BLD: 300 E9/L (ref 130–450)
PMV BLD AUTO: 9.5 FL (ref 7–12)
POTASSIUM SERPL-SCNC: 3.9 MMOL/L (ref 3.5–5)
RBC # BLD: 4.59 E12/L (ref 3.5–5.5)
SODIUM BLD-SCNC: 138 MMOL/L (ref 132–146)
TOTAL PROTEIN: 7.9 G/DL (ref 6.4–8.3)
WBC # BLD: 5.8 E9/L (ref 4.5–11.5)

## 2020-05-08 PROCEDURE — 85025 COMPLETE CBC W/AUTO DIFF WBC: CPT

## 2020-05-08 PROCEDURE — 80053 COMPREHEN METABOLIC PANEL: CPT

## 2020-05-08 PROCEDURE — 36415 COLL VENOUS BLD VENIPUNCTURE: CPT

## 2020-05-08 PROCEDURE — 83735 ASSAY OF MAGNESIUM: CPT

## 2020-05-11 ENCOUNTER — OFFICE VISIT (OUTPATIENT)
Dept: ONCOLOGY | Age: 65
End: 2020-05-11
Payer: COMMERCIAL

## 2020-05-11 ENCOUNTER — HOSPITAL ENCOUNTER (OUTPATIENT)
Dept: INFUSION THERAPY | Age: 65
Discharge: HOME | End: 2020-05-11
Payer: COMMERCIAL

## 2020-05-11 VITALS
DIASTOLIC BLOOD PRESSURE: 66 MMHG | TEMPERATURE: 97.8 F | SYSTOLIC BLOOD PRESSURE: 153 MMHG | RESPIRATION RATE: 18 BRPM | HEART RATE: 70 BPM

## 2020-05-11 VITALS
SYSTOLIC BLOOD PRESSURE: 144 MMHG | DIASTOLIC BLOOD PRESSURE: 65 MMHG | HEART RATE: 80 BPM | OXYGEN SATURATION: 97 % | HEIGHT: 63 IN | WEIGHT: 212.7 LBS | TEMPERATURE: 98 F | BODY MASS INDEX: 37.69 KG/M2

## 2020-05-11 DIAGNOSIS — C50.912 INVASIVE DUCTAL CARCINOMA OF LEFT BREAST (HCC): Primary | ICD-10-CM

## 2020-05-11 PROCEDURE — 1036F TOBACCO NON-USER: CPT | Performed by: INTERNAL MEDICINE

## 2020-05-11 PROCEDURE — G8417 CALC BMI ABV UP PARAM F/U: HCPCS | Performed by: INTERNAL MEDICINE

## 2020-05-11 PROCEDURE — G8427 DOCREV CUR MEDS BY ELIG CLIN: HCPCS | Performed by: INTERNAL MEDICINE

## 2020-05-11 PROCEDURE — 96375 TX/PRO/DX INJ NEW DRUG ADDON: CPT

## 2020-05-11 PROCEDURE — 2580000003 HC RX 258: Performed by: INTERNAL MEDICINE

## 2020-05-11 PROCEDURE — 6360000002 HC RX W HCPCS: Performed by: INTERNAL MEDICINE

## 2020-05-11 PROCEDURE — 96413 CHEMO IV INFUSION 1 HR: CPT

## 2020-05-11 PROCEDURE — 99214 OFFICE O/P EST MOD 30 MIN: CPT | Performed by: INTERNAL MEDICINE

## 2020-05-11 PROCEDURE — 3017F COLORECTAL CA SCREEN DOC REV: CPT | Performed by: INTERNAL MEDICINE

## 2020-05-11 RX ORDER — HEPARIN SODIUM (PORCINE) LOCK FLUSH IV SOLN 100 UNIT/ML 100 UNIT/ML
500 SOLUTION INTRAVENOUS PRN
Status: CANCELLED | OUTPATIENT
Start: 2020-05-11

## 2020-05-11 RX ORDER — DEXAMETHASONE SODIUM PHOSPHATE 10 MG/ML
8 INJECTION INTRAMUSCULAR; INTRAVENOUS ONCE
Status: COMPLETED | OUTPATIENT
Start: 2020-05-11 | End: 2020-05-11

## 2020-05-11 RX ORDER — SODIUM CHLORIDE 0.9 % (FLUSH) 0.9 %
5 SYRINGE (ML) INJECTION PRN
Status: CANCELLED | OUTPATIENT
Start: 2020-05-11

## 2020-05-11 RX ORDER — DEXAMETHASONE SODIUM PHOSPHATE 10 MG/ML
8 INJECTION, SOLUTION INTRAMUSCULAR; INTRAVENOUS ONCE
Status: CANCELLED | OUTPATIENT
Start: 2020-05-11

## 2020-05-11 RX ORDER — SODIUM CHLORIDE 0.9 % (FLUSH) 0.9 %
10 SYRINGE (ML) INJECTION PRN
Status: DISCONTINUED | OUTPATIENT
Start: 2020-05-11 | End: 2020-05-12 | Stop reason: HOSPADM

## 2020-05-11 RX ORDER — HEPARIN SODIUM (PORCINE) LOCK FLUSH IV SOLN 100 UNIT/ML 100 UNIT/ML
500 SOLUTION INTRAVENOUS PRN
Status: DISCONTINUED | OUTPATIENT
Start: 2020-05-11 | End: 2020-05-12 | Stop reason: HOSPADM

## 2020-05-11 RX ORDER — CHLORAL HYDRATE 500 MG
3000 CAPSULE ORAL 3 TIMES DAILY
COMMUNITY

## 2020-05-11 RX ORDER — EPINEPHRINE 1 MG/ML
0.3 INJECTION, SOLUTION, CONCENTRATE INTRAVENOUS PRN
Status: CANCELLED | OUTPATIENT
Start: 2020-05-11

## 2020-05-11 RX ORDER — MEPERIDINE HYDROCHLORIDE 25 MG/ML
12.5 INJECTION INTRAMUSCULAR; INTRAVENOUS; SUBCUTANEOUS ONCE
Status: CANCELLED | OUTPATIENT
Start: 2020-05-11

## 2020-05-11 RX ORDER — SODIUM CHLORIDE 9 MG/ML
20 INJECTION, SOLUTION INTRAVENOUS ONCE
Status: DISCONTINUED | OUTPATIENT
Start: 2020-05-11 | End: 2020-05-12 | Stop reason: HOSPADM

## 2020-05-11 RX ORDER — SODIUM CHLORIDE 9 MG/ML
20 INJECTION, SOLUTION INTRAVENOUS ONCE
Status: CANCELLED | OUTPATIENT
Start: 2020-05-11

## 2020-05-11 RX ORDER — SODIUM CHLORIDE 9 MG/ML
INJECTION, SOLUTION INTRAVENOUS CONTINUOUS
Status: CANCELLED | OUTPATIENT
Start: 2020-05-11

## 2020-05-11 RX ORDER — DIPHENHYDRAMINE HYDROCHLORIDE 50 MG/ML
50 INJECTION INTRAMUSCULAR; INTRAVENOUS ONCE
Status: CANCELLED | OUTPATIENT
Start: 2020-05-11

## 2020-05-11 RX ORDER — METHYLPREDNISOLONE SODIUM SUCCINATE 125 MG/2ML
125 INJECTION, POWDER, LYOPHILIZED, FOR SOLUTION INTRAMUSCULAR; INTRAVENOUS ONCE
Status: CANCELLED | OUTPATIENT
Start: 2020-05-11

## 2020-05-11 RX ORDER — SODIUM CHLORIDE 0.9 % (FLUSH) 0.9 %
10 SYRINGE (ML) INJECTION PRN
Status: CANCELLED | OUTPATIENT
Start: 2020-05-11

## 2020-05-11 RX ADMIN — SODIUM CHLORIDE, PRESERVATIVE FREE 10 ML: 5 INJECTION INTRAVENOUS at 12:57

## 2020-05-11 RX ADMIN — SODIUM CHLORIDE, PRESERVATIVE FREE 10 ML: 5 INJECTION INTRAVENOUS at 12:06

## 2020-05-11 RX ADMIN — DEXAMETHASONE SODIUM PHOSPHATE 8 MG: 10 INJECTION INTRAMUSCULAR; INTRAVENOUS at 11:48

## 2020-05-11 RX ADMIN — ADO-TRASTUZUMAB EMTANSINE 360 MG: 20 INJECTION, POWDER, LYOPHILIZED, FOR SOLUTION INTRAVENOUS at 12:06

## 2020-05-11 RX ADMIN — HEPARIN SODIUM (PORCINE) LOCK FLUSH IV SOLN 100 UNIT/ML 500 UNITS: 100 SOLUTION at 12:57

## 2020-05-11 RX ADMIN — SODIUM CHLORIDE, PRESERVATIVE FREE 10 ML: 5 INJECTION INTRAVENOUS at 11:47

## 2020-05-11 RX ADMIN — SODIUM CHLORIDE 20 ML/HR: 9 INJECTION, SOLUTION INTRAVENOUS at 11:45

## 2020-05-11 NOTE — PROGRESS NOTES
Department of [facility] Oncology      Attending Clinic Note    Reason for Visit: Follow-up on a patient with Left Breast Cancer    PCP:  Rika Goddard MD    History of Present Illness: The mass was located in the 2 o'clock position of left breast.     Breast cancer risk factors include age, gender, menarche before age 16. Age of menarche was 7. Age of menopause was 48. Patient was on birth control for 6 months in her 25s. Patient is . Age of first live birth was 30. Patient did breast feed. Bilateral screening mammogram on 2019: There is a high density, irregular mass with indistinct margins seen in the posterior upper outer quadrant of left breast.     Left Breast U/S on 2019:  Irregular solid mass with angular margins measuring 25 x 20 x 21 mm in the left breast at 2 o'clock located 8 centimeters from the nipple. No axillary LN. On 2019:  Mass, left breast at 2:00, core needle biopsy: Invasive ductal carcinoma,nuclear grade 3, see comment. 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 Breast Cancer Marker Studies:  ER: Negative  RI: Negative  Her-2/francisco Positive/3+    CXR PA/Lateral on 2019:  Normal chest.    MRI Bilateral Breast 2019: An irregular, enhancing mass is again noted in the left breast 2:00 which measures 2.0 x 2.2 x 2.6 cm. No axillary LN. No evidence of neoplasm on right. T2 N0 M0  We recommended neoadjuvant chemotherapy consisting of 6 cycles of TCHP. Side effects of TCHP reviewed with patient. She agreed to proceed. 2D-ECHO EF50-55%. Mediport placed. Cycle # 1 TCHP was on 2019. Cycle # 2 TCHP was on 2019. Cycle # 3 TCHP was on 2019. 2D-ECHO 2019 EF 65%  Cycle # 4 TCHP was on 2019  Cycle # 5 TCHP was on 2019. Cycle # 6 TCHP was on 10/23/2019. MRI Breast Bilateral 2019:   Near complete response to therapy on the left.    There is no axillary injection, and left axillary sentinel lymph node excision on 12/18/2019:  A.  Left breast, new superior-lateral margin, excision:   -Focal adenosis, columnar cell changes and stromal fibrosis/hyalinosis, negative for malignancy;   -Rare microcalcifications in benign tubules. B.  [place] lymph node #1, biopsy: Reactive node, negative for malignancy. C.  [place] lymph node #2, biopsy: Reactive node, negative for malignancy. D.  Left breast, lumpectomy with needle localization:   - Invasive adenocarcinoma of no special type (ductal, NOS), grade 3;   - Ductal carcinoma in situ, high nuclear grade, comedo/solid with necrosis;   - Scar of previous biopsy site, see comment. Comment:  Although the invasive carcinoma is very close to the superior margin in specimen D (3 mm), additional superior-lateral margins in specimen A (at least 2.0 cm in thickness) is completely negative for carcinoma.      CANCER CASE SUMMARY  Procedure: Excision  Specimen Laterality: Left  Tumor Site: Invasive Carcinoma: 12:00 (per report: HES-)  Tumor Size: Size of Largest Invasive Carcinoma: 1.4 X 1.3 X1.3 cm  Histologic Type of Invasive Carcinoma: Invasive carcinoma of no special type (ductal, NOS)  Histologic Grade: [place] Histologic Score  Glandular/tubular differentiation: Score 3  Nuclear pleomorphism: Score 3  Mitotic rate: Score 3  Overall grade: Grade 3 (scores of 9)  Tumor Focality: Single focus of invasive carcinoma  Ductal Carcinoma In Situ (DCIS): Present  Size of DCIS: Intermingled with invasive carcinoma  Number of blocks with DCIS: 3  Number of blocks examined: 14  Architectural patternS: [place]/solid  Nuclear grade: High  Ncrosis present: Central and focal  Lobular Carcinoma In Situ (LCIS): Not identified  Margins: Uninvolved by invasive carcinoma and DCIS  Distance from closest margin: 3 mm  Specify closest margin: surperior (see comment)  Regional lymph Nodes: Uninvolved by tumor cells  Total number of lymph nodes

## 2020-05-12 ENCOUNTER — TELEPHONE (OUTPATIENT)
Dept: BREAST CENTER | Age: 65
End: 2020-05-12

## 2020-05-21 ENCOUNTER — HOSPITAL ENCOUNTER (OUTPATIENT)
Dept: GENERAL RADIOLOGY | Age: 65
Discharge: HOME | End: 2020-05-23
Payer: COMMERCIAL

## 2020-05-21 PROCEDURE — G0279 TOMOSYNTHESIS, MAMMO: HCPCS

## 2020-05-22 NOTE — PROGRESS NOTES
Occupational History    Not on file   Social Needs    Financial resource strain: Not on file    Food insecurity     Worry: Not on file     Inability: Not on file    Transportation needs     Medical: Not on file     Non-medical: Not on file   Tobacco Use    Smoking status: Never Smoker    Smokeless tobacco: Never Used   Substance and Sexual Activity    Alcohol use: Yes     Alcohol/week: 0.0 standard drinks     Comment: socially    Drug use: No    Sexual activity: Not Currently     Partners: Male     Birth control/protection: Post-menopausal   Lifestyle    Physical activity     Days per week: Not on file     Minutes per session: Not on file    Stress: Not on file   Relationships    Social connections     Talks on phone: Not on file     Gets together: Not on file     Attends Hindu service: Not on file     Active member of club or organization: Not on file     Attends meetings of clubs or organizations: Not on file     Relationship status: Not on file    Intimate partner violence     Fear of current or ex partner: Not on file     Emotionally abused: Not on file     Physically abused: Not on file     Forced sexual activity: Not on file   Other Topics Concern    Not on file   Social History Narrative    Lives in [facility].     5/21/20 Diagnostic Mammogram: Leon Luo      MAMMOGRAM FINDINGS:   Finding 1:   No suspicious masses, areas of suspicious architectural distortion, suspicious calcifications, or additional suspicious findings are identified.  There are no significant changes from the prior study.       IMPRESSION:   No mammographic evidence of malignancy.       Screening mammogram in 1 year is recommended.       =======================================   BI-RADS Category 1:  Negative   =======================================       RISK ASSESSMENT:     Review of Systems   Constitutional: Negative for activity change, appetite change, chills, fatigue, fever and unexpected weight change.         Doing motion and neck supple. Thyroid: No thyromegaly. Vascular: No JVD. Trachea: No tracheal deviation. Cardiovascular:      Rate and Rhythm: Normal rate and regular rhythm. Heart sounds: No murmur. No friction rub. No gallop. Pulmonary:      Effort: Pulmonary effort is normal. No respiratory distress or retractions. Breath sounds: Normal breath sounds. No stridor. No wheezing or rales. Chest:      Chest wall: No mass, lacerations, deformity, swelling, tenderness or edema. Breasts: Breasts are symmetrical.         Right: No inverted nipple, mass, nipple discharge, skin change or tenderness. Left: No inverted nipple, mass, nipple discharge, skin change or tenderness. Comments: Right breast exam is unremarkable. Left breast with skin thickening and faint erythema. No acute findings. She has an increased density and tenderness to palpation at the 12:00 position of the left breast.  No skin changes. No axillary adenopathy. Slight decreased ROM of the LUE. Abdominal:      General: There is no distension. Palpations: Abdomen is soft. Tenderness: There is no abdominal tenderness. There is no guarding or rebound. Musculoskeletal: Normal range of motion. General: No tenderness or deformity. Right shoulder: Normal.      Left shoulder: Normal.   Lymphadenopathy:      Cervical: No cervical adenopathy. Right cervical: No superficial, deep or posterior cervical adenopathy. Left cervical: No superficial, deep or posterior cervical adenopathy. Upper Body:      Right upper body: No pectoral adenopathy. Left upper body: No pectoral adenopathy. Skin:     General: Skin is warm and dry. Coloration: Skin is not pale. Findings: No erythema or rash. Neurological:      Mental Status: She is alert and oriented to person, place, and time.       Coordination: Coordination normal.   Psychiatric:         Behavior: Behavior normal.

## 2020-05-27 ENCOUNTER — OFFICE VISIT (OUTPATIENT)
Dept: BREAST CENTER | Age: 65
End: 2020-05-27
Payer: COMMERCIAL

## 2020-05-27 VITALS
DIASTOLIC BLOOD PRESSURE: 60 MMHG | HEIGHT: 63 IN | SYSTOLIC BLOOD PRESSURE: 160 MMHG | HEART RATE: 86 BPM | OXYGEN SATURATION: 98 % | TEMPERATURE: 97.8 F | WEIGHT: 215 LBS | BODY MASS INDEX: 38.09 KG/M2 | RESPIRATION RATE: 20 BRPM

## 2020-05-27 PROCEDURE — 1036F TOBACCO NON-USER: CPT | Performed by: NURSE PRACTITIONER

## 2020-05-27 PROCEDURE — 99214 OFFICE O/P EST MOD 30 MIN: CPT | Performed by: NURSE PRACTITIONER

## 2020-05-27 PROCEDURE — G8417 CALC BMI ABV UP PARAM F/U: HCPCS | Performed by: NURSE PRACTITIONER

## 2020-05-27 PROCEDURE — 99213 OFFICE O/P EST LOW 20 MIN: CPT | Performed by: NURSE PRACTITIONER

## 2020-05-27 PROCEDURE — G8427 DOCREV CUR MEDS BY ELIG CLIN: HCPCS | Performed by: NURSE PRACTITIONER

## 2020-05-27 PROCEDURE — 3017F COLORECTAL CA SCREEN DOC REV: CPT | Performed by: NURSE PRACTITIONER

## 2020-05-27 ASSESSMENT — ENCOUNTER SYMPTOMS
SINUS PRESSURE: 0
VOMITING: 0
SORE THROAT: 0
WHEEZING: 0
TROUBLE SWALLOWING: 0
DIARRHEA: 0
CHEST TIGHTNESS: 0
BACK PAIN: 0
EYE DISCHARGE: 0
VOICE CHANGE: 0
SHORTNESS OF BREATH: 0
ABDOMINAL DISTENTION: 0
CHOKING: 0
BLOOD IN STOOL: 0
SINUS PAIN: 0
CONSTIPATION: 0
RHINORRHEA: 0
EYE ITCHING: 0
COUGH: 0
NAUSEA: 0
ABDOMINAL PAIN: 0

## 2020-05-29 ENCOUNTER — HOSPITAL ENCOUNTER (OUTPATIENT)
Dept: INFUSION THERAPY | Age: 65
Discharge: HOME | End: 2020-05-29
Payer: COMMERCIAL

## 2020-05-29 ENCOUNTER — HOSPITAL ENCOUNTER (OUTPATIENT)
Dept: GENERAL RADIOLOGY | Age: 65
Discharge: HOME | End: 2020-05-31
Payer: COMMERCIAL

## 2020-05-29 DIAGNOSIS — C50.912 INVASIVE DUCTAL CARCINOMA OF LEFT BREAST (HCC): Primary | ICD-10-CM

## 2020-05-29 LAB
ALBUMIN SERPL-MCNC: 4.2 G/DL (ref 3.5–5.2)
ALP BLD-CCNC: 84 U/L (ref 35–104)
ALT SERPL-CCNC: 37 U/L (ref 0–32)
ANION GAP SERPL CALCULATED.3IONS-SCNC: 12 MMOL/L (ref 7–16)
AST SERPL-CCNC: 41 U/L (ref 0–31)
BASOPHILS ABSOLUTE: 0.05 E9/L (ref 0–0.2)
BASOPHILS RELATIVE PERCENT: 0.9 % (ref 0–2)
BILIRUB SERPL-MCNC: 0.3 MG/DL (ref 0–1.2)
BUN BLDV-MCNC: 18 MG/DL (ref 8–23)
CALCIUM SERPL-MCNC: 10.3 MG/DL (ref 8.6–10.2)
CHLORIDE BLD-SCNC: 95 MMOL/L (ref 98–107)
CO2: 28 MMOL/L (ref 22–29)
CREAT SERPL-MCNC: 0.9 MG/DL (ref 0.5–1)
EOSINOPHILS ABSOLUTE: 0.13 E9/L (ref 0.05–0.5)
EOSINOPHILS RELATIVE PERCENT: 2.2 % (ref 0–6)
GFR AFRICAN AMERICAN: >60
GFR NON-AFRICAN AMERICAN: >60 ML/MIN/1.73
GLUCOSE BLD-MCNC: 109 MG/DL (ref 74–99)
HCT VFR BLD CALC: 37.9 % (ref 34–48)
HEMOGLOBIN: 12.2 G/DL (ref 11.5–15.5)
IMMATURE GRANULOCYTES #: 0.02 E9/L
IMMATURE GRANULOCYTES %: 0.3 % (ref 0–5)
LYMPHOCYTES ABSOLUTE: 2.2 E9/L (ref 1.5–4)
LYMPHOCYTES RELATIVE PERCENT: 37.9 % (ref 20–42)
MAGNESIUM: 1.7 MG/DL (ref 1.6–2.6)
MCH RBC QN AUTO: 27.7 PG (ref 26–35)
MCHC RBC AUTO-ENTMCNC: 32.2 % (ref 32–34.5)
MCV RBC AUTO: 86.1 FL (ref 80–99.9)
MONOCYTES ABSOLUTE: 0.69 E9/L (ref 0.1–0.95)
MONOCYTES RELATIVE PERCENT: 11.9 % (ref 2–12)
NEUTROPHILS ABSOLUTE: 2.71 E9/L (ref 1.8–7.3)
NEUTROPHILS RELATIVE PERCENT: 46.8 % (ref 43–80)
PDW BLD-RTO: 17.5 FL (ref 11.5–15)
PLATELET # BLD: 272 E9/L (ref 130–450)
PMV BLD AUTO: 10.2 FL (ref 7–12)
POTASSIUM SERPL-SCNC: 4.2 MMOL/L (ref 3.5–5)
RBC # BLD: 4.4 E12/L (ref 3.5–5.5)
SODIUM BLD-SCNC: 135 MMOL/L (ref 132–146)
TOTAL PROTEIN: 7.8 G/DL (ref 6.4–8.3)
WBC # BLD: 5.8 E9/L (ref 4.5–11.5)

## 2020-05-29 PROCEDURE — 85025 COMPLETE CBC W/AUTO DIFF WBC: CPT

## 2020-05-29 PROCEDURE — 83735 ASSAY OF MAGNESIUM: CPT

## 2020-05-29 PROCEDURE — 76642 ULTRASOUND BREAST LIMITED: CPT

## 2020-05-29 PROCEDURE — 80053 COMPREHEN METABOLIC PANEL: CPT

## 2020-05-29 RX ORDER — HEPARIN SODIUM (PORCINE) LOCK FLUSH IV SOLN 100 UNIT/ML 100 UNIT/ML
500 SOLUTION INTRAVENOUS PRN
Status: CANCELLED | OUTPATIENT
Start: 2020-05-29

## 2020-05-29 RX ORDER — POTASSIUM CHLORIDE 20 MEQ/1
40 TABLET, EXTENDED RELEASE ORAL PRN
Status: CANCELLED
Start: 2020-05-29

## 2020-05-29 RX ORDER — SODIUM CHLORIDE 0.9 % (FLUSH) 0.9 %
10 SYRINGE (ML) INJECTION PRN
Status: DISCONTINUED | OUTPATIENT
Start: 2020-05-29 | End: 2020-05-30 | Stop reason: HOSPADM

## 2020-05-29 RX ORDER — SODIUM CHLORIDE 0.9 % (FLUSH) 0.9 %
10 SYRINGE (ML) INJECTION PRN
Status: CANCELLED | OUTPATIENT
Start: 2020-05-29

## 2020-05-29 RX ORDER — HEPARIN SODIUM (PORCINE) LOCK FLUSH IV SOLN 100 UNIT/ML 100 UNIT/ML
500 SOLUTION INTRAVENOUS PRN
Status: DISCONTINUED | OUTPATIENT
Start: 2020-05-29 | End: 2020-05-30 | Stop reason: HOSPADM

## 2020-06-01 ENCOUNTER — HOSPITAL ENCOUNTER (OUTPATIENT)
Dept: INFUSION THERAPY | Age: 65
Discharge: HOME | End: 2020-06-01
Payer: COMMERCIAL

## 2020-06-01 ENCOUNTER — OFFICE VISIT (OUTPATIENT)
Dept: ONCOLOGY | Age: 65
End: 2020-06-01
Payer: COMMERCIAL

## 2020-06-01 VITALS
BODY MASS INDEX: 37.92 KG/M2 | HEIGHT: 63 IN | TEMPERATURE: 98.7 F | WEIGHT: 214 LBS | SYSTOLIC BLOOD PRESSURE: 130 MMHG | DIASTOLIC BLOOD PRESSURE: 78 MMHG | HEART RATE: 97 BPM | OXYGEN SATURATION: 95 %

## 2020-06-01 VITALS — HEART RATE: 75 BPM | TEMPERATURE: 97.6 F | DIASTOLIC BLOOD PRESSURE: 68 MMHG | SYSTOLIC BLOOD PRESSURE: 153 MMHG

## 2020-06-01 DIAGNOSIS — C50.912 INVASIVE DUCTAL CARCINOMA OF LEFT BREAST (HCC): Primary | ICD-10-CM

## 2020-06-01 PROCEDURE — 96375 TX/PRO/DX INJ NEW DRUG ADDON: CPT

## 2020-06-01 PROCEDURE — 99214 OFFICE O/P EST MOD 30 MIN: CPT | Performed by: INTERNAL MEDICINE

## 2020-06-01 PROCEDURE — G8428 CUR MEDS NOT DOCUMENT: HCPCS | Performed by: INTERNAL MEDICINE

## 2020-06-01 PROCEDURE — 6360000002 HC RX W HCPCS: Performed by: INTERNAL MEDICINE

## 2020-06-01 PROCEDURE — G8417 CALC BMI ABV UP PARAM F/U: HCPCS | Performed by: INTERNAL MEDICINE

## 2020-06-01 PROCEDURE — 3017F COLORECTAL CA SCREEN DOC REV: CPT | Performed by: INTERNAL MEDICINE

## 2020-06-01 PROCEDURE — 1036F TOBACCO NON-USER: CPT | Performed by: INTERNAL MEDICINE

## 2020-06-01 PROCEDURE — 2580000003 HC RX 258: Performed by: INTERNAL MEDICINE

## 2020-06-01 PROCEDURE — 96413 CHEMO IV INFUSION 1 HR: CPT

## 2020-06-01 RX ORDER — SODIUM CHLORIDE 9 MG/ML
INJECTION, SOLUTION INTRAVENOUS CONTINUOUS
Status: CANCELLED | OUTPATIENT
Start: 2020-06-01

## 2020-06-01 RX ORDER — METHYLPREDNISOLONE SODIUM SUCCINATE 125 MG/2ML
125 INJECTION, POWDER, LYOPHILIZED, FOR SOLUTION INTRAMUSCULAR; INTRAVENOUS ONCE
Status: CANCELLED | OUTPATIENT
Start: 2020-06-01

## 2020-06-01 RX ORDER — EPINEPHRINE 1 MG/ML
0.3 INJECTION, SOLUTION, CONCENTRATE INTRAVENOUS PRN
Status: CANCELLED | OUTPATIENT
Start: 2020-06-01

## 2020-06-01 RX ORDER — DEXAMETHASONE SODIUM PHOSPHATE 10 MG/ML
8 INJECTION INTRAMUSCULAR; INTRAVENOUS ONCE
Status: COMPLETED | OUTPATIENT
Start: 2020-06-01 | End: 2020-06-01

## 2020-06-01 RX ORDER — SODIUM CHLORIDE 0.9 % (FLUSH) 0.9 %
10 SYRINGE (ML) INJECTION PRN
Status: DISCONTINUED | OUTPATIENT
Start: 2020-06-01 | End: 2020-06-02 | Stop reason: HOSPADM

## 2020-06-01 RX ORDER — SODIUM CHLORIDE 9 MG/ML
20 INJECTION, SOLUTION INTRAVENOUS ONCE
Status: CANCELLED | OUTPATIENT
Start: 2020-06-01

## 2020-06-01 RX ORDER — HEPARIN SODIUM (PORCINE) LOCK FLUSH IV SOLN 100 UNIT/ML 100 UNIT/ML
500 SOLUTION INTRAVENOUS PRN
Status: DISCONTINUED | OUTPATIENT
Start: 2020-06-01 | End: 2020-06-02 | Stop reason: HOSPADM

## 2020-06-01 RX ORDER — DIPHENHYDRAMINE HYDROCHLORIDE 50 MG/ML
50 INJECTION INTRAMUSCULAR; INTRAVENOUS ONCE
Status: CANCELLED | OUTPATIENT
Start: 2020-06-01

## 2020-06-01 RX ORDER — SODIUM CHLORIDE 0.9 % (FLUSH) 0.9 %
5 SYRINGE (ML) INJECTION PRN
Status: CANCELLED | OUTPATIENT
Start: 2020-06-01

## 2020-06-01 RX ORDER — SODIUM CHLORIDE 9 MG/ML
20 INJECTION, SOLUTION INTRAVENOUS ONCE
Status: DISCONTINUED | OUTPATIENT
Start: 2020-06-01 | End: 2020-06-02 | Stop reason: HOSPADM

## 2020-06-01 RX ORDER — MEPERIDINE HYDROCHLORIDE 25 MG/ML
12.5 INJECTION INTRAMUSCULAR; INTRAVENOUS; SUBCUTANEOUS ONCE
Status: CANCELLED | OUTPATIENT
Start: 2020-06-01

## 2020-06-01 RX ORDER — DEXAMETHASONE SODIUM PHOSPHATE 10 MG/ML
8 INJECTION, SOLUTION INTRAMUSCULAR; INTRAVENOUS ONCE
Status: CANCELLED | OUTPATIENT
Start: 2020-06-01

## 2020-06-01 RX ORDER — LIDOCAINE AND PRILOCAINE 25; 25 MG/G; MG/G
CREAM TOPICAL
Qty: 30 G | Refills: 5 | Status: ON HOLD | OUTPATIENT
Start: 2020-06-01 | End: 2021-08-25 | Stop reason: HOSPADM

## 2020-06-01 RX ORDER — HEPARIN SODIUM (PORCINE) LOCK FLUSH IV SOLN 100 UNIT/ML 100 UNIT/ML
500 SOLUTION INTRAVENOUS PRN
Status: CANCELLED | OUTPATIENT
Start: 2020-06-01

## 2020-06-01 RX ORDER — SODIUM CHLORIDE 0.9 % (FLUSH) 0.9 %
10 SYRINGE (ML) INJECTION PRN
Status: CANCELLED | OUTPATIENT
Start: 2020-06-01

## 2020-06-01 RX ADMIN — SODIUM CHLORIDE 20 ML/HR: 9 INJECTION, SOLUTION INTRAVENOUS at 12:10

## 2020-06-01 RX ADMIN — SODIUM CHLORIDE, PRESERVATIVE FREE 10 ML: 5 INJECTION INTRAVENOUS at 12:10

## 2020-06-01 RX ADMIN — SODIUM CHLORIDE, PRESERVATIVE FREE 10 ML: 5 INJECTION INTRAVENOUS at 13:25

## 2020-06-01 RX ADMIN — DEXAMETHASONE SODIUM PHOSPHATE 8 MG: 10 INJECTION INTRAMUSCULAR; INTRAVENOUS at 12:11

## 2020-06-01 RX ADMIN — HEPARIN SODIUM (PORCINE) LOCK FLUSH IV SOLN 100 UNIT/ML 500 UNITS: 100 SOLUTION at 13:26

## 2020-06-01 RX ADMIN — SODIUM CHLORIDE, PRESERVATIVE FREE 10 ML: 5 INJECTION INTRAVENOUS at 12:36

## 2020-06-01 RX ADMIN — ADO-TRASTUZUMAB EMTANSINE 360 MG: 20 INJECTION, POWDER, LYOPHILIZED, FOR SOLUTION INTRAVENOUS at 12:42

## 2020-06-01 NOTE — PROGRESS NOTES
lymphadenopathy. No evidence of neoplasm on the right. 2D-ECHO 12/16/2019 EF 60%  Left needle localized lumpectomy, blue dye injection, and left axillary sentinel lymph node excision was done on 12/18/2019:  A.  Left breast, new superior-lateral margin, excision:   -Focal adenosis, columnar cell changes and stromal fibrosis/hyalinosis, negative for malignancy;   -Rare microcalcifications in benign tubules. B.  [place] lymph node #1, biopsy: Reactive node, negative for malignancy. C.  [place] lymph node #2, biopsy: Reactive node, negative for malignancy. D.  Left breast, lumpectomy with needle localization:   - Invasive adenocarcinoma of no special type (ductal, NOS), grade 3;   - Ductal carcinoma in situ, high nuclear grade, comedo/solid with necrosis;   - Scar of previous biopsy site, see comment. Comment:  Although the invasive carcinoma is very close to the superior margin in specimen D (3 mm), additional superior-lateral margins in specimen A (at least 2.0 cm in thickness) is completely negative for carcinoma.      CANCER CASE SUMMARY  Procedure: Excision  Specimen Laterality: Left  Tumor Site: Invasive Carcinoma: 12:00 (per report: HES-)  Tumor Size: Size of Largest Invasive Carcinoma: 1.4 X 1.3 X1.3 cm  Histologic Type of Invasive Carcinoma: Invasive carcinoma of no special type (ductal, NOS)  Histologic Grade: [place] Histologic Score  Glandular/tubular differentiation: Score 3  Nuclear pleomorphism: Score 3  Mitotic rate: Score 3  Overall grade: Grade 3 (scores of 9)  Tumor Focality: Single focus of invasive carcinoma  Ductal Carcinoma In Situ (DCIS): Present  Size of DCIS: Intermingled with invasive carcinoma  Number of blocks with DCIS: 3  Number of blocks examined: 14  Architectural patternS: [place]/solid  Nuclear grade: High  Ncrosis present: Central and focal  Lobular Carcinoma In Situ (LCIS): Not identified  Margins: Uninvolved by invasive carcinoma and DCIS  Distance from closest

## 2020-06-19 ENCOUNTER — HOSPITAL ENCOUNTER (OUTPATIENT)
Dept: INFUSION THERAPY | Age: 65
Discharge: HOME | End: 2020-06-19
Payer: COMMERCIAL

## 2020-06-19 DIAGNOSIS — C50.912 INVASIVE DUCTAL CARCINOMA OF LEFT BREAST (HCC): Primary | ICD-10-CM

## 2020-06-19 LAB
ALBUMIN SERPL-MCNC: 4.3 G/DL (ref 3.5–5.2)
ALP BLD-CCNC: 79 U/L (ref 35–104)
ALT SERPL-CCNC: 38 U/L (ref 0–32)
ANION GAP SERPL CALCULATED.3IONS-SCNC: 12 MMOL/L (ref 7–16)
AST SERPL-CCNC: 42 U/L (ref 0–31)
BASOPHILS ABSOLUTE: 0.05 E9/L (ref 0–0.2)
BASOPHILS RELATIVE PERCENT: 0.8 % (ref 0–2)
BILIRUB SERPL-MCNC: 0.4 MG/DL (ref 0–1.2)
BUN BLDV-MCNC: 14 MG/DL (ref 8–23)
CALCIUM SERPL-MCNC: 10.9 MG/DL (ref 8.6–10.2)
CHLORIDE BLD-SCNC: 99 MMOL/L (ref 98–107)
CO2: 29 MMOL/L (ref 22–29)
CREAT SERPL-MCNC: 0.9 MG/DL (ref 0.5–1)
EOSINOPHILS ABSOLUTE: 0.11 E9/L (ref 0.05–0.5)
EOSINOPHILS RELATIVE PERCENT: 1.8 % (ref 0–6)
GFR AFRICAN AMERICAN: >60
GFR NON-AFRICAN AMERICAN: >60 ML/MIN/1.73
GLUCOSE BLD-MCNC: 117 MG/DL (ref 74–99)
HCT VFR BLD CALC: 37.3 % (ref 34–48)
HEMOGLOBIN: 12.3 G/DL (ref 11.5–15.5)
IMMATURE GRANULOCYTES #: 0.02 E9/L
IMMATURE GRANULOCYTES %: 0.3 % (ref 0–5)
LYMPHOCYTES ABSOLUTE: 2.05 E9/L (ref 1.5–4)
LYMPHOCYTES RELATIVE PERCENT: 33.8 % (ref 20–42)
MAGNESIUM: 1.8 MG/DL (ref 1.6–2.6)
MCH RBC QN AUTO: 28.1 PG (ref 26–35)
MCHC RBC AUTO-ENTMCNC: 33 % (ref 32–34.5)
MCV RBC AUTO: 85.4 FL (ref 80–99.9)
MONOCYTES ABSOLUTE: 0.8 E9/L (ref 0.1–0.95)
MONOCYTES RELATIVE PERCENT: 13.2 % (ref 2–12)
NEUTROPHILS ABSOLUTE: 3.03 E9/L (ref 1.8–7.3)
NEUTROPHILS RELATIVE PERCENT: 50.1 % (ref 43–80)
PDW BLD-RTO: 16.9 FL (ref 11.5–15)
PLATELET # BLD: 243 E9/L (ref 130–450)
PMV BLD AUTO: 10.1 FL (ref 7–12)
POTASSIUM SERPL-SCNC: 3.7 MMOL/L (ref 3.5–5)
RBC # BLD: 4.37 E12/L (ref 3.5–5.5)
SODIUM BLD-SCNC: 140 MMOL/L (ref 132–146)
TOTAL PROTEIN: 7.7 G/DL (ref 6.4–8.3)
WBC # BLD: 6.1 E9/L (ref 4.5–11.5)

## 2020-06-19 PROCEDURE — 80053 COMPREHEN METABOLIC PANEL: CPT

## 2020-06-19 PROCEDURE — 83735 ASSAY OF MAGNESIUM: CPT

## 2020-06-19 PROCEDURE — 85025 COMPLETE CBC W/AUTO DIFF WBC: CPT

## 2020-06-19 RX ORDER — HEPARIN SODIUM (PORCINE) LOCK FLUSH IV SOLN 100 UNIT/ML 100 UNIT/ML
500 SOLUTION INTRAVENOUS PRN
Status: DISCONTINUED | OUTPATIENT
Start: 2020-06-19 | End: 2020-06-20 | Stop reason: HOSPADM

## 2020-06-19 RX ORDER — HEPARIN SODIUM (PORCINE) LOCK FLUSH IV SOLN 100 UNIT/ML 100 UNIT/ML
500 SOLUTION INTRAVENOUS PRN
Status: CANCELLED | OUTPATIENT
Start: 2020-06-19

## 2020-06-19 RX ORDER — POTASSIUM CHLORIDE 20 MEQ/1
40 TABLET, EXTENDED RELEASE ORAL PRN
Status: CANCELLED
Start: 2020-06-19

## 2020-06-19 RX ORDER — SODIUM CHLORIDE 0.9 % (FLUSH) 0.9 %
10 SYRINGE (ML) INJECTION PRN
Status: DISCONTINUED | OUTPATIENT
Start: 2020-06-19 | End: 2020-06-20 | Stop reason: HOSPADM

## 2020-06-19 RX ORDER — SODIUM CHLORIDE 0.9 % (FLUSH) 0.9 %
10 SYRINGE (ML) INJECTION PRN
Status: CANCELLED | OUTPATIENT
Start: 2020-06-19

## 2020-06-22 ENCOUNTER — OFFICE VISIT (OUTPATIENT)
Dept: ONCOLOGY | Age: 65
End: 2020-06-22
Payer: COMMERCIAL

## 2020-06-22 ENCOUNTER — HOSPITAL ENCOUNTER (OUTPATIENT)
Dept: INFUSION THERAPY | Age: 65
Discharge: HOME | End: 2020-06-22
Payer: COMMERCIAL

## 2020-06-22 VITALS
SYSTOLIC BLOOD PRESSURE: 168 MMHG | OXYGEN SATURATION: 98 % | BODY MASS INDEX: 38.16 KG/M2 | HEIGHT: 63 IN | WEIGHT: 215.4 LBS | HEART RATE: 68 BPM | TEMPERATURE: 96.8 F | DIASTOLIC BLOOD PRESSURE: 54 MMHG

## 2020-06-22 VITALS — TEMPERATURE: 97.7 F | HEART RATE: 68 BPM | DIASTOLIC BLOOD PRESSURE: 67 MMHG | SYSTOLIC BLOOD PRESSURE: 162 MMHG

## 2020-06-22 DIAGNOSIS — C50.912 INVASIVE DUCTAL CARCINOMA OF LEFT BREAST (HCC): Primary | ICD-10-CM

## 2020-06-22 PROCEDURE — 3017F COLORECTAL CA SCREEN DOC REV: CPT | Performed by: INTERNAL MEDICINE

## 2020-06-22 PROCEDURE — 96375 TX/PRO/DX INJ NEW DRUG ADDON: CPT

## 2020-06-22 PROCEDURE — G8427 DOCREV CUR MEDS BY ELIG CLIN: HCPCS | Performed by: INTERNAL MEDICINE

## 2020-06-22 PROCEDURE — 6360000002 HC RX W HCPCS: Performed by: INTERNAL MEDICINE

## 2020-06-22 PROCEDURE — 96413 CHEMO IV INFUSION 1 HR: CPT

## 2020-06-22 PROCEDURE — G8417 CALC BMI ABV UP PARAM F/U: HCPCS | Performed by: INTERNAL MEDICINE

## 2020-06-22 PROCEDURE — 99214 OFFICE O/P EST MOD 30 MIN: CPT | Performed by: INTERNAL MEDICINE

## 2020-06-22 PROCEDURE — 1036F TOBACCO NON-USER: CPT | Performed by: INTERNAL MEDICINE

## 2020-06-22 PROCEDURE — 2580000003 HC RX 258: Performed by: INTERNAL MEDICINE

## 2020-06-22 RX ORDER — DIPHENHYDRAMINE HYDROCHLORIDE 50 MG/ML
50 INJECTION INTRAMUSCULAR; INTRAVENOUS ONCE
Status: CANCELLED | OUTPATIENT
Start: 2020-06-22

## 2020-06-22 RX ORDER — DEXAMETHASONE SODIUM PHOSPHATE 10 MG/ML
8 INJECTION INTRAMUSCULAR; INTRAVENOUS ONCE
Status: COMPLETED | OUTPATIENT
Start: 2020-06-22 | End: 2020-06-22

## 2020-06-22 RX ORDER — METHYLPREDNISOLONE SODIUM SUCCINATE 125 MG/2ML
125 INJECTION, POWDER, LYOPHILIZED, FOR SOLUTION INTRAMUSCULAR; INTRAVENOUS ONCE
Status: CANCELLED | OUTPATIENT
Start: 2020-06-22

## 2020-06-22 RX ORDER — SODIUM CHLORIDE 9 MG/ML
INJECTION, SOLUTION INTRAVENOUS CONTINUOUS
Status: CANCELLED | OUTPATIENT
Start: 2020-06-22

## 2020-06-22 RX ORDER — MEPERIDINE HYDROCHLORIDE 25 MG/ML
12.5 INJECTION INTRAMUSCULAR; INTRAVENOUS; SUBCUTANEOUS ONCE
Status: CANCELLED | OUTPATIENT
Start: 2020-06-22

## 2020-06-22 RX ORDER — SODIUM CHLORIDE 0.9 % (FLUSH) 0.9 %
5 SYRINGE (ML) INJECTION PRN
Status: CANCELLED | OUTPATIENT
Start: 2020-06-22

## 2020-06-22 RX ORDER — SODIUM CHLORIDE 9 MG/ML
20 INJECTION, SOLUTION INTRAVENOUS ONCE
Status: COMPLETED | OUTPATIENT
Start: 2020-06-22 | End: 2020-06-22

## 2020-06-22 RX ORDER — HEPARIN SODIUM (PORCINE) LOCK FLUSH IV SOLN 100 UNIT/ML 100 UNIT/ML
500 SOLUTION INTRAVENOUS PRN
Status: DISCONTINUED | OUTPATIENT
Start: 2020-06-22 | End: 2020-06-23 | Stop reason: HOSPADM

## 2020-06-22 RX ORDER — SODIUM CHLORIDE 9 MG/ML
20 INJECTION, SOLUTION INTRAVENOUS ONCE
Status: CANCELLED | OUTPATIENT
Start: 2020-06-22

## 2020-06-22 RX ORDER — SODIUM CHLORIDE 0.9 % (FLUSH) 0.9 %
10 SYRINGE (ML) INJECTION PRN
Status: CANCELLED | OUTPATIENT
Start: 2020-06-22

## 2020-06-22 RX ORDER — SODIUM CHLORIDE 0.9 % (FLUSH) 0.9 %
10 SYRINGE (ML) INJECTION PRN
Status: DISCONTINUED | OUTPATIENT
Start: 2020-06-22 | End: 2020-06-23 | Stop reason: HOSPADM

## 2020-06-22 RX ORDER — EPINEPHRINE 1 MG/ML
0.3 INJECTION, SOLUTION, CONCENTRATE INTRAVENOUS PRN
Status: CANCELLED | OUTPATIENT
Start: 2020-06-22

## 2020-06-22 RX ORDER — HEPARIN SODIUM (PORCINE) LOCK FLUSH IV SOLN 100 UNIT/ML 100 UNIT/ML
500 SOLUTION INTRAVENOUS PRN
Status: CANCELLED | OUTPATIENT
Start: 2020-06-22

## 2020-06-22 RX ORDER — DEXAMETHASONE SODIUM PHOSPHATE 10 MG/ML
8 INJECTION, SOLUTION INTRAMUSCULAR; INTRAVENOUS ONCE
Status: CANCELLED | OUTPATIENT
Start: 2020-06-22

## 2020-06-22 RX ADMIN — SODIUM CHLORIDE, PRESERVATIVE FREE 10 ML: 5 INJECTION INTRAVENOUS at 11:25

## 2020-06-22 RX ADMIN — SODIUM CHLORIDE 20 ML/HR: 9 INJECTION, SOLUTION INTRAVENOUS at 11:25

## 2020-06-22 RX ADMIN — ADO-TRASTUZUMAB EMTANSINE 360 MG: 20 INJECTION, POWDER, LYOPHILIZED, FOR SOLUTION INTRAVENOUS at 11:53

## 2020-06-22 RX ADMIN — DEXAMETHASONE SODIUM PHOSPHATE 8 MG: 10 INJECTION INTRAMUSCULAR; INTRAVENOUS at 11:33

## 2020-06-22 RX ADMIN — SODIUM CHLORIDE, PRESERVATIVE FREE 10 ML: 5 INJECTION INTRAVENOUS at 11:33

## 2020-06-25 ENCOUNTER — TELEPHONE (OUTPATIENT)
Dept: RADIATION ONCOLOGY | Age: 65
End: 2020-06-25

## 2020-06-25 ENCOUNTER — HOSPITAL ENCOUNTER (OUTPATIENT)
Dept: RADIATION ONCOLOGY | Age: 65
Discharge: HOME | End: 2020-06-25
Attending: RADIOLOGY
Payer: COMMERCIAL

## 2020-06-25 VITALS — BODY MASS INDEX: 37.55 KG/M2 | WEIGHT: 212 LBS

## 2020-06-25 PROCEDURE — 99442 PR PHYS/QHP TELEPHONE EVALUATION 11-20 MIN: CPT | Performed by: RADIOLOGY

## 2020-06-25 NOTE — PROGRESS NOTES
Radiation Oncology   Radiation Therapy Treatment Summary   Chucky Chand. Romayne Manor MD MS CARLOS Steinberg  [id number]                         60 y.o. Brief History:    Breast CA    Jude Monroe has completed radiation treatment to the left breast with mixed mode photons using a 3D technique. The patient was prescribed a dose of 5000 cGy in 25 fractions ( +/ - / including boost pending clinical characteristics and applicable NCCN guidelines; total dose recorded per [facility] record). If indicated; CBCT daily image guidance / motion management (IBNLT 4D planning) / ABC / surface guidance +/- DIBH - applied PRN per KEVIN and RTOG standards. Treatment Start Date:  1/27/20  Treatment Completion Date:  3/11/20    The treatments were tolerated, without significant interruption. RTOG Acute Toxicity Grade [ARMSC]: 1-2-. (skin)    We will look forward seeing the patient back 3 mi for follow-up with myself or our NP [scheduled and PRN toxicity checks in addition]. The patient knows to call with any questions or concerns. Please feel free to contact me at either office to discuss the care of this patient, or if I can be of any further assistance. Chucky Chand. Romayne Manor, MD [address] [place] [address] Physicians  Radiation Oncology     c:  [phone]  [email or web address]  -[facility] ([address] [place] [address]):  o: [phone]  f:  [phone]  [facility]):  o: [phone]  f:  [phone]  [facility]):  o: [phone]  f:  [phone]    NOTE: This report was transcribed using voice recognition software. Every effort was made to ensure accuracy; however, inadvertent computerized transcription errors may be present.

## 2020-06-29 RX ORDER — PROCHLORPERAZINE MALEATE 10 MG
10 TABLET ORAL EVERY 6 HOURS PRN
Qty: 30 TABLET | Refills: 2 | Status: SHIPPED | OUTPATIENT
Start: 2020-06-29 | End: 2021-01-26

## 2020-07-20 ENCOUNTER — HOSPITAL ENCOUNTER (OUTPATIENT)
Dept: NON INVASIVE DIAGNOSTICS | Age: 65
Discharge: HOME | End: 2020-07-20
Payer: COMMERCIAL

## 2020-07-20 PROCEDURE — 93308 TTE F-UP OR LMTD: CPT

## 2020-07-21 ENCOUNTER — HOSPITAL ENCOUNTER (OUTPATIENT)
Dept: INFUSION THERAPY | Age: 65
Discharge: HOME | End: 2020-07-21
Payer: COMMERCIAL

## 2020-07-21 DIAGNOSIS — C50.912 INVASIVE DUCTAL CARCINOMA OF LEFT BREAST (HCC): Primary | ICD-10-CM

## 2020-07-21 LAB
ALBUMIN SERPL-MCNC: 4.2 G/DL (ref 3.5–5.2)
ALP BLD-CCNC: 88 U/L (ref 35–104)
ALT SERPL-CCNC: 34 U/L (ref 0–32)
ANION GAP SERPL CALCULATED.3IONS-SCNC: 11 MMOL/L (ref 7–16)
AST SERPL-CCNC: 37 U/L (ref 0–31)
BASOPHILS ABSOLUTE: 0.06 E9/L (ref 0–0.2)
BASOPHILS RELATIVE PERCENT: 0.9 % (ref 0–2)
BILIRUB SERPL-MCNC: 0.3 MG/DL (ref 0–1.2)
BUN BLDV-MCNC: 18 MG/DL (ref 8–23)
CALCIUM SERPL-MCNC: 10.5 MG/DL (ref 8.6–10.2)
CHLORIDE BLD-SCNC: 99 MMOL/L (ref 98–107)
CO2: 29 MMOL/L (ref 22–29)
CREAT SERPL-MCNC: 0.9 MG/DL (ref 0.5–1)
EOSINOPHILS ABSOLUTE: 0.16 E9/L (ref 0.05–0.5)
EOSINOPHILS RELATIVE PERCENT: 2.5 % (ref 0–6)
GFR AFRICAN AMERICAN: >60
GFR NON-AFRICAN AMERICAN: >60 ML/MIN/1.73
GLUCOSE BLD-MCNC: 97 MG/DL (ref 74–99)
HCT VFR BLD CALC: 35.1 % (ref 34–48)
HEMOGLOBIN: 11.5 G/DL (ref 11.5–15.5)
IMMATURE GRANULOCYTES #: 0.02 E9/L
IMMATURE GRANULOCYTES %: 0.3 % (ref 0–5)
LYMPHOCYTES ABSOLUTE: 2.33 E9/L (ref 1.5–4)
LYMPHOCYTES RELATIVE PERCENT: 35.7 % (ref 20–42)
MAGNESIUM: 1.8 MG/DL (ref 1.6–2.6)
MCH RBC QN AUTO: 28.3 PG (ref 26–35)
MCHC RBC AUTO-ENTMCNC: 32.8 % (ref 32–34.5)
MCV RBC AUTO: 86.5 FL (ref 80–99.9)
MONOCYTES ABSOLUTE: 0.57 E9/L (ref 0.1–0.95)
MONOCYTES RELATIVE PERCENT: 8.7 % (ref 2–12)
NEUTROPHILS ABSOLUTE: 3.39 E9/L (ref 1.8–7.3)
NEUTROPHILS RELATIVE PERCENT: 51.9 % (ref 43–80)
PDW BLD-RTO: 17 FL (ref 11.5–15)
PLATELET # BLD: 259 E9/L (ref 130–450)
PMV BLD AUTO: 10 FL (ref 7–12)
POTASSIUM SERPL-SCNC: 4 MMOL/L (ref 3.5–5)
RBC # BLD: 4.06 E12/L (ref 3.5–5.5)
SODIUM BLD-SCNC: 139 MMOL/L (ref 132–146)
TOTAL PROTEIN: 7.1 G/DL (ref 6.4–8.3)
WBC # BLD: 6.5 E9/L (ref 4.5–11.5)

## 2020-07-21 PROCEDURE — 80053 COMPREHEN METABOLIC PANEL: CPT

## 2020-07-21 PROCEDURE — 85025 COMPLETE CBC W/AUTO DIFF WBC: CPT

## 2020-07-21 PROCEDURE — 83735 ASSAY OF MAGNESIUM: CPT

## 2020-07-21 RX ORDER — SODIUM CHLORIDE 0.9 % (FLUSH) 0.9 %
10 SYRINGE (ML) INJECTION PRN
Status: DISCONTINUED | OUTPATIENT
Start: 2020-07-21 | End: 2020-07-22 | Stop reason: HOSPADM

## 2020-07-21 RX ORDER — HEPARIN SODIUM (PORCINE) LOCK FLUSH IV SOLN 100 UNIT/ML 100 UNIT/ML
500 SOLUTION INTRAVENOUS PRN
Status: DISCONTINUED | OUTPATIENT
Start: 2020-07-21 | End: 2020-07-22 | Stop reason: HOSPADM

## 2020-07-21 RX ORDER — HEPARIN SODIUM (PORCINE) LOCK FLUSH IV SOLN 100 UNIT/ML 100 UNIT/ML
500 SOLUTION INTRAVENOUS PRN
Status: CANCELLED | OUTPATIENT
Start: 2020-07-21

## 2020-07-21 RX ORDER — POTASSIUM CHLORIDE 20 MEQ/1
40 TABLET, EXTENDED RELEASE ORAL PRN
Status: CANCELLED
Start: 2020-07-21

## 2020-07-21 RX ORDER — SODIUM CHLORIDE 0.9 % (FLUSH) 0.9 %
10 SYRINGE (ML) INJECTION PRN
Status: CANCELLED | OUTPATIENT
Start: 2020-07-21

## 2020-07-22 ENCOUNTER — HOSPITAL ENCOUNTER (OUTPATIENT)
Dept: INFUSION THERAPY | Age: 65
Discharge: HOME | End: 2020-07-22
Payer: COMMERCIAL

## 2020-07-22 ENCOUNTER — OFFICE VISIT (OUTPATIENT)
Dept: ONCOLOGY | Age: 65
End: 2020-07-22
Payer: COMMERCIAL

## 2020-07-22 VITALS
HEIGHT: 63 IN | OXYGEN SATURATION: 96 % | DIASTOLIC BLOOD PRESSURE: 73 MMHG | WEIGHT: 217.2 LBS | HEART RATE: 67 BPM | TEMPERATURE: 97.4 F | BODY MASS INDEX: 38.48 KG/M2 | SYSTOLIC BLOOD PRESSURE: 180 MMHG

## 2020-07-22 VITALS — HEART RATE: 63 BPM | TEMPERATURE: 97.8 F | SYSTOLIC BLOOD PRESSURE: 144 MMHG | DIASTOLIC BLOOD PRESSURE: 68 MMHG

## 2020-07-22 DIAGNOSIS — C50.912 INVASIVE DUCTAL CARCINOMA OF LEFT BREAST (HCC): Primary | ICD-10-CM

## 2020-07-22 PROCEDURE — 99214 OFFICE O/P EST MOD 30 MIN: CPT | Performed by: INTERNAL MEDICINE

## 2020-07-22 PROCEDURE — 6360000002 HC RX W HCPCS: Performed by: INTERNAL MEDICINE

## 2020-07-22 PROCEDURE — 96413 CHEMO IV INFUSION 1 HR: CPT

## 2020-07-22 PROCEDURE — 96375 TX/PRO/DX INJ NEW DRUG ADDON: CPT

## 2020-07-22 PROCEDURE — 3017F COLORECTAL CA SCREEN DOC REV: CPT | Performed by: INTERNAL MEDICINE

## 2020-07-22 PROCEDURE — G8417 CALC BMI ABV UP PARAM F/U: HCPCS | Performed by: INTERNAL MEDICINE

## 2020-07-22 PROCEDURE — 2580000003 HC RX 258: Performed by: INTERNAL MEDICINE

## 2020-07-22 PROCEDURE — G8427 DOCREV CUR MEDS BY ELIG CLIN: HCPCS | Performed by: INTERNAL MEDICINE

## 2020-07-22 PROCEDURE — 1036F TOBACCO NON-USER: CPT | Performed by: INTERNAL MEDICINE

## 2020-07-22 RX ORDER — DEXAMETHASONE SODIUM PHOSPHATE 10 MG/ML
8 INJECTION, SOLUTION INTRAMUSCULAR; INTRAVENOUS ONCE
Status: CANCELLED | OUTPATIENT
Start: 2020-07-22

## 2020-07-22 RX ORDER — MEPERIDINE HYDROCHLORIDE 25 MG/ML
12.5 INJECTION INTRAMUSCULAR; INTRAVENOUS; SUBCUTANEOUS ONCE
Status: CANCELLED | OUTPATIENT
Start: 2020-07-22

## 2020-07-22 RX ORDER — DEXAMETHASONE SODIUM PHOSPHATE 10 MG/ML
8 INJECTION INTRAMUSCULAR; INTRAVENOUS ONCE
Status: COMPLETED | OUTPATIENT
Start: 2020-07-22 | End: 2020-07-22

## 2020-07-22 RX ORDER — SODIUM CHLORIDE 0.9 % (FLUSH) 0.9 %
5 SYRINGE (ML) INJECTION PRN
Status: CANCELLED | OUTPATIENT
Start: 2020-07-22

## 2020-07-22 RX ORDER — SODIUM CHLORIDE 9 MG/ML
20 INJECTION, SOLUTION INTRAVENOUS ONCE
Status: CANCELLED | OUTPATIENT
Start: 2020-07-22

## 2020-07-22 RX ORDER — HEPARIN SODIUM (PORCINE) LOCK FLUSH IV SOLN 100 UNIT/ML 100 UNIT/ML
500 SOLUTION INTRAVENOUS PRN
Status: CANCELLED | OUTPATIENT
Start: 2020-07-22

## 2020-07-22 RX ORDER — HEPARIN SODIUM (PORCINE) LOCK FLUSH IV SOLN 100 UNIT/ML 100 UNIT/ML
500 SOLUTION INTRAVENOUS PRN
Status: DISCONTINUED | OUTPATIENT
Start: 2020-07-22 | End: 2020-07-23 | Stop reason: HOSPADM

## 2020-07-22 RX ORDER — DIPHENHYDRAMINE HYDROCHLORIDE 50 MG/ML
50 INJECTION INTRAMUSCULAR; INTRAVENOUS ONCE
Status: CANCELLED | OUTPATIENT
Start: 2020-07-22

## 2020-07-22 RX ORDER — METHYLPREDNISOLONE SODIUM SUCCINATE 125 MG/2ML
125 INJECTION, POWDER, LYOPHILIZED, FOR SOLUTION INTRAMUSCULAR; INTRAVENOUS ONCE
Status: CANCELLED | OUTPATIENT
Start: 2020-07-22

## 2020-07-22 RX ORDER — SODIUM CHLORIDE 9 MG/ML
20 INJECTION, SOLUTION INTRAVENOUS ONCE
Status: DISCONTINUED | OUTPATIENT
Start: 2020-07-22 | End: 2020-07-23 | Stop reason: HOSPADM

## 2020-07-22 RX ORDER — SODIUM CHLORIDE 0.9 % (FLUSH) 0.9 %
10 SYRINGE (ML) INJECTION PRN
Status: CANCELLED | OUTPATIENT
Start: 2020-07-22

## 2020-07-22 RX ORDER — EPINEPHRINE 1 MG/ML
0.3 INJECTION, SOLUTION, CONCENTRATE INTRAVENOUS PRN
Status: CANCELLED | OUTPATIENT
Start: 2020-07-22

## 2020-07-22 RX ORDER — SODIUM CHLORIDE 0.9 % (FLUSH) 0.9 %
10 SYRINGE (ML) INJECTION PRN
Status: DISCONTINUED | OUTPATIENT
Start: 2020-07-22 | End: 2020-07-23 | Stop reason: HOSPADM

## 2020-07-22 RX ORDER — SODIUM CHLORIDE 9 MG/ML
INJECTION, SOLUTION INTRAVENOUS CONTINUOUS
Status: CANCELLED | OUTPATIENT
Start: 2020-07-22

## 2020-07-22 RX ADMIN — ADO-TRASTUZUMAB EMTANSINE 360 MG: 20 INJECTION, POWDER, LYOPHILIZED, FOR SOLUTION INTRAVENOUS at 11:49

## 2020-07-22 RX ADMIN — SODIUM CHLORIDE 20 ML/HR: 9 INJECTION, SOLUTION INTRAVENOUS at 11:21

## 2020-07-22 RX ADMIN — HEPARIN SODIUM (PORCINE) LOCK FLUSH IV SOLN 100 UNIT/ML 500 UNITS: 100 SOLUTION at 12:34

## 2020-07-22 RX ADMIN — DEXAMETHASONE SODIUM PHOSPHATE 8 MG: 10 INJECTION INTRAMUSCULAR; INTRAVENOUS at 11:26

## 2020-07-22 RX ADMIN — SODIUM CHLORIDE, PRESERVATIVE FREE 10 ML: 5 INJECTION INTRAVENOUS at 11:26

## 2020-07-22 RX ADMIN — SODIUM CHLORIDE, PRESERVATIVE FREE 10 ML: 5 INJECTION INTRAVENOUS at 11:12

## 2020-07-22 NOTE — PROGRESS NOTES
Department of [facility] Oncology       Attending Clinic Note    Reason for Visit: Follow-up on a patient with Left Breast Cancer    PCP:  Sangeeta Ly MD    History of Present Illness: The mass was located in the 2 o'clock position of left breast.     Breast cancer risk factors include age, gender, menarche before age 16. Age of menarche was 7. Age of menopause was 48. Patient was on birth control for 6 months in her 25s. Patient is . Age of first live birth was 30. Patient did breast feed. Bilateral screening mammogram on 2019: There is a high density, irregular mass with indistinct margins seen in the posterior upper outer quadrant of left breast.     Left Breast U/S on 2019:  Irregular solid mass with angular margins measuring 25 x 20 x 21 mm in the left breast at 2 o'clock located 8 centimeters from the nipple. No axillary LN. On 2019:  Mass, left breast at 2:00, core needle biopsy: Invasive ductal carcinoma,nuclear grade 3, see comment. 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 Breast Cancer Marker Studies:  ER: Negative  OR: Negative  Her-2/francisco Positive/3+    CXR PA/Lateral on 2019:  Normal chest.    MRI Bilateral Breast 2019: An irregular, enhancing mass is again noted in the left breast 2:00 which measures 2.0 x 2.2 x 2.6 cm. No axillary LN. No evidence of neoplasm on right. T2 N0 M0  We recommended neoadjuvant chemotherapy consisting of 6 cycles of TCHP. Side effects of TCHP reviewed with patient. She agreed to proceed. 2D-ECHO EF50-55%. Mediport placed. Cycle # 1 TCHP was on 2019. Cycle # 2 TCHP was on 2019. Cycle # 3 TCHP was on 2019. 2D-ECHO 2019 EF 65%  Cycle # 4 TCHP was on 2019  Cycle # 5 TCHP was on 2019. Cycle # 6 TCHP was on 10/23/2019. MRI Breast Bilateral 2019:   Near complete response to therapy on the left.    There is no axillary lymphadenopathy. No evidence of neoplasm on the right. 2D-ECHO 12/16/2019 EF 60%  Left needle localized lumpectomy, blue dye injection, and left axillary sentinel lymph node excision was done on 12/18/2019:  A.  Left breast, new superior-lateral margin, excision:   -Focal adenosis, columnar cell changes and stromal fibrosis/hyalinosis, negative for malignancy;   -Rare microcalcifications in benign tubules. B.  [place] lymph node #1, biopsy: Reactive node, negative for malignancy. C.  [place] lymph node #2, biopsy: Reactive node, negative for malignancy. D.  Left breast, lumpectomy with needle localization:   - Invasive adenocarcinoma of no special type (ductal, NOS), grade 3;   - Ductal carcinoma in situ, high nuclear grade, comedo/solid with necrosis;   - Scar of previous biopsy site, see comment. Comment:  Although the invasive carcinoma is very close to the superior margin in specimen D (3 mm), additional superior-lateral margins in specimen A (at least 2.0 cm in thickness) is completely negative for carcinoma.      CANCER CASE SUMMARY  Procedure: Excision  Specimen Laterality: Left  Tumor Site: Invasive Carcinoma: 12:00 (per report: HES-)  Tumor Size: Size of Largest Invasive Carcinoma: 1.4 X 1.3 X1.3 cm  Histologic Type of Invasive Carcinoma: Invasive carcinoma of no special type (ductal, NOS)  Histologic Grade: [place] Histologic Score  Glandular/tubular differentiation: Score 3  Nuclear pleomorphism: Score 3  Mitotic rate: Score 3  Overall grade: Grade 3 (scores of 9)  Tumor Focality: Single focus of invasive carcinoma  Ductal Carcinoma In Situ (DCIS): Present  Size of DCIS: Intermingled with invasive carcinoma  Number of blocks with DCIS: 3  Number of blocks examined: 14  Architectural patternS: [place]/solid  Nuclear grade: High  Ncrosis present: Central and focal  Lobular Carcinoma In Situ (LCIS): Not identified  Margins: Uninvolved by invasive carcinoma and DCIS  Distance from closest margin: 3 mm  Specify closest margin: surperior (see comment)  Regional lymph Nodes: Uninvolved by tumor cells  Total number of lymph nodes examined: 2  Number of sentinel nodes examined: 2  Treatment Effect: No known presurgical therapy  Lymph-Vascular Invasion: Not identified  Pathologic Staging (pTNM, AJCC 8TH Edition)  Primary tumor: pT1c  Regional lymph nodes (modifier- (sn) sentinel nodes examined: (sn)0  ER: Negative  ME: Negative  Her-2/francisco Positive/3+    Pathology report reviewed extensively with patient. Recommended Ado-Trastuzumab alone for 14 cycles. Side effects of Ado-Trastuzumab reviewed with patient. She agreed to proceed. Cycle # 1 Ado-Trastuzumab was on 01/06/2020. Cycle # 2 Ado-Trastuzumab was on 01/27/2020. RT was started on 01/27/2020 and completed on 03/11/2020. Cycle # 3 Ado-Trastuzumab was on 02/17/2020. Cycle # 4 Ado-Trastuzumab was on 03/16/2020.  2D-ECHO 04/15/2020: EF 65%  Cycle # 5 Ado-Trastuzumab was on 04/20/2020. Cycle # 6 Ado-Trastuzumab was on 05/11/2020. Bilateral Diagnostic Mammogram on 05/21/2020 Negative for malignancy. Left Breast U/S 05/29/2020 Area of post surgical change in the left breast is benign. Cycle # 7 Ado-Trastuzumab was on 06/01/2020. Cycle # 8 Ado-Trastuzumab was on 06/22/2020.  2d-Echo 07/20/2020 noted EF 65-70%    She presented today 07/22/2020 for Cycle # 9 Ado-Trastuzumab and is doing well except for LUE discomfort. Fair appetite and energy level. No nausea/vomiting. Denies fever/chills. Labs reviewed, ok to proceed with treatment today. Review of Systems;  CONSTITUTIONAL: No fever, chills. Fair appetite and energy level. ENMT: Eyes: No diplopia; Nose: No epistaxis. Mouth: No sore throat. RESPIRATORY: No hemoptysis, shortness of breath, cough. CARDIOVASCULAR: No chest pain, palpitations. GASTROINTESTINAL: No nausea/vomiting abdominal pain. GENITOURINARY: No dysuria, urinary frequency, hematuria.   NEURO: No syncope, presyncope, headache. Remainder:  ROS NEGATIVE    Past Medical History:      Diagnosis Date    Anxiety     Cancer ([facility] [place] [address] 2019    breast    Heart murmur     Hypertension     Post-menopausal bleeding     S/P hyst/ polypectomy 6/10/14     Medications:  Reviewed and reconciled. Allergies: Allergies   Allergen Reactions    Tetracyclines & Related Hives     Physical Exam:  BP (!) 180/73 (Site: Right Upper Arm, Position: Sitting, Cuff Size: Medium Adult)   Pulse 67   Temp 97.4 °F (36.3 °C) (Temporal)   Ht 5' 3\" (1.6 m)   Wt 217 lb 3.2 oz (98.5 kg)   SpO2 96%   BMI 38.48 kg/m²   GENERAL: Alert, oriented x 3, not in acute distress. HEENT: PERRLA; EOMI. Oropharynx clear. NECK: Supple. Without lymphadenopathy. LUNGS: Good air entry bilaterally. No wheezing, crackles or ronchi. CARDIOVASCULAR: Regular rate. No murmurs, rubs or gallops. ABDOMEN: Soft. Non-tender, non-distended. Positive bowel sounds. EXTREMITIES: Without clubbing, cyanosis, or edema. NEUROLOGIC: No focal deficits. ECOG PS 1    Impression/Plan:  60 y.o. female with Left Breast Cancer    Mass, left breast at 2:00, core needle biopsy: Invasive ductal carcinoma,nuclear grade 3, see comment. 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 Breast Cancer Marker Studies:  ER: Negative  VA: Negative  Her-2/francisco Positive/3+    MRI Bilateral Breast 05/23/2019: An irregular, enhancing mass is again noted in the left breast 2:00 which measures 2.0 x 2.2 x 2.6 cm. No axillary LN. No evidence of neoplasm on right. T2 N0 M0  We recommended neoadjuvant chemotherapy consisting of 6 cycles of TCHP. Side effects of TCHP reviewed with patient. She agreed to proceed. 2D-ECHO EF50-55%. Mediport placed. Cycle # 1 TCHP was on 06/24/2019. Cycle # 2 TCHP was on 07/24/2019. Cycle # 3 TCHP was on 08/14/2019. 2D-ECHO 08/23/2019 EF 65%  Cycle # 4 TCHP was on 09/04/2019. Cycle # 5 TCHP was on 09/25/2019.    Cycle # 6 TCHP was on 10/23/2019. MRI Breast Bilateral 11/07/2019:   Near complete response to therapy on the left. There is no axillary lymphadenopathy. No evidence of neoplasm on the right. Herceptin/Perjeta was on 12/04/2019. 2D-ECHO 12/16/2019 EF 60%    Left needle localized lumpectomy, blue dye injection, and left axillary sentinel lymph node excision on 12/18/2019:  A.  Left breast, new superior-lateral margin, excision:   -Focal adenosis, columnar cell changes and stromal fibrosis/hyalinosis, negative for malignancy;   -Rare microcalcifications in benign tubules. B.  [place] lymph node #1, biopsy: Reactive node, negative for malignancy. C.  [place] lymph node #2, biopsy: Reactive node, negative for malignancy. D.  Left breast, lumpectomy with needle localization:   - Invasive adenocarcinoma of no special type (ductal, NOS), grade 3;   - Ductal carcinoma in situ, high nuclear grade, comedo/solid with necrosis;   - Scar of previous biopsy site, see comment. Comment:  Although the invasive carcinoma is very close to the superior margin in specimen D (3 mm), additional superior-lateral margins in specimen A (at least 2.0 cm in thickness) is completely negative for carcinoma.      CANCER CASE SUMMARY  Procedure: Excision  Specimen Laterality: Left  Tumor Site: Invasive Carcinoma: 12:00 (per report: HES-)  Tumor Size: Size of Largest Invasive Carcinoma: 1.4 X 1.3 X1.3 cm  Histologic Type of Invasive Carcinoma: Invasive carcinoma of no special type (ductal, NOS)  Histologic Grade: [place] Histologic Score  Glandular/tubular differentiation: Score 3  Nuclear pleomorphism: Score 3  Mitotic rate: Score 3  Overall grade: Grade 3 (scores of 9)  Tumor Focality: Single focus of invasive carcinoma  Ductal Carcinoma In Situ (DCIS): Present  Size of DCIS: Intermingled with invasive carcinoma  Number of blocks with DCIS: 3  Number of blocks examined: 14  Architectural patternS: [place]/solid  Nuclear grade: High  Ncrosis present: Central and focal  Lobular Carcinoma In Situ (LCIS): Not identified  Margins: Uninvolved by invasive carcinoma and DCIS  Distance from closest margin: 3 mm  Specify closest margin: surperior (see comment)  Regional lymph Nodes: Uninvolved by tumor cells  Total number of lymph nodes examined: 2  Number of sentinel nodes examined: 2  Treatment Effect: No known presurgical therapy  Lymph-Vascular Invasion: Not identified  Pathologic Staging (pTNM, AJCC 8TH Edition)  Primary tumor: pT1c  Regional lymph nodes (modifier- (sn) sentinel nodes examined: (sn)0  ER: Negative  ND: Negative  Her-2/francisco Positive/3+    Pathology report reviewed extensively with patient. Radiation Oncology team consulted for adjuvant RT. Recommended Ado-Trastuzumab alone for 14 cycles. Side effects of Ado-Trastuzumab reviewed with patient. She agreed to proceed. Cycle # 1 Ado-Trastuzumab was on 01/06/2020. Cycle # 2 Ado-Trastuzumab was on 01/27/2020. RT was started on 01/27/2020 and completed on 03/11/2020. Cycle # 3 Ado-Trastuzumab was on 02/17/2020. Cycle # 4 Ado-Trastuzumab was on 03/16/2020.  2D-ECHO 04/15/2020: EF 65%  Cycle # 5 Ado-Trastuzumab was on 04/20/2020. Cycle # 6 Ado-Trastuzumab was on 05/11/2020. Bilateral Diagnostic Mammogram on 05/21/2020 Negative for malignancy. Left Breast U/S 05/29/2020 Area of post surgical change in the left breast is benign. Cycle # 7 Ado-Trastuzumab was on 06/01/2020. Cycle # 8 Ado-Trastuzumab was on 06/22/2020.  2d-Echo 07/20/2020 noted EF 65-70%  Cycle # 9 Ado-Trastuzumab is today 07/22/2020. Labs reviewed. Ok to proceed. Appointment with PT next Monday. RTC 3 weeks for Cycle # 10 Ado-Trastuzumab.     Randy Sherwood MD   [id number]  Board Certified Medical Oncologist

## 2020-07-27 ENCOUNTER — HOSPITAL ENCOUNTER (OUTPATIENT)
Dept: OCCUPATIONAL THERAPY | Age: 65
Setting detail: THERAPIES SERIES
Discharge: HOME | End: 2020-07-27
Payer: COMMERCIAL

## 2020-07-27 PROCEDURE — 97165 OT EVAL LOW COMPLEX 30 MIN: CPT | Performed by: OCCUPATIONAL THERAPIST

## 2020-07-27 PROCEDURE — 97530 THERAPEUTIC ACTIVITIES: CPT | Performed by: OCCUPATIONAL THERAPIST

## 2020-07-27 NOTE — PROGRESS NOTES
INJECTION LEFT AXILL. SENTINEL LYMPH NODE EXCISION, INSERTION OF BIOZORB performed by Emilia Lemon MD at [address]   2008     normal - Dr Lindsay Sanabria x 10 years    DILATION AND CURETTAGE        INSERTION / REMOVAL / REPLACEMENT VENOUS ACCESS CATHETER N/A 6/18/2019     MEDI PORT PLACEMENT performed by Raúl Barnhart MD at [address]            [x]Surgery: lumpectomy   [x]Lymph node removal: 2  [x]Radiation Therapy: completed  [x]Chemotherapy: completed  []History of Cellulitis/Infection: none  []Family history of lymphedema: none  []Previous treatment for lymphedema: none  [x]Current compression garment use: sports bra    Home Living: patient lives with spouse in a two floor home with multiple step entry with no rail. Patient does not use an assisted device. Patient stated having bed/bath second floor with half bath first floor and full bath basement. Patient has a tub/shower and shower stall. patient drives  Prior Level of Function: independent all aspects    IADL History  Homemaking Responsibility: performs all aspects  Shopping Responsibility: patient performs all aspects  Mode of Transportation: car  Leisure & Hobbies: art, gardening  Work: retired        Pain Level: no pain noted at time of evaluation.   Patient does complaint of tenderness with palpation along flank and lateral portion of breast.  Pitting Edema: none noted at time of evaluation  Fibrotic tissue:  Along all quadrants of breast.  Increased firmness to touch  Skin Integrity: fair at time of evaluation    Lymphedema Upper Extremity Measurements    Measurements are made in 4cm increments and are circumferential.      CM Follow up #4  Left -  Follow up #3  Left -  Follow up #2  Left -  Follow up #1  Left -  Evaluation -   Left - 27July 2020 Follow up #4  Right -  Follow up #3  Right -  Follow up #2  Right -  Follow up #1 Right -  Evaluation - Right - 27July 2020                  wrist     16.5     16.5   4cm 19.75     19   8cm     22.5     21.75   12cm     26.25     25   16cm     29     27.25   20cm     29.25     28   24cm     35.5     32.5   28cm     38.25     35   32cm     38.75     36.25   36cm     38.75     36.5   40cm     39.5     37   44cm                                       plam     19.5     20     Fingers are measured in cm and between mp and pip and between pip and dip each digit. Digit Follow up #4  Left -  Follow up #3  Left -  Follow up #2  Left -  Follow up #1  Left -  Evaluation -  Left -  Follow up #4  Right -  Follow up #3  Right -  Follow up #2  Right -  Follow up #1  Right -  Evaluation - Right -                              First Digit     6.25, 5.25     6.5, 5.25   Second Digit     6, 5.25     6, 5.5   Third Digit     5.75, 5     6, 5.25   Fourth Digit     5.75, 4.25     6, 4.75   Fifth Digit     6.25     6.25                          Cognition:   Alert/Oriented x3     Vision: within functional limits for daily life tasks. Patient has glasses        Hearing: within functional limits for daily life tasks     ADL  Feeding:  independent  Grooming:  independent  Bathing:  independent  UE Dressing:  independent  LE Dressing:  independent  Toileting:  independent  Transfer:  independent    UE ASSESSMENT: Hand Dominance is right handed  ROM within functional limits for daily life tasks. Patient does complain of increased pulling through upper chest / flank / upper shoulder with shoulder range of motion form 90 to 170 degrees. Strength within functional limits for daily life tasks. Sensation: within functional limits for light touch. Patient does complain of occasional numbness / tingle bilateral hands/feet         Assessment: Secondary Lymphedema, Stage 2,   Affecting the Left Upper Extremityleft breast/left flank.    Patient will benefit from a Complete Decongestive Therapy protocol using manual lymphatic drainage techniques, skilled multilayer compression bandaging using short stretch bandages and foam padding, instruction in a home program of self massage and exercise, compression garment fitting and instruction in independent management strategies for lymphedema. Assessment of current deficits   [x]Pain  []Skin Integrity   [x]Lymphedema   []Functional transfers/mobility   []ADLs   []Strength    []Cognition  []IADLs   []Safety Awareness   []  Motor Endurance  []Fine Motor Coordination   []Balance   []Vision/perception  []Sensation []Gross Motor Coordination  []ROM     This patient demonstrates the ability to follow the plan of care and progress towards goals. Rehab potential is good    Plan   Plan of care initiated. Pt will be seen 2-3x a week for 6 weeks or 18 sessions if needed. Duration: From: 27July 2020 through 7Sept. 2020    Plan of Care:   [x]97140-Manual Lymph Drainage and Combined Decongestive Therapy  [x]38774- Skilled Multilayer Short Stretch Compression Bandaging/ Therapeutic Exercise  [x]Skin Care Education [x]HEP including Self MLD Education and/or Self Bandaging  [x]Education for Lymphedema Risks/Precautions     [x] Caregiver Education   []other:? ???? During this plan of care, this patient will demonstrate optimal reduction in girth, joint mobility, muscle performance, ROM and highest level of function in home, community, recreation and work activities    GOALS  Patient Goal: To learn how to manage what is going on    STG: 3 weeks  1. Patient will demonstrate knowledge and understanding for lymphedema precautions, skin care and self management to decrease progression of lymphedema and risk of infection. Therapist initiated patient education regarding lymphedema precautions and skin care / self management. Patient able to verbalize understanding. Patient progressing toward goal.  2.  Patient will present with decreased limb volume in the involved extremity from moderate to mild for improved functional mobility.    Therapist initiated patient education regarding self manual lymphatic massage sequence for breast / flank involvement. Patient provided hand out to utilize at home. Patient educated on each step of the sequence. Patient able to verbalize understanding. Patient progressing toward goal.    3.  Patient will demonstrate compliance with compression therapy for independent management of lymphedema. LT weeks  1. Patient will be independent with self management of lymphedema including understanding garment wear schedule, self compression bandaging, HEP and self care. 2.  Patient will be fitted for appropriate compression garments for long term management of lymphedema. 3.  Patient will present with decreased limb volume in the involved extremity from mild to trace  for improved functional mobility. 4.  Patient will maximize pain free movement of the left upper extremity to  Maximize independence with daily life tasks. Time In: 1200  Time Out: 1345  Timed Code Treatment Minutes: 30      Eval Complexity: Low Complexity       OT G-codes:  Carrying, Handling, Moving objects   (Current status):  CJ   (Discharge Goal):  [address]  Lymphedema Life Impact Scale Score:  19  Percent Impairment:  26%          Dear Practitioner,  Thank you for this referral.  If you have questions about this patient please contact me at [phone]. The patient and I have planned the above goals and s/he will be provided with a treatment protocol and home program to help achieve them. Swapnil Pendleton   Date: 2020        Practitioner Signature: _______________________________________ Date:_________ ___________________________________  Practitioner Printed Name: _____________________________      I CERTIFY THE ABOVE PRESCRIBED SERVICE ARE REQUIRED FOR THIS PATIENT AND ARE MEDICALLY NECESSARY. THE ABOVE PLAN OF CARE HAS BEEN DEVELOPED IN CONJUNCTION WITH THE LYMPHEDEMA/OCCUPATIONAL THERAPIST.

## 2020-07-29 ENCOUNTER — HOSPITAL ENCOUNTER (OUTPATIENT)
Dept: OCCUPATIONAL THERAPY | Age: 65
Setting detail: THERAPIES SERIES
Discharge: HOME | End: 2020-07-29
Payer: COMMERCIAL

## 2020-07-29 PROCEDURE — 97140 MANUAL THERAPY 1/> REGIONS: CPT | Performed by: OCCUPATIONAL THERAPIST

## 2020-07-29 PROCEDURE — 97530 THERAPEUTIC ACTIVITIES: CPT | Performed by: OCCUPATIONAL THERAPIST

## 2020-07-30 NOTE — PROGRESS NOTES
Fair  [] Poor  Patient is programming at home:  [] Yes  [] No  Family is assisting with programming: [] Yes  [] No  Home programming is consistent: [] Yes  [] No  Other:   Response to treatment:  good  Instructions for patient:   [x] Verbal [] Written  Instructions addressed:  Self manual lymphatic massage sequence    Patient Goal: To learn how to manage what is going on     STG: 3 weeks  1. Patient will demonstrate knowledge and understanding for lymphedema precautions, skin care and self management to decrease progression of lymphedema and risk of infection. Therapist continued patient education regarding lymphedema precautions and skin care / self management. Patient able to verbalize understanding. Patient progressing toward goal.  2.  Patient will present with decreased limb volume in the involved extremity from moderate to mild for improved functional mobility. Therapist continued patient education regarding self manual lymphatic massage sequence for breast / flank involvement. Patient attempted massage sequence at [facility] with use of hand out provided last treatment session. Therapist reviewed each step of the sequence. Patient able to verbalize understanding. Therapist performed manual lymphatic massage sequence for breast involvement. Therapist described each step as it was performed. Patient able to tolerate sequence well. Patient did state she felt a difference in how the effected area felt once therapist completed sequence. Patient progressing toward goal.    3.  Patient will demonstrate compliance with compression therapy for independent management of lymphedema.        LT weeks  1. Patient will be independent with self management of lymphedema including understanding garment wear schedule, self compression bandaging, HEP and self care. 2.  Patient will be fitted for appropriate compression garments for long term management of lymphedema.   3.  Patient will present with decreased limb volume in the involved extremity from mild to trace  for improved functional mobility. 4.  Patient will maximize pain free movement of the left upper extremity to  Maximize independence with daily life tasks.     Plan: Continue to address:  []Bandaging  [] Self Bandaging/Family Assist  []MLD  []Self Massage  []Exercise  []Education  []Obtain referral for garments  []Medical Hold  []Discharge to [facility] OTR/L, CLT

## 2020-08-03 ENCOUNTER — HOSPITAL ENCOUNTER (OUTPATIENT)
Dept: OCCUPATIONAL THERAPY | Age: 65
Setting detail: THERAPIES SERIES
Discharge: HOME | End: 2020-08-03
Payer: COMMERCIAL

## 2020-08-03 PROCEDURE — 97140 MANUAL THERAPY 1/> REGIONS: CPT | Performed by: OCCUPATIONAL THERAPIST

## 2020-08-03 PROCEDURE — 97530 THERAPEUTIC ACTIVITIES: CPT | Performed by: OCCUPATIONAL THERAPIST

## 2020-08-03 NOTE — PROGRESS NOTES
Occupational Therapy      OCCUPATIONAL THERAPY PROGRESS NOTE  Phone: [phone]             Fax: [phone]      Date:  20  Initial Evaluation Date: 2020     Patient Name:  Soha Cash                 :  1955     Restrictions/Precautions:  fall risk  Diagnosis:  Invasive ductal carcinoma of left breast (c50.912)                                                          Insurance/Certification information:  [employer] [place] - [id number]   Referring Physician:  ALLY Warner  Plan of care signed (Y/N):  No  Visit# / total visits: Evaluation/Treatment     Time In: 8071                  Time Out:      Restrictions/Precautions:  [] No blood pressure/blood draw in: [] Left Upper Extremity [] Right Upper Extremity                        [x] Fall Risk       Intervention:   []Other    Pain:  [] No    [x] Yes  Location:  Left chest and flank  Pain Rating (0-10 pain scale):  Patient does not rate  Pain Description:  Uncomfortableness  Interruption of Treatment [] Yes  [x] No    Came to Clinic:  [] Bandaged    []Unbandaged    [] With Stocking     [] With Sleeve     [] Kinesiotaped    [] Zaheer Elfego    [] With Alternative Compression:    Skin Integrity:  [] Normal [] Dry  []Rough []Moist []Rash  Location of problem area/s:  [] Wound: Location/Description   [x] Fibrosis: along all quadrants of the breast.  Increased firmness noted  [] Keratosis: Location/Description  [] Papillomas: Location/Description  [] Other    Color:  [x] Normal [] Mottled [] Flushed [] Other/Description  Location of problem area/s:    Edema:  Increased edema left breast and flank noted    Subjective:  Patient attended treatment session alone.   Patient with no new issues    Objective:  [] Measurement [x]Manual Lymph Drainage  []Bandaging   []Kinesiotaping [x] Education    [x]Self Massage   []Self Bandaging [] Exercise    []Wound Care  []Hygiene  [] Other      Assessment:  Knowledge of home program:   [] Good [] Fair  [] Poor  Patient is programming at home:  [] Yes  [] No  Family is assisting with programming: [] Yes  [] No  Home programming is consistent: [] Yes  [] No  Other:   Response to treatment:  good  Instructions for patient:   [x] Verbal [] Written  Instructions addressed:  Self manual lymphatic massage sequence    Patient Goal: To learn how to manage what is going on     STG: 3 weeks  1. Patient will demonstrate knowledge and understanding for lymphedema precautions, skin care and self management to decrease progression of lymphedema and risk of infection. Therapist continued patient education regarding lymphedema precautions and skin care / self management. Patient able to verbalize understanding. Patient progressing toward goal.  2.  Patient will present with decreased limb volume in the involved extremity from moderate to mild for improved functional mobility. Therapist continued patient education regarding self manual lymphatic massage sequence for breast / flank involvement. Patient performed massage sequence at [facility] with use of hand out provided last treatment session. Therapist reviewed each step of the sequence. Patient able to verbalize understanding. Patient stated she feels her lower quadrant of her breast is not getting as much relief. Discussed need to perform sequence in this area additional stretches or maybe perform sequence in this area twice. Patient able to verbalize understanding. Therapist performed manual lymphatic massage sequence for breast involvement. Therapist described each step as it was performed. Patient able to tolerate sequence well. Patient did state she felt a difference in how the effected area felt once therapist completed sequence. Patient stated her lower quadrant felt better than it has since this started. Patient progressing toward goal.    3.  Patient will demonstrate compliance with compression therapy for independent management of lymphedema.    LT weeks  1. Patient will be independent with self management of lymphedema including understanding garment wear schedule, self compression bandaging, HEP and self care. 2.  Patient will be fitted for appropriate compression garments for long term management of lymphedema. 3.  Patient will present with decreased limb volume in the involved extremity from mild to trace  for improved functional mobility. 4.  Patient will maximize pain free movement of the left upper extremity to  Maximize independence with daily life tasks.     Plan: Continue to address:  [x]Bandaging  [x] Self Bandaging/Family Assist  [x]MLD  [x]Self Massage  [x]Exercise  [x]Education  []Obtain referral for garments  []Medical Hold  []Discharge to [facility] OTR/L, CLT

## 2020-08-10 ENCOUNTER — HOSPITAL ENCOUNTER (OUTPATIENT)
Dept: OCCUPATIONAL THERAPY | Age: 65
Setting detail: THERAPIES SERIES
Discharge: HOME | End: 2020-08-10
Payer: COMMERCIAL

## 2020-08-10 PROCEDURE — 97530 THERAPEUTIC ACTIVITIES: CPT | Performed by: OCCUPATIONAL THERAPIST

## 2020-08-10 PROCEDURE — 97110 THERAPEUTIC EXERCISES: CPT | Performed by: OCCUPATIONAL THERAPIST

## 2020-08-11 ENCOUNTER — HOSPITAL ENCOUNTER (OUTPATIENT)
Dept: INFUSION THERAPY | Age: 65
Discharge: HOME | End: 2020-08-11
Payer: COMMERCIAL

## 2020-08-11 DIAGNOSIS — C50.912 INVASIVE DUCTAL CARCINOMA OF LEFT BREAST (HCC): Primary | ICD-10-CM

## 2020-08-11 LAB
ALBUMIN SERPL-MCNC: 4.2 G/DL (ref 3.5–5.2)
ALP BLD-CCNC: 82 U/L (ref 35–104)
ALT SERPL-CCNC: 33 U/L (ref 0–32)
ANION GAP SERPL CALCULATED.3IONS-SCNC: 11 MMOL/L (ref 7–16)
AST SERPL-CCNC: 38 U/L (ref 0–31)
BASOPHILS ABSOLUTE: 0.04 E9/L (ref 0–0.2)
BASOPHILS RELATIVE PERCENT: 0.6 % (ref 0–2)
BILIRUB SERPL-MCNC: 0.4 MG/DL (ref 0–1.2)
BUN BLDV-MCNC: 17 MG/DL (ref 8–23)
CALCIUM SERPL-MCNC: 10.7 MG/DL (ref 8.6–10.2)
CHLORIDE BLD-SCNC: 95 MMOL/L (ref 98–107)
CO2: 29 MMOL/L (ref 22–29)
CREAT SERPL-MCNC: 0.9 MG/DL (ref 0.5–1)
EOSINOPHILS ABSOLUTE: 0.15 E9/L (ref 0.05–0.5)
EOSINOPHILS RELATIVE PERCENT: 2.3 % (ref 0–6)
GFR AFRICAN AMERICAN: >60
GFR NON-AFRICAN AMERICAN: >60 ML/MIN/1.73
GLUCOSE BLD-MCNC: 102 MG/DL (ref 74–99)
HCT VFR BLD CALC: 36.6 % (ref 34–48)
HEMOGLOBIN: 12.2 G/DL (ref 11.5–15.5)
IMMATURE GRANULOCYTES #: 0.02 E9/L
IMMATURE GRANULOCYTES %: 0.3 % (ref 0–5)
LYMPHOCYTES ABSOLUTE: 2.48 E9/L (ref 1.5–4)
LYMPHOCYTES RELATIVE PERCENT: 38.8 % (ref 20–42)
MAGNESIUM: 1.7 MG/DL (ref 1.6–2.6)
MCH RBC QN AUTO: 28.4 PG (ref 26–35)
MCHC RBC AUTO-ENTMCNC: 33.3 % (ref 32–34.5)
MCV RBC AUTO: 85.3 FL (ref 80–99.9)
MONOCYTES ABSOLUTE: 0.71 E9/L (ref 0.1–0.95)
MONOCYTES RELATIVE PERCENT: 11.1 % (ref 2–12)
NEUTROPHILS ABSOLUTE: 2.99 E9/L (ref 1.8–7.3)
NEUTROPHILS RELATIVE PERCENT: 46.9 % (ref 43–80)
PDW BLD-RTO: 17.2 FL (ref 11.5–15)
PLATELET # BLD: 286 E9/L (ref 130–450)
PMV BLD AUTO: 10 FL (ref 7–12)
POTASSIUM SERPL-SCNC: 3.8 MMOL/L (ref 3.5–5)
RBC # BLD: 4.29 E12/L (ref 3.5–5.5)
SODIUM BLD-SCNC: 135 MMOL/L (ref 132–146)
TOTAL PROTEIN: 7.4 G/DL (ref 6.4–8.3)
WBC # BLD: 6.4 E9/L (ref 4.5–11.5)

## 2020-08-11 PROCEDURE — 80053 COMPREHEN METABOLIC PANEL: CPT

## 2020-08-11 PROCEDURE — 83735 ASSAY OF MAGNESIUM: CPT

## 2020-08-11 PROCEDURE — 85025 COMPLETE CBC W/AUTO DIFF WBC: CPT

## 2020-08-11 PROCEDURE — 36415 COLL VENOUS BLD VENIPUNCTURE: CPT

## 2020-08-11 RX ORDER — HEPARIN SODIUM (PORCINE) LOCK FLUSH IV SOLN 100 UNIT/ML 100 UNIT/ML
500 SOLUTION INTRAVENOUS PRN
Status: DISCONTINUED | OUTPATIENT
Start: 2020-08-11 | End: 2020-08-12 | Stop reason: HOSPADM

## 2020-08-11 RX ORDER — SODIUM CHLORIDE 0.9 % (FLUSH) 0.9 %
10 SYRINGE (ML) INJECTION PRN
Status: DISCONTINUED | OUTPATIENT
Start: 2020-08-11 | End: 2020-08-12 | Stop reason: HOSPADM

## 2020-08-11 RX ORDER — SODIUM CHLORIDE 0.9 % (FLUSH) 0.9 %
10 SYRINGE (ML) INJECTION PRN
Status: CANCELLED | OUTPATIENT
Start: 2020-08-11

## 2020-08-11 RX ORDER — HEPARIN SODIUM (PORCINE) LOCK FLUSH IV SOLN 100 UNIT/ML 100 UNIT/ML
500 SOLUTION INTRAVENOUS PRN
Status: CANCELLED | OUTPATIENT
Start: 2020-08-11

## 2020-08-11 RX ORDER — POTASSIUM CHLORIDE 20 MEQ/1
40 TABLET, EXTENDED RELEASE ORAL PRN
Status: CANCELLED
Start: 2020-08-11

## 2020-08-11 NOTE — PROGRESS NOTES
Occupational Therapy      OCCUPATIONAL THERAPY PROGRESS NOTE  Phone: [phone]             Fax: [phone]      Date:  08/10/20  Initial Evaluation Date: 2020     Patient Name:  Tex Elizondo                 :  1955     Restrictions/Precautions:  fall risk  Diagnosis:  Invasive ductal carcinoma of left breast (c50.912)                                                          Insurance/Certification information:  Medical [place] - [id number]   Referring Physician:  ALLY Parsons  Plan of care signed (Y/N):  No  Visit# / total visits: Visit #4     Time In: 1450                  Time Out: 1545     Restrictions/Precautions:  [] No blood pressure/blood draw in: [] Left Upper Extremity [] Right Upper Extremity                        [x] Fall Risk       Intervention:   []Other    Pain:  [] No    [x] Yes  Location:  Left chest and flank  Pain Rating (0-10 pain scale):  Patient does not rate  Pain Description:  Uncomfortableness  Interruption of Treatment [] Yes  [x] No    Came to Clinic:  [] Bandaged    []Unbandaged    [] With Stocking     [] With Sleeve     [] Kinesiotaped    [] [place]    [] With Alternative Compression:    Skin Integrity:  [] Normal [] Dry  []Rough []Moist []Rash  Location of problem area/s:  [] Wound: Location/Description   [x] Fibrosis: along all quadrants of the breast.  Increased firmness noted  [] Keratosis: Location/Description  [] Papillomas: Location/Description  [] Other    Color:  [x] Normal [] Mottled [] Flushed [] Other/Description  Location of problem area/s:    Edema:  Increased edema left breast and flank noted    Subjective:  Patient attended treatment session alone.   Patient with no new issues    Objective:  [] Measurement [x]Manual Lymph Drainage  []Bandaging   []Kinesiotaping [x] Education    [x]Self Massage   []Self Bandaging [] Exercise    []Wound Care  []Hygiene  [] Other      Assessment:  Knowledge of home program:   [] Good [] Fair  [] Poor  Patient is programming at home:  [] Yes  [] No  Family is assisting with programming: [] Yes  [] No  Home programming is consistent: [] Yes  [] No  Other:   Response to treatment:  good  Instructions for patient:   [x] Verbal [] Written  Instructions addressed:  Self manual lymphatic massage sequence    Patient Goal: To learn how to manage what is going on     STG: 3 weeks  1. Patient will demonstrate knowledge and understanding for lymphedema precautions, skin care and self management to decrease progression of lymphedema and risk of infection. Therapist continued patient education regarding lymphedema precautions and skin care / self management. Patient able to verbalize and demonstrate understanding.  goal met  2. Patient will present with decreased limb volume in the involved extremity from moderate to mild for improved functional mobility. Therapist continued patient education regarding self manual lymphatic massage sequence for breast / flank involvement. Patient continues to perform massage sequence at home with use of hand out stating she has not issues and getting good results from sequence. Patient progressing toward goal.    3.  Patient will demonstrate compliance with compression therapy for independent management of lymphedema.        LT weeks  1. Patient will be independent with self management of lymphedema including understanding garment wear schedule, self compression bandaging, HEP and self care. Therapist continued patient education regarding home exercise program to include range of motion / stretching exercises. Therapist reviewed exercises and patient able to verbalize understanding. Patient performed shoulder overhead pulley ten reps times three sets with good tolerance. Therapist performed skin mobilization techniques for upper shoulder / axilla / upper arm. Patient tolerated well. Patient stated she felt less discomfort with range of motion once completed.   Patient

## 2020-08-12 ENCOUNTER — OFFICE VISIT (OUTPATIENT)
Dept: ONCOLOGY | Age: 65
End: 2020-08-12
Payer: COMMERCIAL

## 2020-08-12 ENCOUNTER — HOSPITAL ENCOUNTER (OUTPATIENT)
Dept: INFUSION THERAPY | Age: 65
Discharge: HOME | End: 2020-08-12
Payer: COMMERCIAL

## 2020-08-12 VITALS
SYSTOLIC BLOOD PRESSURE: 187 MMHG | HEIGHT: 63 IN | BODY MASS INDEX: 38.8 KG/M2 | OXYGEN SATURATION: 97 % | DIASTOLIC BLOOD PRESSURE: 85 MMHG | HEART RATE: 71 BPM | WEIGHT: 219 LBS | TEMPERATURE: 98.3 F

## 2020-08-12 VITALS
DIASTOLIC BLOOD PRESSURE: 66 MMHG | SYSTOLIC BLOOD PRESSURE: 147 MMHG | TEMPERATURE: 97.2 F | RESPIRATION RATE: 18 BRPM | HEART RATE: 59 BPM

## 2020-08-12 DIAGNOSIS — C50.912 INVASIVE DUCTAL CARCINOMA OF LEFT BREAST (HCC): Primary | ICD-10-CM

## 2020-08-12 PROCEDURE — G8417 CALC BMI ABV UP PARAM F/U: HCPCS | Performed by: INTERNAL MEDICINE

## 2020-08-12 PROCEDURE — 6360000002 HC RX W HCPCS: Performed by: INTERNAL MEDICINE

## 2020-08-12 PROCEDURE — 3017F COLORECTAL CA SCREEN DOC REV: CPT | Performed by: INTERNAL MEDICINE

## 2020-08-12 PROCEDURE — 99214 OFFICE O/P EST MOD 30 MIN: CPT | Performed by: INTERNAL MEDICINE

## 2020-08-12 PROCEDURE — 1036F TOBACCO NON-USER: CPT | Performed by: INTERNAL MEDICINE

## 2020-08-12 PROCEDURE — 2580000003 HC RX 258: Performed by: INTERNAL MEDICINE

## 2020-08-12 PROCEDURE — 96375 TX/PRO/DX INJ NEW DRUG ADDON: CPT

## 2020-08-12 PROCEDURE — 96413 CHEMO IV INFUSION 1 HR: CPT

## 2020-08-12 PROCEDURE — G8427 DOCREV CUR MEDS BY ELIG CLIN: HCPCS | Performed by: INTERNAL MEDICINE

## 2020-08-12 RX ORDER — METHYLPREDNISOLONE SODIUM SUCCINATE 125 MG/2ML
125 INJECTION, POWDER, LYOPHILIZED, FOR SOLUTION INTRAMUSCULAR; INTRAVENOUS ONCE
Status: CANCELLED | OUTPATIENT
Start: 2020-08-12

## 2020-08-12 RX ORDER — SODIUM CHLORIDE 0.9 % (FLUSH) 0.9 %
5 SYRINGE (ML) INJECTION PRN
Status: CANCELLED | OUTPATIENT
Start: 2020-08-12

## 2020-08-12 RX ORDER — MEPERIDINE HYDROCHLORIDE 25 MG/ML
12.5 INJECTION INTRAMUSCULAR; INTRAVENOUS; SUBCUTANEOUS ONCE
Status: CANCELLED | OUTPATIENT
Start: 2020-08-12

## 2020-08-12 RX ORDER — SODIUM CHLORIDE 0.9 % (FLUSH) 0.9 %
10 SYRINGE (ML) INJECTION PRN
Status: CANCELLED | OUTPATIENT
Start: 2020-08-12

## 2020-08-12 RX ORDER — SODIUM CHLORIDE 9 MG/ML
20 INJECTION, SOLUTION INTRAVENOUS ONCE
Status: CANCELLED | OUTPATIENT
Start: 2020-08-12

## 2020-08-12 RX ORDER — SODIUM CHLORIDE 0.9 % (FLUSH) 0.9 %
10 SYRINGE (ML) INJECTION PRN
Status: DISCONTINUED | OUTPATIENT
Start: 2020-08-12 | End: 2020-08-13 | Stop reason: HOSPADM

## 2020-08-12 RX ORDER — DIPHENHYDRAMINE HYDROCHLORIDE 50 MG/ML
50 INJECTION INTRAMUSCULAR; INTRAVENOUS ONCE
Status: CANCELLED | OUTPATIENT
Start: 2020-08-12

## 2020-08-12 RX ORDER — ACETAMINOPHEN 500 MG
500 TABLET ORAL EVERY 6 HOURS PRN
COMMUNITY

## 2020-08-12 RX ORDER — HEPARIN SODIUM (PORCINE) LOCK FLUSH IV SOLN 100 UNIT/ML 100 UNIT/ML
500 SOLUTION INTRAVENOUS PRN
Status: DISCONTINUED | OUTPATIENT
Start: 2020-08-12 | End: 2020-08-13 | Stop reason: HOSPADM

## 2020-08-12 RX ORDER — EPINEPHRINE 1 MG/ML
0.3 INJECTION, SOLUTION, CONCENTRATE INTRAVENOUS PRN
Status: CANCELLED | OUTPATIENT
Start: 2020-08-12

## 2020-08-12 RX ORDER — SODIUM CHLORIDE 9 MG/ML
20 INJECTION, SOLUTION INTRAVENOUS ONCE
Status: DISCONTINUED | OUTPATIENT
Start: 2020-08-12 | End: 2020-08-13 | Stop reason: HOSPADM

## 2020-08-12 RX ORDER — DEXAMETHASONE SODIUM PHOSPHATE 10 MG/ML
8 INJECTION INTRAMUSCULAR; INTRAVENOUS ONCE
Status: COMPLETED | OUTPATIENT
Start: 2020-08-12 | End: 2020-08-12

## 2020-08-12 RX ORDER — DEXAMETHASONE SODIUM PHOSPHATE 10 MG/ML
8 INJECTION, SOLUTION INTRAMUSCULAR; INTRAVENOUS ONCE
Status: CANCELLED | OUTPATIENT
Start: 2020-08-12

## 2020-08-12 RX ORDER — SODIUM CHLORIDE 9 MG/ML
INJECTION, SOLUTION INTRAVENOUS CONTINUOUS
Status: CANCELLED | OUTPATIENT
Start: 2020-08-12

## 2020-08-12 RX ORDER — HEPARIN SODIUM (PORCINE) LOCK FLUSH IV SOLN 100 UNIT/ML 100 UNIT/ML
500 SOLUTION INTRAVENOUS PRN
Status: CANCELLED | OUTPATIENT
Start: 2020-08-12

## 2020-08-12 RX ADMIN — ADO-TRASTUZUMAB EMTANSINE 360 MG: 20 INJECTION, POWDER, LYOPHILIZED, FOR SOLUTION INTRAVENOUS at 11:39

## 2020-08-12 RX ADMIN — SODIUM CHLORIDE, PRESERVATIVE FREE 10 ML: 5 INJECTION INTRAVENOUS at 10:55

## 2020-08-12 RX ADMIN — DEXAMETHASONE SODIUM PHOSPHATE 8 MG: 10 INJECTION INTRAMUSCULAR; INTRAVENOUS at 11:18

## 2020-08-12 RX ADMIN — SODIUM CHLORIDE, PRESERVATIVE FREE 10 ML: 5 INJECTION INTRAVENOUS at 11:18

## 2020-08-12 RX ADMIN — HEPARIN SODIUM (PORCINE) LOCK FLUSH IV SOLN 100 UNIT/ML 500 UNITS: 100 SOLUTION at 12:23

## 2020-08-12 RX ADMIN — SODIUM CHLORIDE 20 ML/HR: 9 INJECTION, SOLUTION INTRAVENOUS at 11:17

## 2020-08-12 RX ADMIN — SODIUM CHLORIDE, PRESERVATIVE FREE 10 ML: 5 INJECTION INTRAVENOUS at 12:23

## 2020-08-12 NOTE — PROGRESS NOTES
Department of [facility] Oncology       Attending Clinic Note    Reason for Visit: Follow-up on a patient with Left Breast Cancer    PCP:  Akanksha Dawson MD    History of Present Illness: The mass was located in the 2 o'clock position of left breast.     Breast cancer risk factors include age, gender, menarche before age 16. Age of menarche was 7. Age of menopause was 48. Patient was on birth control for 6 months in her 25s. Patient is . Age of first live birth was 30. Patient did breast feed. Bilateral screening mammogram on 2019: There is a high density, irregular mass with indistinct margins seen in the posterior upper outer quadrant of left breast.     Left Breast U/S on 2019:  Irregular solid mass with angular margins measuring 25 x 20 x 21 mm in the left breast at 2 o'clock located 8 centimeters from the nipple. No axillary LN. On 2019:  Mass, left breast at 2:00, core needle biopsy: Invasive ductal carcinoma,nuclear grade 3, see comment. 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 Breast Cancer Marker Studies:  ER: Negative  MS: Negative  Her-2/francisco Positive/3+    CXR PA/Lateral on 2019:  Normal chest.    MRI Bilateral Breast 2019: An irregular, enhancing mass is again noted in the left breast 2:00 which measures 2.0 x 2.2 x 2.6 cm. No axillary LN. No evidence of neoplasm on right. T2 N0 M0  We recommended neoadjuvant chemotherapy consisting of 6 cycles of TCHP. Side effects of TCHP reviewed with patient. She agreed to proceed. 2D-ECHO EF50-55%. Mediport placed. Cycle # 1 TCHP was on 2019. Cycle # 2 TCHP was on 2019. Cycle # 3 TCHP was on 2019. 2D-ECHO 2019 EF 65%  Cycle # 4 TCHP was on 2019  Cycle # 5 TCHP was on 2019. Cycle # 6 TCHP was on 10/23/2019. MRI Breast Bilateral 2019:   Near complete response to therapy on the left.    There is no axillary lymphadenopathy. No evidence of neoplasm on the right. 2D-ECHO 12/16/2019 EF 60%  Left needle localized lumpectomy, blue dye injection, and left axillary sentinel lymph node excision was done on 12/18/2019:  A.  Left breast, new superior-lateral margin, excision:   -Focal adenosis, columnar cell changes and stromal fibrosis/hyalinosis, negative for malignancy;   -Rare microcalcifications in benign tubules. B.  [place] lymph node #1, biopsy: Reactive node, negative for malignancy. C.  [place] lymph node #2, biopsy: Reactive node, negative for malignancy. D.  Left breast, lumpectomy with needle localization:   - Invasive adenocarcinoma of no special type (ductal, NOS), grade 3;   - Ductal carcinoma in situ, high nuclear grade, comedo/solid with necrosis;   - Scar of previous biopsy site, see comment. Comment:  Although the invasive carcinoma is very close to the superior margin in specimen D (3 mm), additional superior-lateral margins in specimen A (at least 2.0 cm in thickness) is completely negative for carcinoma.      CANCER CASE SUMMARY  Procedure: Excision  Specimen Laterality: Left  Tumor Site: Invasive Carcinoma: 12:00 (per report: HES-)  Tumor Size: Size of Largest Invasive Carcinoma: 1.4 X 1.3 X1.3 cm  Histologic Type of Invasive Carcinoma: Invasive carcinoma of no special type (ductal, NOS)  Histologic Grade: [place] Histologic Score  Glandular/tubular differentiation: Score 3  Nuclear pleomorphism: Score 3  Mitotic rate: Score 3  Overall grade: Grade 3 (scores of 9)  Tumor Focality: Single focus of invasive carcinoma  Ductal Carcinoma In Situ (DCIS): Present  Size of DCIS: Intermingled with invasive carcinoma  Number of blocks with DCIS: 3  Number of blocks examined: 14  Architectural patternS: [place]/solid  Nuclear grade: High  Ncrosis present: Central and focal  Lobular Carcinoma In Situ (LCIS): Not identified  Margins: Uninvolved by invasive carcinoma and DCIS  Distance from closest # 5 TCHP was on 09/25/2019. Cycle # 6 TCHP was on 10/23/2019. MRI Breast Bilateral 11/07/2019:   Near complete response to therapy on the left. There is no axillary lymphadenopathy. No evidence of neoplasm on the right. Herceptin/Perjeta was on 12/04/2019. 2D-ECHO 12/16/2019 EF 60%    Left needle localized lumpectomy, blue dye injection, and left axillary sentinel lymph node excision on 12/18/2019:  A.  Left breast, new superior-lateral margin, excision:   -Focal adenosis, columnar cell changes and stromal fibrosis/hyalinosis, negative for malignancy;   -Rare microcalcifications in benign tubules. B.  [place] lymph node #1, biopsy: Reactive node, negative for malignancy. C.  [place] lymph node #2, biopsy: Reactive node, negative for malignancy. D.  Left breast, lumpectomy with needle localization:   - Invasive adenocarcinoma of no special type (ductal, NOS), grade 3;   - Ductal carcinoma in situ, high nuclear grade, comedo/solid with necrosis;   - Scar of previous biopsy site, see comment. Comment:  Although the invasive carcinoma is very close to the superior margin in specimen D (3 mm), additional superior-lateral margins in specimen A (at least 2.0 cm in thickness) is completely negative for carcinoma.      CANCER CASE SUMMARY  Procedure: Excision  Specimen Laterality: Left  Tumor Site: Invasive Carcinoma: 12:00 (per report: HES-)  Tumor Size: Size of Largest Invasive Carcinoma: 1.4 X 1.3 X1.3 cm  Histologic Type of Invasive Carcinoma: Invasive carcinoma of no special type (ductal, NOS)  Histologic Grade: [place] Histologic Score  Glandular/tubular differentiation: Score 3  Nuclear pleomorphism: Score 3  Mitotic rate: Score 3  Overall grade: Grade 3 (scores of 9)  Tumor Focality: Single focus of invasive carcinoma  Ductal Carcinoma In Situ (DCIS): Present  Size of DCIS: Intermingled with invasive carcinoma  Number of blocks with DCIS: 3  Number of blocks examined: 14  Architectural patternS: [place]/solid  Nuclear grade: High  Ncrosis present: Central and focal  Lobular Carcinoma In Situ (LCIS): Not identified  Margins: Uninvolved by invasive carcinoma and DCIS  Distance from closest margin: 3 mm  Specify closest margin: surperior (see comment)  Regional lymph Nodes: Uninvolved by tumor cells  Total number of lymph nodes examined: 2  Number of sentinel nodes examined: 2  Treatment Effect: No known presurgical therapy  Lymph-Vascular Invasion: Not identified  Pathologic Staging (pTNM, AJCC 8TH Edition)  Primary tumor: pT1c  Regional lymph nodes (modifier- (sn) sentinel nodes examined: (sn)0  ER: Negative  [place]: Negative  Her-2/francisco Positive/3+    Pathology report reviewed extensively with patient. Radiation Oncology team consulted for adjuvant RT. Recommended Ado-Trastuzumab alone for 14 cycles. Side effects of Ado-Trastuzumab reviewed with patient. She agreed to proceed. Cycle # 1 Ado-Trastuzumab was on 01/06/2020. Cycle # 2 Ado-Trastuzumab was on 01/27/2020. RT was started on 01/27/2020 and completed on 03/11/2020. Cycle # 3 Ado-Trastuzumab was on 02/17/2020. Cycle # 4 Ado-Trastuzumab was on 03/16/2020.  2D-ECHO 04/15/2020: EF 65%  Cycle # 5 Ado-Trastuzumab was on 04/20/2020. Cycle # 6 Ado-Trastuzumab was on 05/11/2020. Bilateral Diagnostic Mammogram on 05/21/2020 Negative for malignancy. Left Breast U/S 05/29/2020 Area of post surgical change in the left breast is benign. Cycle # 7 Ado-Trastuzumab was on 06/01/2020. Cycle # 8 Ado-Trastuzumab was on 06/22/2020.  2d-Echo 07/20/2020 noted EF 65-70%  Cycle # 9 Ado-Trastuzumab was on 07/22/2020. Cycle # 10 Ado-Trastuzumab is today 08/12/2020. Labs reviewed. Ok to proceed. Continue with OT for lymphedema. Nasal saline sprays for mild epistaxis. RTC 3 weeks for Cycle # 11 Ado-Trastuzumab.     Alex Duff MD   [id number]/[id number]  Board Certified Medical Oncologist

## 2020-08-14 ENCOUNTER — HOSPITAL ENCOUNTER (OUTPATIENT)
Dept: OCCUPATIONAL THERAPY | Age: 65
Setting detail: THERAPIES SERIES
Discharge: HOME | End: 2020-08-14
Payer: COMMERCIAL

## 2020-08-14 PROCEDURE — 97530 THERAPEUTIC ACTIVITIES: CPT | Performed by: OCCUPATIONAL THERAPIST

## 2020-08-14 NOTE — PROGRESS NOTES
Occupational Therapy      OCCUPATIONAL THERAPY PROGRESS NOTE  Phone: [phone]             Fax: [phone]      Date:  08/10/20  Initial Evaluation Date: 2020     Patient Name:  Tex Elizondo                 :  1955     Restrictions/Precautions:  fall risk  Diagnosis:  Invasive ductal carcinoma of left breast (c50.912)                                                          Insurance/Certification information:  [employer] - [id number]   Referring Physician:  ALLY Parsons  Plan of care signed (Y/N):  No  Visit# / total visits: Visit #4     Time In: 4838                  Time Out: 6303     Restrictions/Precautions:  [] No blood pressure/blood draw in: [] Left Upper Extremity [] Right Upper Extremity                        [x] Fall Risk       Intervention:   []Other    Pain:  [] No    [x] Yes  Location:  Left chest and flank  Pain Rating (0-10 pain scale):  Patient does not rate  Pain Description:  Uncomfortableness  Interruption of Treatment [] Yes  [x] No    Came to Clinic:  [] Bandaged    []Unbandaged    [] With Stocking     [] With Sleeve     [] Kinesiotaped    [] Minus Narrow    [] With Alternative Compression:    Skin Integrity:  [] Normal [] Dry  []Rough []Moist []Rash  Location of problem area/s:  [] Wound: Location/Description   [x] Fibrosis: along all quadrants of the breast.  Increased firmness noted  [] Keratosis: Location/Description  [] Papillomas: Location/Description  [] Other    Color:  [x] Normal [] Mottled [] Flushed [] Other/Description  Location of problem area/s:    Edema:  Increased edema left breast and flank noted    Subjective:  Patient attended treatment session alone.   Patient with no new issues    Objective:  [] Measurement [x]Manual Lymph Drainage  []Bandaging   []Kinesiotaping [x] Education    [x]Self Massage   []Self Bandaging [] Exercise    []Wound Care  []Hygiene  [] Other      Assessment:  Knowledge of home program:   [] Good [] Fair  [] Poor  Patient is programming at home:  [] Yes  [] No  Family is assisting with programming: [] Yes  [] No  Home programming is consistent: [] Yes  [] No  Other:   Response to treatment:  good  Instructions for patient:   [x] Verbal [] Written  Instructions addressed:  Self manual lymphatic massage sequence    Patient Goal: To learn how to manage what is going on     STG: 3 weeks  1. Patient will demonstrate knowledge and understanding for lymphedema precautions, skin care and self management to decrease progression of lymphedema and risk of infection. goal met  2. Patient will present with decreased limb volume in the involved extremity from moderate to mild for improved functional mobility. Therapist continued patient education regarding self manual lymphatic massage sequence for breast / flank involvement. Patient continues to perform massage sequence at home with use of hand out stating she has not issues and getting good results from sequence. Therapist performed manual lymphatic drainage massage sequence. Patient tolerated well. Patient stated she felt better and her garment did not feel as tight once sequence was completed. Patient progressing toward goal.    3.  Patient will demonstrate compliance with compression therapy for independent management of lymphedema. Patient received her compression bra and utilizing since received. Patient stated fitting well. Therapist to monitor fit and function of garment. Progressing toward goal.          LT weeks  1. Patient will be independent with self management of lymphedema including understanding garment wear schedule, self compression bandaging, HEP and self care. Therapist continued patient education regarding home exercise program to include range of motion / stretching exercises. Therapist reviewed possible use of lymphedema pump. Patient will let therapist know if she would like to look into pump coverage by insurance further.   Patient progressing toward goal.    2.  Patient will be fitted for appropriate compression garments for long term management of lymphedema. 3.  Patient will present with decreased limb volume in the involved extremity from mild to trace  for improved functional mobility. 4.  Patient will maximize pain free movement of the left upper extremity to  Maximize independence with daily life tasks.     Plan: Continue to address:  [x]Bandaging  [x] Self Bandaging/Family Assist  [x]MLD  [x]Self Massage  [x]Exercise  [x]Education  []Obtain referral for garments  []Medical Hold  []Discharge to [facility] OTR/L, CLT

## 2020-08-19 ENCOUNTER — HOSPITAL ENCOUNTER (OUTPATIENT)
Dept: OCCUPATIONAL THERAPY | Age: 65
Setting detail: THERAPIES SERIES
Discharge: HOME | End: 2020-08-19
Payer: COMMERCIAL

## 2020-08-19 PROCEDURE — 97530 THERAPEUTIC ACTIVITIES: CPT | Performed by: OCCUPATIONAL THERAPIST

## 2020-08-20 NOTE — PROGRESS NOTES
Occupational Therapy      OCCUPATIONAL THERAPY PROGRESS NOTE  Phone: [phone]             Fax: [phone]      Date:  20  Initial Evaluation Date: 2020     Patient Name:  Shad Enriquez                 :  1955     Restrictions/Precautions:  fall risk  Diagnosis:  Invasive ductal carcinoma of left breast (c50.912)                                                          Insurance/Certification information:  [employer] [place] - [id number]   Referring Physician:  ALLY Valles  Plan of care signed (Y/N):  No  Visit# / total visits: Visit #7     Time In: 1017                  Time Out: 8602     Restrictions/Precautions:  [] No blood pressure/blood draw in: [] Left Upper Extremity [] Right Upper Extremity                        [x] Fall Risk       Intervention:   []Other    Pain:  [] No    [x] Yes  Location:  Left chest and flank  Pain Rating (0-10 pain scale):  Patient does not rate  Pain Description:  Uncomfortableness  Interruption of Treatment [] Yes  [x] No    Came to Clinic:  [] Bandaged    []Unbandaged    [] With Stocking     [] With Sleeve     [] Kinesiotaped    [] Rebbecca Albe    [] With Alternative Compression:    Skin Integrity:  [] Normal [] Dry  []Rough []Moist []Rash  Location of problem area/s:  [] Wound: Location/Description   [x] Fibrosis: along all quadrants of the breast.  Increased firmness noted  [] Keratosis: Location/Description  [] Papillomas: Location/Description  [] Other    Color:  [x] Normal [] Mottled [] Flushed [] Other/Description  Location of problem area/s:    Edema:  Increased edema left breast and flank noted    Subjective:  Patient attended treatment session alone.   Patient with no new issues    Objective:  [] Measurement [x]Manual Lymph Drainage  []Bandaging   []Kinesiotaping [x] Education    [x]Self Massage   []Self Bandaging [] Exercise    []Wound Care  []Hygiene  [] Other      Assessment:  Knowledge of home program:   [] Good [] Fair  [] Poor  Patient is programming at home:  [] Yes  [] No  Family is assisting with programming: [] Yes  [] No  Home programming is consistent: [] Yes  [] No  Other:   Response to treatment:  good  Instructions for patient:   [x] Verbal [] Written  Instructions addressed:  Self manual lymphatic massage sequence    Patient Goal: To learn how to manage what is going on     STG: 3 weeks  1. Patient will demonstrate knowledge and understanding for lymphedema precautions, skin care and self management to decrease progression of lymphedema and risk of infection. goal met  2. Patient will present with decreased limb volume in the involved extremity from moderate to mild for improved functional mobility. Therapist continued patient education regarding self manual lymphatic massage sequence for breast / flank involvement. Patient continues to perform massage sequence at home with use of hand out stating she has no issues and getting good results from sequence. Patient stated she got up several mornings ago and had increased discomfort. She laid back down and performed sequence and was able to get up without discomfort. Patient presented with mild edema. Goal met. 3.  Patient will demonstrate compliance with compression therapy for independent management of lymphedema. Patient continues to utilize her compression bra stating it fits well and getting good results from use. Patient provided foam inserts to place at hardened areas of the breast.  Therapist will continue to monitor fit and function of garment. Progressing toward goal.          LT weeks  1. Patient will be independent with self management of lymphedema including understanding garment wear schedule, self compression bandaging, HEP and self care. Therapist continued patient education regarding home exercise program, wear schedule of garments, and eating lifestyle.   Therapist further discussed possible use of lymphedema pump and how this will assist in her management of lymphedema of the breast.  Patient able to verbalize understanding and stated she would like to inquire further about insurance coverage. Therapist to provide DME  patient information to assist with coverage determination. Patient progressing toward goal.    2.  Patient will be fitted for appropriate compression garments for long term management of lymphedema. Patient received compression garment and continues to utilize. Garment fits well and works as intended. Patient goal met. 3.  Patient will present with decreased limb volume in the involved extremity from mild to trace  for improved functional mobility. 4.  Patient will maximize pain free movement of the left upper extremity to  Maximize independence with daily life tasks.     Plan: Continue to address:  [x]Bandaging  [x] Self Bandaging/Family Assist  [x]MLD  [x]Self Massage  [x]Exercise  [x]Education  []Obtain referral for garments  []Medical Hold  []Discharge to [facility] OTR/L, CLT

## 2020-09-01 ENCOUNTER — HOSPITAL ENCOUNTER (OUTPATIENT)
Dept: INFUSION THERAPY | Age: 65
Discharge: HOME | End: 2020-09-01
Payer: COMMERCIAL

## 2020-09-01 DIAGNOSIS — C50.912 INVASIVE DUCTAL CARCINOMA OF LEFT BREAST (HCC): ICD-10-CM

## 2020-09-01 LAB
ALBUMIN SERPL-MCNC: 4.3 G/DL (ref 3.5–5.2)
ALP BLD-CCNC: 99 U/L (ref 35–104)
ALT SERPL-CCNC: 36 U/L (ref 0–32)
ANION GAP SERPL CALCULATED.3IONS-SCNC: 12 MMOL/L (ref 7–16)
AST SERPL-CCNC: 37 U/L (ref 0–31)
BASOPHILS ABSOLUTE: 0.04 E9/L (ref 0–0.2)
BASOPHILS RELATIVE PERCENT: 0.6 % (ref 0–2)
BILIRUB SERPL-MCNC: 0.4 MG/DL (ref 0–1.2)
BUN BLDV-MCNC: 15 MG/DL (ref 8–23)
CALCIUM SERPL-MCNC: 10.4 MG/DL (ref 8.6–10.2)
CHLORIDE BLD-SCNC: 94 MMOL/L (ref 98–107)
CO2: 29 MMOL/L (ref 22–29)
CREAT SERPL-MCNC: 0.9 MG/DL (ref 0.5–1)
EOSINOPHILS ABSOLUTE: 0.12 E9/L (ref 0.05–0.5)
EOSINOPHILS RELATIVE PERCENT: 1.9 % (ref 0–6)
GFR AFRICAN AMERICAN: >60
GFR NON-AFRICAN AMERICAN: >60 ML/MIN/1.73
GLUCOSE BLD-MCNC: 106 MG/DL (ref 74–99)
HCT VFR BLD CALC: 38.7 % (ref 34–48)
HEMOGLOBIN: 12.7 G/DL (ref 11.5–15.5)
IMMATURE GRANULOCYTES #: 0.02 E9/L
IMMATURE GRANULOCYTES %: 0.3 % (ref 0–5)
LYMPHOCYTES ABSOLUTE: 2.37 E9/L (ref 1.5–4)
LYMPHOCYTES RELATIVE PERCENT: 37.6 % (ref 20–42)
MAGNESIUM: 1.7 MG/DL (ref 1.6–2.6)
MCH RBC QN AUTO: 28 PG (ref 26–35)
MCHC RBC AUTO-ENTMCNC: 32.8 % (ref 32–34.5)
MCV RBC AUTO: 85.2 FL (ref 80–99.9)
MONOCYTES ABSOLUTE: 0.74 E9/L (ref 0.1–0.95)
MONOCYTES RELATIVE PERCENT: 11.7 % (ref 2–12)
NEUTROPHILS ABSOLUTE: 3.01 E9/L (ref 1.8–7.3)
NEUTROPHILS RELATIVE PERCENT: 47.9 % (ref 43–80)
PDW BLD-RTO: 17.3 FL (ref 11.5–15)
PLATELET # BLD: 296 E9/L (ref 130–450)
PMV BLD AUTO: 10 FL (ref 7–12)
POTASSIUM SERPL-SCNC: 3.9 MMOL/L (ref 3.5–5)
RBC # BLD: 4.54 E12/L (ref 3.5–5.5)
SODIUM BLD-SCNC: 135 MMOL/L (ref 132–146)
TOTAL PROTEIN: 7.9 G/DL (ref 6.4–8.3)
WBC # BLD: 6.3 E9/L (ref 4.5–11.5)

## 2020-09-01 PROCEDURE — 80053 COMPREHEN METABOLIC PANEL: CPT

## 2020-09-01 PROCEDURE — 85025 COMPLETE CBC W/AUTO DIFF WBC: CPT

## 2020-09-01 PROCEDURE — 83735 ASSAY OF MAGNESIUM: CPT

## 2020-09-02 ENCOUNTER — HOSPITAL ENCOUNTER (OUTPATIENT)
Dept: INFUSION THERAPY | Age: 65
Discharge: HOME | End: 2020-09-02
Payer: COMMERCIAL

## 2020-09-02 ENCOUNTER — OFFICE VISIT (OUTPATIENT)
Dept: ONCOLOGY | Age: 65
End: 2020-09-02
Payer: COMMERCIAL

## 2020-09-02 ENCOUNTER — HOSPITAL ENCOUNTER (OUTPATIENT)
Dept: OCCUPATIONAL THERAPY | Age: 65
Setting detail: THERAPIES SERIES
Discharge: HOME | End: 2020-09-02
Payer: COMMERCIAL

## 2020-09-02 VITALS
SYSTOLIC BLOOD PRESSURE: 159 MMHG | HEART RATE: 70 BPM | DIASTOLIC BLOOD PRESSURE: 70 MMHG | TEMPERATURE: 97.1 F | HEIGHT: 63 IN | OXYGEN SATURATION: 94 % | WEIGHT: 217.9 LBS | BODY MASS INDEX: 38.61 KG/M2

## 2020-09-02 VITALS
TEMPERATURE: 97.6 F | RESPIRATION RATE: 18 BRPM | DIASTOLIC BLOOD PRESSURE: 67 MMHG | SYSTOLIC BLOOD PRESSURE: 141 MMHG | HEART RATE: 60 BPM

## 2020-09-02 DIAGNOSIS — C50.912 INVASIVE DUCTAL CARCINOMA OF LEFT BREAST (HCC): Primary | ICD-10-CM

## 2020-09-02 PROCEDURE — G8427 DOCREV CUR MEDS BY ELIG CLIN: HCPCS | Performed by: INTERNAL MEDICINE

## 2020-09-02 PROCEDURE — 99214 OFFICE O/P EST MOD 30 MIN: CPT | Performed by: INTERNAL MEDICINE

## 2020-09-02 PROCEDURE — G8417 CALC BMI ABV UP PARAM F/U: HCPCS | Performed by: INTERNAL MEDICINE

## 2020-09-02 PROCEDURE — 1036F TOBACCO NON-USER: CPT | Performed by: INTERNAL MEDICINE

## 2020-09-02 PROCEDURE — 3017F COLORECTAL CA SCREEN DOC REV: CPT | Performed by: INTERNAL MEDICINE

## 2020-09-02 PROCEDURE — 2580000003 HC RX 258: Performed by: INTERNAL MEDICINE

## 2020-09-02 PROCEDURE — 6360000002 HC RX W HCPCS: Performed by: INTERNAL MEDICINE

## 2020-09-02 PROCEDURE — 96413 CHEMO IV INFUSION 1 HR: CPT

## 2020-09-02 PROCEDURE — 96375 TX/PRO/DX INJ NEW DRUG ADDON: CPT

## 2020-09-02 PROCEDURE — 97530 THERAPEUTIC ACTIVITIES: CPT | Performed by: OCCUPATIONAL THERAPIST

## 2020-09-02 RX ORDER — SODIUM CHLORIDE 9 MG/ML
INJECTION, SOLUTION INTRAVENOUS CONTINUOUS
Status: CANCELLED | OUTPATIENT
Start: 2020-09-02

## 2020-09-02 RX ORDER — MEPERIDINE HYDROCHLORIDE 25 MG/ML
12.5 INJECTION INTRAMUSCULAR; INTRAVENOUS; SUBCUTANEOUS ONCE
Status: CANCELLED | OUTPATIENT
Start: 2020-09-02

## 2020-09-02 RX ORDER — SODIUM CHLORIDE 0.9 % (FLUSH) 0.9 %
5 SYRINGE (ML) INJECTION PRN
Status: CANCELLED | OUTPATIENT
Start: 2020-09-02

## 2020-09-02 RX ORDER — EPINEPHRINE 1 MG/ML
0.3 INJECTION, SOLUTION, CONCENTRATE INTRAVENOUS PRN
Status: CANCELLED | OUTPATIENT
Start: 2020-09-02

## 2020-09-02 RX ORDER — HEPARIN SODIUM (PORCINE) LOCK FLUSH IV SOLN 100 UNIT/ML 100 UNIT/ML
500 SOLUTION INTRAVENOUS PRN
Status: DISCONTINUED | OUTPATIENT
Start: 2020-09-02 | End: 2020-09-03 | Stop reason: HOSPADM

## 2020-09-02 RX ORDER — METHYLPREDNISOLONE SODIUM SUCCINATE 125 MG/2ML
125 INJECTION, POWDER, LYOPHILIZED, FOR SOLUTION INTRAMUSCULAR; INTRAVENOUS ONCE
Status: CANCELLED | OUTPATIENT
Start: 2020-09-02

## 2020-09-02 RX ORDER — SODIUM CHLORIDE 9 MG/ML
20 INJECTION, SOLUTION INTRAVENOUS ONCE
Status: CANCELLED | OUTPATIENT
Start: 2020-09-02

## 2020-09-02 RX ORDER — DEXAMETHASONE SODIUM PHOSPHATE 10 MG/ML
8 INJECTION, SOLUTION INTRAMUSCULAR; INTRAVENOUS ONCE
Status: COMPLETED | OUTPATIENT
Start: 2020-09-02 | End: 2020-09-02

## 2020-09-02 RX ORDER — SODIUM CHLORIDE 0.9 % (FLUSH) 0.9 %
10 SYRINGE (ML) INJECTION PRN
Status: DISCONTINUED | OUTPATIENT
Start: 2020-09-02 | End: 2020-09-03 | Stop reason: HOSPADM

## 2020-09-02 RX ORDER — DIPHENHYDRAMINE HYDROCHLORIDE 50 MG/ML
50 INJECTION INTRAMUSCULAR; INTRAVENOUS ONCE
Status: CANCELLED | OUTPATIENT
Start: 2020-09-02

## 2020-09-02 RX ORDER — DEXAMETHASONE SODIUM PHOSPHATE 10 MG/ML
8 INJECTION, SOLUTION INTRAMUSCULAR; INTRAVENOUS ONCE
Status: CANCELLED | OUTPATIENT
Start: 2020-09-02

## 2020-09-02 RX ORDER — SODIUM CHLORIDE 9 MG/ML
20 INJECTION, SOLUTION INTRAVENOUS ONCE
Status: DISCONTINUED | OUTPATIENT
Start: 2020-09-02 | End: 2020-09-03 | Stop reason: HOSPADM

## 2020-09-02 RX ORDER — SODIUM CHLORIDE 0.9 % (FLUSH) 0.9 %
10 SYRINGE (ML) INJECTION PRN
Status: CANCELLED | OUTPATIENT
Start: 2020-09-02

## 2020-09-02 RX ORDER — HEPARIN SODIUM (PORCINE) LOCK FLUSH IV SOLN 100 UNIT/ML 100 UNIT/ML
500 SOLUTION INTRAVENOUS PRN
Status: CANCELLED | OUTPATIENT
Start: 2020-09-02

## 2020-09-02 RX ADMIN — ADO-TRASTUZUMAB EMTANSINE 360 MG: 20 INJECTION, POWDER, LYOPHILIZED, FOR SOLUTION INTRAVENOUS at 11:45

## 2020-09-02 RX ADMIN — SODIUM CHLORIDE, PRESERVATIVE FREE 10 ML: 5 INJECTION INTRAVENOUS at 12:30

## 2020-09-02 RX ADMIN — DEXAMETHASONE SODIUM PHOSPHATE 8 MG: 10 INJECTION INTRAMUSCULAR; INTRAVENOUS at 11:27

## 2020-09-02 RX ADMIN — SODIUM CHLORIDE, PRESERVATIVE FREE 10 ML: 5 INJECTION INTRAVENOUS at 11:45

## 2020-09-02 RX ADMIN — SODIUM CHLORIDE, PRESERVATIVE FREE 10 ML: 5 INJECTION INTRAVENOUS at 11:27

## 2020-09-02 RX ADMIN — SODIUM CHLORIDE 20 ML/HR: 9 INJECTION, SOLUTION INTRAVENOUS at 11:26

## 2020-09-02 RX ADMIN — HEPARIN SODIUM (PORCINE) LOCK FLUSH IV SOLN 100 UNIT/ML 500 UNITS: 100 SOLUTION at 12:30

## 2020-09-02 NOTE — PROGRESS NOTES
Occupational Therapy      OCCUPATIONAL THERAPY PROGRESS NOTE  Phone: [phone]             Fax: [phone]      Date:  20  Initial Evaluation Date: 2020     Patient Name:  Shanika Barillas                 :  1955     Restrictions/Precautions:  fall risk  Diagnosis:  Invasive ductal carcinoma of left breast (c50.912)                                                          Insurance/Certification information:  Medical [employer] - [id number]   Referring Physician:  ALLY Kapadia  Plan of care signed (Y/N):  No  Visit# / total visits: Visit #7     Time In: 8405                  Time Out: 6043     Restrictions/Precautions:  [] No blood pressure/blood draw in: [] Left Upper Extremity [] Right Upper Extremity                        [x] Fall Risk       Intervention:   []Other    Pain:  [] No    [x] Yes  Location:  Left chest and flank  Pain Rating (0-10 pain scale):  Patient does not rate  Pain Description:  Uncomfortableness  Interruption of Treatment [] Yes  [x] No    Came to Clinic:  [] Bandaged    []Unbandaged    [] With Stocking     [] With Sleeve     [] Kinesiotaped    [] Una Ruff    [] With Alternative Compression:    Skin Integrity:  [] Normal [] Dry  []Rough []Moist []Rash  Location of problem area/s:  [] Wound: Location/Description   [x] Fibrosis: along all quadrants of the breast.  Increased firmness noted  [] Keratosis: Location/Description  [] Papillomas: Location/Description  [] Other    Color:  [x] Normal [] Mottled [] Flushed [] Other/Description  Location of problem area/s:    Edema:  Increased edema left breast and flank noted    Subjective:  Patient attended treatment session with spouse. Patient stated doing too much over this past weekend and having increased soreness along axilla into upper shoulder. Patient stated not feeling an increase in chest fluid however.     Objective:  [] Measurement [x]Manual Lymph Drainage  []Bandaging   []Kinesiotaping [x] Education    [x]Self Massage   []Self Bandaging [] Exercise    []Wound Care  []Hygiene  [] Other      Assessment:  Knowledge of home program:   [] Good [] Fair  [] Poor  Patient is programming at home:  [] Yes  [] No  Family is assisting with programming: [] Yes  [] No  Home programming is consistent: [] Yes  [] No  Other:   Response to treatment:  good  Instructions for patient:   [x] Verbal [] Written  Instructions addressed:  Self manual lymphatic massage sequence    Patient Goal: To learn how to manage what is going on     STG: 3 weeks  1. Patient will demonstrate knowledge and understanding for lymphedema precautions, skin care and self management to decrease progression of lymphedema and risk of infection. goal met  2. Patient will present with decreased limb volume in the involved extremity from moderate to mild for improved functional mobility. Goal met. 3.  Patient will demonstrate compliance with compression therapy for independent management of lymphedema. Patient continues to utilize her compression bra and it continues to fit well and working as intended. Therapist will continue to monitor fit and function of garment. Progressing toward goal.          LT weeks  1. Patient will be independent with self management of lymphedema including understanding garment wear schedule, self compression bandaging, HEP and self care. Therapist continued patient/family education regarding home exercise program, wear schedule of garments, and eating lifestyle. Patient and spouse able to verbalize understanding. Patient progressing toward goal.    2.  Patient will be fitted for appropriate compression garments for long term management of lymphedema. Patient continues to utilize compression garment as intended. Patient stated it continues to fit well and works as intended. Goal met.   3.  Patient will present with decreased limb volume in the involved extremity from mild to trace  for improved functional mobility. Therapist continued patient education regarding self manual lymphatic massage sequence for breast / flank involvement. Patient continues to perform massage sequence at home with use of hand out stating she has no issues and getting good results from sequence. Therapist reviewed sequence with spouse and demonstrated sequence providing hands on practice for spouse. Patient and spouse able to verbalize understanding. Therapist will monitor progress next treatment session. Patient presented with mild edema. 4.  Patient will maximize pain free movement of the left upper extremity to  Maximize independence with daily life tasks.     Plan: Continue to address:  [x]Bandaging  [x] Self Bandaging/Family Assist  [x]MLD  [x]Self Massage  [x]Exercise  [x]Education  []Obtain referral for garments  []Medical Hold  []Discharge to [facility] OTR/L, CLT

## 2020-09-02 NOTE — PROGRESS NOTES
Department of [facility] Oncology       Attending Clinic Note    Reason for Visit: Follow-up on a patient with Left Breast Cancer    PCP:  Ani Jimenez MD    History of Present Illness: The mass was located in the 2 o'clock position of left breast.     Breast cancer risk factors include age, gender, menarche before age 16. Age of menarche was 7. Age of menopause was 48. Patient was on birth control for 6 months in her 25s. Patient is . Age of first live birth was 30. Patient did breast feed. Bilateral screening mammogram on 2019: There is a high density, irregular mass with indistinct margins seen in the posterior upper outer quadrant of left breast.     Left Breast U/S on 2019:  Irregular solid mass with angular margins measuring 25 x 20 x 21 mm in the left breast at 2 o'clock located 8 centimeters from the nipple. No axillary LN. On 2019:  Mass, left breast at 2:00, core needle biopsy: Invasive ductal carcinoma,nuclear grade 3, see comment. 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 Breast Cancer Marker Studies:  ER: Negative  TX: Negative  Her-2/francisco Positive/3+    CXR PA/Lateral on 2019:  Normal chest.    MRI Bilateral Breast 2019: An irregular, enhancing mass is again noted in the left breast 2:00 which measures 2.0 x 2.2 x 2.6 cm. No axillary LN. No evidence of neoplasm on right. T2 N0 M0  We recommended neoadjuvant chemotherapy consisting of 6 cycles of TCHP. Side effects of TCHP reviewed with patient. She agreed to proceed. 2D-ECHO EF50-55%. Mediport placed. Cycle # 1 TCHP was on 2019. Cycle # 2 TCHP was on 2019. Cycle # 3 TCHP was on 2019. 2D-ECHO 2019 EF 65%  Cycle # 4 TCHP was on 2019  Cycle # 5 TCHP was on 2019. Cycle # 6 TCHP was on 10/23/2019. MRI Breast Bilateral 2019:   Near complete response to therapy on the left.    There is no axillary lymphadenopathy. No evidence of neoplasm on the right. 2D-ECHO 12/16/2019 EF 60%  Left needle localized lumpectomy, blue dye injection, and left axillary sentinel lymph node excision was done on 12/18/2019:  A.  Left breast, new superior-lateral margin, excision:   -Focal adenosis, columnar cell changes and stromal fibrosis/hyalinosis, negative for malignancy;   -Rare microcalcifications in benign tubules. B.  Burns lymph node #1, biopsy: Reactive node, negative for malignancy. C.  Burns lymph node #2, biopsy: Reactive node, negative for malignancy. D.  Left breast, lumpectomy with needle localization:   - Invasive adenocarcinoma of no special type (ductal, NOS), grade 3;   - Ductal carcinoma in situ, high nuclear grade, comedo/solid with necrosis;   - Scar of previous biopsy site, see comment. Comment:  Although the invasive carcinoma is very close to the superior margin in specimen D (3 mm), additional superior-lateral margins in specimen A (at least 2.0 cm in thickness) is completely negative for carcinoma.      CANCER CASE SUMMARY  Procedure: Excision  Specimen Laterality: Left  Tumor Site: Invasive Carcinoma: 12:00 (per report: HES-)  Tumor Size: Size of Largest Invasive Carcinoma: 1.4 X 1.3 X1.3 cm  Histologic Type of Invasive Carcinoma: Invasive carcinoma of no special type (ductal, NOS)  Histologic Grade: [place] Histologic Score  Glandular/tubular differentiation: Score 3  Nuclear pleomorphism: Score 3  Mitotic rate: Score 3  Overall grade: Grade 3 (scores of 9)  Tumor Focality: Single focus of invasive carcinoma  Ductal Carcinoma In Situ (DCIS): Present  Size of DCIS: Intermingled with invasive carcinoma  Number of blocks with DCIS: 3  Number of blocks examined: 14  Architectural patternS: Comedo/solid  Nuclear grade: High  Ncrosis present: Central and focal  Lobular Carcinoma In Situ (LCIS): Not identified  Margins: Uninvolved by invasive carcinoma and DCIS  Distance from closest pain.   GENITOURINARY: No dysuria, urinary frequency, hematuria. NEURO: No syncope, presyncope, headache. Remainder:  ROS NEGATIVE    Past Medical History:      Diagnosis Date    Anxiety     Cancer ([address] 2019    breast    Heart murmur     Hypertension     Post-menopausal bleeding     S/P hyst/ polypectomy 6/10/14     Medications:  Reviewed and reconciled. Allergies: Allergies   Allergen Reactions    Tetracyclines & Related Hives     Physical Exam:  BP (!) 159/70 (Site: Right Upper Arm, Position: Sitting, Cuff Size: Medium Adult)   Pulse 70   Temp 97.1 °F (36.2 °C) (Temporal)   Ht 5' 3\" (1.6 m)   Wt 217 lb 14.4 oz (98.8 kg)   SpO2 94%   BMI 38.60 kg/m²   GENERAL: Alert, oriented x 3, not in acute distress. HEENT: PERRLA; EOMI. Oropharynx clear. NECK: Supple. Without lymphadenopathy. LUNGS: Good air entry bilaterally. No wheezing, crackles or ronchi. CARDIOVASCULAR: Regular rate. No murmurs, rubs or gallops. ABDOMEN: Soft. Non-tender, non-distended. Positive bowel sounds. EXTREMITIES: Without clubbing, cyanosis, or edema. NEUROLOGIC: No focal deficits. ECOG PS 1    Impression/Plan:  60 y.o. female with Left Breast Cancer    Mass, left breast at 2:00, core needle biopsy: Invasive ductal carcinoma,nuclear grade 3, see comment. 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 Breast Cancer Marker Studies:  ER: Negative  MS: Negative  Her-2/francisco Positive/3+    MRI Bilateral Breast 05/23/2019: An irregular, enhancing mass is again noted in the left breast 2:00 which measures 2.0 x 2.2 x 2.6 cm. No axillary LN. No evidence of neoplasm on right. T2 N0 M0  We recommended neoadjuvant chemotherapy consisting of 6 cycles of TCHP. Side effects of TCHP reviewed with patient. She agreed to proceed. 2D-ECHO EF50-55%. Mediport placed. Cycle # 1 TCHP was on 06/24/2019. Cycle # 2 TCHP was on 07/24/2019. Cycle # 3 TCHP was on 08/14/2019.    2D-ECHO 08/23/2019 EF 65%  Cycle # 4 TCHP was on 09/04/2019. Cycle # 5 TCHP was on 09/25/2019. Cycle # 6 TCHP was on 10/23/2019. MRI Breast Bilateral 11/07/2019:   Near complete response to therapy on the left. There is no axillary lymphadenopathy. No evidence of neoplasm on the right. Herceptin/Perjeta was on 12/04/2019. 2D-ECHO 12/16/2019 EF 60%    Left needle localized lumpectomy, blue dye injection, and left axillary sentinel lymph node excision on 12/18/2019:  A.  Left breast, new superior-lateral margin, excision:   -Focal adenosis, columnar cell changes and stromal fibrosis/hyalinosis, negative for malignancy;   -Rare microcalcifications in benign tubules. B.  Young lymph node #1, biopsy: Reactive node, negative for malignancy. C.  Young lymph node #2, biopsy: Reactive node, negative for malignancy. D.  Left breast, lumpectomy with needle localization:   - Invasive adenocarcinoma of no special type (ductal, NOS), grade 3;   - Ductal carcinoma in situ, high nuclear grade, comedo/solid with necrosis;   - Scar of previous biopsy site, see comment. Comment:  Although the invasive carcinoma is very close to the superior margin in specimen D (3 mm), additional superior-lateral margins in specimen A (at least 2.0 cm in thickness) is completely negative for carcinoma.      CANCER CASE SUMMARY  Procedure: Excision  Specimen Laterality: Left  Tumor Site: Invasive Carcinoma: 12:00 (per report: HES-)  Tumor Size: Size of Largest Invasive Carcinoma: 1.4 X 1.3 X1.3 cm  Histologic Type of Invasive Carcinoma: Invasive carcinoma of no special type (ductal, NOS)  Histologic Grade: [place] Histologic Score  Glandular/tubular differentiation: Score 3  Nuclear pleomorphism: Score 3  Mitotic rate: Score 3  Overall grade: Grade 3 (scores of 9)  Tumor Focality: Single focus of invasive carcinoma  Ductal Carcinoma In Situ (DCIS): Present  Size of DCIS: Intermingled with invasive carcinoma  Number of blocks with DCIS: 3  Number of blocks examined: 14  Architectural patternS: [place]/solid  Nuclear grade: High  Ncrosis present: Central and focal  Lobular Carcinoma In Situ (LCIS): Not identified  Margins: Uninvolved by invasive carcinoma and DCIS  Distance from closest margin: 3 mm  Specify closest margin: surperior (see comment)  Regional lymph Nodes: Uninvolved by tumor cells  Total number of lymph nodes examined: 2  Number of sentinel nodes examined: 2  Treatment Effect: No known presurgical therapy  Lymph-Vascular Invasion: Not identified  Pathologic Staging (pTNM, AJCC 8TH Edition)  Primary tumor: pT1c  Regional lymph nodes (modifier- (sn) sentinel nodes examined: (sn)0  ER: Negative  FL: Negative  Her-2/francisco Positive/3+    Pathology report reviewed extensively with patient. Radiation Oncology team consulted for adjuvant RT. Recommended Ado-Trastuzumab alone for 14 cycles. Side effects of Ado-Trastuzumab reviewed with patient. She agreed to proceed. Cycle # 1 Ado-Trastuzumab was on 01/06/2020. Cycle # 2 Ado-Trastuzumab was on 01/27/2020. RT was started on 01/27/2020 and completed on 03/11/2020. Cycle # 3 Ado-Trastuzumab was on 02/17/2020. Cycle # 4 Ado-Trastuzumab was on 03/16/2020.  2D-ECHO 04/15/2020: EF 65%  Cycle # 5 Ado-Trastuzumab was on 04/20/2020. Cycle # 6 Ado-Trastuzumab was on 05/11/2020. Bilateral Diagnostic Mammogram on 05/21/2020 Negative for malignancy. Left Breast U/S 05/29/2020 Area of post surgical change in the left breast is benign. Cycle # 7 Ado-Trastuzumab was on 06/01/2020. Cycle # 8 Ado-Trastuzumab was on 06/22/2020.  2d-Echo 07/20/2020 noted EF 65-70%  Cycle # 9 Ado-Trastuzumab was on 07/22/2020. Cycle # 10 Ado-Trastuzumab was on 08/12/2020. Cycle # 11 Ado-Trastuzumab is today 09/02/2020. Labs reviewed. Ok to proceed. RTC 3 weeks for Cycle # 12 Ado-Trastuzumab.     Kimberly Phillips MD   [id number]  Board Certified Medical Oncologist

## 2020-09-09 ENCOUNTER — HOSPITAL ENCOUNTER (OUTPATIENT)
Dept: OCCUPATIONAL THERAPY | Age: 65
Setting detail: THERAPIES SERIES
Discharge: HOME | End: 2020-09-09
Payer: COMMERCIAL

## 2020-09-09 PROCEDURE — 97530 THERAPEUTIC ACTIVITIES: CPT | Performed by: OCCUPATIONAL THERAPIST

## 2020-09-09 NOTE — PROGRESS NOTES
Occupational Therapy      OCCUPATIONAL THERAPY PROGRESS NOTE  Phone: [phone]             Fax: [phone]      Date:  20  Initial Evaluation Date: 2020     Patient Name:  Joann Cedillo                 :  1955     Restrictions/Precautions:  fall risk  Diagnosis:  Invasive ductal carcinoma of left breast (c50.912)                                                          Insurance/Certification information:  [facility] [place] - [id number]   Referring Physician:  ALLY Jimenez  Plan of care signed (Y/N):  No  Visit# / total visits: Visit #8      Time In: 1330                  Time Out: 6533     Restrictions/Precautions:  [] No blood pressure/blood draw in: [] Left Upper Extremity [] Right Upper Extremity                        [x] Fall Risk       Intervention:   []Other    Pain:  [] No    [x] Yes  Location:  Left chest and flank  Pain Rating (0-10 pain scale):  Patient does not rate  Pain Description:  Uncomfortableness  Interruption of Treatment [] Yes  [x] No    Came to Clinic:  [] Bandaged    []Unbandaged    [] With Stocking     [] With Sleeve     [] Kinesiotaped    [] [place]    [] With Alternative Compression:    Skin Integrity:  [] Normal [] Dry  []Rough []Moist []Rash  Location of problem area/s:  [] Wound: Location/Description   [x] Fibrosis: along all quadrants of the breast.  Increased firmness noted; healing  [] Keratosis: Location/Description  [] Papillomas: Location/Description  [] Other    Color:  [x] Normal [] Mottled [] Flushed [] Other/Description  Location of problem area/s:    Edema:  Increased edema left breast and flank noted    Subjective:  Patient attended treatment session alone.   Patient stated increased edema upper lateral chest and axilla / upper flank    Objective:  [] Measurement [x]Manual Lymph Drainage  []Bandaging   []Kinesiotaping [x] Education    [x]Self Massage   []Self Bandaging [] Exercise    []Wound Care  []Hygiene  [] the patients lymphedema? No   IF NO, what symptoms demonstrate that  basic pneumatic compression device is ineffective in managing lymphedema? (check all that apply):    basic pneumatic compression device caused or exacerbated swelling proximal to pump sleeve (top of thigh, genitals, abdomen, hips, buttocks, etc.) Yes     basic pneumatic compression device was unable to accommodate limb size  No   Patient unable to tolerate  basic pneumatic compression device due to pain/compromised skin integrity (e.g., open wounds/ulcers) No    basic pneumatic compression device did not control swelling Yes   Basic pneumatic compression device moved fluid to upper lateral chest and axilla / upper flank area causing increased heaviness and discomfort. Patient educated how pump leaves fluid in this area and patient will need to manually move fluid to an area that can process fluid. Patient able to verbalize understanding. Patient has tried an  advanced pneumatic compression device? Yes      If YES:   [id number] advanced pneumatic compression device was used for <4 weeks   Was the  advanced pneumatic compression device effective in managing the patients lymphedema? Yes    Patient able to feel difference in pumps and stated she did not feel the heaviness or discomfort in upper lateral chest or axilla / upper flank area after use. Pump able to effectively manage fluid throughout chest and flank. TREATMENT PLAN:   Patient to complete the following treatment(s): Compression Garments and/or Bandaging: daily    Advanced Pneumatic Compression Device: daily  Professional Lymphedema Treatment: 2-3x/wk   Self-MLD: daily  Exercise: 2-3x/wk as able   Elevation: daily            Patient Goal: To learn how to manage what is going on     STG: 3 weeks  1.   Patient will demonstrate knowledge and understanding for lymphedema precautions, skin care and self management to decrease progression of lymphedema and risk of infection. goal met  2. Patient will present with decreased limb volume in the involved extremity from moderate to mild for improved functional mobility. Goal met. 3.  Patient will demonstrate compliance with compression therapy for independent management of lymphedema. Patient continues to utilize her compression bra and having increased discomfort at axilla / upper flank, and lateral upper chest.  Therapist discussed issue with increased fluid in these areas. Patient stated after advanced pump use she did not feel as uncomfortable or heavy in these areas. Progressing toward goal.          LT weeks  1. Patient will be independent with self management of lymphedema including understanding garment wear schedule, self compression bandaging, HEP and self care. Therapist continued patient education regarding home exercise program, wear schedule of garments, and eating lifestyle. Patient able to verbalize understanding. Patient progressing toward goal.    2.  Patient will be fitted for appropriate compression garments for long term management of lymphedema. Patient continues to utilize compression garment as intended. Patient educated on performing sequence for flank area from hip to axilla to assist in moving fluid from upper lateral chest and axilla to hip for proper management. Discussed how moving this fluid will relieve area of discomfort. Patient able to verbalize understanding. 3.  Patient will present with decreased limb volume in the involved extremity from mild to trace  for improved functional mobility. Therapist continued patient education regarding self manual lymphatic massage sequence for breast / flank involvement. Patient continues to perform massage sequence at home with use of hand out stating she has no issues and getting good results from sequence. Patient performed trial of basic and advanced lymphedema pump.  Patient able to verbalize difference between two pumps and how advanced pump was able to manage the fluid more effectively than the basic pump. Progressing toward goal.    4.  Patient will maximize pain free movement of the left upper extremity to  Maximize independence with daily life tasks. Therapist continued patient education regarding range of motion / stretching exercises for upper extremity. Patient exhibited full range of motion with minimal discomfort noted.   Patient progressing toward goal.    Plan: Continue to address:  [x]Bandaging  [x] Self Bandaging/Family Assist  [x]MLD  [x]Self Massage  [x]Exercise  [x]Education  []Obtain referral for garments  []Medical Hold  []Discharge to [facility] OTR/L, CLT

## 2020-09-22 ENCOUNTER — HOSPITAL ENCOUNTER (OUTPATIENT)
Dept: INFUSION THERAPY | Age: 65
Discharge: HOME | End: 2020-09-22
Payer: COMMERCIAL

## 2020-09-22 ENCOUNTER — HOSPITAL ENCOUNTER (OUTPATIENT)
Dept: RADIATION ONCOLOGY | Age: 65
Discharge: HOME | End: 2020-09-22
Attending: RADIOLOGY
Payer: COMMERCIAL

## 2020-09-22 DIAGNOSIS — C50.912 INVASIVE DUCTAL CARCINOMA OF LEFT BREAST (HCC): ICD-10-CM

## 2020-09-22 DIAGNOSIS — C50.412 MALIGNANT NEOPLASM OF UPPER-OUTER QUADRANT OF LEFT BREAST IN FEMALE, ESTROGEN RECEPTOR POSITIVE (HCC): Primary | ICD-10-CM

## 2020-09-22 DIAGNOSIS — Z17.0 MALIGNANT NEOPLASM OF UPPER-OUTER QUADRANT OF LEFT BREAST IN FEMALE, ESTROGEN RECEPTOR POSITIVE (HCC): Primary | ICD-10-CM

## 2020-09-22 LAB
ALBUMIN SERPL-MCNC: 4.1 G/DL (ref 3.5–5.2)
ALP BLD-CCNC: 96 U/L (ref 35–104)
ALT SERPL-CCNC: 35 U/L (ref 0–32)
ANION GAP SERPL CALCULATED.3IONS-SCNC: 14 MMOL/L (ref 7–16)
AST SERPL-CCNC: 40 U/L (ref 0–31)
BASOPHILS ABSOLUTE: 0.05 E9/L (ref 0–0.2)
BASOPHILS RELATIVE PERCENT: 0.8 % (ref 0–2)
BILIRUB SERPL-MCNC: 0.4 MG/DL (ref 0–1.2)
BUN BLDV-MCNC: 18 MG/DL (ref 8–23)
CALCIUM SERPL-MCNC: 10.4 MG/DL (ref 8.6–10.2)
CHLORIDE BLD-SCNC: 89 MMOL/L (ref 98–107)
CO2: 25 MMOL/L (ref 22–29)
CREAT SERPL-MCNC: 0.8 MG/DL (ref 0.5–1)
EOSINOPHILS ABSOLUTE: 0.11 E9/L (ref 0.05–0.5)
EOSINOPHILS RELATIVE PERCENT: 1.7 % (ref 0–6)
GFR AFRICAN AMERICAN: >60
GFR NON-AFRICAN AMERICAN: >60 ML/MIN/1.73
GLUCOSE BLD-MCNC: 100 MG/DL (ref 74–99)
HCT VFR BLD CALC: 37.6 % (ref 34–48)
HEMOGLOBIN: 12.4 G/DL (ref 11.5–15.5)
IMMATURE GRANULOCYTES #: 0.02 E9/L
IMMATURE GRANULOCYTES %: 0.3 % (ref 0–5)
LYMPHOCYTES ABSOLUTE: 2.21 E9/L (ref 1.5–4)
LYMPHOCYTES RELATIVE PERCENT: 33.9 % (ref 20–42)
MAGNESIUM: 1.8 MG/DL (ref 1.6–2.6)
MCH RBC QN AUTO: 27.7 PG (ref 26–35)
MCHC RBC AUTO-ENTMCNC: 33 % (ref 32–34.5)
MCV RBC AUTO: 84.1 FL (ref 80–99.9)
MONOCYTES ABSOLUTE: 0.72 E9/L (ref 0.1–0.95)
MONOCYTES RELATIVE PERCENT: 11.1 % (ref 2–12)
NEUTROPHILS ABSOLUTE: 3.4 E9/L (ref 1.8–7.3)
NEUTROPHILS RELATIVE PERCENT: 52.2 % (ref 43–80)
PDW BLD-RTO: 17 FL (ref 11.5–15)
PLATELET # BLD: 264 E9/L (ref 130–450)
PMV BLD AUTO: 10.1 FL (ref 7–12)
POTASSIUM SERPL-SCNC: 3.8 MMOL/L (ref 3.5–5)
RBC # BLD: 4.47 E12/L (ref 3.5–5.5)
SODIUM BLD-SCNC: 128 MMOL/L (ref 132–146)
TOTAL PROTEIN: 7.5 G/DL (ref 6.4–8.3)
WBC # BLD: 6.5 E9/L (ref 4.5–11.5)

## 2020-09-22 PROCEDURE — 36415 COLL VENOUS BLD VENIPUNCTURE: CPT

## 2020-09-22 PROCEDURE — 85025 COMPLETE CBC W/AUTO DIFF WBC: CPT

## 2020-09-22 PROCEDURE — 80053 COMPREHEN METABOLIC PANEL: CPT

## 2020-09-22 PROCEDURE — 99442 PR PHYS/QHP TELEPHONE EVALUATION 11-20 MIN: CPT | Performed by: NURSE PRACTITIONER

## 2020-09-22 PROCEDURE — 83735 ASSAY OF MAGNESIUM: CPT

## 2020-09-22 RX ORDER — SODIUM CHLORIDE 0.9 % (FLUSH) 0.9 %
10 SYRINGE (ML) INJECTION PRN
Status: DISCONTINUED | OUTPATIENT
Start: 2020-09-22 | End: 2020-09-23 | Stop reason: HOSPADM

## 2020-09-22 RX ORDER — HEPARIN SODIUM (PORCINE) LOCK FLUSH IV SOLN 100 UNIT/ML 100 UNIT/ML
500 SOLUTION INTRAVENOUS PRN
Status: CANCELLED | OUTPATIENT
Start: 2020-09-22

## 2020-09-22 RX ORDER — HEPARIN SODIUM (PORCINE) LOCK FLUSH IV SOLN 100 UNIT/ML 100 UNIT/ML
500 SOLUTION INTRAVENOUS PRN
Status: DISCONTINUED | OUTPATIENT
Start: 2020-09-22 | End: 2020-09-23 | Stop reason: HOSPADM

## 2020-09-22 RX ORDER — SODIUM CHLORIDE 0.9 % (FLUSH) 0.9 %
10 SYRINGE (ML) INJECTION PRN
Status: CANCELLED | OUTPATIENT
Start: 2020-09-22

## 2020-09-22 NOTE — PROGRESS NOTES
Telephone visit due to COVID-19    Patient identified: yes  Patient consented for telehealth services: yes  Patient location: home  Provider location: [facility]  Persons on today's call: patient        Radiation Oncology   Follow Up Note        2020    Manny Gibson MD,      HPI:  Kym Beatty is a pleasant 60 y.o. female with a diagnosis of left breast cancer (ER/NE-, Her2+), s/p NACT, BCS and adjuvant radiation therapy. The patient underwent a course of radiation therapy to the left breast, which was completed on 3/11/20. She completed 6000 cGy in 30 fractions. States that she is doing well. Denies any skin issues. Completes monthly self breast exams although she is worried she will not feel a lump as she has lymphedema of the left breast. She does follow up with Lymphedema Therapy. She has gotten fitted for a lymphedema pump and is completing massage as instructed to the left breast.    Since starting immunotherapy, she has noticed hiccups with eating and acid reflux/ \"bubbly stomach\" at times. TUMS seems to help with this. Patient is following with Dr Russel Naqvi for medical oncology. Continues on immunotherapy. Next appt 20. Patient is following with Dr Mariana Smith and ALLY Daley for breast surgery. Next appt 20. Past Medical History:   Diagnosis Date    Anxiety     Cancer [facility])     breast    Heart murmur     Hypertension     Post-menopausal bleeding     S/P hyst/ polypectomy 6/10/14         Past Surgical History:   Procedure Laterality Date    BREAST BIOPSY      BREAST BIOPSY Left 2019    LEFT BREAST  NEEDLE LOCALIZED LUMPECTOMY BLUE DYE INJECTION LEFT AXILL.  SENTINEL LYMPH NODE EXCISION, INSERTION OF BIOZORB performed by Kassie Chavez MD at [address] LUMPECTOMY Left 2019    patient had a left axillary SLN     SECTION      COLONOSCOPY      normal - Dr Luda Irwin x 10 years    DILATION AND CURETTAGE  INSERTION / REMOVAL / REPLACEMENT VENOUS ACCESS CATHETER N/A 6/18/2019    MEDI PORT PLACEMENT performed by Amandeep Santizo MD at [address] History     Socioeconomic History    Marital status:      Spouse name: Not on file    Number of children: Not on file    Years of education: Not on file    Highest education level: Not on file   Occupational History    Not on file   Social Needs    Financial resource strain: Not on file    Food insecurity     Worry: Not on file     Inability: Not on file    Transportation needs     Medical: Not on file     Non-medical: Not on file   Tobacco Use    Smoking status: Never Smoker    Smokeless tobacco: Never Used   Substance and Sexual Activity    Alcohol use: Yes     Alcohol/week: 0.0 standard drinks     Comment: socially    Drug use: No    Sexual activity: Not Currently     Partners: Male     Birth control/protection: Post-menopausal   Lifestyle    Physical activity     Days per week: Not on file     Minutes per session: Not on file    Stress: Not on file   Relationships    Social connections     Talks on phone: Not on file     Gets together: Not on file     Attends Voodoo service: Not on file     Active member of club or organization: Not on file     Attends meetings of clubs or organizations: Not on file     Relationship status: Not on file    Intimate partner violence     Fear of current or ex partner: Not on file     Emotionally abused: Not on file     Physically abused: Not on file     Forced sexual activity: Not on file   Other Topics Concern    Not on file   Social History Narrative    Lives in [place].          Family History   Problem Relation Age of Onset    Bleeding Prob Sister     Bleeding Prob Sister     Heart Disease Father     Heart Attack Father 79    High Blood Pressure Father     Cancer Mother 61        lung    Stroke Maternal Grandmother     Stroke Paternal Grandfather     Cancer Paternal Grandfather 48 stomach\"   Genitourinary: Patient acknowledges no dysuria, trouble voiding, or hematuria. No nocturia or increased frequency.  Musculoskeletal: No gait disturbance, weakness or joint complaints.  Integumentary: No rash or pruritis.  Neurological: No headache, diplopia, change in muscle strength, numbness or tingling. No change in gait, balance, coordination, mood, affect, memory, mentation, behavior.  Psychiatric: No anxiety, or depression.  Endocrine: No temperature intolerance. No excessive thirst, fluid intake, or urination. No tremor.  Hematologic/Lymphatic: No abnormal bruising or bleeding, blood clots or swollen lymph nodes. + Lymphedema to Left Breast   Allergic/Immunologic: No nasal congestion or hives. IMAGING:    US LEFT BREAST, 5/29/20:  Narrative    ULTRASOUND FINDINGS:         Finding 1:    Sonography was performed in the left breast using a radial and anti-radial approach.  There is an area of post surgical change seen in the left breast at 12 o'clock located 1 centimeter from the nipple.         Associated small seroma that measures 36 x 15 x 43 mm and does not appear to be drainable.         No sonographic evidence of suspicious solid or cystic mass lesions.  No focal areas of suspicious atypical echogenicity.         IMPRESSION:    Area of post surgical change in the left breast is benign.         Screening mammogram in 1 year is recommended.         =======================================    BI-RADS Category 2:  Benign    =======================================             MAMMOGRAM, 5/21/20:   Narrative    TISSUE DENSITY:    The breasts are heterogeneously dense (Type 3 density).         MAMMOGRAM FINDINGS:    Finding 1:    No suspicious masses, areas of suspicious architectural distortion, suspicious calcifications, or additional suspicious findings are identified.  There are no significant changes from the prior study.         IMPRESSION:    No mammographic evidence of malignancy.         Screening mammogram in 1 year is recommended.         =======================================    BI-RADS Category 1:  Negative    =======================================                 ASSESSMENT/PLAN:    Left breast cancer (ER/IN-, Her2+), s/p NACT, BCS and adjuvant radiation therapy to the left breast completed on 3/11/20 (6000 cGy in 30 fractions). Patient is doing well post-radiation completion. Skin care and sun care reviewed. Encouraged to continue to complete monthly self breast exams. Reviewed signs and symptoms of recurrence. Imaging per med onc/ breast surgery. Cont to follow with Dr Anurag Marroquin for medical oncology. Continues on immunotherapy. Next appt 9/23/20. Cont to follow with Dr Eloisa Kimbrough and ALLY Mead for breast surgery. Next appt 11/30/20. I discussed follow up plans with Valerie Hubbard. At this time, I will see the patient back in 6 months for a post radiation completion follow up visit. Instructed to follow up with other providers involved in their care as directed (including but not limited to Medical Oncology, Primary Care, Pulmonary, and Surgery). The patient was given our contact number in the event that if at any time they change their mind and would like to return to the clinic to see either myself or one of the Radiation Oncologists, they can simply call us and we would be happy to see them. Thank you for involving us in the management of this extremely pleasant patient.           Sincerely,    Bertha Cr, MSN, RN, APRN-CNP  Nurse Practitioner for Radiation Oncology    [facility] [address]: [phone] ([facility]: [phone])  [facility]) [facility]:  [phone] ([facility]:  [phone])  [address] [place] [address]: [phone] ([facility]: [phone])        I AFFIRM this is a Patient Initiated Episode with a Patient who has not had a related appointment within my department in the past 7 days or scheduled within the next 24 hours.     Total time: 11-20 min

## 2020-09-23 ENCOUNTER — HOSPITAL ENCOUNTER (OUTPATIENT)
Dept: INFUSION THERAPY | Age: 65
Discharge: HOME | End: 2020-09-23
Payer: COMMERCIAL

## 2020-09-23 ENCOUNTER — OFFICE VISIT (OUTPATIENT)
Dept: ONCOLOGY | Age: 65
End: 2020-09-23
Payer: COMMERCIAL

## 2020-09-23 ENCOUNTER — HOSPITAL ENCOUNTER (OUTPATIENT)
Dept: OCCUPATIONAL THERAPY | Age: 65
Setting detail: THERAPIES SERIES
Discharge: HOME | End: 2020-09-23
Payer: COMMERCIAL

## 2020-09-23 VITALS
DIASTOLIC BLOOD PRESSURE: 76 MMHG | HEIGHT: 63 IN | BODY MASS INDEX: 38.84 KG/M2 | TEMPERATURE: 97.5 F | SYSTOLIC BLOOD PRESSURE: 153 MMHG | HEART RATE: 71 BPM | OXYGEN SATURATION: 100 % | WEIGHT: 219.2 LBS

## 2020-09-23 VITALS
DIASTOLIC BLOOD PRESSURE: 65 MMHG | SYSTOLIC BLOOD PRESSURE: 140 MMHG | HEART RATE: 59 BPM | TEMPERATURE: 97.8 F | RESPIRATION RATE: 18 BRPM

## 2020-09-23 DIAGNOSIS — C50.912 INVASIVE DUCTAL CARCINOMA OF LEFT BREAST (HCC): Primary | ICD-10-CM

## 2020-09-23 PROCEDURE — 6360000002 HC RX W HCPCS: Performed by: INTERNAL MEDICINE

## 2020-09-23 PROCEDURE — G8427 DOCREV CUR MEDS BY ELIG CLIN: HCPCS | Performed by: INTERNAL MEDICINE

## 2020-09-23 PROCEDURE — 96375 TX/PRO/DX INJ NEW DRUG ADDON: CPT

## 2020-09-23 PROCEDURE — 2580000003 HC RX 258: Performed by: INTERNAL MEDICINE

## 2020-09-23 PROCEDURE — 1036F TOBACCO NON-USER: CPT | Performed by: INTERNAL MEDICINE

## 2020-09-23 PROCEDURE — G8417 CALC BMI ABV UP PARAM F/U: HCPCS | Performed by: INTERNAL MEDICINE

## 2020-09-23 PROCEDURE — 99214 OFFICE O/P EST MOD 30 MIN: CPT | Performed by: INTERNAL MEDICINE

## 2020-09-23 PROCEDURE — 3017F COLORECTAL CA SCREEN DOC REV: CPT | Performed by: INTERNAL MEDICINE

## 2020-09-23 PROCEDURE — 96413 CHEMO IV INFUSION 1 HR: CPT

## 2020-09-23 RX ORDER — METHYLPREDNISOLONE SODIUM SUCCINATE 125 MG/2ML
125 INJECTION, POWDER, LYOPHILIZED, FOR SOLUTION INTRAMUSCULAR; INTRAVENOUS ONCE
Status: CANCELLED | OUTPATIENT
Start: 2020-09-23

## 2020-09-23 RX ORDER — SODIUM CHLORIDE 0.9 % (FLUSH) 0.9 %
10 SYRINGE (ML) INJECTION PRN
Status: DISCONTINUED | OUTPATIENT
Start: 2020-09-23 | End: 2020-09-24 | Stop reason: HOSPADM

## 2020-09-23 RX ORDER — DIPHENHYDRAMINE HYDROCHLORIDE 50 MG/ML
50 INJECTION INTRAMUSCULAR; INTRAVENOUS ONCE
Status: CANCELLED | OUTPATIENT
Start: 2020-09-23

## 2020-09-23 RX ORDER — SODIUM CHLORIDE 0.9 % (FLUSH) 0.9 %
10 SYRINGE (ML) INJECTION PRN
Status: CANCELLED | OUTPATIENT
Start: 2020-09-23

## 2020-09-23 RX ORDER — SODIUM CHLORIDE 9 MG/ML
250 INJECTION, SOLUTION INTRAVENOUS ONCE
Status: DISCONTINUED | OUTPATIENT
Start: 2020-09-23 | End: 2020-09-24 | Stop reason: HOSPADM

## 2020-09-23 RX ORDER — MEPERIDINE HYDROCHLORIDE 25 MG/ML
12.5 INJECTION INTRAMUSCULAR; INTRAVENOUS; SUBCUTANEOUS ONCE
Status: CANCELLED | OUTPATIENT
Start: 2020-09-23

## 2020-09-23 RX ORDER — HEPARIN SODIUM (PORCINE) LOCK FLUSH IV SOLN 100 UNIT/ML 100 UNIT/ML
500 SOLUTION INTRAVENOUS PRN
Status: DISCONTINUED | OUTPATIENT
Start: 2020-09-23 | End: 2020-09-24 | Stop reason: HOSPADM

## 2020-09-23 RX ORDER — SODIUM CHLORIDE 9 MG/ML
INJECTION, SOLUTION INTRAVENOUS CONTINUOUS
Status: CANCELLED | OUTPATIENT
Start: 2020-09-23

## 2020-09-23 RX ORDER — DEXAMETHASONE SODIUM PHOSPHATE 10 MG/ML
8 INJECTION, SOLUTION INTRAMUSCULAR; INTRAVENOUS ONCE
Status: CANCELLED | OUTPATIENT
Start: 2020-09-23

## 2020-09-23 RX ORDER — DEXAMETHASONE SODIUM PHOSPHATE 10 MG/ML
8 INJECTION, SOLUTION INTRAMUSCULAR; INTRAVENOUS ONCE
Status: COMPLETED | OUTPATIENT
Start: 2020-09-23 | End: 2020-09-23

## 2020-09-23 RX ORDER — HEPARIN SODIUM (PORCINE) LOCK FLUSH IV SOLN 100 UNIT/ML 100 UNIT/ML
500 SOLUTION INTRAVENOUS PRN
Status: CANCELLED | OUTPATIENT
Start: 2020-09-23

## 2020-09-23 RX ORDER — SODIUM CHLORIDE 0.9 % (FLUSH) 0.9 %
5 SYRINGE (ML) INJECTION PRN
Status: CANCELLED | OUTPATIENT
Start: 2020-09-23

## 2020-09-23 RX ORDER — EPINEPHRINE 1 MG/ML
0.3 INJECTION, SOLUTION, CONCENTRATE INTRAVENOUS PRN
Status: CANCELLED | OUTPATIENT
Start: 2020-09-23

## 2020-09-23 RX ORDER — SODIUM CHLORIDE 9 MG/ML
20 INJECTION, SOLUTION INTRAVENOUS ONCE
Status: CANCELLED | OUTPATIENT
Start: 2020-09-23

## 2020-09-23 RX ADMIN — HEPARIN SODIUM (PORCINE) LOCK FLUSH IV SOLN 100 UNIT/ML 500 UNITS: 100 SOLUTION at 12:10

## 2020-09-23 RX ADMIN — DEXAMETHASONE SODIUM PHOSPHATE 8 MG: 10 INJECTION, SOLUTION INTRAMUSCULAR; INTRAVENOUS at 10:57

## 2020-09-23 RX ADMIN — SODIUM CHLORIDE, PRESERVATIVE FREE 10 ML: 5 INJECTION INTRAVENOUS at 12:10

## 2020-09-23 RX ADMIN — SODIUM CHLORIDE 250 ML: 9 INJECTION, SOLUTION INTRAVENOUS at 10:56

## 2020-09-23 RX ADMIN — SODIUM CHLORIDE, PRESERVATIVE FREE 10 ML: 5 INJECTION INTRAVENOUS at 10:56

## 2020-09-23 RX ADMIN — ADO-TRASTUZUMAB EMTANSINE 360 MG: 20 INJECTION, POWDER, LYOPHILIZED, FOR SOLUTION INTRAVENOUS at 11:17

## 2020-09-23 NOTE — PROGRESS NOTES
Department of [facility] Oncology       Attending Clinic Note    Reason for Visit: Follow-up on a patient with Left Breast Cancer    PCP:  Margaux Dumont MD    History of Present Illness: The mass was located in the 2 o'clock position of left breast.     Breast cancer risk factors include age, gender, menarche before age 16. Age of menarche was 7. Age of menopause was 48. Patient was on birth control for 6 months in her 25s. Patient is . Age of first live birth was 30. Patient did breast feed. Bilateral screening mammogram on 2019: There is a high density, irregular mass with indistinct margins seen in the posterior upper outer quadrant of left breast.     Left Breast U/S on 2019:  Irregular solid mass with angular margins measuring 25 x 20 x 21 mm in the left breast at 2 o'clock located 8 centimeters from the nipple. No axillary LN. On 2019:  Mass, left breast at 2:00, core needle biopsy: Invasive ductal carcinoma,nuclear grade 3, see comment. 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 Breast Cancer Marker Studies:  ER: Negative  PA: Negative  Her-2/francisco Positive/3+    CXR PA/Lateral on 2019:  Normal chest.    MRI Bilateral Breast 2019: An irregular, enhancing mass is again noted in the left breast 2:00 which measures 2.0 x 2.2 x 2.6 cm. No axillary LN. No evidence of neoplasm on right. T2 N0 M0  We recommended neoadjuvant chemotherapy consisting of 6 cycles of TCHP. Side effects of TCHP reviewed with patient. She agreed to proceed. 2D-ECHO EF50-55%. Mediport placed. Cycle # 1 TCHP was on 2019. Cycle # 2 TCHP was on 2019. Cycle # 3 TCHP was on 2019. 2D-ECHO 2019 EF 65%  Cycle # 4 TCHP was on 2019  Cycle # 5 TCHP was on 2019. Cycle # 6 TCHP was on 10/23/2019. MRI Breast Bilateral 2019:   Near complete response to therapy on the left.    There is no axillary lymphadenopathy. No evidence of neoplasm on the right. 2D-ECHO 12/16/2019 EF 60%  Left needle localized lumpectomy, blue dye injection, and left axillary sentinel lymph node excision was done on 12/18/2019:  A.  Left breast, new superior-lateral margin, excision:   -Focal adenosis, columnar cell changes and stromal fibrosis/hyalinosis, negative for malignancy;   -Rare microcalcifications in benign tubules. B.  [place] lymph node #1, biopsy: Reactive node, negative for malignancy. C.  [place] lymph node #2, biopsy: Reactive node, negative for malignancy. D.  Left breast, lumpectomy with needle localization:   - Invasive adenocarcinoma of no special type (ductal, NOS), grade 3;   - Ductal carcinoma in situ, high nuclear grade, comedo/solid with necrosis;   - Scar of previous biopsy site, see comment. Comment:  Although the invasive carcinoma is very close to the superior margin in specimen D (3 mm), additional superior-lateral margins in specimen A (at least 2.0 cm in thickness) is completely negative for carcinoma.      CANCER CASE SUMMARY  Procedure: Excision  Specimen Laterality: Left  Tumor Site: Invasive Carcinoma: 12:00 (per report: HES-)  Tumor Size: Size of Largest Invasive Carcinoma: 1.4 X 1.3 X1.3 cm  Histologic Type of Invasive Carcinoma: Invasive carcinoma of no special type (ductal, NOS)  Histologic Grade: Mcnary Histologic Score  Glandular/tubular differentiation: Score 3  Nuclear pleomorphism: Score 3  Mitotic rate: Score 3  Overall grade: Grade 3 (scores of 9)  Tumor Focality: Single focus of invasive carcinoma  Ductal Carcinoma In Situ (DCIS): Present  Size of DCIS: Intermingled with invasive carcinoma  Number of blocks with DCIS: 3  Number of blocks examined: 14  Architectural patternS: Comedo/solid  Nuclear grade: High  Ncrosis present: Central and focal  Lobular Carcinoma In Situ (LCIS): Not identified  Margins: Uninvolved by invasive carcinoma and DCIS  Distance from closest margin: 3 mm  Specify closest margin: surperior (see comment)  Regional lymph Nodes: Uninvolved by tumor cells  Total number of lymph nodes examined: 2  Number of sentinel nodes examined: 2  Treatment Effect: No known presurgical therapy  Lymph-Vascular Invasion: Not identified  Pathologic Staging (pTNM, AJCC 8TH Edition)  Primary tumor: pT1c  Regional lymph nodes (modifier- (sn) sentinel nodes examined: (sn)0  ER: Negative  NV: Negative  Her-2/francisco Positive/3+    Pathology report reviewed extensively with patient. Recommended Ado-Trastuzumab alone for 14 cycles. Side effects of Ado-Trastuzumab reviewed with patient. She agreed to proceed. Cycle # 1 Ado-Trastuzumab was on 01/06/2020. Cycle # 2 Ado-Trastuzumab was on 01/27/2020. RT was started on 01/27/2020 and completed on 03/11/2020. Cycle # 3 Ado-Trastuzumab was on 02/17/2020. Cycle # 4 Ado-Trastuzumab was on 03/16/2020.  2D-ECHO 04/15/2020: EF 65%  Cycle # 5 Ado-Trastuzumab was on 04/20/2020. Cycle # 6 Ado-Trastuzumab was on 05/11/2020. Bilateral Diagnostic Mammogram on 05/21/2020 Negative for malignancy. Left Breast U/S 05/29/2020 Area of post surgical change in the left breast is benign. Cycle # 7 Ado-Trastuzumab was on 06/01/2020. Cycle # 8 Ado-Trastuzumab was on 06/22/2020.  2d-Echo 07/20/2020 noted EF 65-70%  Cycle # 9 Ado-Trastuzumab was on 07/22/2020. Cycle # 10 Ado-Trastuzumab was on 08/12/2020. Cycle # 11 Ado-Trastuzumab was on 09/02/2020. She presented today 09/23/2020 for Cycle # 12 Ado-Trastuzumab and is doing well. Fair appetite and energy level. No nausea/vomiting. Denies fever/chills. Labs reviewed, ok to proceed with treatment today. Review of Systems;  CONSTITUTIONAL: No fever, chills. Fair appetite and energy level. ENMT: Eyes: No diplopia. Mouth: No sore throat. RESPIRATORY: No hemoptysis, shortness of breath, cough. CARDIOVASCULAR: No chest pain, palpitations. GASTROINTESTINAL: No nausea/vomiting abdominal pain. GENITOURINARY: No dysuria, urinary frequency, hematuria. NEURO: No syncope, presyncope, headache. Remainder:  ROS NEGATIVE    Past Medical History:      Diagnosis Date    Anxiety     Cancer ([address] 2019    breast    Heart murmur     Hypertension     Post-menopausal bleeding     S/P hyst/ polypectomy 6/10/14     Medications:  Reviewed and reconciled. Allergies: Allergies   Allergen Reactions    Tetracyclines & Related Hives     Physical Exam:  BP (!) 153/76   Pulse 71   Temp 97.5 °F (36.4 °C)   Ht 5' 3\" (1.6 m)   Wt 219 lb 3.2 oz (99.4 kg)   SpO2 100%   BMI 38.83 kg/m²   GENERAL: Alert, oriented x 3, not in acute distress. HEENT: PERRLA; EOMI. Oropharynx clear. NECK: Supple. Without lymphadenopathy. LUNGS: Good air entry bilaterally. No wheezing, crackles or ronchi. CARDIOVASCULAR: Regular rate. No murmurs, rubs or gallops. ABDOMEN: Soft. Non-tender, non-distended. Positive bowel sounds. EXTREMITIES: Without clubbing, cyanosis, or edema. NEUROLOGIC: No focal deficits. ECOG PS 1    Impression/Plan:  60 y.o. female with Left Breast Cancer    Mass, left breast at 2:00, core needle biopsy: Invasive ductal carcinoma,nuclear grade 3, see comment. 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 Breast Cancer Marker Studies:  ER: Negative  [place]: Negative  Her-2/francisco Positive/3+    MRI Bilateral Breast 05/23/2019: An irregular, enhancing mass is again noted in the left breast 2:00 which measures 2.0 x 2.2 x 2.6 cm. No axillary LN. No evidence of neoplasm on right. T2 N0 M0  We recommended neoadjuvant chemotherapy consisting of 6 cycles of TCHP. Side effects of TCHP reviewed with patient. She agreed to proceed. 2D-ECHO EF50-55%. Mediport placed. Cycle # 1 TCHP was on 06/24/2019. Cycle # 2 TCHP was on 07/24/2019. Cycle # 3 TCHP was on 08/14/2019. 2D-ECHO 08/23/2019 EF 65%  Cycle # 4 TCHP was on 09/04/2019.     Cycle # 5 TCHP was on 09/25/2019. Cycle # 6 TCHP was on 10/23/2019. MRI Breast Bilateral 11/07/2019:   Near complete response to therapy on the left. There is no axillary lymphadenopathy. No evidence of neoplasm on the right. Herceptin/Perjeta was on 12/04/2019. 2D-ECHO 12/16/2019 EF 60%    Left needle localized lumpectomy, blue dye injection, and left axillary sentinel lymph node excision on 12/18/2019:  A.  Left breast, new superior-lateral margin, excision:   -Focal adenosis, columnar cell changes and stromal fibrosis/hyalinosis, negative for malignancy;   -Rare microcalcifications in benign tubules. B.  [place] lymph node #1, biopsy: Reactive node, negative for malignancy. C.  [place] lymph node #2, biopsy: Reactive node, negative for malignancy. D.  Left breast, lumpectomy with needle localization:   - Invasive adenocarcinoma of no special type (ductal, NOS), grade 3;   - Ductal carcinoma in situ, high nuclear grade, comedo/solid with necrosis;   - Scar of previous biopsy site, see comment. Comment:  Although the invasive carcinoma is very close to the superior margin in specimen D (3 mm), additional superior-lateral margins in specimen A (at least 2.0 cm in thickness) is completely negative for carcinoma.      CANCER CASE SUMMARY  Procedure: Excision  Specimen Laterality: Left  Tumor Site: Invasive Carcinoma: 12:00 (per report: HES-)  Tumor Size: Size of Largest Invasive Carcinoma: 1.4 X 1.3 X1.3 cm  Histologic Type of Invasive Carcinoma: Invasive carcinoma of no special type (ductal, NOS)  Histologic Grade: Lidya Histologic Score  Glandular/tubular differentiation: Score 3  Nuclear pleomorphism: Score 3  Mitotic rate: Score 3  Overall grade: Grade 3 (scores of 9)  Tumor Focality: Single focus of invasive carcinoma  Ductal Carcinoma In Situ (DCIS): Present  Size of DCIS: Intermingled with invasive carcinoma  Number of blocks with DCIS: 3  Number of blocks examined: 14  Architectural patternS: Comedo/solid  Nuclear grade: High  Ncrosis present: Central and focal  Lobular Carcinoma In Situ (LCIS): Not identified  Margins: Uninvolved by invasive carcinoma and DCIS  Distance from closest margin: 3 mm  Specify closest margin: surperior (see comment)  Regional lymph Nodes: Uninvolved by tumor cells  Total number of lymph nodes examined: 2  Number of sentinel nodes examined: 2  Treatment Effect: No known presurgical therapy  Lymph-Vascular Invasion: Not identified  Pathologic Staging (pTNM, AJCC 8TH Edition)  Primary tumor: pT1c  Regional lymph nodes (modifier- (sn) sentinel nodes examined: (sn)0  ER: Negative  SC: Negative  Her-2/francisco Positive/3+    Pathology report reviewed extensively with patient. Radiation Oncology team consulted for adjuvant RT. Recommended Ado-Trastuzumab alone for 14 cycles. Side effects of Ado-Trastuzumab reviewed with patient. She agreed to proceed. Cycle # 1 Ado-Trastuzumab was on 01/06/2020. Cycle # 2 Ado-Trastuzumab was on 01/27/2020. RT was started on 01/27/2020 and completed on 03/11/2020. Cycle # 3 Ado-Trastuzumab was on 02/17/2020. Cycle # 4 Ado-Trastuzumab was on 03/16/2020.  2D-ECHO 04/15/2020: EF 65%  Cycle # 5 Ado-Trastuzumab was on 04/20/2020. Cycle # 6 Ado-Trastuzumab was on 05/11/2020. Bilateral Diagnostic Mammogram on 05/21/2020 Negative for malignancy. Left Breast U/S 05/29/2020 Area of post surgical change in the left breast is benign. Cycle # 7 Ado-Trastuzumab was on 06/01/2020. Cycle # 8 Ado-Trastuzumab was on 06/22/2020.  2d-Echo 07/20/2020 noted EF 65-70%  Cycle # 9 Ado-Trastuzumab was on 07/22/2020. Cycle # 10 Ado-Trastuzumab was on 08/12/2020. Cycle # 11 Ado-Trastuzumab was on 09/02/2020. Cycle # 12 Ado-Trastuzumab is today 09/23/2020. Labs reviewed. Ok to proceed. RTC 3 weeks for Cycle # 13 Ado-Trastuzumab.     Erma Campoverde MD   08/[id number]/[id number]  Board Certified Medical Oncologist

## 2020-09-24 NOTE — PROGRESS NOTES
Occupational [address] [facility]     OCCUPATIONAL THERAPY UPDATE PROGRESS NOTE    [facility] [address]  [address] [facility]  [place]   Phone: [phone]   Fax: [phone]     Date: 2020      Initial Evaluation Date: 2020     Patient: Karlie Pierre       : 1955    Restrictions/Precautions:  fall risk  Diagnosis:  Invasive ductal carcinoma of left breast (N76.772)                                                          Insurance/Certification information:  [employer] [place] - [id number]   Referring MELANIE Quintana-CNP  Plan of care signed (Y/N):  Yes      Time In: 1405                  Time Out: 8276        Total Visits to date:  10 Cancels/No-shows to date: 0            Significant Findings At Last Visit/Comments:    Patient making steady gains toward goals. Patient demonstrates independence with home program regarding self manual massage and compression garment use. Patient continues performing her range of motion / stretch exercises. Currently patient is developing / finalizing her home program and awaiting approval for a lymphedema pump. Patient with continuation of a pocket of fluid upper flank that causes discomfort.   Patient current measurements taken and recorded below:    Lymphedema Upper Extremity Measurements     Measurements are made in 4cm increments and are circumferential.       CM Follow up #4  Left -  Follow up #3  Left -  Follow up #2  Left -  Follow up #1  Left - . 2020 Evaluation -   Left - 2020 Follow up #4  Right -  Follow up #3  Right -  Follow up #2  Right -  Follow up #1 Right - 2020  Evaluation - Right - 2020                             wrist        16.75 16.5        16.5 16.5   4cm        19.5 19.75        18.5 19   8cm        23 22.5        22 21.75   12cm        26.75 26.25        25.5 25   16cm        29.25 29        27.75 27.25   20cm       compression bandaging, HEP and self care. Therapist continued patient education regarding home exercise program, wear schedule of garments, and eating lifestyle. Patient further educated on how to adjust her home programming pending increase / decrease of fluid. Patient able to verbalize understanding. Patient progressing toward goal.    2.  Patient will be fitted for appropriate compression garments for long term management of lymphedema. Patient continues to utilize compression garment as intended. Patient garment continues to fit well and work as intended. Goal met   3.  Patient will present with decreased limb volume in the involved extremity from mild to trace  for improved functional mobility. Therapist continued patient education regarding self manual lymphatic massage sequence for breast / flank involvement. Therapist discussed with patient performing side sequence when flank discomfort returns before her chest discomfort. Patient able to verbalize understanding. Progressing toward goal.    4.  Patient will maximize pain free movement of the left upper extremity to  Maximize independence with daily life tasks. Therapist continued patient education regarding range of motion / stretching exercises for upper extremity. Patient exhibited full range of motion with minimal discomfort noted. Patient progressing toward goal.    Frequency/Duration:  # Days per week:  1-3 # Weeks: 5     Rehab Potential: [] Excellent [x] Good [] Fair  [] Poor     Goal Status:  [] Achieved [x] Partially Achieved  [] Not Achieved     Patient Status: [x] Patient treatment plan to continue 1-3x/wk from 23Sept. 2020 through 88CNFH. 2020         Electronically signed by: RICKY Rojas/L, CLT-STEFF          For Medicare Patient: Signature is needed on discharge. If you have any questions or concerns, please don't hesitate to call.   Thank you for your referral.    Physician

## 2020-10-21 ENCOUNTER — HOSPITAL ENCOUNTER (OUTPATIENT)
Dept: INFUSION THERAPY | Age: 65
Discharge: HOME | End: 2020-10-21
Payer: COMMERCIAL

## 2020-10-21 ENCOUNTER — HOSPITAL ENCOUNTER (OUTPATIENT)
Dept: OCCUPATIONAL THERAPY | Age: 65
Setting detail: THERAPIES SERIES
Discharge: HOME | End: 2020-10-21
Payer: COMMERCIAL

## 2020-10-21 DIAGNOSIS — C50.912 INVASIVE DUCTAL CARCINOMA OF LEFT BREAST (HCC): ICD-10-CM

## 2020-10-21 LAB
ALBUMIN SERPL-MCNC: 4 G/DL (ref 3.5–5.2)
ALP BLD-CCNC: 94 U/L (ref 35–104)
ALT SERPL-CCNC: 33 U/L (ref 0–32)
ANION GAP SERPL CALCULATED.3IONS-SCNC: 12 MMOL/L (ref 7–16)
AST SERPL-CCNC: 36 U/L (ref 0–31)
BASOPHILS ABSOLUTE: 0.03 E9/L (ref 0–0.2)
BASOPHILS RELATIVE PERCENT: 0.5 % (ref 0–2)
BILIRUB SERPL-MCNC: 0.4 MG/DL (ref 0–1.2)
BUN BLDV-MCNC: 15 MG/DL (ref 8–23)
CALCIUM SERPL-MCNC: 10.5 MG/DL (ref 8.6–10.2)
CHLORIDE BLD-SCNC: 96 MMOL/L (ref 98–107)
CO2: 28 MMOL/L (ref 22–29)
CREAT SERPL-MCNC: 0.9 MG/DL (ref 0.5–1)
EOSINOPHILS ABSOLUTE: 0.19 E9/L (ref 0.05–0.5)
EOSINOPHILS RELATIVE PERCENT: 3.1 % (ref 0–6)
GFR AFRICAN AMERICAN: >60
GFR NON-AFRICAN AMERICAN: >60 ML/MIN/1.73
GLUCOSE BLD-MCNC: 106 MG/DL (ref 74–99)
HCT VFR BLD CALC: 38 % (ref 34–48)
HEMOGLOBIN: 12.8 G/DL (ref 11.5–15.5)
IMMATURE GRANULOCYTES #: 0.03 E9/L
IMMATURE GRANULOCYTES %: 0.5 % (ref 0–5)
LYMPHOCYTES ABSOLUTE: 2.48 E9/L (ref 1.5–4)
LYMPHOCYTES RELATIVE PERCENT: 40.7 % (ref 20–42)
MAGNESIUM: 1.8 MG/DL (ref 1.6–2.6)
MCH RBC QN AUTO: 28.2 PG (ref 26–35)
MCHC RBC AUTO-ENTMCNC: 33.7 % (ref 32–34.5)
MCV RBC AUTO: 83.7 FL (ref 80–99.9)
MONOCYTES ABSOLUTE: 0.61 E9/L (ref 0.1–0.95)
MONOCYTES RELATIVE PERCENT: 10 % (ref 2–12)
NEUTROPHILS ABSOLUTE: 2.75 E9/L (ref 1.8–7.3)
NEUTROPHILS RELATIVE PERCENT: 45.2 % (ref 43–80)
PDW BLD-RTO: 17.2 FL (ref 11.5–15)
PLATELET # BLD: 253 E9/L (ref 130–450)
PMV BLD AUTO: 9.7 FL (ref 7–12)
POTASSIUM SERPL-SCNC: 4.1 MMOL/L (ref 3.5–5)
RBC # BLD: 4.54 E12/L (ref 3.5–5.5)
SODIUM BLD-SCNC: 136 MMOL/L (ref 132–146)
TOTAL PROTEIN: 7.5 G/DL (ref 6.4–8.3)
WBC # BLD: 6.1 E9/L (ref 4.5–11.5)

## 2020-10-21 PROCEDURE — 83735 ASSAY OF MAGNESIUM: CPT

## 2020-10-21 PROCEDURE — 36415 COLL VENOUS BLD VENIPUNCTURE: CPT

## 2020-10-21 PROCEDURE — 85025 COMPLETE CBC W/AUTO DIFF WBC: CPT

## 2020-10-21 PROCEDURE — 80053 COMPREHEN METABOLIC PANEL: CPT

## 2020-10-21 PROCEDURE — 97530 THERAPEUTIC ACTIVITIES: CPT | Performed by: OCCUPATIONAL THERAPIST

## 2020-10-21 NOTE — PROGRESS NOTES
Occupational [address] [facility]     OCCUPATIONAL THERAPY DISCHARGE NOTE    [facility]  [address] [facility]  [place]   Phone: [phone]   Fax: [phone]     Date: 10/21/2020      Initial Evaluation Date: 2020     Patient: Wellington Serna       : 1955    Restrictions/Precautions:  fall risk  Diagnosis:  Invasive ductal carcinoma of left breast (C18.244)                                                          Insurance/Certification information:  [employer] - [id number]   Referring ALLY Licea  Plan of care signed (Y/N):  Yes      Time In: 1300                  Time Out: 1355        Total Visits to date:  11 Cancels/No-shows to date: 0            Significant Findings At Last Visit/Comments:    Patient has made steady gains toward goals. .  Patient has received her lymphedema pump and utilizes daily, wears compression garment daily, and performs upper extremity stretch / range of motion exercises daily. Patient stated the pain she was having has subsided and she has not pain / tightness during shoulder range of motion. Patient currently following her home program with no issues. Patient current measurements taken and recorded below:    Lymphedema Upper Extremity Measurements     Measurements are made in 4cm increments and are circumferential.       CM Follow up #4  Left -  Follow up #3  Left -  Follow up #2  Left - .  Follow up #1  Left - .  Evaluation -   Left - ly  Follow up #4  Right -  Follow up #3  Right -  Follow up #2  Right - 2020  Follow up #1 Right - .   Evaluation - Right - 2020                             wrist      16.25  16.75 16.5      16.5  16.5 16.5   4cm      19.5  19.5 19.75      18.25  18.5 19   8cm      23.75  23 22.5      22  22 21.75   12cm      27  26.75 26.25      25.5  25.5 25   16cm      29.75  29.25 29      27.25  27.75 27.25   20cm      29  29 29.25      28.5  28 28   24cm      38  34.25 35.5      31  32.5 32.5   28cm      39  38 38.25      36  36 35   32cm      38.5  38.5 38.75      36  36 36.25   36cm      38  39 38.75      36.5  36.5 36.5   40cm        38 39.5        37.25 37   44cm                                                                       plam      19.25  19.5 19.5      20.25  21 20      Fingers are measured in cm and between mp and pip and between pip and dip each digit.     Digit Follow up #4  Left -  Follow up #3  Left -  Follow up #2  Left -  Follow up #1  Left -  Evaluation -  Left -  Follow up #4  Right -  Follow up #3  Right -  Follow up #2  Right -  Follow up #1  Right -  Evaluation - Right -                                                    First Digit      6.25, 5.25  6, 5.25 6.25, 5.25     6.5, 5.25  6.5, 5.25 6.5, 5.25   Second Digit      6, 5.25  5.75, 5.25 6, 5.25      6.25, 5.5  6.25, 5.5 6, 5.5   Third Digit      5.5, 5  5.75, 4.75 5.75, 5      6.25, 5.25  5.75, 5.25 6, 5.25   Fourth Digit      5.25, 4.5  5.75, 4.5 5.75, 4.25      5.75, 4.75  5.75, 4.75 6, 4.75   Fifth Digit      6  6 6.25      6  6.25 6.25                                Goals Status:    Patient Goal: To learn how to manage what is going on     STG: 3 weeks  1.  Patient will demonstrate knowledge and understanding for lymphedema precautions, skin care and self management to decrease progression of lymphedema and risk of infection. goal met  2.  Patient will present with decreased limb volume in the involved extremity from moderate to mild for improved functional mobility. Goal met.    3.  Patient will demonstrate compliance with compression therapy for independent management of lymphedema. Goal met.         LT weeks  1.  Patient will be independent with self management of lymphedema including understanding garment wear schedule, self compression bandaging, HEP and self care.   Therapist continued patient education regarding home exercise program, wear schedule of garments, home exercise program, and eating lifestyle. Patient further educated on how to adjust her home programming pending increase / decrease of fluid. Patient able to verbalize and demonstrate understanding. Therapist further educated patient on how to utilize her lymphedema pump in her home program.  Patient able to verbalize understanding. Patient progressing toward goal.    2.  Patient will be fitted for appropriate compression garments for long term management of lymphedema. Patient continues to utilize compression garment as intended. Patient stated she does not feel different with garment at this time. Patient questions if use of lymphedema pump assists such that she does not feel a difference with garment. Patient utilizes garment every other day or so. Goal met   3.  Patient will present with decreased limb volume in the involved extremity from mild to trace  for improved functional mobility. Therapist continued patient education regarding self manual lymphatic massage sequence for breast / flank involvement and use of lymphedema pump. Patient stated the lymphedema pump does provide increased coverage of the flank and she does not have the discomfort like she had prior. Patient currently presenting with trace edema. Goal met  4.  Patient will maximize pain free movement of the left upper extremity to  Maximize independence with daily life tasks. Therapist continued patient education regarding range of motion / stretching exercises for upper extremity. Patient exhibited full range of motion with no discomfort noted. goal met     Frequency/Duration:  # Days per week:  1-3 # Weeks: 5     Rehab Potential: [] Excellent [x] Good [] Fair  [] Poor     Goal Status:  [x] Achieved [] Partially Achieved  [] Not Achieved     Patient Status: [x] Patient discharged from lymphedema clinic at this time. Electronically signed by:   Rickie Galindo

## 2020-10-22 ENCOUNTER — OFFICE VISIT (OUTPATIENT)
Dept: ONCOLOGY | Age: 65
End: 2020-10-22
Payer: COMMERCIAL

## 2020-10-22 ENCOUNTER — HOSPITAL ENCOUNTER (OUTPATIENT)
Dept: INFUSION THERAPY | Age: 65
Discharge: HOME | End: 2020-10-22
Payer: COMMERCIAL

## 2020-10-22 VITALS
OXYGEN SATURATION: 99 % | HEIGHT: 63 IN | BODY MASS INDEX: 39.28 KG/M2 | DIASTOLIC BLOOD PRESSURE: 75 MMHG | WEIGHT: 221.7 LBS | HEART RATE: 75 BPM | SYSTOLIC BLOOD PRESSURE: 165 MMHG | TEMPERATURE: 97.5 F

## 2020-10-22 VITALS — DIASTOLIC BLOOD PRESSURE: 72 MMHG | HEART RATE: 66 BPM | TEMPERATURE: 97.9 F | SYSTOLIC BLOOD PRESSURE: 165 MMHG

## 2020-10-22 DIAGNOSIS — C50.912 INVASIVE DUCTAL CARCINOMA OF LEFT BREAST (HCC): Primary | ICD-10-CM

## 2020-10-22 PROCEDURE — 96375 TX/PRO/DX INJ NEW DRUG ADDON: CPT

## 2020-10-22 PROCEDURE — G8427 DOCREV CUR MEDS BY ELIG CLIN: HCPCS | Performed by: INTERNAL MEDICINE

## 2020-10-22 PROCEDURE — 99214 OFFICE O/P EST MOD 30 MIN: CPT | Performed by: INTERNAL MEDICINE

## 2020-10-22 PROCEDURE — G8417 CALC BMI ABV UP PARAM F/U: HCPCS | Performed by: INTERNAL MEDICINE

## 2020-10-22 PROCEDURE — 6360000002 HC RX W HCPCS: Performed by: INTERNAL MEDICINE

## 2020-10-22 PROCEDURE — 96413 CHEMO IV INFUSION 1 HR: CPT

## 2020-10-22 PROCEDURE — 1036F TOBACCO NON-USER: CPT | Performed by: INTERNAL MEDICINE

## 2020-10-22 PROCEDURE — 2580000003 HC RX 258: Performed by: INTERNAL MEDICINE

## 2020-10-22 PROCEDURE — 3017F COLORECTAL CA SCREEN DOC REV: CPT | Performed by: INTERNAL MEDICINE

## 2020-10-22 PROCEDURE — G8484 FLU IMMUNIZE NO ADMIN: HCPCS | Performed by: INTERNAL MEDICINE

## 2020-10-22 RX ORDER — DEXAMETHASONE SODIUM PHOSPHATE 10 MG/ML
8 INJECTION, SOLUTION INTRAMUSCULAR; INTRAVENOUS ONCE
Status: COMPLETED | OUTPATIENT
Start: 2020-10-22 | End: 2020-10-22

## 2020-10-22 RX ORDER — SODIUM CHLORIDE 0.9 % (FLUSH) 0.9 %
10 SYRINGE (ML) INJECTION PRN
Status: CANCELLED | OUTPATIENT
Start: 2020-10-22

## 2020-10-22 RX ORDER — SODIUM CHLORIDE 9 MG/ML
20 INJECTION, SOLUTION INTRAVENOUS ONCE
Status: CANCELLED | OUTPATIENT
Start: 2020-10-22

## 2020-10-22 RX ORDER — SODIUM CHLORIDE 0.9 % (FLUSH) 0.9 %
10 SYRINGE (ML) INJECTION PRN
Status: DISCONTINUED | OUTPATIENT
Start: 2020-10-22 | End: 2020-10-23 | Stop reason: HOSPADM

## 2020-10-22 RX ORDER — DEXAMETHASONE SODIUM PHOSPHATE 10 MG/ML
8 INJECTION, SOLUTION INTRAMUSCULAR; INTRAVENOUS ONCE
Status: CANCELLED | OUTPATIENT
Start: 2020-10-22

## 2020-10-22 RX ORDER — HEPARIN SODIUM (PORCINE) LOCK FLUSH IV SOLN 100 UNIT/ML 100 UNIT/ML
500 SOLUTION INTRAVENOUS PRN
Status: DISCONTINUED | OUTPATIENT
Start: 2020-10-22 | End: 2020-10-23 | Stop reason: HOSPADM

## 2020-10-22 RX ORDER — EPINEPHRINE 1 MG/ML
0.3 INJECTION, SOLUTION, CONCENTRATE INTRAVENOUS PRN
Status: CANCELLED | OUTPATIENT
Start: 2020-10-22

## 2020-10-22 RX ORDER — DIPHENHYDRAMINE HYDROCHLORIDE 50 MG/ML
50 INJECTION INTRAMUSCULAR; INTRAVENOUS ONCE
Status: CANCELLED | OUTPATIENT
Start: 2020-10-22

## 2020-10-22 RX ORDER — MEPERIDINE HYDROCHLORIDE 25 MG/ML
12.5 INJECTION INTRAMUSCULAR; INTRAVENOUS; SUBCUTANEOUS ONCE
Status: CANCELLED | OUTPATIENT
Start: 2020-10-22

## 2020-10-22 RX ORDER — SODIUM CHLORIDE 9 MG/ML
20 INJECTION, SOLUTION INTRAVENOUS ONCE
Status: DISCONTINUED | OUTPATIENT
Start: 2020-10-22 | End: 2020-10-23 | Stop reason: HOSPADM

## 2020-10-22 RX ORDER — SODIUM CHLORIDE 0.9 % (FLUSH) 0.9 %
5 SYRINGE (ML) INJECTION PRN
Status: CANCELLED | OUTPATIENT
Start: 2020-10-22

## 2020-10-22 RX ORDER — HEPARIN SODIUM (PORCINE) LOCK FLUSH IV SOLN 100 UNIT/ML 100 UNIT/ML
500 SOLUTION INTRAVENOUS PRN
Status: CANCELLED | OUTPATIENT
Start: 2020-10-22

## 2020-10-22 RX ORDER — SODIUM CHLORIDE 9 MG/ML
INJECTION, SOLUTION INTRAVENOUS CONTINUOUS
Status: CANCELLED | OUTPATIENT
Start: 2020-10-22

## 2020-10-22 RX ORDER — METHYLPREDNISOLONE SODIUM SUCCINATE 125 MG/2ML
125 INJECTION, POWDER, LYOPHILIZED, FOR SOLUTION INTRAMUSCULAR; INTRAVENOUS ONCE
Status: CANCELLED | OUTPATIENT
Start: 2020-10-22

## 2020-10-22 RX ADMIN — ADO-TRASTUZUMAB EMTANSINE 360 MG: 20 INJECTION, POWDER, LYOPHILIZED, FOR SOLUTION INTRAVENOUS at 11:17

## 2020-10-22 RX ADMIN — SODIUM CHLORIDE, PRESERVATIVE FREE 10 ML: 5 INJECTION INTRAVENOUS at 10:54

## 2020-10-22 RX ADMIN — SODIUM CHLORIDE 20 ML/HR: 9 INJECTION, SOLUTION INTRAVENOUS at 10:54

## 2020-10-22 RX ADMIN — DEXAMETHASONE SODIUM PHOSPHATE 8 MG: 10 INJECTION INTRAMUSCULAR; INTRAVENOUS at 10:54

## 2020-10-22 NOTE — PROGRESS NOTES
Department of [facility] Oncology       Attending Clinic Note    Reason for Visit: Follow-up on a patient with Left Breast Cancer    PCP:  Juan Poon MD    History of Present Illness: The mass was located in the 2 o'clock position of left breast.     Breast cancer risk factors include age, gender, menarche before age 16. Age of menarche was 7. Age of menopause was 48. Patient was on birth control for 6 months in her 25s. Patient is . Age of first live birth was 30. Patient did breast feed. Bilateral screening mammogram on 2019: There is a high density, irregular mass with indistinct margins seen in the posterior upper outer quadrant of left breast.     Left Breast U/S on 2019:  Irregular solid mass with angular margins measuring 25 x 20 x 21 mm in the left breast at 2 o'clock located 8 centimeters from the nipple. No axillary LN. On 2019:  Mass, left breast at 2:00, core needle biopsy: Invasive ductal carcinoma,nuclear grade 3, see comment. 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 Breast Cancer Marker Studies:  ER: Negative  [place]: Negative  Her-2/francisco Positive/3+    CXR PA/Lateral on 2019:  Normal chest.    MRI Bilateral Breast 2019: An irregular, enhancing mass is again noted in the left breast 2:00 which measures 2.0 x 2.2 x 2.6 cm. No axillary LN. No evidence of neoplasm on right. T2 N0 M0  We recommended neoadjuvant chemotherapy consisting of 6 cycles of TCHP. Side effects of TCHP reviewed with patient. She agreed to proceed. 2D-ECHO EF50-55%. Mediport placed. Cycle # 1 TCHP was on 2019. Cycle # 2 TCHP was on 2019. Cycle # 3 TCHP was on 2019. 2D-ECHO 2019 EF 65%  Cycle # 4 TCHP was on 2019  Cycle # 5 TCHP was on 2019. Cycle # 6 TCHP was on 10/23/2019. MRI Breast Bilateral 2019:   Near complete response to therapy on the left.    There is no axillary lymphadenopathy. No evidence of neoplasm on the right. 2D-ECHO 12/16/2019 EF 60%  Left needle localized lumpectomy, blue dye injection, and left axillary sentinel lymph node excision was done on 12/18/2019:  A.  Left breast, new superior-lateral margin, excision:   -Focal adenosis, columnar cell changes and stromal fibrosis/hyalinosis, negative for malignancy;   -Rare microcalcifications in benign tubules. B.  [place] lymph node #1, biopsy: Reactive node, negative for malignancy. C.  [place] lymph node #2, biopsy: Reactive node, negative for malignancy. D.  Left breast, lumpectomy with needle localization:   - Invasive adenocarcinoma of no special type (ductal, NOS), grade 3;   - Ductal carcinoma in situ, high nuclear grade, comedo/solid with necrosis;   - Scar of previous biopsy site, see comment. Comment:  Although the invasive carcinoma is very close to the superior margin in specimen D (3 mm), additional superior-lateral margins in specimen A (at least 2.0 cm in thickness) is completely negative for carcinoma.      CANCER CASE SUMMARY  Procedure: Excision  Specimen Laterality: Left  Tumor Site: Invasive Carcinoma: 12:00 (per report: HES-)  Tumor Size: Size of Largest Invasive Carcinoma: 1.4 X 1.3 X1.3 cm  Histologic Type of Invasive Carcinoma: Invasive carcinoma of no special type (ductal, NOS)  Histologic Grade: [place] Histologic Score  Glandular/tubular differentiation: Score 3  Nuclear pleomorphism: Score 3  Mitotic rate: Score 3  Overall grade: Grade 3 (scores of 9)  Tumor Focality: Single focus of invasive carcinoma  Ductal Carcinoma In Situ (DCIS): Present  Size of DCIS: Intermingled with invasive carcinoma  Number of blocks with DCIS: 3  Number of blocks examined: 14  Architectural patternS: [place]/solid  Nuclear grade: High  Ncrosis present: Central and focal  Lobular Carcinoma In Situ (LCIS): Not identified  Margins: Uninvolved by invasive carcinoma and DCIS  Distance from closest margin: 3 mm  Specify closest margin: surperior (see comment)  Regional lymph Nodes: Uninvolved by tumor cells  Total number of lymph nodes examined: 2  Number of sentinel nodes examined: 2  Treatment Effect: No known presurgical therapy  Lymph-Vascular Invasion: Not identified  Pathologic Staging (pTNM, AJCC 8TH Edition)  Primary tumor: pT1c  Regional lymph nodes (modifier- (sn) sentinel nodes examined: (sn)0  ER: Negative  [place]: Negative  Her-2/francisco Positive/3+    Pathology report reviewed extensively with patient. Recommended Ado-Trastuzumab alone for 14 cycles. Side effects of Ado-Trastuzumab reviewed with patient. She agreed to proceed. Cycle # 1 Ado-Trastuzumab was on 01/06/2020. Cycle # 2 Ado-Trastuzumab was on 01/27/2020. RT was started on 01/27/2020 and completed on 03/11/2020. Cycle # 3 Ado-Trastuzumab was on 02/17/2020. Cycle # 4 Ado-Trastuzumab was on 03/16/2020.  2D-ECHO 04/15/2020: EF 65%  Cycle # 5 Ado-Trastuzumab was on 04/20/2020. Cycle # 6 Ado-Trastuzumab was on 05/11/2020. Bilateral Diagnostic Mammogram on 05/21/2020 Negative for malignancy. Left Breast U/S 05/29/2020 Area of post surgical change in the left breast is benign. Cycle # 7 Ado-Trastuzumab was on 06/01/2020. Cycle # 8 Ado-Trastuzumab was on 06/22/2020.  2d-Echo 07/20/2020 noted EF 65-70%  Cycle # 9 Ado-Trastuzumab was on 07/22/2020. Cycle # 10 Ado-Trastuzumab was on 08/12/2020. Cycle # 11 Ado-Trastuzumab was on 09/02/2020. Cycle # 12 Ado-Trastuzumab was on 09/23/2020. Cycle # 13 Ado-Trastuzumab is today 10/22/2020. She presented today 10/22/2020 for Cycle # 13 Ado-Trastuzumab and is doing well. Fair appetite and energy level. No nausea/vomiting. Denies fever/chills. Labs reviewed, ok to proceed with treatment today. Review of Systems;  CONSTITUTIONAL: No fever, chills. Fair appetite and energy level. ENMT: Eyes: No diplopia. Mouth: No sore throat. RESPIRATORY: No hemoptysis, shortness of breath, cough. CARDIOVASCULAR: No chest pain, palpitations. GASTROINTESTINAL: No nausea/vomiting abdominal pain. GENITOURINARY: No dysuria, urinary frequency, hematuria. NEURO: No syncope, presyncope, headache. Remainder:  ROS NEGATIVE    Past Medical History:      Diagnosis Date    Anxiety     Cancer ([facility] [address] 2019    breast    Heart murmur     Hypertension     Post-menopausal bleeding     S/P hyst/ polypectomy 6/10/14     Medications:  Reviewed and reconciled. Allergies: Allergies   Allergen Reactions    Tetracyclines & Related Hives     Physical Exam:  BP (!) 165/75 (Site: Right Upper Arm, Position: Sitting, Cuff Size: Large Adult)   Pulse 75   Temp 97.5 °F (36.4 °C) (Temporal)   Ht 5' 3\" (1.6 m)   Wt 221 lb 11.2 oz (100.6 kg)   SpO2 99%   BMI 39.27 kg/m²   GENERAL: Alert, oriented x 3, not in acute distress. HEENT: PERRLA; EOMI. Oropharynx clear. NECK: Supple. Without lymphadenopathy. LUNGS: Good air entry bilaterally. No wheezing, crackles or ronchi. CARDIOVASCULAR: Regular rate. No murmurs, rubs or gallops. ABDOMEN: Soft. Non-tender, non-distended. Positive bowel sounds. EXTREMITIES: Without clubbing, cyanosis, or edema. NEUROLOGIC: No focal deficits. ECOG PS 1    Impression/Plan:  60 y.o. female with Left Breast Cancer    Mass, left breast at 2:00, core needle biopsy: Invasive ductal carcinoma,nuclear grade 3, see comment. 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 Breast Cancer Marker Studies:  ER: Negative  [place]: Negative  Her-2/francisco Positive/3+    MRI Bilateral Breast 05/23/2019: An irregular, enhancing mass is again noted in the left breast 2:00 which measures 2.0 x 2.2 x 2.6 cm. No axillary LN. No evidence of neoplasm on right. T2 N0 M0  We recommended neoadjuvant chemotherapy consisting of 6 cycles of TCHP. Side effects of TCHP reviewed with patient. She agreed to proceed. 2D-ECHO EF50-55%. Mediport placed.   Cycle # 1 TCHP was on 06/24/2019. Cycle # 2 TCHP was on 07/24/2019. Cycle # 3 TCHP was on 08/14/2019. 2D-ECHO 08/23/2019 EF 65%  Cycle # 4 TCHP was on 09/04/2019. Cycle # 5 TCHP was on 09/25/2019. Cycle # 6 TCHP was on 10/23/2019. MRI Breast Bilateral 11/07/2019:   Near complete response to therapy on the left. There is no axillary lymphadenopathy. No evidence of neoplasm on the right. Herceptin/Perjeta was on 12/04/2019. 2D-ECHO 12/16/2019 EF 60%    Left needle localized lumpectomy, blue dye injection, and left axillary sentinel lymph node excision on 12/18/2019:  A.  Left breast, new superior-lateral margin, excision:   -Focal adenosis, columnar cell changes and stromal fibrosis/hyalinosis, negative for malignancy;   -Rare microcalcifications in benign tubules. B.  [place] lymph node #1, biopsy: Reactive node, negative for malignancy. C.  [place] lymph node #2, biopsy: Reactive node, negative for malignancy. D.  Left breast, lumpectomy with needle localization:   - Invasive adenocarcinoma of no special type (ductal, NOS), grade 3;   - Ductal carcinoma in situ, high nuclear grade, comedo/solid with necrosis;   - Scar of previous biopsy site, see comment. Comment:  Although the invasive carcinoma is very close to the superior margin in specimen D (3 mm), additional superior-lateral margins in specimen A (at least 2.0 cm in thickness) is completely negative for carcinoma.      CANCER CASE SUMMARY  Procedure: Excision  Specimen Laterality: Left  Tumor Site: Invasive Carcinoma: 12:00 (per report: HES-)  Tumor Size: Size of Largest Invasive Carcinoma: 1.4 X 1.3 X1.3 cm  Histologic Type of Invasive Carcinoma: Invasive carcinoma of no special type (ductal, NOS)  Histologic Grade: [place] Histologic Score  Glandular/tubular differentiation: Score 3  Nuclear pleomorphism: Score 3  Mitotic rate: Score 3  Overall grade: Grade 3 (scores of 9)  Tumor Focality: Single focus of invasive carcinoma  Ductal Carcinoma In Situ (DCIS): Present  Size of DCIS: Intermingled with invasive carcinoma  Number of blocks with DCIS: 3  Number of blocks examined: 14  Architectural patternS: [place]/solid  Nuclear grade: High  Ncrosis present: Central and focal  Lobular Carcinoma In Situ (LCIS): Not identified  Margins: Uninvolved by invasive carcinoma and DCIS  Distance from closest margin: 3 mm  Specify closest margin: surperior (see comment)  Regional lymph Nodes: Uninvolved by tumor cells  Total number of lymph nodes examined: 2  Number of sentinel nodes examined: 2  Treatment Effect: No known presurgical therapy  Lymph-Vascular Invasion: Not identified  Pathologic Staging (pTNM, AJCC 8TH Edition)  Primary tumor: pT1c  Regional lymph nodes (modifier- (sn) sentinel nodes examined: (sn)0  ER: Negative  [place]: Negative  Her-2/francisco Positive/3+    Pathology report reviewed extensively with patient. Radiation Oncology team consulted for adjuvant RT. Recommended Ado-Trastuzumab alone for 14 cycles. Side effects of Ado-Trastuzumab reviewed with patient. She agreed to proceed. Cycle # 1 Ado-Trastuzumab was on 01/06/2020. Cycle # 2 Ado-Trastuzumab was on 01/27/2020. RT was started on 01/27/2020 and completed on 03/11/2020. Cycle # 3 Ado-Trastuzumab was on 02/17/2020. Cycle # 4 Ado-Trastuzumab was on 03/16/2020.  2D-ECHO 04/15/2020: EF 65%  Cycle # 5 Ado-Trastuzumab was on 04/20/2020. Cycle # 6 Ado-Trastuzumab was on 05/11/2020. Bilateral Diagnostic Mammogram on 05/21/2020 Negative for malignancy. Left Breast U/S 05/29/2020 Area of post surgical change in the left breast is benign. Cycle # 7 Ado-Trastuzumab was on 06/01/2020. Cycle # 8 Ado-Trastuzumab was on 06/22/2020.  2d-Echo 07/20/2020 noted EF 65-70%  Cycle # 9 Ado-Trastuzumab was on 07/22/2020. Cycle # 10 Ado-Trastuzumab was on 08/12/2020. Cycle # 11 Ado-Trastuzumab was on 09/02/2020. Cycle # 12 Ado-Trastuzumab was on 09/23/2020.    Cycle # 13 Ado-Trastuzumab is today 10/22/2020. Labs reviewed. Ok to proceed. RTC 3 weeks for Cycle # 14 Ado-Trastuzumab.  2d-Echo ordered in the interim    Sophia Bui MD   [id number]  Board Certified Medical Oncologist

## 2020-11-05 ENCOUNTER — HOSPITAL ENCOUNTER (OUTPATIENT)
Dept: NON INVASIVE DIAGNOSTICS | Age: 65
Discharge: HOME | End: 2020-11-05
Payer: COMMERCIAL

## 2020-11-05 PROCEDURE — 93308 TTE F-UP OR LMTD: CPT

## 2020-11-09 RX ORDER — SODIUM CHLORIDE 0.9 % (FLUSH) 0.9 %
10 SYRINGE (ML) INJECTION PRN
Status: CANCELLED | OUTPATIENT
Start: 2020-11-09

## 2020-11-09 RX ORDER — HEPARIN SODIUM (PORCINE) LOCK FLUSH IV SOLN 100 UNIT/ML 100 UNIT/ML
500 SOLUTION INTRAVENOUS PRN
Status: CANCELLED | OUTPATIENT
Start: 2020-11-09

## 2020-11-11 ENCOUNTER — HOSPITAL ENCOUNTER (OUTPATIENT)
Dept: INFUSION THERAPY | Age: 65
Discharge: HOME | End: 2020-11-11
Payer: COMMERCIAL

## 2020-11-11 DIAGNOSIS — C50.912 INVASIVE DUCTAL CARCINOMA OF LEFT BREAST (HCC): ICD-10-CM

## 2020-11-11 LAB
ALBUMIN SERPL-MCNC: 3.9 G/DL (ref 3.5–5.2)
ALP BLD-CCNC: 100 U/L (ref 35–104)
ALT SERPL-CCNC: 32 U/L (ref 0–32)
ANION GAP SERPL CALCULATED.3IONS-SCNC: 7 MMOL/L (ref 7–16)
AST SERPL-CCNC: 38 U/L (ref 0–31)
BASOPHILS ABSOLUTE: 0.04 E9/L (ref 0–0.2)
BASOPHILS RELATIVE PERCENT: 0.7 % (ref 0–2)
BILIRUB SERPL-MCNC: 0.5 MG/DL (ref 0–1.2)
BUN BLDV-MCNC: 14 MG/DL (ref 8–23)
CALCIUM SERPL-MCNC: 10.5 MG/DL (ref 8.6–10.2)
CHLORIDE BLD-SCNC: 98 MMOL/L (ref 98–107)
CO2: 30 MMOL/L (ref 22–29)
CREAT SERPL-MCNC: 0.8 MG/DL (ref 0.5–1)
EOSINOPHILS ABSOLUTE: 0.19 E9/L (ref 0.05–0.5)
EOSINOPHILS RELATIVE PERCENT: 3.2 % (ref 0–6)
GFR AFRICAN AMERICAN: >60
GFR NON-AFRICAN AMERICAN: >60 ML/MIN/1.73
GLUCOSE BLD-MCNC: 97 MG/DL (ref 74–99)
HCT VFR BLD CALC: 38.6 % (ref 34–48)
HEMOGLOBIN: 12.7 G/DL (ref 11.5–15.5)
IMMATURE GRANULOCYTES #: 0.01 E9/L
IMMATURE GRANULOCYTES %: 0.2 % (ref 0–5)
LYMPHOCYTES ABSOLUTE: 2.55 E9/L (ref 1.5–4)
LYMPHOCYTES RELATIVE PERCENT: 43.2 % (ref 20–42)
MAGNESIUM: 1.6 MG/DL (ref 1.6–2.6)
MCH RBC QN AUTO: 27.9 PG (ref 26–35)
MCHC RBC AUTO-ENTMCNC: 32.9 % (ref 32–34.5)
MCV RBC AUTO: 84.6 FL (ref 80–99.9)
MONOCYTES ABSOLUTE: 0.65 E9/L (ref 0.1–0.95)
MONOCYTES RELATIVE PERCENT: 11 % (ref 2–12)
NEUTROPHILS ABSOLUTE: 2.46 E9/L (ref 1.8–7.3)
NEUTROPHILS RELATIVE PERCENT: 41.7 % (ref 43–80)
PDW BLD-RTO: 17.4 FL (ref 11.5–15)
PLATELET # BLD: 241 E9/L (ref 130–450)
PMV BLD AUTO: 9.8 FL (ref 7–12)
POTASSIUM SERPL-SCNC: 3.4 MMOL/L (ref 3.5–5)
RBC # BLD: 4.56 E12/L (ref 3.5–5.5)
SODIUM BLD-SCNC: 135 MMOL/L (ref 132–146)
TOTAL PROTEIN: 7.5 G/DL (ref 6.4–8.3)
WBC # BLD: 5.9 E9/L (ref 4.5–11.5)

## 2020-11-11 PROCEDURE — 80053 COMPREHEN METABOLIC PANEL: CPT

## 2020-11-11 PROCEDURE — 83735 ASSAY OF MAGNESIUM: CPT

## 2020-11-11 PROCEDURE — 85025 COMPLETE CBC W/AUTO DIFF WBC: CPT

## 2020-11-11 PROCEDURE — 36415 COLL VENOUS BLD VENIPUNCTURE: CPT

## 2020-11-12 ENCOUNTER — HOSPITAL ENCOUNTER (OUTPATIENT)
Dept: INFUSION THERAPY | Age: 65
Discharge: HOME | End: 2020-11-12
Payer: COMMERCIAL

## 2020-11-12 ENCOUNTER — OFFICE VISIT (OUTPATIENT)
Dept: ONCOLOGY | Age: 65
End: 2020-11-12
Payer: COMMERCIAL

## 2020-11-12 VITALS
HEART RATE: 74 BPM | WEIGHT: 220.6 LBS | BODY MASS INDEX: 39.08 KG/M2 | OXYGEN SATURATION: 96 % | SYSTOLIC BLOOD PRESSURE: 174 MMHG | DIASTOLIC BLOOD PRESSURE: 78 MMHG

## 2020-11-12 VITALS
OXYGEN SATURATION: 97 % | DIASTOLIC BLOOD PRESSURE: 68 MMHG | RESPIRATION RATE: 18 BRPM | TEMPERATURE: 96.9 F | SYSTOLIC BLOOD PRESSURE: 154 MMHG | HEART RATE: 65 BPM

## 2020-11-12 DIAGNOSIS — C50.912 INVASIVE DUCTAL CARCINOMA OF LEFT BREAST (HCC): Primary | ICD-10-CM

## 2020-11-12 PROCEDURE — 99214 OFFICE O/P EST MOD 30 MIN: CPT | Performed by: INTERNAL MEDICINE

## 2020-11-12 PROCEDURE — 6360000002 HC RX W HCPCS: Performed by: INTERNAL MEDICINE

## 2020-11-12 PROCEDURE — 1036F TOBACCO NON-USER: CPT | Performed by: INTERNAL MEDICINE

## 2020-11-12 PROCEDURE — G8417 CALC BMI ABV UP PARAM F/U: HCPCS | Performed by: INTERNAL MEDICINE

## 2020-11-12 PROCEDURE — G8484 FLU IMMUNIZE NO ADMIN: HCPCS | Performed by: INTERNAL MEDICINE

## 2020-11-12 PROCEDURE — 2580000003 HC RX 258: Performed by: INTERNAL MEDICINE

## 2020-11-12 PROCEDURE — 96413 CHEMO IV INFUSION 1 HR: CPT

## 2020-11-12 PROCEDURE — 96375 TX/PRO/DX INJ NEW DRUG ADDON: CPT

## 2020-11-12 PROCEDURE — 3017F COLORECTAL CA SCREEN DOC REV: CPT | Performed by: INTERNAL MEDICINE

## 2020-11-12 PROCEDURE — G8427 DOCREV CUR MEDS BY ELIG CLIN: HCPCS | Performed by: INTERNAL MEDICINE

## 2020-11-12 RX ORDER — SODIUM CHLORIDE 9 MG/ML
INJECTION, SOLUTION INTRAVENOUS CONTINUOUS
Status: CANCELLED | OUTPATIENT
Start: 2020-11-12

## 2020-11-12 RX ORDER — MEPERIDINE HYDROCHLORIDE 25 MG/ML
12.5 INJECTION INTRAMUSCULAR; INTRAVENOUS; SUBCUTANEOUS ONCE
Status: CANCELLED | OUTPATIENT
Start: 2020-11-12

## 2020-11-12 RX ORDER — SODIUM CHLORIDE 9 MG/ML
20 INJECTION, SOLUTION INTRAVENOUS ONCE
Status: COMPLETED | OUTPATIENT
Start: 2020-11-12 | End: 2020-11-12

## 2020-11-12 RX ORDER — EPINEPHRINE 1 MG/ML
0.3 INJECTION, SOLUTION, CONCENTRATE INTRAVENOUS PRN
Status: CANCELLED | OUTPATIENT
Start: 2020-11-12

## 2020-11-12 RX ORDER — SODIUM CHLORIDE 0.9 % (FLUSH) 0.9 %
10 SYRINGE (ML) INJECTION PRN
Status: DISCONTINUED | OUTPATIENT
Start: 2020-11-12 | End: 2020-11-13 | Stop reason: HOSPADM

## 2020-11-12 RX ORDER — SODIUM CHLORIDE 9 MG/ML
20 INJECTION, SOLUTION INTRAVENOUS ONCE
Status: CANCELLED | OUTPATIENT
Start: 2020-11-12

## 2020-11-12 RX ORDER — HEPARIN SODIUM (PORCINE) LOCK FLUSH IV SOLN 100 UNIT/ML 100 UNIT/ML
500 SOLUTION INTRAVENOUS PRN
Status: CANCELLED | OUTPATIENT
Start: 2020-11-12

## 2020-11-12 RX ORDER — HEPARIN SODIUM (PORCINE) LOCK FLUSH IV SOLN 100 UNIT/ML 100 UNIT/ML
500 SOLUTION INTRAVENOUS PRN
Status: DISCONTINUED | OUTPATIENT
Start: 2020-11-12 | End: 2020-11-13 | Stop reason: HOSPADM

## 2020-11-12 RX ORDER — SODIUM CHLORIDE 0.9 % (FLUSH) 0.9 %
5 SYRINGE (ML) INJECTION PRN
Status: CANCELLED | OUTPATIENT
Start: 2020-11-12

## 2020-11-12 RX ORDER — METHYLPREDNISOLONE SODIUM SUCCINATE 125 MG/2ML
125 INJECTION, POWDER, LYOPHILIZED, FOR SOLUTION INTRAMUSCULAR; INTRAVENOUS ONCE
Status: CANCELLED | OUTPATIENT
Start: 2020-11-12

## 2020-11-12 RX ORDER — SODIUM CHLORIDE 0.9 % (FLUSH) 0.9 %
10 SYRINGE (ML) INJECTION PRN
Status: CANCELLED | OUTPATIENT
Start: 2020-11-12

## 2020-11-12 RX ORDER — DIPHENHYDRAMINE HYDROCHLORIDE 50 MG/ML
50 INJECTION INTRAMUSCULAR; INTRAVENOUS ONCE
Status: CANCELLED | OUTPATIENT
Start: 2020-11-12

## 2020-11-12 RX ORDER — DEXAMETHASONE SODIUM PHOSPHATE 10 MG/ML
8 INJECTION, SOLUTION INTRAMUSCULAR; INTRAVENOUS ONCE
Status: CANCELLED | OUTPATIENT
Start: 2020-11-12

## 2020-11-12 RX ORDER — DEXAMETHASONE SODIUM PHOSPHATE 10 MG/ML
8 INJECTION, SOLUTION INTRAMUSCULAR; INTRAVENOUS ONCE
Status: COMPLETED | OUTPATIENT
Start: 2020-11-12 | End: 2020-11-12

## 2020-11-12 RX ADMIN — HEPARIN 500 UNITS: 100 SYRINGE at 13:04

## 2020-11-12 RX ADMIN — DEXAMETHASONE SODIUM PHOSPHATE 8 MG: 10 INJECTION, SOLUTION INTRAMUSCULAR; INTRAVENOUS at 11:45

## 2020-11-12 RX ADMIN — ADO-TRASTUZUMAB EMTANSINE 360 MG: 20 INJECTION, POWDER, LYOPHILIZED, FOR SOLUTION INTRAVENOUS at 12:08

## 2020-11-12 RX ADMIN — SODIUM CHLORIDE, PRESERVATIVE FREE 10 ML: 5 INJECTION INTRAVENOUS at 13:04

## 2020-11-12 RX ADMIN — SODIUM CHLORIDE, PRESERVATIVE FREE 10 ML: 5 INJECTION INTRAVENOUS at 11:45

## 2020-11-12 RX ADMIN — SODIUM CHLORIDE 20 ML/HR: 9 INJECTION, SOLUTION INTRAVENOUS at 11:35

## 2020-11-12 RX ADMIN — SODIUM CHLORIDE, PRESERVATIVE FREE 10 ML: 5 INJECTION INTRAVENOUS at 11:33

## 2020-11-12 RX ADMIN — SODIUM CHLORIDE, PRESERVATIVE FREE 10 ML: 5 INJECTION INTRAVENOUS at 12:05

## 2020-11-12 NOTE — PROGRESS NOTES
Department of [facility] Oncology       Attending Clinic Note    Reason for Visit: Follow-up on a patient with Left Breast Cancer    PCP:  Ashok Galvin MD    History of Present Illness: The mass was located in the 2 o'clock position of left breast.     Breast cancer risk factors include age, gender, menarche before age 16. Age of menarche was 7. Age of menopause was 48. Patient was on birth control for 6 months in her 25s. Patient is . Age of first live birth was 30. Patient did breast feed. Bilateral screening mammogram on 2019: There is a high density, irregular mass with indistinct margins seen in the posterior upper outer quadrant of left breast.     Left Breast U/S on 2019:  Irregular solid mass with angular margins measuring 25 x 20 x 21 mm in the left breast at 2 o'clock located 8 centimeters from the nipple. No axillary LN. On 2019:  Mass, left breast at 2:00, core needle biopsy: Invasive ductal carcinoma,nuclear grade 3, see comment. 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 Breast Cancer Marker Studies:  ER: Negative  [place]: Negative  Her-2/francisco Positive/3+    CXR PA/Lateral on 2019:  Normal chest.    MRI Bilateral Breast 2019: An irregular, enhancing mass is again noted in the left breast 2:00 which measures 2.0 x 2.2 x 2.6 cm. No axillary LN. No evidence of neoplasm on right. T2 N0 M0  We recommended neoadjuvant chemotherapy consisting of 6 cycles of TCHP. Side effects of TCHP reviewed with patient. She agreed to proceed. 2D-ECHO EF50-55%. Mediport placed. Cycle # 1 TCHP was on 2019. Cycle # 2 TCHP was on 2019. Cycle # 3 TCHP was on 2019. 2D-ECHO 2019 EF 65%  Cycle # 4 TCHP was on 2019  Cycle # 5 TCHP was on 2019. Cycle # 6 TCHP was on 10/23/2019. MRI Breast Bilateral 2019:   Near complete response to therapy on the left.    There is no axillary lymphadenopathy. No evidence of neoplasm on the right. 2D-ECHO 12/16/2019 EF 60%  Left needle localized lumpectomy, blue dye injection, and left axillary sentinel lymph node excision was done on 12/18/2019:  A.  Left breast, new superior-lateral margin, excision:   -Focal adenosis, columnar cell changes and stromal fibrosis/hyalinosis, negative for malignancy;   -Rare microcalcifications in benign tubules. B.  [place] lymph node #1, biopsy: Reactive node, negative for malignancy. C.  [place] lymph node #2, biopsy: Reactive node, negative for malignancy. D.  Left breast, lumpectomy with needle localization:   - Invasive adenocarcinoma of no special type (ductal, NOS), grade 3;   - Ductal carcinoma in situ, high nuclear grade, comedo/solid with necrosis;   - Scar of previous biopsy site, see comment. Comment:  Although the invasive carcinoma is very close to the superior margin in specimen D (3 mm), additional superior-lateral margins in specimen A (at least 2.0 cm in thickness) is completely negative for carcinoma.      CANCER CASE SUMMARY  Procedure: Excision  Specimen Laterality: Left  Tumor Site: Invasive Carcinoma: 12:00 (per report: HES-)  Tumor Size: Size of Largest Invasive Carcinoma: 1.4 X 1.3 X1.3 cm  Histologic Type of Invasive Carcinoma: Invasive carcinoma of no special type (ductal, NOS)  Histologic Grade: [place] Histologic Score  Glandular/tubular differentiation: Score 3  Nuclear pleomorphism: Score 3  Mitotic rate: Score 3  Overall grade: Grade 3 (scores of 9)  Tumor Focality: Single focus of invasive carcinoma  Ductal Carcinoma In Situ (DCIS): Present  Size of DCIS: Intermingled with invasive carcinoma  Number of blocks with DCIS: 3  Number of blocks examined: 14  Architectural patternS: [place]/solid  Nuclear grade: High  Ncrosis present: Central and focal  Lobular Carcinoma In Situ (LCIS): Not identified  Margins: Uninvolved by invasive carcinoma and DCIS  Distance from closest CARDIOVASCULAR: No chest pain, palpitations. GASTROINTESTINAL: No nausea/vomiting abdominal pain. GENITOURINARY: No dysuria, urinary frequency, hematuria. NEURO: No syncope, presyncope, headache. Remainder:  ROS NEGATIVE    Past Medical History:      Diagnosis Date    Anxiety     Cancer ([facility] [address] 2019    breast    Heart murmur     Hypertension     Post-menopausal bleeding     S/P hyst/ polypectomy 6/10/14     Medications:  Reviewed and reconciled. Allergies: Allergies   Allergen Reactions    Tetracyclines & Related Hives     Physical Exam:  Pulse 74   Wt 220 lb 9.6 oz (100.1 kg)   SpO2 96%   BMI 39.08 kg/m²   GENERAL: Alert, oriented x 3, not in acute distress. HEENT: PERRLA; EOMI. Oropharynx clear. NECK: Supple. Without lymphadenopathy. LUNGS: Good air entry bilaterally. No wheezing, crackles or ronchi. CARDIOVASCULAR: Regular rate. No murmurs, rubs or gallops. ABDOMEN: Soft. Non-tender, non-distended. Positive bowel sounds. EXTREMITIES: Without clubbing, cyanosis, or edema. NEUROLOGIC: No focal deficits. ECOG PS 1    Impression/Plan:  60 y.o. female with Left Breast Cancer    Mass, left breast at 2:00, core needle biopsy: Invasive ductal carcinoma,nuclear grade 3, see comment. 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 Breast Cancer Marker Studies:  ER: Negative  [place]: Negative  Her-2/francisco Positive/3+    MRI Bilateral Breast 05/23/2019: An irregular, enhancing mass is again noted in the left breast 2:00 which measures 2.0 x 2.2 x 2.6 cm. No axillary LN. No evidence of neoplasm on right. T2 N0 M0  We recommended neoadjuvant chemotherapy consisting of 6 cycles of TCHP. Side effects of TCHP reviewed with patient. She agreed to proceed. 2D-ECHO EF50-55%. Mediport placed. Cycle # 1 TCHP was on 06/24/2019. Cycle # 2 TCHP was on 07/24/2019. Cycle # 3 TCHP was on 08/14/2019.    2D-ECHO 08/23/2019 EF 65%  Cycle # 4 TCHP was on 09/04/2019. Cycle # 5 TCHP was on 09/25/2019. Cycle # 6 TCHP was on 10/23/2019. MRI Breast Bilateral 11/07/2019:   Near complete response to therapy on the left. There is no axillary lymphadenopathy. No evidence of neoplasm on the right. Herceptin/Perjeta was on 12/04/2019. 2D-ECHO 12/16/2019 EF 60%    Left needle localized lumpectomy, blue dye injection, and left axillary sentinel lymph node excision on 12/18/2019:  A.  Left breast, new superior-lateral margin, excision:   -Focal adenosis, columnar cell changes and stromal fibrosis/hyalinosis, negative for malignancy;   -Rare microcalcifications in benign tubules. B.  [place] lymph node #1, biopsy: Reactive node, negative for malignancy. C.  [place] lymph node #2, biopsy: Reactive node, negative for malignancy. D.  Left breast, lumpectomy with needle localization:   - Invasive adenocarcinoma of no special type (ductal, NOS), grade 3;   - Ductal carcinoma in situ, high nuclear grade, comedo/solid with necrosis;   - Scar of previous biopsy site, see comment. Comment:  Although the invasive carcinoma is very close to the superior margin in specimen D (3 mm), additional superior-lateral margins in specimen A (at least 2.0 cm in thickness) is completely negative for carcinoma.      CANCER CASE SUMMARY  Procedure: Excision  Specimen Laterality: Left  Tumor Site: Invasive Carcinoma: 12:00 (per report: HES-)  Tumor Size: Size of Largest Invasive Carcinoma: 1.4 X 1.3 X1.3 cm  Histologic Type of Invasive Carcinoma: Invasive carcinoma of no special type (ductal, NOS)  Histologic Grade: [place] Histologic Score  Glandular/tubular differentiation: Score 3  Nuclear pleomorphism: Score 3  Mitotic rate: Score 3  Overall grade: Grade 3 (scores of 9)  Tumor Focality: Single focus of invasive carcinoma  Ductal Carcinoma In Situ (DCIS): Present  Size of DCIS: Intermingled with invasive carcinoma  Number of blocks with DCIS: 3  Number of blocks examined: 14  Architectural patternS: [place]/solid  Nuclear grade: High  Ncrosis present: Central and focal  Lobular Carcinoma In Situ (LCIS): Not identified  Margins: Uninvolved by invasive carcinoma and DCIS  Distance from closest margin: 3 mm  Specify closest margin: surperior (see comment)  Regional lymph Nodes: Uninvolved by tumor cells  Total number of lymph nodes examined: 2  Number of sentinel nodes examined: 2  Treatment Effect: No known presurgical therapy  Lymph-Vascular Invasion: Not identified  Pathologic Staging (pTNM, AJCC 8TH Edition)  Primary tumor: pT1c  Regional lymph nodes (modifier- (sn) sentinel nodes examined: (sn)0  ER: Negative  [place]: Negative  Her-2/francisco Positive/3+    Pathology report reviewed extensively with patient. Radiation Oncology team consulted for adjuvant RT. Recommended Ado-Trastuzumab alone for 14 cycles. Side effects of Ado-Trastuzumab reviewed with patient. She agreed to proceed. Cycle # 1 Ado-Trastuzumab was on 01/06/2020. Cycle # 2 Ado-Trastuzumab was on 01/27/2020. RT was started on 01/27/2020 and completed on 03/11/2020. Cycle # 3 Ado-Trastuzumab was on 02/17/2020. Cycle # 4 Ado-Trastuzumab was on 03/16/2020.  2D-ECHO 04/15/2020: EF 65%  Cycle # 5 Ado-Trastuzumab was on 04/20/2020. Cycle # 6 Ado-Trastuzumab was on 05/11/2020. Bilateral Diagnostic Mammogram on 05/21/2020 Negative for malignancy. Left Breast U/S 05/29/2020 Area of post surgical change in the left breast is benign. Cycle # 7 Ado-Trastuzumab was on 06/01/2020. Cycle # 8 Ado-Trastuzumab was on 06/22/2020.  2d-Echo 07/20/2020 noted EF 65-70%  Cycle # 9 Ado-Trastuzumab was on 07/22/2020. Cycle # 10 Ado-Trastuzumab was on 08/12/2020. Cycle # 11 Ado-Trastuzumab was on 09/02/2020. Cycle # 12 Ado-Trastuzumab was on 09/23/2020. Cycle # 13 Ado-Trastuzumab was on 10/22/2020. 2d-echo 11/05/2020 EF 70%   Cycle # 14 Ado-Trastuzumab is today 11/12/2020. HypoK+ to be replaced. RTC 4 months with labs.

## 2020-11-12 NOTE — PROGRESS NOTES
Patient tolerated infusion well without complaint. Port flushed per protocol and de-accessed. Patient discharged ambulatory in stable condition.

## 2020-11-12 NOTE — PROGRESS NOTES
Patient Potassium 3.4   No orders in [facility] for replacement. Patient reports that she still has potassium tablets at home - says that Dr. Ulysses Mormon is OK with her taking those to bring it up and increase potassium rich foods in her diet. Patient teaching provided.

## 2020-11-24 ASSESSMENT — ENCOUNTER SYMPTOMS
EYE DISCHARGE: 0
ABDOMINAL PAIN: 0
COUGH: 0
VOMITING: 0
ABDOMINAL DISTENTION: 0
CONSTIPATION: 0
TROUBLE SWALLOWING: 0
VOICE CHANGE: 0
EYE ITCHING: 0
BACK PAIN: 0
WHEEZING: 0
BLOOD IN STOOL: 0
DIARRHEA: 0
RHINORRHEA: 0
NAUSEA: 0
CHEST TIGHTNESS: 0
CHOKING: 0
SINUS PRESSURE: 0
SHORTNESS OF BREATH: 0
SORE THROAT: 0
SINUS PAIN: 0

## 2020-11-24 NOTE — PROGRESS NOTES
Subjective: This note was copied forward from the last encounter. Essential components for this patient record were reviewed and verified on this visit including:  recent hospitalizations, recent imaging, PMH, PSH, FH, SOC HX, Allergies, and Medications were reviewed and updated as appropriate. In addition, the assessment and plan were copied from prior office note and updated accordingly. Patient ID: Joe Krause is a 60 y.o. female. HPI   Patient presents today for 6 mos follow up with history breast cancer, left. The mass was located in the 2 o'clock position of left breast.     Breast cancer risk factors include age, gender, menarche before age 16. Age of menarche was 7. Age of menopause was 48. Patient was on birth control for 6 months in her 25s. Patient is . Age of first live birth was 30. Patient did breast feed. Bilateral screening mammogram on 2019: There is a high density, irregular mass with indistinct margins seen in the posterior upper outer quadrant of left breast.     Left Breast U/S on 2019:  Irregular solid mass with angular margins measuring 25 x 20 x 21 mm in the left breast at 2 o'clock located 8 centimeters from the nipple. No axillary LN. On 2019:  Mass, left breast at 2:00, core needle biopsy: Invasive ductal carcinoma,nuclear grade 3, see comment. 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 Breast Cancer Marker Studies:  ER: Negative  [place]: Negative  Her-2/francisco Positive/3+    CXR PA/Lateral on 2019:  Normal chest.    MRI Bilateral Breast 2019: An irregular, enhancing mass is again noted in the left breast 2:00 which measures 2.0 x 2.2 x 2.6 cm. No axillary LN. No evidence of neoplasm on right. T2 N0 M0  We recommended neoadjuvant chemotherapy consisting of 6 cycles of TCHP. Side effects of TCHP reviewed with patient. She agreed to proceed. 2D-ECHO EF50-55%.  Mediport placed. Cycle # 1 TCHP was on 06/24/2019. Cycle # 2 TCHP was on 07/24/2019. Cycle # 3 TCHP was on 08/14/2019. 2D-ECHO 08/23/2019 EF 65%  Cycle # 4 TCHP was on 09/04/2019  Cycle # 5 TCHP was on 09/25/2019. Cycle # 6 TCHP was on 10/23/2019. MRI Breast Bilateral 11/07/2019:   Near complete response to therapy on the left. There is no axillary lymphadenopathy. No evidence of neoplasm on the right. 2D-ECHO 12/16/2019 EF 60%  Left needle localized lumpectomy, blue dye injection, and left axillary sentinel lymph node excision was done on 12/18/2019:  A.  Left breast, new superior-lateral margin, excision:   -Focal adenosis, columnar cell changes and stromal fibrosis/hyalinosis, negative for malignancy;   -Rare microcalcifications in benign tubules. B.  [place] lymph node #1, biopsy: Reactive node, negative for malignancy. C.  [place] lymph node #2, biopsy: Reactive node, negative for malignancy. D.  Left breast, lumpectomy with needle localization:   - Invasive adenocarcinoma of no special type (ductal, NOS), grade 3;   - Ductal carcinoma in situ, high nuclear grade, comedo/solid with necrosis;   - Scar of previous biopsy site, see comment. Comment:  Although the invasive carcinoma is very close to the superior margin in specimen D (3 mm), additional superior-lateral margins in specimen A (at least 2.0 cm in thickness) is completely negative for carcinoma.      CANCER CASE SUMMARY  Procedure: Excision  Specimen Laterality: Left  Tumor Site: Invasive Carcinoma: 12:00 (per report: [facility]  Tumor Size: Size of Largest Invasive Carcinoma: 1.4 X 1.3 X1.3 cm  Histologic Type of Invasive Carcinoma: Invasive carcinoma of no special type (ductal, NOS)  Histologic Grade: [place] Histologic Score  Glandular/tubular differentiation: Score 3  Nuclear pleomorphism: Score 3  Mitotic rate: Score 3  Overall grade: Grade 3 (scores of 9)  Tumor Focality: Single focus of invasive carcinoma  Ductal Carcinoma In Situ (DCIS): Present  Size of DCIS: Intermingled with invasive carcinoma  Number of blocks with DCIS: 3  Number of blocks examined: 14  Architectural patternS: Comedo/solid  Nuclear grade: High  Ncrosis present: Central and focal  Lobular Carcinoma In Situ (LCIS): Not identified  Margins: Uninvolved by invasive carcinoma and DCIS  Distance from closest margin: 3 mm  Specify closest margin: surperior (see comment)  Regional lymph Nodes: Uninvolved by tumor cells  Total number of lymph nodes examined: 2  Number of sentinel nodes examined: 2  Treatment Effect: No known presurgical therapy  Lymph-Vascular Invasion: Not identified  Pathologic Staging (pTNM, AJCC 8TH Edition)  Primary tumor: pT1c  Regional lymph nodes (modifier- (sn) sentinel nodes examined: (sn)0  ER: Negative  [place]: Negative  Her-2/francisco Positive/3+      Medical OncologyNicole Recommended Ado-Trastuzumab alone for 14 cycles. Cycle # 1 Ado-Trastuzumab was on 01/06/2020. Cycle # 2 Ado-Trastuzumab was on 01/27/2020. RT was started on 01/27/2020 and completed on 03/11/2020. Cycle # 3 Ado-Trastuzumab was on 02/17/2020. Cycle # 4 Ado-Trastuzumab was on 03/16/2020.  2D-ECHO 04/15/2020: EF 65%  Cycle # 5 Ado-Trastuzumab was on 04/20/2020. Cycle # 6 Ado-Trastuzumab was 05/11/2020  Bilateral Diagnostic Mammogram on 05/21/2020 Negative for malignancy. Left Breast U/S 05/29/2020 Area of post surgical change in the left breast is benign. Cycle # 7 Ado-Trastuzumab was on 06/01/2020. Cycle # 8 Ado-Trastuzumab was on 06/22/2020.  2d-Echo 07/20/2020 noted EF 65-70%  Cycle # 9 Ado-Trastuzumab was on 07/22/2020. Cycle # 10 Ado-Trastuzumab was on 08/12/2020. Cycle # 11 Ado-Trastuzumab was on 09/02/2020. Cycle # 12 Ado-Trastuzumab was on 09/23/2020  Cycle # 13 Ado-Trastuzumab was on 10/22/2020. Cycle # 14 Ado-Trastuzumab was on 11/12/2020.       Past Medical History:   Diagnosis Date    Anxiety     Cancer [facility]) 2019    breast    Heart murmur     visit. Allergies   Allergen Reactions    Tetracyclines & Related Hives       Family History   Problem Relation Age of Onset    Bleeding Prob Sister     Bleeding Prob Sister     Heart Disease Father     Heart Attack Father 79    High Blood Pressure Father     Cancer Mother 61        lung    Stroke Maternal Grandmother     Stroke Paternal Grandfather     Cancer Paternal Grandfather 48        GI cancer       Social History     Socioeconomic History    Marital status:      Spouse name: Not on file    Number of children: Not on file    Years of education: Not on file    Highest education level: Not on file   Occupational History    Not on file   Social Needs    Financial resource strain: Not on file    Food insecurity     Worry: Not on file     Inability: Not on file    Transportation needs     Medical: Not on file     Non-medical: Not on file   Tobacco Use    Smoking status: Never Smoker    Smokeless tobacco: Never Used   Substance and Sexual Activity    Alcohol use:  Yes     Alcohol/week: 0.0 standard drinks     Comment: socially    Drug use: No    Sexual activity: Not Currently     Partners: Male     Birth control/protection: Post-menopausal   Lifestyle    Physical activity     Days per week: Not on file     Minutes per session: Not on file    Stress: Not on file   Relationships    Social connections     Talks on phone: Not on file     Gets together: Not on file     Attends Protestant service: Not on file     Active member of club or organization: Not on file     Attends meetings of clubs or organizations: Not on file     Relationship status: Not on file    Intimate partner violence     Fear of current or ex partner: Not on file     Emotionally abused: Not on file     Physically abused: Not on file     Forced sexual activity: Not on file   Other Topics Concern    Not on file   Social History Narrative    [address]     5/21/20 Diagnostic Mammogram: [address] [place] [address] Dr Andrea      MAMMOGRAM FINDINGS:   Finding 1:   No suspicious masses, areas of suspicious architectural distortion, suspicious calcifications, or additional suspicious findings are identified.  There are no significant changes from the prior study.       IMPRESSION:   No mammographic evidence of malignancy.       Screening mammogram in 1 year is recommended.       =======================================   BI-RADS Category 1:  Negative   =======================================       RISK ASSESSMENT:     Review of Systems   Constitutional: Negative for activity change, appetite change, chills, fatigue, fever and unexpected weight change. Doing well. Completed [place] per Medical Oncology. Completed OT and lymphedema is better managed and breast discomfort is improved. HENT: Negative for congestion, postnasal drip, rhinorrhea, sinus pressure, sinus pain, sore throat, trouble swallowing and voice change. Eyes: Negative for discharge, itching and visual disturbance. Respiratory: Negative for cough, choking, chest tightness, shortness of breath and wheezing. Cardiovascular: Negative for chest pain, palpitations and leg swelling. Gastrointestinal: Negative for abdominal distention, abdominal pain, blood in stool, constipation, diarrhea, nausea and vomiting. Endocrine: Negative for cold intolerance and heat intolerance. Genitourinary: Negative for difficulty urinating, dysuria, frequency and hematuria. Musculoskeletal: Negative for arthralgias, back pain, gait problem, joint swelling, myalgias, neck pain and neck stiffness. Allergic/Immunologic: Negative for environmental allergies and food allergies. Neurological: Negative for dizziness, seizures, syncope, speech difficulty, weakness, light-headedness and headaches. Hematological: Negative for adenopathy. Does not bruise/bleed easily. Psychiatric/Behavioral: Negative for agitation, confusion and decreased concentration. The patient is not nervous/anxious. Objective:   Physical Exam  Vitals signs and nursing note reviewed. Constitutional:       General: She is not in acute distress. Appearance: She is well-developed. She is not diaphoretic. Comments: ECOG remains stable at 0  Alert and oriented. Pleasant and conversant. HENT:      Head: Normocephalic and atraumatic. Mouth/Throat:      Pharynx: No oropharyngeal exudate. Eyes:      General: No scleral icterus. Right eye: No discharge. Left eye: No discharge. Conjunctiva/sclera: Conjunctivae normal.   Neck:      Musculoskeletal: Normal range of motion and neck supple. Thyroid: No thyromegaly. Vascular: No JVD. Trachea: No tracheal deviation. Cardiovascular:      Rate and Rhythm: Normal rate and regular rhythm. Heart sounds: No murmur. No friction rub. No gallop. Pulmonary:      Effort: Pulmonary effort is normal. No respiratory distress or retractions. Breath sounds: Normal breath sounds. No stridor. No wheezing or rales. Chest:      Chest wall: No mass, lacerations, deformity, swelling, tenderness or edema. Breasts: Breasts are symmetrical.         Right: No inverted nipple, mass, nipple discharge, skin change or tenderness. Left: No inverted nipple, mass, nipple discharge, skin change or tenderness. Comments: Right breast exam is unremarkable. Abdominal:      General: There is no distension. Palpations: Abdomen is soft. Tenderness: There is no abdominal tenderness. There is no guarding or rebound. Musculoskeletal: Normal range of motion. General: No tenderness or deformity. Right shoulder: Normal.      Left shoulder: Normal.   Lymphadenopathy:      Cervical: No cervical adenopathy. Right cervical: No superficial, deep or posterior cervical adenopathy. Left cervical: No superficial, deep or posterior cervical adenopathy. Upper Body:      Right upper body: No pectoral adenopathy. Left upper body: No pectoral adenopathy. Skin:     General: Skin is warm and dry. Coloration: Skin is not pale. Findings: No erythema or rash. Neurological:      Mental Status: She is alert and oriented to person, place, and time. Coordination: Coordination normal.   Psychiatric:         Behavior: Behavior normal.         Thought Content: Thought content normal.         Judgment: Judgment normal.        Assessment: This patient  presents today for tenderness of left breast with redness. Was being treated by PCP for mastitis with Keflex but has had no relief. Radha Laguerre is a pleasant 60 y.o. female who had a mass of the left breast.      The mass was located in the 2 o'clock position of left breast.     Breast cancer risk factors include age, gender, menarche before age 16. Age of menarche was 7. Age of menopause was 48. Patient was on birth control for 6 months in her 25s. Patient is . Age of first live birth was 30. Patient did breast feed. Bilateral screening mammogram on 2019: There is a high density, irregular mass with indistinct margins seen in the posterior upper outer quadrant of left breast.     Left Breast U/S on 2019:  Irregular solid mass with angular margins measuring 25 x 20 x 21 mm in the left breast at 2 o'clock located 8 centimeters from the nipple. No axillary LN. On 2019:  Mass, left breast at 2:00, core needle biopsy: Invasive ductal carcinoma,nuclear grade 3, see comment. 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 Breast Cancer Marker Studies:  ER: Negative  [place]: Negative  Her-2/francisco Positive/3+    CXR PA/Lateral on 2019:  Normal chest.    MRI Bilateral Breast 2019: An irregular, enhancing mass is again noted in the left breast 2:00 which measures 2.0 x 2.2 x 2.6 cm. No axillary LN. No evidence of neoplasm on right.     T2 N0 M0  We recommended neoadjuvant chemotherapy consisting of 6 cycles of TCHP. Side effects of TCHP reviewed with patient. She agreed to proceed. 2D-ECHO EF50-55%. Mediport placed. Cycle # 1 TCHP was on 06/24/2019. Cycle # 2 TCHP was on 07/24/2019. Cycle # 3 TCHP was on 08/14/2019. 2D-ECHO 08/23/2019 EF 65%  Cycle # 4 TCHP was on 09/04/2019  Cycle # 5 TCHP was on 09/25/2019. Cycle # 6 TCHP was on 10/23/2019. MRI Breast Bilateral 11/07/2019:   Near complete response to therapy on the left. There is no axillary lymphadenopathy. No evidence of neoplasm on the right. 2D-ECHO 12/16/2019 EF 60%  Left needle localized lumpectomy, blue dye injection, and left axillary sentinel lymph node excision was done on 12/18/2019:  A.  Left breast, new superior-lateral margin, excision:   -Focal adenosis, columnar cell changes and stromal fibrosis/hyalinosis, negative for malignancy;   -Rare microcalcifications in benign tubules. B.  [place] lymph node #1, biopsy: Reactive node, negative for malignancy. C.  [place] lymph node #2, biopsy: Reactive node, negative for malignancy. D.  Left breast, lumpectomy with needle localization:   - Invasive adenocarcinoma of no special type (ductal, NOS), grade 3;   - Ductal carcinoma in situ, high nuclear grade, comedo/solid with necrosis;   - Scar of previous biopsy site, see comment. Comment:  Although the invasive carcinoma is very close to the superior margin in specimen D (3 mm), additional superior-lateral margins in specimen A (at least 2.0 cm in thickness) is completely negative for carcinoma.      CANCER CASE SUMMARY  Procedure: Excision  Specimen Laterality: Left  Tumor Site: Invasive Carcinoma: 12:00 (per report: [facility]-)  Tumor Size: Size of Largest Invasive Carcinoma: 1.4 X 1.3 X1.3 cm  Histologic Type of Invasive Carcinoma: Invasive carcinoma of no special type (ductal, NOS)  Histologic Grade: [place] Histologic Score  Glandular/tubular differentiation: Score 3  Nuclear pleomorphism: Score 3  Mitotic rate: Score 3  Overall grade: Grade 3 (scores of 9)  Tumor Focality: Single focus of invasive carcinoma  Ductal Carcinoma In Situ (DCIS): Present  Size of DCIS: Intermingled with invasive carcinoma  Number of blocks with DCIS: 3  Number of blocks examined: 14  Architectural patternS: Comedo/solid  Nuclear grade: High  Ncrosis present: Central and focal  Lobular Carcinoma In Situ (LCIS): Not identified  Margins: Uninvolved by invasive carcinoma and DCIS  Distance from closest margin: 3 mm  Specify closest margin: surperior (see comment)  Regional lymph Nodes: Uninvolved by tumor cells  Total number of lymph nodes examined: 2  Number of sentinel nodes examined: 2  Treatment Effect: No known presurgical therapy  Lymph-Vascular Invasion: Not identified  Pathologic Staging (pTNM, AJCC 8TH Edition)  Primary tumor: pT1c  Regional lymph nodes (modifier- (sn) sentinel nodes examined: (sn)0  ER: Negative  [place]: Negative  Her-2/francisco Positive/3+    Pathology report reviewed extensively with patient. Recommended Ado-Trastuzumab alone for 14 cycles. Side effects of Ado-Trastuzumab reviewed with patient. She agreed to proceed. Cycle # 1 Ado-Trastuzumab was on 01/06/2020. Cycle # 2 Ado-Trastuzumab was on 01/27/2020. RT was started on 01/27/2020 and completed on 03/11/2020. Cycle # 3 Ado-Trastuzumab was on 02/17/2020. Cycle # 4 Ado-Trastuzumab was on 03/16/2020.  2D-ECHO 04/15/2020: EF 65%  Cycle # 5 Ado-Trastuzumab was on 04/20/2020. Cycle # 6 Ado-Trastuzumab was on 05/11/2020.  -Bilateral diagnostic mammogram on 05/21/2020 was negative. Left Breast U/S 05/29/2020 Area of post surgical change in the left breast is benign. Cycle # 7 Ado-Trastuzumab was on 06/01/2020. Cycle # 8 Ado-Trastuzumab was on 06/22/2020.  2d-Echo 07/20/2020 noted EF 65-70%  Cycle # 9 Ado-Trastuzumab was on 07/22/2020. Cycle # 10 Ado-Trastuzumab was on 08/12/2020. Cycle # 11 Ado-Trastuzumab was on 09/02/2020.   Cycle # 12 Ado-Trastuzumab was on 09/23/2020. Cycle # 13 Ado-Trastuzumab was on 10/22/2020. Cycle # 14 Ado-Trastuzumab was on 11/12/2020. Completed OT X 11 visits on 10/21/2020: with good results and good control of her lymphedema. Clinically, she is without evidence of recurrent disease. She has a small, noninflamed, left axillary epidermal inclusion cyst.  She was instructed on warm compresses and to avoid manual compression. In addition, she was advised to call us in the event that it becomes red or inflamed. Will plan for excision at the same time as her right Mediport removal.       Plan:   1. Continue monthly breast/chest wall self examination; detailed instructions reviewed today. Bring any changes to your physician's attention. 2. Continue healthy diet and exercise routinely as tolerated. 3. Avoid alcohol. 4. Limit caffeine intake. 5. Wear a good supportive bra. 6. Repeat mammogram May 2021.  7. Continue follow up with Primary Care/Medical Oncology. 8. RTC 4 months (June 1) with mammogram same day. During today's visit, face-to-face time 25 minutes, greater than 50% in counseling education and coordination of care. All questions were answered to her apparent satisfaction, and she is agreeable to the plan as outlined above. Nate Nayak, RN, MSN, APRN-CNP, [address]  Advanced Oncology Certified Nurse Practitioner  Department of Breast Surgery  [facility]/  [facility] [place] in collaboration with Dr. Franci Horowitz/Anjana Vo APRN-CNP

## 2020-12-06 NOTE — TELEPHONE ENCOUNTER
Patient submitted a cancer insurance statement. Patient completed her piece for submission. Worked extensively with Kamini Esquivel to print itemized bills with appropriate EOB's. DOS 5-6-18, 5-20-19, 5-23-19, 6-3-19, 6-13-19, 6-18-19 included for submission. Charges had not been entered for DOS 6-24-19. Patient aware of printing and requests that provider signed document along with itemized bills and EOB's be mailed to her home address. Reviewed necessity for follow up. Counseled on red flags and to return for them.  Patient appears well on discharge.

## 2021-01-26 ENCOUNTER — HOSPITAL ENCOUNTER (OUTPATIENT)
Dept: INFUSION THERAPY | Age: 66
Discharge: HOME | End: 2021-01-26
Payer: COMMERCIAL

## 2021-01-26 ENCOUNTER — OFFICE VISIT (OUTPATIENT)
Dept: BREAST CENTER | Age: 66
End: 2021-01-26
Payer: COMMERCIAL

## 2021-01-26 VITALS
SYSTOLIC BLOOD PRESSURE: 128 MMHG | HEART RATE: 71 BPM | HEIGHT: 63 IN | BODY MASS INDEX: 38.98 KG/M2 | TEMPERATURE: 97 F | RESPIRATION RATE: 20 BRPM | OXYGEN SATURATION: 98 % | WEIGHT: 220 LBS | DIASTOLIC BLOOD PRESSURE: 68 MMHG

## 2021-01-26 DIAGNOSIS — Z17.0 MALIGNANT NEOPLASM OF UPPER-OUTER QUADRANT OF LEFT BREAST IN FEMALE, ESTROGEN RECEPTOR POSITIVE (HCC): Primary | ICD-10-CM

## 2021-01-26 DIAGNOSIS — Z85.3 HISTORY OF BREAST CANCER: ICD-10-CM

## 2021-01-26 DIAGNOSIS — C50.412 MALIGNANT NEOPLASM OF UPPER-OUTER QUADRANT OF LEFT BREAST IN FEMALE, ESTROGEN RECEPTOR POSITIVE (HCC): Primary | ICD-10-CM

## 2021-01-26 DIAGNOSIS — Z12.31 SCREENING MAMMOGRAM FOR HIGH-RISK PATIENT: Primary | ICD-10-CM

## 2021-01-26 PROCEDURE — G8427 DOCREV CUR MEDS BY ELIG CLIN: HCPCS | Performed by: NURSE PRACTITIONER

## 2021-01-26 PROCEDURE — 6360000002 HC RX W HCPCS: Performed by: INTERNAL MEDICINE

## 2021-01-26 PROCEDURE — G8417 CALC BMI ABV UP PARAM F/U: HCPCS | Performed by: NURSE PRACTITIONER

## 2021-01-26 PROCEDURE — 3017F COLORECTAL CA SCREEN DOC REV: CPT | Performed by: NURSE PRACTITIONER

## 2021-01-26 PROCEDURE — 99213 OFFICE O/P EST LOW 20 MIN: CPT | Performed by: NURSE PRACTITIONER

## 2021-01-26 PROCEDURE — G8484 FLU IMMUNIZE NO ADMIN: HCPCS | Performed by: NURSE PRACTITIONER

## 2021-01-26 PROCEDURE — 96523 IRRIG DRUG DELIVERY DEVICE: CPT

## 2021-01-26 PROCEDURE — 1036F TOBACCO NON-USER: CPT | Performed by: NURSE PRACTITIONER

## 2021-01-26 PROCEDURE — 2580000003 HC RX 258: Performed by: INTERNAL MEDICINE

## 2021-01-26 RX ORDER — SODIUM CHLORIDE 0.9 % (FLUSH) 0.9 %
10 SYRINGE (ML) INJECTION PRN
Status: CANCELLED | OUTPATIENT
Start: 2021-01-26

## 2021-01-26 RX ORDER — HEPARIN SODIUM (PORCINE) LOCK FLUSH IV SOLN 100 UNIT/ML 100 UNIT/ML
500 SOLUTION INTRAVENOUS PRN
Status: DISCONTINUED | OUTPATIENT
Start: 2021-01-26 | End: 2021-01-27 | Stop reason: HOSPADM

## 2021-01-26 RX ORDER — SODIUM CHLORIDE 0.9 % (FLUSH) 0.9 %
10 SYRINGE (ML) INJECTION PRN
Status: DISCONTINUED | OUTPATIENT
Start: 2021-01-26 | End: 2021-01-27 | Stop reason: HOSPADM

## 2021-01-26 RX ORDER — HEPARIN SODIUM (PORCINE) LOCK FLUSH IV SOLN 100 UNIT/ML 100 UNIT/ML
500 SOLUTION INTRAVENOUS PRN
Status: CANCELLED | OUTPATIENT
Start: 2021-01-26

## 2021-01-26 RX ADMIN — HEPARIN 500 UNITS: 100 SYRINGE at 11:27

## 2021-01-26 RX ADMIN — SODIUM CHLORIDE, PRESERVATIVE FREE 10 ML: 5 INJECTION INTRAVENOUS at 11:27

## 2021-03-12 ENCOUNTER — HOSPITAL ENCOUNTER (OUTPATIENT)
Dept: INFUSION THERAPY | Age: 66
Discharge: HOME | End: 2021-03-12
Payer: COMMERCIAL

## 2021-03-12 DIAGNOSIS — Z17.0 MALIGNANT NEOPLASM OF UPPER-OUTER QUADRANT OF LEFT BREAST IN FEMALE, ESTROGEN RECEPTOR POSITIVE (HCC): ICD-10-CM

## 2021-03-12 DIAGNOSIS — C50.412 MALIGNANT NEOPLASM OF UPPER-OUTER QUADRANT OF LEFT BREAST IN FEMALE, ESTROGEN RECEPTOR POSITIVE (HCC): ICD-10-CM

## 2021-03-12 DIAGNOSIS — C50.912 INVASIVE DUCTAL CARCINOMA OF LEFT BREAST (HCC): Primary | ICD-10-CM

## 2021-03-12 LAB
ALBUMIN SERPL-MCNC: 4.2 G/DL (ref 3.5–5.2)
ALP BLD-CCNC: 101 U/L (ref 35–104)
ALT SERPL-CCNC: 34 U/L (ref 0–32)
ANION GAP SERPL CALCULATED.3IONS-SCNC: 10 MMOL/L (ref 7–16)
AST SERPL-CCNC: 29 U/L (ref 0–31)
BASOPHILS ABSOLUTE: 0.05 E9/L (ref 0–0.2)
BASOPHILS RELATIVE PERCENT: 0.8 % (ref 0–2)
BILIRUB SERPL-MCNC: 0.3 MG/DL (ref 0–1.2)
BUN BLDV-MCNC: 18 MG/DL (ref 8–23)
CALCIUM SERPL-MCNC: 10.2 MG/DL (ref 8.6–10.2)
CHLORIDE BLD-SCNC: 99 MMOL/L (ref 98–107)
CO2: 29 MMOL/L (ref 22–29)
CREAT SERPL-MCNC: 0.8 MG/DL (ref 0.5–1)
EOSINOPHILS ABSOLUTE: 0.2 E9/L (ref 0.05–0.5)
EOSINOPHILS RELATIVE PERCENT: 3.2 % (ref 0–6)
GFR AFRICAN AMERICAN: >60
GFR NON-AFRICAN AMERICAN: >60 ML/MIN/1.73
GLUCOSE BLD-MCNC: 102 MG/DL (ref 74–99)
HCT VFR BLD CALC: 38.1 % (ref 34–48)
HEMOGLOBIN: 12.9 G/DL (ref 11.5–15.5)
IMMATURE GRANULOCYTES #: 0.02 E9/L
IMMATURE GRANULOCYTES %: 0.3 % (ref 0–5)
LYMPHOCYTES ABSOLUTE: 2.68 E9/L (ref 1.5–4)
LYMPHOCYTES RELATIVE PERCENT: 42.5 % (ref 20–42)
MAGNESIUM: 1.6 MG/DL (ref 1.6–2.6)
MCH RBC QN AUTO: 29.8 PG (ref 26–35)
MCHC RBC AUTO-ENTMCNC: 33.9 % (ref 32–34.5)
MCV RBC AUTO: 88 FL (ref 80–99.9)
MONOCYTES ABSOLUTE: 0.52 E9/L (ref 0.1–0.95)
MONOCYTES RELATIVE PERCENT: 8.3 % (ref 2–12)
NEUTROPHILS ABSOLUTE: 2.83 E9/L (ref 1.8–7.3)
NEUTROPHILS RELATIVE PERCENT: 44.9 % (ref 43–80)
PDW BLD-RTO: 15.9 FL (ref 11.5–15)
PLATELET # BLD: 211 E9/L (ref 130–450)
PMV BLD AUTO: 10.3 FL (ref 7–12)
POTASSIUM SERPL-SCNC: 3.9 MMOL/L (ref 3.5–5)
RBC # BLD: 4.33 E12/L (ref 3.5–5.5)
SODIUM BLD-SCNC: 138 MMOL/L (ref 132–146)
TOTAL PROTEIN: 7.4 G/DL (ref 6.4–8.3)
WBC # BLD: 6.3 E9/L (ref 4.5–11.5)

## 2021-03-12 PROCEDURE — 83735 ASSAY OF MAGNESIUM: CPT

## 2021-03-12 PROCEDURE — 85025 COMPLETE CBC W/AUTO DIFF WBC: CPT

## 2021-03-12 PROCEDURE — 80053 COMPREHEN METABOLIC PANEL: CPT

## 2021-03-12 PROCEDURE — 2580000003 HC RX 258: Performed by: INTERNAL MEDICINE

## 2021-03-12 PROCEDURE — 6360000002 HC RX W HCPCS: Performed by: INTERNAL MEDICINE

## 2021-03-12 PROCEDURE — 36591 DRAW BLOOD OFF VENOUS DEVICE: CPT

## 2021-03-12 RX ORDER — SODIUM CHLORIDE 0.9 % (FLUSH) 0.9 %
10 SYRINGE (ML) INJECTION PRN
Status: DISCONTINUED | OUTPATIENT
Start: 2021-03-12 | End: 2021-03-13 | Stop reason: HOSPADM

## 2021-03-12 RX ORDER — HEPARIN SODIUM (PORCINE) LOCK FLUSH IV SOLN 100 UNIT/ML 100 UNIT/ML
500 SOLUTION INTRAVENOUS PRN
Status: CANCELLED | OUTPATIENT
Start: 2021-03-12

## 2021-03-12 RX ORDER — HEPARIN SODIUM (PORCINE) LOCK FLUSH IV SOLN 100 UNIT/ML 100 UNIT/ML
500 SOLUTION INTRAVENOUS PRN
Status: DISCONTINUED | OUTPATIENT
Start: 2021-03-12 | End: 2021-03-13 | Stop reason: HOSPADM

## 2021-03-12 RX ORDER — SODIUM CHLORIDE 0.9 % (FLUSH) 0.9 %
10 SYRINGE (ML) INJECTION PRN
Status: CANCELLED | OUTPATIENT
Start: 2021-03-12

## 2021-03-12 RX ADMIN — SODIUM CHLORIDE, PRESERVATIVE FREE 10 ML: 5 INJECTION INTRAVENOUS at 11:30

## 2021-03-12 RX ADMIN — HEPARIN 500 UNITS: 100 SYRINGE at 11:38

## 2021-03-12 RX ADMIN — SODIUM CHLORIDE, PRESERVATIVE FREE 10 ML: 5 INJECTION INTRAVENOUS at 11:35

## 2021-03-12 RX ADMIN — SODIUM CHLORIDE, PRESERVATIVE FREE 10 ML: 5 INJECTION INTRAVENOUS at 11:38

## 2021-03-15 ENCOUNTER — OFFICE VISIT (OUTPATIENT)
Dept: ONCOLOGY | Age: 66
End: 2021-03-15
Payer: COMMERCIAL

## 2021-03-15 ENCOUNTER — HOSPITAL ENCOUNTER (OUTPATIENT)
Dept: INFUSION THERAPY | Age: 66
Discharge: HOME | End: 2021-03-15
Payer: COMMERCIAL

## 2021-03-15 VITALS
TEMPERATURE: 97.9 F | OXYGEN SATURATION: 98 % | HEIGHT: 63 IN | DIASTOLIC BLOOD PRESSURE: 83 MMHG | BODY MASS INDEX: 41.29 KG/M2 | HEART RATE: 72 BPM | WEIGHT: 233 LBS | SYSTOLIC BLOOD PRESSURE: 196 MMHG

## 2021-03-15 DIAGNOSIS — Z85.3 PERSONAL HISTORY OF BREAST CANCER: Primary | ICD-10-CM

## 2021-03-15 PROCEDURE — 3017F COLORECTAL CA SCREEN DOC REV: CPT | Performed by: INTERNAL MEDICINE

## 2021-03-15 PROCEDURE — 99214 OFFICE O/P EST MOD 30 MIN: CPT | Performed by: INTERNAL MEDICINE

## 2021-03-15 PROCEDURE — G8484 FLU IMMUNIZE NO ADMIN: HCPCS | Performed by: INTERNAL MEDICINE

## 2021-03-15 PROCEDURE — 1036F TOBACCO NON-USER: CPT | Performed by: INTERNAL MEDICINE

## 2021-03-15 PROCEDURE — G8428 CUR MEDS NOT DOCUMENT: HCPCS | Performed by: INTERNAL MEDICINE

## 2021-03-15 PROCEDURE — 99214 OFFICE O/P EST MOD 30 MIN: CPT

## 2021-03-15 PROCEDURE — G8417 CALC BMI ABV UP PARAM F/U: HCPCS | Performed by: INTERNAL MEDICINE

## 2021-03-15 NOTE — PROGRESS NOTES
Department of [facility]       Attending Clinic Note    Reason for Visit: Follow-up on a patient with Left Breast Cancer    PCP:  Natalio Kimball MD    History of Present Illness: The mass was located in the 2 o'clock position of left breast.     Breast cancer risk factors include age, gender, menarche before age 16. Age of menarche was 7. Age of menopause was 48. Patient was on birth control for 6 months in her 25s. Patient is . Age of first live birth was 30. Patient did breast feed. Bilateral screening mammogram on 2019: There is a high density, irregular mass with indistinct margins seen in the posterior upper outer quadrant of left breast.     Left Breast U/S on 2019:  Irregular solid mass with angular margins measuring 25 x 20 x 21 mm in the left breast at 2 o'clock located 8 centimeters from the nipple. No axillary LN. On 2019:  Mass, left breast at 2:00, core needle biopsy: Invasive ductal carcinoma,nuclear grade 3, see comment. 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 Breast Cancer Marker Studies:  ER: Negative  [place]: Negative  Her-2/francisco Positive/3+    CXR PA/Lateral on 2019:  Normal chest.    MRI Bilateral Breast 2019: An irregular, enhancing mass is again noted in the left breast 2:00 which measures 2.0 x 2.2 x 2.6 cm. No axillary LN. No evidence of neoplasm on right. T2 N0 M0  We recommended neoadjuvant chemotherapy consisting of 6 cycles of TCHP. Side effects of TCHP reviewed with patient. She agreed to proceed. 2D-ECHO EF50-55%. Mediport placed. Cycle # 1 TCHP was on 2019. Cycle # 2 TCHP was on 2019. Cycle # 3 TCHP was on 2019. 2D-ECHO 2019 EF 65%  Cycle # 4 TCHP was on 2019  Cycle # 5 TCHP was on 2019. Cycle # 6 TCHP was on 10/23/2019. MRI Breast Bilateral 2019:   Near complete response to therapy on the left.    There is no axillary lymphadenopathy. No evidence of neoplasm on the right. 2D-ECHO 12/16/2019 EF 60%  Left needle localized lumpectomy, blue dye injection, and left axillary sentinel lymph node excision was done on 12/18/2019:  A.  Left breast, new superior-lateral margin, excision:   -Focal adenosis, columnar cell changes and stromal fibrosis/hyalinosis, negative for malignancy;   -Rare microcalcifications in benign tubules. B.  [place] lymph node #1, biopsy: Reactive node, negative for malignancy. C.  [place] lymph node #2, biopsy: Reactive node, negative for malignancy. D.  Left breast, lumpectomy with needle localization:   - Invasive adenocarcinoma of no special type (ductal, NOS), grade 3;   - Ductal carcinoma in situ, high nuclear grade, comedo/solid with necrosis;   - Scar of previous biopsy site, see comment. Comment:  Although the invasive carcinoma is very close to the superior margin in specimen D (3 mm), additional superior-lateral margins in specimen A (at least 2.0 cm in thickness) is completely negative for carcinoma.      CANCER CASE SUMMARY  Procedure: Excision  Specimen Laterality: Left  Tumor Site: Invasive Carcinoma: 12:00 (per report: HES-)  Tumor Size: Size of Largest Invasive Carcinoma: 1.4 X 1.3 X1.3 cm  Histologic Type of Invasive Carcinoma: Invasive carcinoma of no special type (ductal, NOS)  Histologic Grade: [place] Histologic Score  Glandular/tubular differentiation: Score 3  Nuclear pleomorphism: Score 3  Mitotic rate: Score 3  Overall grade: Grade 3 (scores of 9)  Tumor Focality: Single focus of invasive carcinoma  Ductal Carcinoma In Situ (DCIS): Present  Size of DCIS: Intermingled with invasive carcinoma  Number of blocks with DCIS: 3  Number of blocks examined: 14  Architectural patternS: Comedo/solid  Nuclear grade: High  Ncrosis present: Central and focal  Lobular Carcinoma In Situ (LCIS): Not identified  Margins: Uninvolved by invasive carcinoma and DCIS  Distance from closest margin: 3 mm  Specify closest margin: surperior (see comment)  Regional lymph Nodes: Uninvolved by tumor cells  Total number of lymph nodes examined: 2  Number of sentinel nodes examined: 2  Treatment Effect: No known presurgical therapy  Lymph-Vascular Invasion: Not identified  Pathologic Staging (pTNM, AJCC 8TH Edition)  Primary tumor: pT1c  Regional lymph nodes (modifier- (sn) sentinel nodes examined: (sn)0  ER: Negative  NE: Negative  Her-2/francisco Positive/3+    Pathology report reviewed extensively with patient. Recommended Ado-Trastuzumab alone for 14 cycles. Side effects of Ado-Trastuzumab reviewed with patient. She agreed to proceed. Cycle # 1 Ado-Trastuzumab was on 01/06/2020. Cycle # 2 Ado-Trastuzumab was on 01/27/2020. RT was started on 01/27/2020 and completed on 03/11/2020. Cycle # 3 Ado-Trastuzumab was on 02/17/2020. Cycle # 4 Ado-Trastuzumab was on 03/16/2020.  2D-ECHO 04/15/2020: EF 65%  Cycle # 5 Ado-Trastuzumab was on 04/20/2020. Cycle # 6 Ado-Trastuzumab was on 05/11/2020. Bilateral Diagnostic Mammogram on 05/21/2020 Negative for malignancy. Left Breast U/S 05/29/2020 Area of post surgical change in the left breast is benign. Cycle # 7 Ado-Trastuzumab was on 06/01/2020. Cycle # 8 Ado-Trastuzumab was on 06/22/2020.  2d-Echo 07/20/2020 noted EF 65-70%  Cycle # 9 Ado-Trastuzumab was on 07/22/2020. Cycle # 10 Ado-Trastuzumab was on 08/12/2020. Cycle # 11 Ado-Trastuzumab was on 09/02/2020. Cycle # 12 Ado-Trastuzumab was on 09/23/2020. Cycle # 13 Ado-Trastuzumab was on 10/22/2020. Cycle # 14 Ado-Trastuzumab was on 11/12/2020. She presented today 03/15/2021 for F/U and is doing well. Fair appetite and energy level. No nausea/vomiting. Denies fever/chills. Review of Systems;  CONSTITUTIONAL: No fever, chills. Fair appetite and energy level. ENMT: Eyes: No diplopia. Mouth: No sore throat. RESPIRATORY: No hemoptysis, shortness of breath, cough.    CARDIOVASCULAR: No chest pain, palpitations. GASTROINTESTINAL: No nausea/vomiting abdominal pain. GENITOURINARY: No dysuria, urinary frequency, hematuria. NEURO: No syncope, presyncope, headache. Remainder:  ROS NEGATIVE    Past Medical History:      Diagnosis Date    Anxiety     Cancer ([facility] [address] 2019    breast    Heart murmur     Hypertension     Post-menopausal bleeding     S/P hyst/ polypectomy 6/10/14     Medications:  Reviewed and reconciled. Allergies: Allergies   Allergen Reactions    Tetracyclines & Related Hives     Physical Exam:  BP (!) 196/83 (Site: Right Upper Arm, Position: Sitting, Cuff Size: Medium Adult)   Pulse 72   Temp 97.9 °F (36.6 °C) (Temporal)   Ht 5' 3\" (1.6 m)   Wt 233 lb (105.7 kg)   SpO2 98%   BMI 41.27 kg/m²   GENERAL: Alert, oriented x 3, not in acute distress. EXTREMITIES: Without clubbing, cyanosis, or edema. ECOG PS 1    Impression/Plan:  60 y.o. female with Left Breast Cancer    Mass, left breast at 2:00, core needle biopsy: Invasive ductal carcinoma,nuclear grade 3, see comment. 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 Breast Cancer Marker Studies:  ER: Negative  DC: Negative  Her-2/francisco Positive/3+    MRI Bilateral Breast 05/23/2019: An irregular, enhancing mass is again noted in the left breast 2:00 which measures 2.0 x 2.2 x 2.6 cm. No axillary LN. No evidence of neoplasm on right. T2 N0 M0  We recommended neoadjuvant chemotherapy consisting of 6 cycles of TCHP. Side effects of TCHP reviewed with patient. She agreed to proceed. 2D-ECHO EF50-55%. Mediport placed. Cycle # 1 TCHP was on 06/24/2019. Cycle # 2 TCHP was on 07/24/2019. Cycle # 3 TCHP was on 08/14/2019. 2D-ECHO 08/23/2019 EF 65%  Cycle # 4 TCHP was on 09/04/2019. Cycle # 5 TCHP was on 09/25/2019. Cycle # 6 TCHP was on 10/23/2019. MRI Breast Bilateral 11/07/2019:   Near complete response to therapy on the left. There is no axillary lymphadenopathy.   No evidence of neoplasm on the right. Herceptin/Perjeta was on 12/04/2019. 2D-ECHO 12/16/2019 EF 60%    Left needle localized lumpectomy, blue dye injection, and left axillary sentinel lymph node excision on 12/18/2019:  A.  Left breast, new superior-lateral margin, excision:   -Focal adenosis, columnar cell changes and stromal fibrosis/hyalinosis, negative for malignancy;   -Rare microcalcifications in benign tubules. B.  [place] lymph node #1, biopsy: Reactive node, negative for malignancy. C.  [place] lymph node #2, biopsy: Reactive node, negative for malignancy. D.  Left breast, lumpectomy with needle localization:   - Invasive adenocarcinoma of no special type (ductal, NOS), grade 3;   - Ductal carcinoma in situ, high nuclear grade, comedo/solid with necrosis;   - Scar of previous biopsy site, see comment. Comment:  Although the invasive carcinoma is very close to the superior margin in specimen D (3 mm), additional superior-lateral margins in specimen A (at least 2.0 cm in thickness) is completely negative for carcinoma.      CANCER CASE SUMMARY  Procedure: Excision  Specimen Laterality: Left  Tumor Site: Invasive Carcinoma: 12:00 (per report: HES-)  Tumor Size: Size of Largest Invasive Carcinoma: 1.4 X 1.3 X1.3 cm  Histologic Type of Invasive Carcinoma: Invasive carcinoma of no special type (ductal, NOS)  Histologic Grade: [place] Histologic Score  Glandular/tubular differentiation: Score 3  Nuclear pleomorphism: Score 3  Mitotic rate: Score 3  Overall grade: Grade 3 (scores of 9)  Tumor Focality: Single focus of invasive carcinoma  Ductal Carcinoma In Situ (DCIS): Present  Size of DCIS: Intermingled with invasive carcinoma  Number of blocks with DCIS: 3  Number of blocks examined: 14  Architectural patternS: Comedo/solid  Nuclear grade: High  Ncrosis present: Central and focal  Lobular Carcinoma In Situ (LCIS): Not identified  Margins: Uninvolved by invasive carcinoma and DCIS  Distance from closest margin: 3 mm  Specify closest margin: surperior (see comment)  Regional lymph Nodes: Uninvolved by tumor cells  Total number of lymph nodes examined: 2  Number of sentinel nodes examined: 2  Treatment Effect: No known presurgical therapy  Lymph-Vascular Invasion: Not identified  Pathologic Staging (pTNM, AJCC 8TH Edition)  Primary tumor: pT1c  Regional lymph nodes (modifier- (sn) sentinel nodes examined: (sn)0  ER: Negative  MA: Negative  Her-2/francisco Positive/3+    Pathology report reviewed extensively with patient. Radiation Oncology team consulted for adjuvant RT. Recommended Ado-Trastuzumab alone for 14 cycles. Side effects of Ado-Trastuzumab reviewed with patient. She agreed to proceed. Cycle # 1 Ado-Trastuzumab was on 01/06/2020. RT was started on 01/27/2020 and completed on 03/11/2020. 2d-echo 11/05/2020 EF 70%   Cycle # 14 Ado-Trastuzumab was on 11/12/2020. Labs reviewed. CINTHYA. RTC 4 months with labs.  Mediport removal in the interim    Adele Conte MD   [id number]  Board Certified Medical Oncologist

## 2021-03-30 ENCOUNTER — HOSPITAL ENCOUNTER (OUTPATIENT)
Dept: RADIATION ONCOLOGY | Age: 66
Discharge: HOME | End: 2021-03-30
Attending: RADIOLOGY
Payer: COMMERCIAL

## 2021-03-30 DIAGNOSIS — Z17.0 MALIGNANT NEOPLASM OF UPPER-OUTER QUADRANT OF LEFT BREAST IN FEMALE, ESTROGEN RECEPTOR POSITIVE (HCC): Primary | ICD-10-CM

## 2021-03-30 DIAGNOSIS — C50.412 MALIGNANT NEOPLASM OF UPPER-OUTER QUADRANT OF LEFT BREAST IN FEMALE, ESTROGEN RECEPTOR POSITIVE (HCC): Primary | ICD-10-CM

## 2021-03-30 PROCEDURE — 99441 PR PHYS/QHP TELEPHONE EVALUATION 5-10 MIN: CPT | Performed by: NURSE PRACTITIONER

## 2021-03-30 NOTE — PROGRESS NOTES
MEDI PORT PLACEMENT performed by Kem Rajput MD at [facility] History     Socioeconomic History    Marital status:      Spouse name: Not on file    Number of children: Not on file    Years of education: Not on file    Highest education level: Not on file   Occupational History    Not on file   Social Needs    Financial resource strain: Not on file    Food insecurity     Worry: Not on file     Inability: Not on file    Transportation needs     Medical: Not on file     Non-medical: Not on file   Tobacco Use    Smoking status: Never Smoker    Smokeless tobacco: Never Used   Substance and Sexual Activity    Alcohol use: Yes     Alcohol/week: 0.0 standard drinks     Comment: socially    Drug use: No    Sexual activity: Not Currently     Partners: Male     Birth control/protection: Post-menopausal   Lifestyle    Physical activity     Days per week: Not on file     Minutes per session: Not on file    Stress: Not on file   Relationships    Social connections     Talks on phone: Not on file     Gets together: Not on file     Attends [employer] service: Not on file     Active member of club or organization: Not on file     Attends meetings of clubs or organizations: Not on file     Relationship status: Not on file    Intimate partner violence     Fear of current or ex partner: Not on file     Emotionally abused: Not on file     Physically abused: Not on file     Forced sexual activity: Not on file   Other Topics Concern    Not on file   Social History Narrative    Lives in [place].          Family History   Problem Relation Age of Onset    Bleeding Prob Sister     Bleeding Prob Sister     Heart Disease Father     Heart Attack Father 79    High Blood Pressure Father     Cancer Mother 61        lung    Stroke Maternal Grandmother     Stroke Paternal Grandfather     Cancer Paternal Grandfather 48        GI cancer       Allergies:   Tetracyclines & related      Current Outpatient Medications   Medication Sig Dispense Refill    acetaminophen (TYLENOL) 500 MG tablet Take 500 mg by mouth every 6 hours as needed for Pain       lidocaine-prilocaine (EMLA) 2.5-2.5 % cream Apply 1 hour before your scheduled chemotherapy,cream will last up to 3 hours. Squeeze a small amount,the size of a quarter onto your port. 30 g 5    Omega-3 Fatty Acids (FISH OIL) 1000 MG CAPS Take 3,000 mg by mouth 3 times daily      diphenoxylate-atropine (LOMOTIL) 2.5-0.025 MG per tablet Take 1 tablet by mouth 4 times daily as needed for Diarrhea.  b complex vitamins capsule Take 1 capsule by mouth daily      Multiple Vitamins-Minerals (MULTIVITAMIN WOMEN 50+ PO) Take 1 tablet by mouth daily       Biotin 5000 MCG TABS Take 5,000 mcg by mouth 2 times daily       metoprolol succinate (TOPROL XL) 50 MG extended release tablet TAKE ONE TABLET BY MOUTH EVERY night  3    valsartan-hydrochlorothiazide (DIOVAN-HCT) 320-25 MG per tablet TAKE ONE TABLET BY MOUTH EVERY DAY  1    clonazePAM (KLONOPIN) 0.5 MG tablet Take 0.5 mg by mouth daily as needed for Anxiety. No current facility-administered medications for this encounter. REVIEW OF SYSTEMS:       Constitutional:  No fever chills or rigors.  Eyes: No changes in vision, discharge, or pain   ENT: No Headaches, hearing loss or vertigo. No mouth sores or sore throat. No change in taste or smell.  Cardiovascular: No chest discomfort, dyspnea on exertion, palpitations or loss of consciousness or phlebitis.  Respiratory: Has no cough or wheezing, Has no sputum production. Has no hemoptysis, has no pleuritic pain.  Gastrointestinal: No abdominal pain, appetite loss, blood in stools. No change in bowel habits. No hematemesis    Genitourinary: Patient acknowledges no dysuria, trouble voiding, or hematuria. No nocturia or increased frequency.  Musculoskeletal: No gait disturbance, weakness or joint complaints.    Integumentary: No rash or pruritis.  Neurological: No headache, diplopia, change in muscle strength, numbness or tingling. No change in gait, balance, coordination, mood, affect, memory, mentation, behavior.  Psychiatric: No anxiety, or depression.  Endocrine: No temperature intolerance. No excessive thirst, fluid intake, or urination. No tremor.  Hematologic/Lymphatic: No abnormal bruising or bleeding, blood clots or swollen lymph nodes. +lymphedema left breast   Allergic/Immunologic: No nasal congestion or hives. IMAGING:    US LEFT BREAST, 5/29/20:  Narrative   ULTRASOUND FINDINGS:       Finding 1:   Sonography was performed in the left breast using a radial and anti-radial approach.  There is an area of post surgical change seen in the left breast at 12 o'clock located 1 centimeter from the nipple.       Associated small seroma that measures 36 x 15 x 43 mm and does not appear to be drainable.       No sonographic evidence of suspicious solid or cystic mass lesions.  No focal areas of suspicious atypical echogenicity.       IMPRESSION:   Area of post surgical change in the left breast is benign.       Screening mammogram in 1 year is recommended.       =======================================   BI-RADS Category 2:  Benign   =======================================       MAMMOGRAM, 5/21/20:   Narrative   TISSUE DENSITY:   The breasts are heterogeneously dense (Type 3 density).       MAMMOGRAM FINDINGS:   Finding 1:   No suspicious masses, areas of suspicious architectural distortion, suspicious calcifications, or additional suspicious findings are identified.  There are no significant changes from the prior study.       IMPRESSION:   No mammographic evidence of malignancy.       Screening mammogram in 1 year is recommended.       =======================================   BI-RADS Category 1:  Negative   =======================================                 ASSESSMENT/PLAN:    Left breast cancer (ER/MI-, Her2+), s/p NACT, BCS and adjuvant radiation therapy to the left breast completed on 3/11/20 (6000 cGy in 30 fractions).     Patient is doing well post-radiation completion. Skin care and sun care reviewed. Encouraged to continue to complete monthly self breast exams. Reviewed signs and symptoms of recurrence. Imaging per med onc/ breast surgery. Mammogram completed 5/21/20, no evidence of malignancy. Lymphedema Left Breast: She has been using a lymphedema pump and is completing massage as instructed to the left breast, although she feels lymphedema is progressing to left arm as it's achy. She does follow up with Lymphedema Therapy on  PRN basis. Offered to refer back to lymphedema therapy but patient declines at this time and feels she can control it with self massage and the pump.        Cont to follow with Dr Liz Bean for medical oncology. Next appt 7/15/21.     Cont to follow with Dr Barbara Lyn and Param Winter, APRN-CNP for breast surgery. Next appt 6/1/21 with mammogram prior. I discussed follow up plans with Clement Barcenas. At this time, Dr Vic Bishop will see the patient back on a PRN basis as patient is following closely with medical oncology and breast surgery for her cancer care. Instructed to follow up with other providers involved in their care as directed (including but not limited to Medical Oncology, Primary Care, Pulmonary, and Surgery). The patient was given our contact number in the event that if at any time they change their mind and would like to return to the clinic to see either myself or one of the Radiation Oncologists, they can simply call us and we would be happy to see them. Thank you for involving us in the management of this extremely pleasant patient.           Sincerely,    Angie Velarde, MSN, RN, APRN-CNP  Nurse Practitioner for Radiation Oncology    PHYSIC[facility]ANS [address]) [facility]: [phone] ([facility]: [phone])  [address] [place] [address]: [phone] (FAX: [phone] [facility] [place]) [facility]:  [phone] ([facility]:  [phone])      I AFFIRM this is a Patient Initiated Episode with a Patient who has not had a related appointment within my department in the past 7 days or scheduled within the next 24 hours.     Total time: 5-10 min

## 2021-04-02 LAB — SOURCE: NORMAL

## 2021-05-20 ASSESSMENT — ENCOUNTER SYMPTOMS
CHEST TIGHTNESS: 0
ABDOMINAL PAIN: 0
BLOOD IN STOOL: 0
SHORTNESS OF BREATH: 0
NAUSEA: 0
BACK PAIN: 0
RHINORRHEA: 0
COUGH: 0
DIARRHEA: 0
EYE DISCHARGE: 0
EYE ITCHING: 0
CHOKING: 0
ABDOMINAL DISTENTION: 0
SINUS PAIN: 0
CONSTIPATION: 0
SINUS PRESSURE: 0
SORE THROAT: 0
VOMITING: 0
VOICE CHANGE: 0
TROUBLE SWALLOWING: 0
WHEEZING: 0

## 2021-05-20 NOTE — PROGRESS NOTES
Subjective: This note was copied forward from the last encounter. Essential components for this patient record were reviewed and verified on this visit including:  recent hospitalizations, recent imaging, PMH, PSH, FH, SOC HX, Allergies, and Medications were reviewed and updated as appropriate. In addition, the assessment and plan were copied from prior office note and updated accordingly. Patient ID: Hardy Mayen is a 73 y.o. female. 2021  HPI   Patient presents today for 6 mos follow up with history breast cancer, left. Has developed some edema of the left arm, forearm. The cancer was located in the 2 o'clock position of left breast.     Breast cancer risk factors include age, gender, menarche before age 16. Age of menarche was 7. Age of menopause was 48. Patient was on birth control for 6 months in her 25s. Patient is . Age of first live birth was 30. Patient did breast feed. Bilateral screening mammogram on 2019: There is a high density, irregular mass with indistinct margins seen in the posterior upper outer quadrant of left breast.     Left Breast U/S on 2019:  Irregular solid mass with angular margins measuring 25 x 20 x 21 mm in the left breast at 2 o'clock located 8 centimeters from the nipple. No axillary LN. On 2019:  Mass, left breast at 2:00, core needle biopsy: Invasive ductal carcinoma,nuclear grade 3, see comment. 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 Breast Cancer Marker Studies:  ER: Negative  SD: Negative  Her-2/francisco Positive/3+    CXR PA/Lateral on 2019:  Normal chest.    MRI Bilateral Breast 2019: An irregular, enhancing mass is again noted in the left breast 2:00 which measures 2.0 x 2.2 x 2.6 cm. No axillary LN. No evidence of neoplasm on right. T2 N0 M0  We recommended neoadjuvant chemotherapy consisting of 6 cycles of TCHP.  Side effects of TCHP reviewed with patient. She agreed to proceed. 2D-ECHO EF50-55%. Mediport placed. Cycle # 1 TCHP was on 06/24/2019. Cycle # 2 TCHP was on 07/24/2019. Cycle # 3 TCHP was on 08/14/2019. 2D-ECHO 08/23/2019 EF 65%  Cycle # 4 TCHP was on 09/04/2019  Cycle # 5 TCHP was on 09/25/2019. Cycle # 6 TCHP was on 10/23/2019. MRI Breast Bilateral 11/07/2019:   Near complete response to therapy on the left. There is no axillary lymphadenopathy. No evidence of neoplasm on the right. 2D-ECHO 12/16/2019 EF 60%  Left needle localized lumpectomy, blue dye injection, and left axillary sentinel lymph node excision was done on 12/18/2019:  A.  Left breast, new superior-lateral margin, excision:   -Focal adenosis, columnar cell changes and stromal fibrosis/hyalinosis, negative for malignancy;   -Rare microcalcifications in benign tubules. B.  [place] lymph node #1, biopsy: Reactive node, negative for malignancy. C.  [place] lymph node #2, biopsy: Reactive node, negative for malignancy. D.  Left breast, lumpectomy with needle localization:   - Invasive adenocarcinoma of no special type (ductal, NOS), grade 3;   - Ductal carcinoma in situ, high nuclear grade, comedo/solid with necrosis;   - Scar of previous biopsy site, see comment. Comment:  Although the invasive carcinoma is very close to the superior margin in specimen D (3 mm), additional superior-lateral margins in specimen A (at least 2.0 cm in thickness) is completely negative for carcinoma.      CANCER CASE SUMMARY  Procedure: Excision  Specimen Laterality: Left  Tumor Site: Invasive Carcinoma: 12:00 (per report: HES-)  Tumor Size: Size of Largest Invasive Carcinoma: 1.4 X 1.3 X1.3 cm  Histologic Type of Invasive Carcinoma: Invasive carcinoma of no special type (ductal, NOS)  Histologic Grade: Lidya Histologic Score  Glandular/tubular differentiation: Score 3  Nuclear pleomorphism: Score 3  Mitotic rate: Score 3  Overall grade: Grade 3 (scores of 9)  Tumor Focality: Single focus of invasive carcinoma  Ductal Carcinoma In Situ (DCIS): Present  Size of DCIS: Intermingled with invasive carcinoma  Number of blocks with DCIS: 3  Number of blocks examined: 14  Architectural patternS: Comedo/solid  Nuclear grade: High  Ncrosis present: Central and focal  Lobular Carcinoma In Situ (LCIS): Not identified  Margins: Uninvolved by invasive carcinoma and DCIS  Distance from closest margin: 3 mm  Specify closest margin: surperior (see comment)  Regional lymph Nodes: Uninvolved by tumor cells  Total number of lymph nodes examined: 2  Number of sentinel nodes examined: 2  Treatment Effect: No known presurgical therapy  Lymph-Vascular Invasion: Not identified  Pathologic Staging (pTNM, AJCC 8TH Edition)  Primary tumor: pT1c  Regional lymph nodes (modifier- (sn) sentinel nodes examined: (sn)0  ER: Negative  ME: Negative  Her-2/francisco Positive/3+      Medical OncologyMaty Recommended Ado-Trastuzumab alone for 14 cycles. Cycle # 1 Ado-Trastuzumab was on 01/06/2020. Cycle # 2 Ado-Trastuzumab was on 01/27/2020. RT was started on 01/27/2020 and completed on 03/11/2020. Cycle # 3 Ado-Trastuzumab was on 02/17/2020. Cycle # 4 Ado-Trastuzumab was on 03/16/2020.  2D-ECHO 04/15/2020: EF 65%  Cycle # 5 Ado-Trastuzumab was on 04/20/2020. Cycle # 6 Ado-Trastuzumab was 05/11/2020  Bilateral Diagnostic Mammogram on 05/21/2020 Negative for malignancy. Left Breast U/S 05/29/2020 Area of post surgical change in the left breast is benign. Cycle # 7 Ado-Trastuzumab was on 06/01/2020. Cycle # 8 Ado-Trastuzumab was on 06/22/2020.  2d-Echo 07/20/2020 noted EF 65-70%  Cycle # 9 Ado-Trastuzumab was on 07/22/2020. Cycle # 10 Ado-Trastuzumab was on 08/12/2020. Cycle # 11 Ado-Trastuzumab was on 09/02/2020. Cycle # 12 Ado-Trastuzumab was on 09/23/2020  Cycle # 13 Ado-Trastuzumab was on 10/22/2020. Cycle # 14 Ado-Trastuzumab was on 11/12/2020.       Past Medical History:   Diagnosis Date    Anxiety  Cancer ([facility] [address]     breast    Heart murmur     Hypertension     Post-menopausal bleeding     S/P hyst/ polypectomy 6/10/14       Past Surgical History:   Procedure Laterality Date    BREAST BIOPSY      BREAST BIOPSY Left 2019    LEFT BREAST  NEEDLE LOCALIZED LUMPECTOMY BLUE DYE INJECTION LEFT AXILL. SENTINEL LYMPH NODE EXCISION, INSERTION OF BIOZORB performed by Jb Arriaga MD at . [address] LUMPECTOMY Left 2019    patient had a left axillary SLN     SECTION      COLONOSCOPY      normal - Dr Ping Jones x 10 years    DILATION AND CURETTAGE      INSERTION / REMOVAL / REPLACEMENT VENOUS ACCESS CATHETER N/A 2019    MEDI PORT PLACEMENT performed by Antony Velarde MD at [facility] OR       Current Outpatient Medications   Medication Sig Dispense Refill    acetaminophen (TYLENOL) 500 MG tablet Take 500 mg by mouth every 6 hours as needed for Pain       lidocaine-prilocaine (EMLA) 2.5-2.5 % cream Apply 1 hour before your scheduled chemotherapy,cream will last up to 3 hours. Squeeze a small amount,the size of a quarter onto your port. 30 g 5    Omega-3 Fatty Acids (FISH OIL) 1000 MG CAPS Take 3,000 mg by mouth 3 times daily      diphenoxylate-atropine (LOMOTIL) 2.5-0.025 MG per tablet Take 1 tablet by mouth 4 times daily as needed for Diarrhea.  b complex vitamins capsule Take 1 capsule by mouth daily      Multiple Vitamins-Minerals (MULTIVITAMIN WOMEN 50+ PO) Take 1 tablet by mouth daily       Biotin 5000 MCG TABS Take 5,000 mcg by mouth 2 times daily       metoprolol succinate (TOPROL XL) 50 MG extended release tablet TAKE ONE TABLET BY MOUTH EVERY night  3    valsartan-hydrochlorothiazide (DIOVAN-HCT) 320-25 MG per tablet TAKE ONE TABLET BY MOUTH EVERY DAY  1    clonazePAM (KLONOPIN) 0.5 MG tablet Take 0.5 mg by mouth daily as needed for Anxiety. No current facility-administered medications for this visit.        Allergies   Allergen Reactions    Tetracyclines & Related Hives       Family History   Problem Relation Age of Onset    Bleeding Prob Sister     Bleeding Prob Sister     Heart Disease Father     Heart Attack Father 79    High Blood Pressure Father     Cancer Mother 61        lung    Stroke Maternal Grandmother     Stroke Paternal Grandfather     Cancer Paternal Grandfather 48        GI cancer       Social History     Socioeconomic History    Marital status:      Spouse name: Not on file    Number of children: Not on file    Years of education: Not on file    Highest education level: Not on file   Occupational History    Not on file   Tobacco Use    Smoking status: Never Smoker    Smokeless tobacco: Never Used   Vaping Use    Vaping Use: Never used   Substance and Sexual Activity    Alcohol use: Yes     Alcohol/week: 0.0 standard drinks     Comment: socially    Drug use: No    Sexual activity: Not Currently     Partners: Male     Birth control/protection: Post-menopausal   Other Topics Concern    Not on file   Social History Narrative    Lives in [place]. Social Determinants of Health     Financial Resource Strain:     Difficulty of Paying Living Expenses:    Food Insecurity:     Worried About Running Out of Food in the Last Year:     [address] in the Last Year:    Transportation Needs:     Lack of Transportation (Medical):      Lack of Transportation (Non-Medical):    Physical Activity:     Days of Exercise per Week:     Minutes of Exercise per Session:    Stress:     Feeling of Stress :    Social Connections:     Frequency of Communication with Friends and Family:     Frequency of Social Gatherings with Friends and Family:     Attends Alevism Services:     Active Member of Clubs or Organizations:     Attends Club or Organization Meetings:     Marital Status:    Intimate Partner Violence:     Fear of Current or Ex-Partner:     Emotionally Abused:     Physically Abused:     Sexually Abused: 5/21/20 Diagnostic Mammogram: [facility]      MAMMOGRAM FINDINGS:   Finding 1:   No suspicious masses, areas of suspicious architectural distortion, suspicious calcifications, or additional suspicious findings are identified.  There are no significant changes from the prior study.       IMPRESSION:   No mammographic evidence of malignancy.       Screening mammogram in 1 year is recommended.       =======================================   BI-RADS Category 1:  Negative   =======================================       RISK ASSESSMENT:     Review of Systems   Constitutional: Negative for activity change, appetite change, chills, fatigue, fever and unexpected weight change. Doing well. Completed [place]cy[place] per Medical Oncology. Has developed swelling of the left arm, forearm. HENT: Negative for congestion, postnasal drip, rhinorrhea, sinus pressure, sinus pain, sore throat, trouble swallowing and voice change. Eyes: Negative for discharge, itching and visual disturbance. Respiratory: Negative for cough, choking, chest tightness, shortness of breath and wheezing. Cardiovascular: Negative for chest pain, palpitations and leg swelling. Gastrointestinal: Negative for abdominal distention, abdominal pain, blood in stool, constipation, diarrhea, nausea and vomiting. Endocrine: Negative for cold intolerance and heat intolerance. Genitourinary: Negative for difficulty urinating, dysuria, frequency and hematuria. Musculoskeletal: Negative for arthralgias, back pain, gait problem, joint swelling, myalgias, neck pain and neck stiffness. Allergic/Immunologic: Negative for environmental allergies and food allergies. Neurological: Negative for dizziness, seizures, syncope, speech difficulty, weakness, light-headedness and headaches. Hematological: Negative for adenopathy. Does not bruise/bleed easily. Psychiatric/Behavioral: Negative for agitation, confusion and decreased concentration.  The patient is not nervous/anxious. Objective:   Physical Exam  Vitals and nursing note reviewed. Constitutional:       General: She is not in acute distress. Appearance: She is well-developed. She is not diaphoretic. Comments: ECOG remains stable at 0  Alert and oriented. Pleasant and conversant. HENT:      Head: Normocephalic and atraumatic. Mouth/Throat:      Pharynx: No oropharyngeal exudate. Eyes:      General: No scleral icterus. Right eye: No discharge. Left eye: No discharge. Conjunctiva/sclera: Conjunctivae normal.   Neck:      Thyroid: No thyromegaly. Vascular: No JVD. Trachea: No tracheal deviation. Cardiovascular:      Rate and Rhythm: Normal rate and regular rhythm. Heart sounds: No murmur heard. No friction rub. No gallop. Pulmonary:      Effort: Pulmonary effort is normal. No respiratory distress or retractions. Breath sounds: Normal breath sounds. No stridor. No wheezing or rales. Chest:      Chest wall: No mass, lacerations, deformity, swelling, tenderness or edema. Breasts: Breasts are symmetrical.         Right: No inverted nipple, mass, nipple discharge, skin change or tenderness. Left: No inverted nipple, mass, nipple discharge, skin change or tenderness. Comments: Right breast exam is unremarkable. Subtle edema left breast, trace left arm. Abdominal:      General: There is no distension. Palpations: Abdomen is soft. Tenderness: There is no abdominal tenderness. There is no guarding or rebound. Musculoskeletal:         General: No tenderness or deformity. Normal range of motion. Right shoulder: Normal. Normal range of motion. Left shoulder: Normal. Normal range of motion. Left upper arm: Swelling (  Trace) present. Cervical back: Normal range of motion and neck supple. Lymphadenopathy:      Cervical: No cervical adenopathy.       Right cervical: No superficial, deep or posterior cervical adenopathy. Left cervical: No superficial, deep or posterior cervical adenopathy. Upper Body:      Right upper body: No pectoral adenopathy. Left upper body: No pectoral adenopathy. Skin:     General: Skin is warm and dry. Coloration: Skin is not pale. Findings: No erythema or rash. Neurological:      Mental Status: She is alert and oriented to person, place, and time. Coordination: Coordination normal.   Psychiatric:         Behavior: Behavior normal.         Thought Content: Thought content normal.         Judgment: Judgment normal.        Assessment: This patient  presents today for tenderness of left breast with redness. Was being treated by PCP for mastitis with Keflex but has had no relief. Yadira Galo is a pleasant 73 y.o. female who had a mass of the left breast.      The malignancy was located in the 2 o'clock position of left breast.     Breast cancer risk factors include age, gender, menarche before age 16. Age of menarche was 7. Age of menopause was 48. Patient was on birth control for 6 months in her 25s. Patient is . Age of first live birth was 30. Patient did breast feed. Bilateral screening mammogram on 2019: There is a high density, irregular mass with indistinct margins seen in the posterior upper outer quadrant of left breast.     Left Breast U/S on 2019:  Irregular solid mass with angular margins measuring 25 x 20 x 21 mm in the left breast at 2 o'clock located 8 centimeters from the nipple. No axillary LN. On 2019:  Mass, left breast at 2:00, core needle biopsy: Invasive ductal carcinoma,nuclear grade 3, see comment. 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 Breast Cancer Marker Studies:  ER: Negative  [place]: Negative  Her-2/francisco Positive/3+    CXR PA/Lateral on 2019:  Normal chest.    MRI Bilateral Breast 2019:   An irregular, enhancing mass is again noted in the left breast 2:00 which measures 2.0 x 2.2 x 2.6 cm. No axillary LN. No evidence of neoplasm on right. T2 N0 M0  We recommended neoadjuvant chemotherapy consisting of 6 cycles of TCHP. Side effects of TCHP reviewed with patient. She agreed to proceed. 2D-ECHO EF50-55%. Mediport placed. Cycle # 1 TCHP was on 06/24/2019. Cycle # 2 TCHP was on 07/24/2019. Cycle # 3 TCHP was on 08/14/2019. 2D-ECHO 08/23/2019 EF 65%  Cycle # 4 TCHP was on 09/04/2019  Cycle # 5 TCHP was on 09/25/2019. Cycle # 6 TCHP was on 10/23/2019. MRI Breast Bilateral 11/07/2019:   Near complete response to therapy on the left. There is no axillary lymphadenopathy. No evidence of neoplasm on the right. 2D-ECHO 12/16/2019 EF 60%  Left needle localized lumpectomy, blue dye injection, and left axillary sentinel lymph node excision was done on 12/18/2019:  A.  Left breast, new superior-lateral margin, excision:   -Focal adenosis, columnar cell changes and stromal fibrosis/hyalinosis, negative for malignancy;   -Rare microcalcifications in benign tubules. B.  [place] lymph node #1, biopsy: Reactive node, negative for malignancy. C.  [place] lymph node #2, biopsy: Reactive node, negative for malignancy. D.  Left breast, lumpectomy with needle localization:   - Invasive adenocarcinoma of no special type (ductal, NOS), grade 3;   - Ductal carcinoma in situ, high nuclear grade, comedo/solid with necrosis;   - Scar of previous biopsy site, see comment. Comment:  Although the invasive carcinoma is very close to the superior margin in specimen D (3 mm), additional superior-lateral margins in specimen A (at least 2.0 cm in thickness) is completely negative for carcinoma.      CANCER CASE SUMMARY  Procedure: Excision  Specimen Laterality: Left  Tumor Site: Invasive Carcinoma: 12:00 (per report: HES-)  Tumor Size: Size of Largest Invasive Carcinoma: 1.4 X 1.3 X1.3 cm  Histologic Type of Invasive Carcinoma: Invasive carcinoma of no special type (ductal, NOS)  Histologic Grade: Hunker Histologic Score  Glandular/tubular differentiation: Score 3  Nuclear pleomorphism: Score 3  Mitotic rate: Score 3  Overall grade: Grade 3 (scores of 9)  Tumor Focality: Single focus of invasive carcinoma  Ductal Carcinoma In Situ (DCIS): Present  Size of DCIS: Intermingled with invasive carcinoma  Number of blocks with DCIS: 3  Number of blocks examined: 14  Architectural patternS: Comedo/solid  Nuclear grade: High  Ncrosis present: Central and focal  Lobular Carcinoma In Situ (LCIS): Not identified  Margins: Uninvolved by invasive carcinoma and DCIS  Distance from closest margin: 3 mm  Specify closest margin: surperior (see comment)  Regional lymph Nodes: Uninvolved by tumor cells  Total number of lymph nodes examined: 2  Number of sentinel nodes examined: 2  Treatment Effect: No known presurgical therapy  Lymph-Vascular Invasion: Not identified  Pathologic Staging (pTNM, AJCC 8TH Edition)  Primary tumor: pT1c  Regional lymph nodes (modifier- (sn) sentinel nodes examined: (sn)0  ER: Negative  VT: Negative  Her-2/francisco Positive/3+    Pathology report reviewed extensively with patient. Recommended Ado-Trastuzumab alone for 14 cycles. Side effects of Ado-Trastuzumab reviewed with patient. She agreed to proceed. Cycle # 1 Ado-Trastuzumab was on 01/06/2020. Cycle # 2 Ado-Trastuzumab was on 01/27/2020. RT was started on 01/27/2020 and completed on 03/11/2020. Cycle # 3 Ado-Trastuzumab was on 02/17/2020. Cycle # 4 Ado-Trastuzumab was on 03/16/2020.  2D-ECHO 04/15/2020: EF 65%  Cycle # 5 Ado-Trastuzumab was on 04/20/2020. Cycle # 6 Ado-Trastuzumab was on 05/11/2020.  -Bilateral diagnostic mammogram on 05/21/2020 was negative. Left Breast U/S 05/29/2020 Area of post surgical change in the left breast is benign. Cycle # 7 Ado-Trastuzumab was on 06/01/2020.   Cycle # 8 Ado-Trastuzumab was on 06/22/2020.  2d-Echo 07/20/2020 noted EF 65-70%  Cycle # 9 Ado-Trastuzumab was on 07/22/2020. Cycle # 10 Ado-Trastuzumab was on 08/12/2020. Cycle # 11 Ado-Trastuzumab was on 09/02/2020. Cycle # 12 Ado-Trastuzumab was on 09/23/2020. Cycle # 13 Ado-Trastuzumab was on 10/22/2020. Cycle # 14 Ado-Trastuzumab was on 11/12/2020. Completed OT X 11 visits on 10/21/2020: with good results and good control of her lymphedema. Clinically, she is without evidence of recurrent disease. She has a small, noninflamed, left axillary epidermal inclusion cyst.  She was instructed on warm compresses and to avoid manual compression. In addition, she was advised to call us in the event that it becomes red or inflamed.   Will plan for excision at the same time as her right Mediport removal.      -6/1/2021; BILATERAL SCREENING MAMMOGRAM;[facility]    FINDINGS:   Breast tissue is heterogeneously dense which may obscure small masses.  No   suspicious mass, microcalcifications, or architectural distortion identified   in either breast.           Impression   No mammographic evidence of malignancy in either breast.       RECOMMENDATION:       Bilateral mammogram is advised in 1 year.       BIRADS:   BIRADS - CATEGORY 1       Negative Mammogram.  Normal interval follow-up is recommended in 12 months.       OVERALL ASSESSMENT - NEGATIVE       A letter of notification will be sent to the patient regarding the results.       RISK ASSESSMENT:       - LIFETIME RISK -       Patient has a Tyrer Cuzick score of N/A       - BREAST TISSUE DENSITY -       Heterogeneously dense (type 3 density)       After a diagnosis of breast carcinoma, risk assessment models including   Tyrer-Cuzick calculate a 100% lifetime risk, which does not apply toward risk   for contralateral disease, an ipsilateral second primary, or risk for   recurrence.       The following recommendation for \"SURVEILLANCE/FOLLOW-UP\" imaging is based on   the NCCN Guidelines Version 2.2016 for Invasive Breast Cancer:     --History and physical exam 1-4 times per year as clinically appropriate for   5 years, then annually.       --Periodic screening for changes in family history and referral to genetic   counseling as indicated       --Mammography every 12 months¹.       ¹Studies indicate that annual mammograms are the appropriate frequency for   surveillance of breast cancer patients who have had breast-conserving surgery   and radiation therapy with no clear advantage to shorter interval imaging. Patients should wait 6 to 12 months after the completion of radiation therapy   to begin their annual mammogram surveillance. Suspicious findings on physical   examination or surveillance imaging might warrant a shorter interval between   mammograms.       NOTE:       Mammography is not 100% accurate in the detection of breast cancer.  Accuracy   decreases as mammographic density of the breast increases.  A negative   mammogram should not deter further evaluation (including biopsy) of   suspicious physical findings.       Recommendations are based on NCCN ([address]) and   ACR [employer] of Radiology) guidelines.       Thank you for sending your patient to this ACR and FDA approved facility. If   there are any physician concerns regarding this report, a Radiologist can be   reached by calling the following number [phone].       Follow up Protocol for the [facility]:       BI-RADS 1 or 2: Myke Robles will send a reminder when next mammogram is due.       BI-RADS 3: Myke Robles will send a reminder for recommended short term follow up.       BI-RADS 0, 4 or 5: Myke Robles will contact patient to schedule all additional   views and procedures.             Imaging reviewed with patient. No evidence of recurrent disease on imaging, or on clinical breast exam.  Trace edema left arm and forearm.   Patient has requested referral back to our lymphedema specialist.  She will be seeing Dr. Isaacs Rolls in July and discuss port removal.  Has a epidermal inclusion cyst left axilla which we will remove at the same time as we remove the port. Questions answered to patient satisfaction. Office visit 6 months with imaging in 1 year. Plan:   1. Continue monthly breast/chest wall self examination; detailed instructions reviewed today. Bring any changes to your physician's attention. 2. Continue healthy diet and exercise routinely as tolerated. 3. Avoid alcohol. 4. Limit caffeine intake. 5. Wear a good supportive bra. 6. Repeat mammogram May 2021.  7. Continue follow up with Primary Care/Medical Oncology. 8. Referral to Occupational Therapy/lymphedema. During today's visit, face-to-face time 25 minutes, greater than 50% in counseling education and coordination of care. All questions were answered to her apparent satisfaction, and she is agreeable to the plan as outlined above. Dr. Alex Santacruz.  Tiffanie Rivera MD [facility]  June 1, 2021

## 2021-06-01 ENCOUNTER — HOSPITAL ENCOUNTER (OUTPATIENT)
Dept: GENERAL RADIOLOGY | Age: 66
Discharge: HOME | End: 2021-06-03
Payer: MEDICARE

## 2021-06-01 ENCOUNTER — HOSPITAL ENCOUNTER (OUTPATIENT)
Dept: INFUSION THERAPY | Age: 66
Discharge: HOME | End: 2021-06-01
Payer: MEDICARE

## 2021-06-01 ENCOUNTER — OFFICE VISIT (OUTPATIENT)
Dept: BREAST CENTER | Age: 66
End: 2021-06-01
Payer: MEDICARE

## 2021-06-01 VITALS
DIASTOLIC BLOOD PRESSURE: 76 MMHG | BODY MASS INDEX: 39.87 KG/M2 | TEMPERATURE: 97.5 F | OXYGEN SATURATION: 98 % | WEIGHT: 225 LBS | SYSTOLIC BLOOD PRESSURE: 138 MMHG | RESPIRATION RATE: 18 BRPM | HEART RATE: 69 BPM | HEIGHT: 63 IN

## 2021-06-01 DIAGNOSIS — Z12.31 SCREENING MAMMOGRAM FOR HIGH-RISK PATIENT: ICD-10-CM

## 2021-06-01 DIAGNOSIS — Z85.3 HISTORY OF BREAST CANCER: ICD-10-CM

## 2021-06-01 DIAGNOSIS — C50.412 MALIGNANT NEOPLASM OF UPPER-OUTER QUADRANT OF LEFT BREAST IN FEMALE, ESTROGEN RECEPTOR POSITIVE (HCC): Primary | ICD-10-CM

## 2021-06-01 DIAGNOSIS — Z17.0 MALIGNANT NEOPLASM OF UPPER-OUTER QUADRANT OF LEFT BREAST IN FEMALE, ESTROGEN RECEPTOR POSITIVE (HCC): Primary | ICD-10-CM

## 2021-06-01 PROCEDURE — 99213 OFFICE O/P EST LOW 20 MIN: CPT | Performed by: SURGERY

## 2021-06-01 PROCEDURE — 2580000003 HC RX 258: Performed by: INTERNAL MEDICINE

## 2021-06-01 PROCEDURE — 1090F PRES/ABSN URINE INCON ASSESS: CPT | Performed by: SURGERY

## 2021-06-01 PROCEDURE — 1036F TOBACCO NON-USER: CPT | Performed by: SURGERY

## 2021-06-01 PROCEDURE — 1123F ACP DISCUSS/DSCN MKR DOCD: CPT | Performed by: SURGERY

## 2021-06-01 PROCEDURE — G8400 PT W/DXA NO RESULTS DOC: HCPCS | Performed by: SURGERY

## 2021-06-01 PROCEDURE — 96523 IRRIG DRUG DELIVERY DEVICE: CPT

## 2021-06-01 PROCEDURE — 3017F COLORECTAL CA SCREEN DOC REV: CPT | Performed by: SURGERY

## 2021-06-01 PROCEDURE — G8427 DOCREV CUR MEDS BY ELIG CLIN: HCPCS | Performed by: SURGERY

## 2021-06-01 PROCEDURE — 6360000002 HC RX W HCPCS: Performed by: INTERNAL MEDICINE

## 2021-06-01 PROCEDURE — 4040F PNEUMOC VAC/ADMIN/RCVD: CPT | Performed by: SURGERY

## 2021-06-01 PROCEDURE — 77067 SCR MAMMO BI INCL CAD: CPT

## 2021-06-01 PROCEDURE — G8417 CALC BMI ABV UP PARAM F/U: HCPCS | Performed by: SURGERY

## 2021-06-01 RX ORDER — SODIUM CHLORIDE 0.9 % (FLUSH) 0.9 %
10 SYRINGE (ML) INJECTION PRN
Status: CANCELLED | OUTPATIENT
Start: 2021-06-01

## 2021-06-01 RX ORDER — HEPARIN SODIUM (PORCINE) LOCK FLUSH IV SOLN 100 UNIT/ML 100 UNIT/ML
500 SOLUTION INTRAVENOUS PRN
Status: CANCELLED | OUTPATIENT
Start: 2021-06-01

## 2021-06-01 RX ORDER — SODIUM CHLORIDE 0.9 % (FLUSH) 0.9 %
10 SYRINGE (ML) INJECTION PRN
Status: DISCONTINUED | OUTPATIENT
Start: 2021-06-01 | End: 2021-06-02 | Stop reason: HOSPADM

## 2021-06-01 RX ORDER — HEPARIN SODIUM (PORCINE) LOCK FLUSH IV SOLN 100 UNIT/ML 100 UNIT/ML
500 SOLUTION INTRAVENOUS PRN
Status: DISCONTINUED | OUTPATIENT
Start: 2021-06-01 | End: 2021-06-02 | Stop reason: HOSPADM

## 2021-06-01 RX ADMIN — HEPARIN 500 UNITS: 100 SYRINGE at 11:31

## 2021-06-01 RX ADMIN — SODIUM CHLORIDE, PRESERVATIVE FREE 10 ML: 5 INJECTION INTRAVENOUS at 11:30

## 2021-06-01 NOTE — PATIENT INSTRUCTIONS

## 2021-06-07 ENCOUNTER — HOSPITAL ENCOUNTER (OUTPATIENT)
Dept: OCCUPATIONAL THERAPY | Age: 66
Setting detail: THERAPIES SERIES
Discharge: HOME | End: 2021-06-07
Payer: MEDICARE

## 2021-06-07 PROCEDURE — 97165 OT EVAL LOW COMPLEX 30 MIN: CPT | Performed by: OCCUPATIONAL THERAPIST

## 2021-06-07 NOTE — PROGRESS NOTES
Occupational Therapy      OCCUPATIONAL THERAPY INITIAL [address]   Phone: [phone]  Fax: [phone]     Date:  2021  Initial Evaluation Date: Cruzito Webster     Evaluating Therapist: RICKY Morrow/Suni DEL VALLE    Patient Name:  Christiano Ashley    :  1955    Restrictions/Precautions:  fall risk  Diagnosis:  History of breast cancer (z85.3)       Date of Surgery/Injury: n/a    Insurance/Certification information:  [employer]ET[employer] Medicare - [employer]  Plan of care signed (Y/N): N  Visit# / total visits: Evaluation    Referring Practitioner:  Will Barreto MD  Specific Practitioner Orders: Evaluation and Treatment     Assessment of current deficits   []Pain  []Skin Integrity   [x]Lymphedema   []Functional transfers/mobility   []ADLs   []Strength    []Cognition  []IADLs   []Safety Awareness   []  Motor Endurance    []Fine Motor Coordination   []Balance   []Vision/perception  []Sensation []Gross Motor Coordination  []ROM     OT PLAN OF CARE   OT POC based on physician orders, patient diagnosis and results of clinical assessment    Frequency/Duration:  1-2x/wk for 6weeks or 12 treatment sessions from June  through ly   Specific OT Treatment to include:     Plan of Care:     [x]97140-Manual Lymph Drainage and Combined Decongestive Therapy  [x]03778- Skilled Multilayer Short Stretch Compression Bandaging/ Therapeutic Exercise  [x]Skin Care Education [x]HEP including Self MLD Education and/or Self Bandaging  [x]Education for Lymphedema Risks/Precautions     [x] Caregiver Education   []other:      Patient Specific Goal: to feel comfortable managing fluid in arm      STG: 3 weeks  1. Patient will demonstrate knowledge and understanding for lymphedema precautions, skin care and self management to decrease progression of lymphedema and risk of infection.   2.  Patient will present with decreased limb volume in the involved extremity from minimal to trace for improved functional mobility. 3.  Patient will demonstrate compliance with compression therapy for independent management of lymphedema. LT weeks  1. Patient will be independent with self management of lymphedema including understanding garment wear schedule, self compression bandaging, HEP and self care. 2.  Patient will be fitted for appropriate compression garments for long term management of lymphedema. Therapist initiated patient education regarding compression garment for left upper extremity. Therapist reviewed garments, compression, wear schedule, care, and ordering of garments. Patient stated she would order a garment online. 3.  Patient will present with decreased limb volume in the involved extremity from trace to none  for improved functional mobility. Past medical History:  Past Medical History:   Past Medical History:   Diagnosis Date    Anxiety     Breast cancer ([facility] [address] 2019    lt breast    Cancer ([facility] [address] 2019    breast    Heart murmur     History of therapeutic radiation 2019    Hx antineoplastic chemo 2019    Hypertension     Post-menopausal bleeding     S/P hyst/ polypectomy 6/10/14     Past Surgical History:   Past Surgical History:   Procedure Laterality Date    BREAST BIOPSY      BREAST BIOPSY Left 2019    LEFT BREAST  NEEDLE LOCALIZED LUMPECTOMY BLUE DYE INJECTION LEFT AXILL.  SENTINEL LYMPH NODE EXCISION, INSERTION OF BIOZORB performed by Susanna Singleton MD at [address] LUMPECTOMY Left 2019    patient had a left axillary SLN     SECTION      COLONOSCOPY      normal - Dr Franco Asp x 10 years    DILATION AND CURETTAGE      INSERTION / REMOVAL / REPLACEMENT VENOUS ACCESS CATHETER N/A 2019    MEDI PORT PLACEMENT performed by Dorys Rees MD at [address] [place] [address]       [x]Surgery: lumpectomy   [x]Lymph node removal: 2  [x]Radiation Therapy: completed  [x]Chemotherapy: completed  []History of Cellulitis/Infection: []Family history of lymphedema:   []Previous treatment for lymphedema:   [x]Current compression garment use: compression bra    Reason for Referral: Pt was referred to [facility] due to intractable lymphedema of the left upper extremity, unrelieved by elevation. Chief Complaint: increased swelling and discomfort left upper extremity  Triggers: none noted    Home Living: patient lives with spouse in a two floor home with multiple step entry with no rail. Patient does not use an assisted device. Patient stated having bed/bath second floor with half bath first floor and full bath basement. Patient has a tub/shower and shower stall.   patient drives  Prior Level of Function: independent all aspects    Cognition:   Alert/Oriented x3     IADL History  Homemaking Responsibility: performs all aspects  Shopping Responsibility: patient performs all aspects  Mode of Transportation: car  Leisure & Hobbies: art, gardening  Work: retired      Pain Level: 2 on scale of 1-10, discomfort  Pitting Edema: none noted at time of evaluation  Fibrotic tissue:  None noted at time of evaluation  Skin Integrity: fair at time of evaluation    Lymphedema Upper Extremity Measurements     Measurements are made in 4cm increments and are circumferential.       CM Follow up #4  Left -  Follow up #3  Left -  Follow up #2  Left -  Follow up #1  Left - 7June 2021  Evaluation -   Left - 27July 2020 Follow up #4  Right -  Follow up #3  Right -  Follow up #2  Right -  Follow up #1 Right - 7June 2021 Evaluation - Right - 27July 2020                             wrist        17 16.5        17 16.5   4cm        20.5 19.75        19.5 19   8cm        24 22.5        23.5 21.75   12cm        27.75 26.25        27 25   16cm        31 29        29.25 27.25   20cm        30.5 29.25        29.5 28   24cm        40.5 35.5        37.5 32.5   28cm        41.5 38.25        39 35   32cm        42.5 38.75        40 36.25   36cm        42 38.75        40.5 36.5   40cm        41 39.5        40 37   44cm                                                                       plam        20 19.5        20.5 20      Fingers are measured in cm and between mp and pip and between pip and dip each digit.     Digit Follow up #4  Left -  Follow up #3  Left -  Follow up #2  Left -  Follow up #1  Left -  Evaluation -  Left -  Follow up #4  Right -  Follow up #3  Right -  Follow up #2  Right -  Follow up #1  Right -  Evaluation - Right -                                                    First Digit        6.25, 5.25 6.25, 5.25        6.5, 5.5 6.5, 5.25   Second Digit        6, 5.25 6, 5.25        6.5, 5.5 6, 5.5   Third Digit        6, 5 5.75, 5        5.75, 5.25 6, 5.25   Fourth Digit        5.5, 4.5 5.75, 4.25        6, 5.75 6, 4.75   Fifth Digit        6.25 6.25        6.25 6.25                                  Cognition:   Alert/Oriented x3     Vision: within functional limits for daily life tasks. Has glasses        Hearing: within functional limits for daily life tasks     ADL  Feeding:  independent  Grooming:  independent  Bathing:  independent  UE Dressing:  independent  LE Dressing:  independent  Toileting:  independent  Transfer:  independent    UE ASSESSMENT: Hand Dominance is right handed  ROM within functional limits for daily life tasks  Strength within functional limits for daily life tasks    Sensation: with functional limits for light touch    OT G-codes:  Carrying, Handling, Moving objects   (Current status): CJ   (Discharge Goal):  [facility]  Lymphedema Life Impact Scale Score:  17  Percent Impairment:  24%       Eval Complexity: Low Complexity    Rehab Potential:                                 [x] Good  [] Fair  [] Poor        Suggested Professional Referral:       [x] No  [] Yes:  Barriers to Goal Achievement[de-identified]          [x] No  [] Yes:  Domestic Concerns:                           [x] No  [] Yes:       Patient.  Education:  [x] Plans/Goals, Risks/Benefits discussed  [] Home exercise program  Method of Education: [x] Verbal  [] Demo  [x] Written  Comprehension of Education:  [x] Verbalizes understanding. [] Demonstrates understanding. [] Needs Review. [] Demonstrates/verbalizes understanding of HEP/Ed previously given. Patient understands diagnosis/prognosis and consents to treatment, plan and goals: [x] Yes    [] No   This patient demonstrates the ability to follow the plan of care and progress towards goals. Rehab potential is good       Assessment: Secondary Lymphedema, Stage 1, Affecting the Left Upper Extremity. Patient will benefit from a Complete Decongestive Therapy protocol using manual lymphatic drainage techniques, skilled multilayer compression bandaging using short stretch bandages and foam padding, instruction in a home program of self massage and exercise, compression garment fitting and instruction in independent management strategies for lymphedema. Goal Formulation: Patient  Time In: 8788            Time Out: 1515                      Timed Code Treatment Minutes: 0 minutes      CODE  Minutes  Units   69005 OT Eval Low 60 1   23482 OT Eval Medium     63214 OT Eval High     85932 Fluidotherapy     28057 Manual     14482 Therapeutic Ex     45863 Therapeutic Activity     48655 ADL/COMP Tech Train     [id number] Neuromuscular Re-Ed     20469 OrthoManagementTraining     [id number] Paraffin     53170 Electrical Stim - Attended     73087 Iontophoresis     38181 Ultrasound      Other     Total  60 1        Electronically signed by: RICKY Mayer/L, CLT-STEFF      Physician's Certification / Comments      Frequency/Duration 1-2 / week for 12 visits. Certification period From: 7June 2021  To: 19July 2021     I have reviewed the Plan of Care established for skilled therapy services and certify that the services are required and that they will be provided while the patient is under my care.      Physician's Comments/Revisions: Physicians's Printed Name:  Billy Willson MD                                   [de-identified] Signature:                                                               Date:      Please review Patient's OT evaluation and if you agree sign/date and fax back to us at our [facility].  Thank you for your referral!

## 2021-07-02 ENCOUNTER — HOSPITAL ENCOUNTER (OUTPATIENT)
Dept: OCCUPATIONAL THERAPY | Age: 66
Setting detail: THERAPIES SERIES
Discharge: HOME | End: 2021-07-02
Payer: MEDICARE

## 2021-07-02 PROCEDURE — 97530 THERAPEUTIC ACTIVITIES: CPT | Performed by: OCCUPATIONAL THERAPIST

## 2021-07-09 ENCOUNTER — HOSPITAL ENCOUNTER (OUTPATIENT)
Dept: OCCUPATIONAL THERAPY | Age: 66
Setting detail: THERAPIES SERIES
Discharge: HOME | End: 2021-07-09
Payer: MEDICARE

## 2021-07-09 PROCEDURE — 97530 THERAPEUTIC ACTIVITIES: CPT | Performed by: OCCUPATIONAL THERAPIST

## 2021-07-09 NOTE — PROGRESS NOTES
Occupational Therapy        OCCUPATIONAL THERAPY PROGRESS NOTE  [address] [facility],[address]              Phone: [phone]             Fax: [phone]    Date:  2021  Initial Evaluation Date: Nanette Long     Evaluating Therapist: RICKY Johnson/Clarke DEL VALLE    Patient Name:  Mat Graham    :  1955    Restrictions/Precautions:  fall risk  Diagnosis:  History of breast cancer (z85.3)           Date of Surgery/Injury: n/a    Insurance/Certification information:  [employer] Medicare - [employer]  Plan of care signed (Y/N): Yes  Visit# / total visits: Visit #2    Referring Practitioner:  Gisele Miller MD  Specific Practitioner Orders: Evaluation and Treatment     Assessment of current deficits   []Pain  []Skin Integrity   [x]Lymphedema   []Functional transfers/mobility   []ADLs   []Strength    []Cognition  []IADLs   []Safety Awareness   []  Motor Endurance    []Fine Motor Coordination   []Balance   []Vision/perception  []Sensation []Gross Motor Coordination  []ROM     OT PLAN OF CARE   OT POC based on physician orders, patient diagnosis and results of clinical assessment    Frequency/Duration:  1-2x/wk for 6weeks or 12 treatment sessions from 2021 through 2021  Specific OT Treatment to include:     Plan of Care:     [x]97140-Manual Lymph Drainage and Combined Decongestive Therapy  [x]84144- Skilled Multilayer Short Stretch Compression Bandaging/ Therapeutic Exercise  [x]Skin Care Education [x]HEP including Self MLD Education and/or Self Bandaging  [x]Education for Lymphedema Risks/Precautions     [x] Caregiver Education   []other:      Patient Specific Goal: to feel comfortable managing fluid in arm    STG: 3 weeks  1. Patient will demonstrate knowledge and understanding for lymphedema precautions, skin care and self management to decrease progression of lymphedema and risk of infection.   Therapist initiated patient education regarding lymphedema precautions and skin care / self management. Patient able to verbalize and demonstrate understanding. Goal met. 2.  Patient will present with decreased limb volume in the involved extremity from minimal to trace for improved functional mobility. Therapist initiated patient education regarding manual lymphatic massage and use of lymphedema pump. Patient verbalized using once daily kaylen the evening. Patient attended treatment session with bilateral increase in edema. Patient measurements taken and recorded below. Therapist with lengthy discussion on home program and how to maximize fluid movement / reduction. Patient able to verbalize understanding. Patient progressing toward goal.  3.  Patient will demonstrate compliance with compression therapy for independent management of lymphedema. Therapist initiated patient education regarding compression garment and benefits. Patient will begin to trial sleeve and work on tolerating day wear of garment. Patient progressing toward goal.          LT weeks  1. Patient will be independent with self management of lymphedema including understanding garment wear schedule, self compression bandaging, HEP and self care. 2.  Patient will be fitted for appropriate compression garments for long term management of lymphedema. Therapist initiated patient education regarding compression garment for left upper extremity. Patient received garment and patient able to kevin/doff independently. Garment fits well and therapist will continue to monitor fit and function. Patient progressing toward goal.  3.  Patient will present with decreased limb volume in the involved extremity from trace to none  for improved functional mobility.         Restrictions/Precautions:  [] No blood pressure/blood draw in: [] Left Upper Extremity     [] Right Upper Extremity        [x] Fall Risk       Intervention:   []Other    Pain:  [x] No    [] Yes  Location:  Pain Rating (0-10 pain scale):  Pain

## 2021-07-09 NOTE — PROGRESS NOTES
Occupational Therapy        OCCUPATIONAL THERAPY PROGRESS NOTE  [facility] AT [address]              Phone: [phone]             Fax: [phone]    Date:  2021  Initial Evaluation Date: Felicita Wiley     Evaluating Therapist: RICKY Pruitt/Juan DEL VALLE    Patient Name:  Ana Jarrett    :  1955    Restrictions/Precautions:  fall risk  Diagnosis:  History of breast cancer (z85.3)           Date of Surgery/Injury: n/a    Insurance/Certification information:  [employer]ETNA Medicare - [employer]  Plan of care signed (Y/N): Yes  Visit# / total visits: Visit #3    Referring Practitioner:  Essence Valdez MD  Specific Practitioner Orders: Evaluation and Treatment     Assessment of current deficits   []Pain  []Skin Integrity   [x]Lymphedema   []Functional transfers/mobility   []ADLs   []Strength    []Cognition  []IADLs   []Safety Awareness   []  Motor Endurance    []Fine Motor Coordination   []Balance   []Vision/perception  []Sensation []Gross Motor Coordination  []ROM     OT PLAN OF CARE   OT POC based on physician orders, patient diagnosis and results of clinical assessment    Frequency/Duration:  1-2x/wk for 6weeks or 12 treatment sessions from e  through ly   Specific OT Treatment to include:     Plan of Care:     [x]97140-Manual Lymph Drainage and Combined Decongestive Therapy  [x]16588- Skilled Multilayer Short Stretch Compression Bandaging/ Therapeutic Exercise  [x]Skin Care Education [x]HEP including Self MLD Education and/or Self Bandaging  [x]Education for Lymphedema Risks/Precautions     [x] Caregiver Education   []other:      Patient Specific Goal: to feel comfortable managing fluid in arm    STG: 3 weeks  1. Patient will demonstrate knowledge and understanding for lymphedema precautions, skin care and self management to decrease progression of lymphedema and risk of infection.   Therapist continued patient education regarding lymphedema precautions and skin care / self management. Patient able to verbalize and demonstrate understanding. Goal met. 2.  Patient will present with decreased limb volume in the involved extremity from minimal to trace for improved functional mobility. Therapist continued patient education regarding manual lymphatic massage and use of lymphedema pump. Patient verbalized using once daily kaylen the evening. Patient attended treatment session with bilateral increase in edema. Patient measurements taken and recorded below. Therapist with lengthy discussion on home program and how to maximize fluid movement / reduction. Patient able to verbalize understanding. Patient progressing toward goal.  3.  Patient will demonstrate compliance with compression therapy for independent management of lymphedema. Therapist continued patient education regarding compression garment and benefits. Patient currently able to tolerate compression sleeve about six hours or so. Therapist provided support and encouragement regarding compression. Patient will work on increasing wear time to about eight to ten hours. Patient progressing toward goal.          LT weeks  1. Patient will be independent with self management of lymphedema including understanding garment wear schedule, self compression bandaging, HEP and self care. 2.  Patient will be fitted for appropriate compression garments for long term management of lymphedema. Therapist initiated patient education regarding compression garment for left upper extremity. Therapist reviewed garments, compression, wear schedule, care, and ordering of garments. Patient stated she would order a garment online. 3.  Patient will present with decreased limb volume in the involved extremity from trace to none  for improved functional mobility.        Lymphedema Upper Extremity Measurements     Measurements are made in 4cm increments and are circumferential.       CM Follow up #4  Left -  Follow up #3  Left - 9July 2021 Follow up #2  Left - 2July 2021 Follow up #1  Left - 7June 2021  Evaluation -   Left - 27July 2020 Follow up #4  Right -  Follow up #3  Right -  Follow up #2  Right - 9July 2021  Follow up #1 Right - 7June 2021 Evaluation - Right - 27July 2020                             wrist    16.75  16.5  17 16.5      17  17 16.5   4cm    20.75  20  20.5 19.75      19.25  19.5 19   8cm    24.25  23.5  24 22.5      23.5  23.5 21.75   12cm    28  27.25  27.75 26.25      27.25  27 25   16cm    30  30  31 29      29.5  29.25 27.25   20cm    30  30.25  30.5 29.25      29.75  29.5 28   24cm    37  36.5  40.5 35.5      34  37.5 32.5   28cm    40.5  41  41.5 38.25      38.25  39 35   32cm    41.5  42.5  42.5 38.75      39.5  40 36.25   36cm    41.5  42.5  42 38.75      40  40.5 36.5   40cm    40.5  41.5  41 39.5      40.5  40 37   44cm                                                                       plam    19.25  19.25  20 19.5      21  20.5 20      Fingers are measured in cm and between mp and pip and between pip and dip each digit.     Digit Follow up #4  Left -  Follow up #3  Left -  Follow up #2  Left -  Follow up #1  Left -  Evaluation -  Left -  Follow up #4  Right -  Follow up #3  Right -  Follow up #2  Right -  Follow up #1  Right -  Evaluation - Right -                                                    First Digit    6.25, 5.25  6.25, 5  6.25, 5.25 6.25, 5.25      6.5, 5.5  6.5, 5.5 6.5, 5.25   Second Digit    6.5, 5.5  6.25, 5  6, 5.25 6, 5.25      6.5, 5.25  6.5, 5.5 6, 5.5   Third Digit    6.25, 5  5.75, 5  6, 5 5.75, 5      6, 5.25  5.75, 5.25 6, 5.25   Fourth Digit    5.25, 4.5  5.25, 4.5  5.5, 4.5 5.75, 4.25      6, 5  6, 5.75 6, 4.75   Fifth Digit    6.25  6  6.25 6.25      6.25  6.25 6.25                                     Restrictions/Precautions:  [] No blood pressure/blood draw in: [] Left Upper Extremity     [] Right Upper Extremity        [x] Fall Risk       Intervention:   []Other    Pain:  [x] No    [] Yes  Location:  Pain Rating (0-10 pain scale):  Pain Description:  Interruption of Treatment [] Yes  [] No    Came to Clinic:  [] Bandaged    []Unbandaged    [] With Stocking     [x] With Sleeve     [] Kinesiotaped    [] [place]    [] With Alternative Compression:    Skin Integrity:  [x] Normal [] Dry  []Rough []Moist []Rash  Location of problem area/s:  [] Wound: Location/Description   [] Fibrosis: Location/Description  [] Keratosis: Location/Description  [] Papillomas: Location/Description  [] Other    Color:  [x] Normal [] Mottled [] Flushed [] Other/Description  Location of problem area/s:    Edema:  Edema bilateral upper extremities    Subjective:    Objective:  [] Measurement [x]Manual Lymph Drainage  []Bandaging   []Kinesiotaping [x] Education    []Self Massage   []Self Bandaging [x] Exercise    []Wound Care  []Hygiene  [] Other        Assessment:  Knowledge of home program:   [] Good [] Fair  [] Poor  Patient is programming at home:             [] Yes  [] No  Family is assisting with programming: [] Yes  [] No  Home programming is consistent:             [] Yes  [] No  Other:   Response to treatment:  Instructions for patient:   [] Verbal [] Written  Instructions addressed:        Time In: 1515            Time Out: 1600                      Timed Code Treatment Minutes: 45 minutes      CODE  Minutes  Units   86344 OT Eval Low     57154 OT Eval Medium     96979 OT Eval High     56416 Fluidotherapy     01716 Manual     82314 Therapeutic Ex     86152 Therapeutic Activity 45 3   27918 ADL/COMP Tech Train     [address] Neuromuscular Re-Ed     43113 OrthoManagementTraining     36068 Paraffin     07597 Electrical Stim - Attended     51157 Iontophoresis     57280 Ultrasound      Other     Total  45 3       Plan: Continue to address:  [x]Bandaging  [x] Self Bandaging/Family Assist  [x]MLD  [x]Self Massage  [x]Exercise  [x]Education  []Obtain referral for garments  []Medical Hold  []Discharge to Home Program  Kellen Coronel., OTR/L, CLT

## 2021-07-14 ENCOUNTER — HOSPITAL ENCOUNTER (OUTPATIENT)
Dept: INFUSION THERAPY | Age: 66
Discharge: HOME | End: 2021-07-14
Payer: MEDICARE

## 2021-07-14 DIAGNOSIS — C50.412 MALIGNANT NEOPLASM OF UPPER-OUTER QUADRANT OF LEFT BREAST IN FEMALE, ESTROGEN RECEPTOR POSITIVE (HCC): ICD-10-CM

## 2021-07-14 DIAGNOSIS — Z17.0 MALIGNANT NEOPLASM OF UPPER-OUTER QUADRANT OF LEFT BREAST IN FEMALE, ESTROGEN RECEPTOR POSITIVE (HCC): ICD-10-CM

## 2021-07-14 DIAGNOSIS — C50.912 INVASIVE DUCTAL CARCINOMA OF LEFT BREAST (HCC): Primary | ICD-10-CM

## 2021-07-14 LAB
ALBUMIN SERPL-MCNC: 4.3 G/DL (ref 3.5–5.2)
ALP BLD-CCNC: 84 U/L (ref 35–104)
ALT SERPL-CCNC: 32 U/L (ref 0–32)
ANION GAP SERPL CALCULATED.3IONS-SCNC: 11 MMOL/L (ref 7–16)
AST SERPL-CCNC: 27 U/L (ref 0–31)
BASOPHILS ABSOLUTE: 0.04 E9/L (ref 0–0.2)
BASOPHILS RELATIVE PERCENT: 0.6 % (ref 0–2)
BILIRUB SERPL-MCNC: 0.3 MG/DL (ref 0–1.2)
BUN BLDV-MCNC: 23 MG/DL (ref 6–23)
CALCIUM SERPL-MCNC: 9.8 MG/DL (ref 8.6–10.2)
CHLORIDE BLD-SCNC: 100 MMOL/L (ref 98–107)
CO2: 28 MMOL/L (ref 22–29)
CREAT SERPL-MCNC: 1.4 MG/DL (ref 0.5–1)
EOSINOPHILS ABSOLUTE: 0.19 E9/L (ref 0.05–0.5)
EOSINOPHILS RELATIVE PERCENT: 2.9 % (ref 0–6)
GFR AFRICAN AMERICAN: 46
GFR NON-AFRICAN AMERICAN: 38 ML/MIN/1.73
GLUCOSE BLD-MCNC: 103 MG/DL (ref 74–99)
HCT VFR BLD CALC: 38.9 % (ref 34–48)
HEMOGLOBIN: 12.9 G/DL (ref 11.5–15.5)
IMMATURE GRANULOCYTES #: 0.01 E9/L
IMMATURE GRANULOCYTES %: 0.2 % (ref 0–5)
LYMPHOCYTES ABSOLUTE: 2.44 E9/L (ref 1.5–4)
LYMPHOCYTES RELATIVE PERCENT: 37.1 % (ref 20–42)
MAGNESIUM: 2 MG/DL (ref 1.6–2.6)
MCH RBC QN AUTO: 29.1 PG (ref 26–35)
MCHC RBC AUTO-ENTMCNC: 33.2 % (ref 32–34.5)
MCV RBC AUTO: 87.8 FL (ref 80–99.9)
MONOCYTES ABSOLUTE: 0.49 E9/L (ref 0.1–0.95)
MONOCYTES RELATIVE PERCENT: 7.4 % (ref 2–12)
NEUTROPHILS ABSOLUTE: 3.41 E9/L (ref 1.8–7.3)
NEUTROPHILS RELATIVE PERCENT: 51.8 % (ref 43–80)
PDW BLD-RTO: 15 FL (ref 11.5–15)
PLATELET # BLD: 215 E9/L (ref 130–450)
PMV BLD AUTO: 10.8 FL (ref 7–12)
POTASSIUM SERPL-SCNC: 4 MMOL/L (ref 3.5–5)
RBC # BLD: 4.43 E12/L (ref 3.5–5.5)
SODIUM BLD-SCNC: 139 MMOL/L (ref 132–146)
TOTAL PROTEIN: 7.4 G/DL (ref 6.4–8.3)
WBC # BLD: 6.6 E9/L (ref 4.5–11.5)

## 2021-07-14 PROCEDURE — 80053 COMPREHEN METABOLIC PANEL: CPT

## 2021-07-14 PROCEDURE — 2580000003 HC RX 258: Performed by: INTERNAL MEDICINE

## 2021-07-14 PROCEDURE — 83735 ASSAY OF MAGNESIUM: CPT

## 2021-07-14 PROCEDURE — 85025 COMPLETE CBC W/AUTO DIFF WBC: CPT

## 2021-07-14 PROCEDURE — 36591 DRAW BLOOD OFF VENOUS DEVICE: CPT

## 2021-07-14 PROCEDURE — 6360000002 HC RX W HCPCS: Performed by: INTERNAL MEDICINE

## 2021-07-14 RX ORDER — SODIUM CHLORIDE 0.9 % (FLUSH) 0.9 %
10 SYRINGE (ML) INJECTION PRN
Status: DISCONTINUED | OUTPATIENT
Start: 2021-07-14 | End: 2021-07-15 | Stop reason: HOSPADM

## 2021-07-14 RX ORDER — SODIUM CHLORIDE 0.9 % (FLUSH) 0.9 %
10 SYRINGE (ML) INJECTION PRN
Status: CANCELLED | OUTPATIENT
Start: 2021-07-14

## 2021-07-14 RX ORDER — HEPARIN SODIUM (PORCINE) LOCK FLUSH IV SOLN 100 UNIT/ML 100 UNIT/ML
500 SOLUTION INTRAVENOUS PRN
Status: DISCONTINUED | OUTPATIENT
Start: 2021-07-14 | End: 2021-07-15 | Stop reason: HOSPADM

## 2021-07-14 RX ORDER — HEPARIN SODIUM (PORCINE) LOCK FLUSH IV SOLN 100 UNIT/ML 100 UNIT/ML
500 SOLUTION INTRAVENOUS PRN
Status: CANCELLED | OUTPATIENT
Start: 2021-07-14

## 2021-07-14 RX ADMIN — SODIUM CHLORIDE, PRESERVATIVE FREE 10 ML: 5 INJECTION INTRAVENOUS at 11:39

## 2021-07-14 RX ADMIN — HEPARIN 500 UNITS: 100 SYRINGE at 11:39

## 2021-07-15 ENCOUNTER — OFFICE VISIT (OUTPATIENT)
Dept: ONCOLOGY | Age: 66
End: 2021-07-15
Payer: MEDICARE

## 2021-07-15 ENCOUNTER — HOSPITAL ENCOUNTER (OUTPATIENT)
Dept: INFUSION THERAPY | Age: 66
Discharge: HOME | End: 2021-07-15
Payer: MEDICARE

## 2021-07-15 ENCOUNTER — TELEPHONE (OUTPATIENT)
Dept: CASE MANAGEMENT | Age: 66
End: 2021-07-15

## 2021-07-15 VITALS
WEIGHT: 238 LBS | HEART RATE: 70 BPM | HEIGHT: 63 IN | BODY MASS INDEX: 42.17 KG/M2 | SYSTOLIC BLOOD PRESSURE: 144 MMHG | OXYGEN SATURATION: 97 % | RESPIRATION RATE: 18 BRPM | DIASTOLIC BLOOD PRESSURE: 67 MMHG

## 2021-07-15 DIAGNOSIS — Z85.3 PERSONAL HISTORY OF BREAST CANCER: Primary | ICD-10-CM

## 2021-07-15 PROCEDURE — 99213 OFFICE O/P EST LOW 20 MIN: CPT

## 2021-07-15 PROCEDURE — 99214 OFFICE O/P EST MOD 30 MIN: CPT | Performed by: INTERNAL MEDICINE

## 2021-07-15 NOTE — PROGRESS NOTES
Department of [facility] Oncology       Attending Clinic Note    Reason for Visit: Follow-up on a patient with Left Breast Cancer    PCP:  Yves Galvan MD    History of Present Illness: The mass was located in the 2 o'clock position of left breast.     Breast cancer risk factors include age, gender, menarche before age 16. Age of menarche was 7. Age of menopause was 48. Patient was on birth control for 6 months in her 25s. Patient is . Age of first live birth was 30. Patient did breast feed. Bilateral screening mammogram on 2019: There is a high density, irregular mass with indistinct margins seen in the posterior upper outer quadrant of left breast.     Left Breast U/S on 2019:  Irregular solid mass with angular margins measuring 25 x 20 x 21 mm in the left breast at 2 o'clock located 8 centimeters from the nipple. No axillary LN. On 2019:  Mass, left breast at 2:00, core needle biopsy: Invasive ductal carcinoma,nuclear grade 3, see comment. 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 Breast Cancer Marker Studies:  ER: Negative  ID: Negative  Her-2/francisco Positive/3+    CXR PA/Lateral on 2019:  Normal chest.    MRI Bilateral Breast 2019: An irregular, enhancing mass is again noted in the left breast 2:00 which measures 2.0 x 2.2 x 2.6 cm. No axillary LN. No evidence of neoplasm on right. T2 N0 M0  We recommended neoadjuvant chemotherapy consisting of 6 cycles of TCHP. Side effects of TCHP reviewed with patient. She agreed to proceed. 2D-ECHO EF50-55%. Mediport placed. Cycle # 1 TCHP was on 2019. Cycle # 2 TCHP was on 2019. Cycle # 3 TCHP was on 2019. 2D-ECHO 2019 EF 65%  Cycle # 4 TCHP was on 2019  Cycle # 5 TCHP was on 2019. Cycle # 6 TCHP was on 10/23/2019. MRI Breast Bilateral 2019:   Near complete response to therapy on the left.    There is no axillary throat. RESPIRATORY: No hemoptysis, shortness of breath, cough. CARDIOVASCULAR: No chest pain, palpitations. GASTROINTESTINAL: No nausea/vomiting abdominal pain. GENITOURINARY: No dysuria, urinary frequency, hematuria. NEURO: No syncope, presyncope, headache. Remainder:  ROS NEGATIVE    Past Medical History:      Diagnosis Date    Anxiety     Breast cancer ([facility] [address] 2019    lt breast    [facility]    [facility]    Heart murmur     History of therapeutic radiation 2019    Hx antineoplastic chemo 2019    Hypertension     Post-menopausal bleeding     S/P hyst/ polypectomy 6/10/14     Medications:  Reviewed and reconciled. Allergies: Allergies   Allergen Reactions    Tetracyclines & Related Hives     Physical Exam:  BP (!) 144/67   Pulse 70   Resp 18   Ht 5' 3\" (1.6 m)   Wt 238 lb (108 kg)   SpO2 97%   BMI 42.16 kg/m²   GENERAL: Alert, oriented x 3, not in acute distress. EXTREMITIES: Without clubbing, cyanosis, or edema. ECOG PS 1    Lab Results   Component Value Date    WBC 6.6 07/14/2021    HGB 12.9 07/14/2021    HCT 38.9 07/14/2021    MCV 87.8 07/14/2021     07/14/2021     Lab Results   Component Value Date     07/14/2021    K 4.0 07/14/2021     07/14/2021    CO2 28 07/14/2021    BUN 23 07/14/2021    CREATININE 1.4 (H) 07/14/2021    GLUCOSE 103 (H) 07/14/2021    CALCIUM 9.8 07/14/2021    PROT 7.4 07/14/2021    LABALBU 4.3 07/14/2021    BILITOT 0.3 07/14/2021    ALKPHOS 84 07/14/2021    AST 27 07/14/2021    ALT 32 07/14/2021    LABGLOM 38 07/14/2021    GFRAA 46 07/14/2021     Impression/Plan:  73 y.o. female with Left Breast Cancer    Mass, left breast at 2:00, core needle biopsy: Invasive ductal carcinoma,nuclear grade 3, see comment. 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 Breast Cancer Marker Studies:  ER: Negative  FL: Negative  Her-2/francisco Positive/3+    MRI Bilateral Breast 05/23/2019:   An irregular, enhancing mass is again noted in the left breast 2:00 which measures 2.0 x 2.2 x 2.6 cm. No axillary LN. No evidence of neoplasm on right. T2 N0 M0  We recommended neoadjuvant chemotherapy consisting of 6 cycles of TCHP. Side effects of TCHP reviewed with patient. She agreed to proceed. 2D-ECHO EF50-55%. Mediport placed. Cycle # 1 TCHP was on 06/24/2019. Cycle # 2 TCHP was on 07/24/2019. Cycle # 3 TCHP was on 08/14/2019. 2D-ECHO 08/23/2019 EF 65%  Cycle # 4 TCHP was on 09/04/2019. Cycle # 5 TCHP was on 09/25/2019. Cycle # 6 TCHP was on 10/23/2019. MRI Breast Bilateral 11/07/2019:   Near complete response to therapy on the left. There is no axillary lymphadenopathy. No evidence of neoplasm on the right. Herceptin/Perjeta was on 12/04/2019. 2D-ECHO 12/16/2019 EF 60%    Left needle localized lumpectomy, blue dye injection, and left axillary sentinel lymph node excision on 12/18/2019:  A.  Left breast, new superior-lateral margin, excision:   -Focal adenosis, columnar cell changes and stromal fibrosis/hyalinosis, negative for malignancy;   -Rare microcalcifications in benign tubules. B.  Heflin lymph node #1, biopsy: Reactive node, negative for malignancy. C.  Heflin lymph node #2, biopsy: Reactive node, negative for malignancy. D.  Left breast, lumpectomy with needle localization:   - Invasive adenocarcinoma of no special type (ductal, NOS), grade 3;   - Ductal carcinoma in situ, high nuclear grade, comedo/solid with necrosis;   - Scar of previous biopsy site, see comment. Comment:  Although the invasive carcinoma is very close to the superior margin in specimen D (3 mm), additional superior-lateral margins in specimen A (at least 2.0 cm in thickness) is completely negative for carcinoma.      CANCER CASE SUMMARY  Procedure: Excision  Specimen Laterality: Left  Tumor Site: Invasive Carcinoma: 12:00 (per report: HES-)  Tumor Size: Size of Largest Invasive Carcinoma: 1.4 X 1.3 X1.3 cm  Histologic Type of Invasive Carcinoma: Invasive carcinoma of no special type (ductal, NOS)  Histologic Grade: [place] Histologic Score  Glandular/tubular differentiation: Score 3  Nuclear pleomorphism: Score 3  Mitotic rate: Score 3  Overall grade: Grade 3 (scores of 9)  Tumor Focality: Single focus of invasive carcinoma  Ductal Carcinoma In Situ (DCIS): Present  Size of DCIS: Intermingled with invasive carcinoma  Number of blocks with DCIS: 3  Number of blocks examined: 14  Architectural patternS: Comedo/solid  Nuclear grade: High  Ncrosis present: Central and focal  Lobular Carcinoma In Situ (LCIS): Not identified  Margins: Uninvolved by invasive carcinoma and DCIS  Distance from closest margin: 3 mm  Specify closest margin: surperior (see comment)  Regional lymph Nodes: Uninvolved by tumor cells  Total number of lymph nodes examined: 2  Number of sentinel nodes examined: 2  Treatment Effect: No known presurgical therapy  Lymph-Vascular Invasion: Not identified  Pathologic Staging (pTNM, AJCC 8TH Edition)  Primary tumor: pT1c  Regional lymph nodes (modifier- (sn) sentinel nodes examined: (sn)0  ER: Negative  AL: Negative  Her-2/francisco Positive/3+    Pathology report reviewed extensively with patient. Radiation Oncology team consulted for adjuvant RT. Recommended Ado-Trastuzumab alone for 14 cycles. Side effects of Ado-Trastuzumab reviewed with patient. She agreed to proceed. Cycle # 1 Ado-Trastuzumab was on 01/06/2020. RT was started on 01/27/2020 and completed on 03/11/2020. 2d-echo 11/05/2020 EF 70%   Cycle # 14 Ado-Trastuzumab was on 11/12/2020. Bilateral Screening Mammogram 06/01/2021: No evidence of malignancy. Imaging reviewed. Labs reviewed. Increase po fluid intake  No clinical evidence of recurrence. RTC 4 months with labs.      Tashia Muñoz MD   [id number]/21/[id number]  Board Certified Medical Oncologist

## 2021-07-19 ENCOUNTER — TELEPHONE (OUTPATIENT)
Dept: SURGERY | Age: 66
End: 2021-07-19

## 2021-07-19 NOTE — TELEPHONE ENCOUNTER
JERAD Denny called and spoke to Haylee and scheduled PT for medi-port removal on 2021 @ 10am with Dr. Sherren Gibney. PT is not taking ASA/blood thinner products. PT has received both COVID 19 vaccines. PT verbalized she understood prep instructions, and NPO after midnight. PT verbalized that she understood appointment date/time, as well as to arrive 2 hours prior to procedure. PT advised PAT will call to go over all medication and advise on where to park and enter the hospital at for procedure. PT has been told to make sure they have a ride to and from procedure as they are not allowed to drive after procedure. PT procedure letter was mailed to them with all instructions also on it. MA instructed PT to call the office at [phone] with any questions, comments, or concerns about the procedure. Prior Authorization Form:      DEMOGRAPHICS:                     Patient Name:  John Haskins  Patient :  1955            Insurance:  Payor: Lizette Awad / Plan: Rodriguez Bunch PPO / Product Type: Medicare /   Insurance ID Number:    Payor/Plan Subscr  Sex Relation Sub. Ins. ID Effective Group Num   1.  [address] [facility]*  Female Self [id number] 21 [id number]                                    [id number]         DIAGNOSIS & PROCEDURE:                       Procedure/Operation: medi-port removal           CPT Code: 78629    Diagnosis:  H/O breast CA    ICD10 Code: Z85.3    Location:  [facility]    Surgeon:  Va Burgos INFORMATION:                          Date: 2021    Time: 10am              Anesthesia:  MAC/TIVA                                                       Status:  Outpatient        Special Comments:  N/A       Electronically signed by Landy Menchaca MA on 2021 at 11:12 AM

## 2021-08-18 NOTE — PROGRESS NOTES
Patient states has received the last dose of the COVID vaccine and is 2 weeks post last dose. Patient instructed to bring the Incont card or a picture of the card,  day of surgery. Patient verbalized understanding.

## 2021-08-23 NOTE — PROGRESS NOTES
[address] [facility] PRE-ADMISSION TESTING GENERAL INSTRUCTIONS- [facility]-phone number:[phone]    GENERAL INSTRUCTIONS  [x] Antibacterial Soap shower Night before and/or AM of Surgery  [] Loi wipe instruction sheet and wipes given. [x] Nothing by mouth after midnight, including gum, candy, mints, or water. [x] You may brush your teeth, gargle, but do NOT swallow water. []Hibiclens shower  the night before and the morning of surgery. Do not use             Hibiclens on your face or head. [x]No smoking, chewing tobacco, illegal drugs, or alcohol within 24 hours of your surgery. [x] Jewelry, valuables or body piercing's should not be brought to the hospital. All body and/or tongue piercing's must be removed prior to arriving to hospital.  ALL hair pins must be removed. [] Do not wear makeup, lotions, powders, deodorant. Nail polish as directed by the nurse. [x] Arrange transportation with a responsible adult  to and from the hospital. If you do not have a responsible adult  to transport you, you will need to make arrangements with a medical transportation company (i.e. [employer]. A Uber/taxi/bus is not appropriate unless you are accompanied by a responsible adult ). Arrange for someone to be with you for the remainder of the day and for 24 hours after your procedure due to having had anesthesia. Who will be your  for transportation?______HUSBAND, BLAISE____________   Who will be staying with you for 24 hrs after your procedure?______HUSBAND____________  [x] Bring insurance card and photo ID.  [] Transfusion Bracelet: Please bring with you to hospital, day of surgery  [] Bring urine specimen day of surgery. Any small container is acceptable. [] Use inhalers the morning of surgery and bring with you to hospital.  [x] Bring copy of living will or healthcare power of  papers to be placed in your electronic record.   [] CPAP/BI-PAP: Please bring your machine if you are to spend the night in the hospital.     PARKING INSTRUCTIONS:   [x] Arrival Time:___0800_________  · [x] Parking lot '\"I\"  is located on [facility] (the corner of [facility] and [facility]). To enter, press the button and the gate will lift. A free token will be provided to exit the lot. One car per patient is allowed to park in this lot. All other cars are to park on [address] [place] [address] either in the parking garage or the handicap lot. [] To reach the [facility] lobby from [address] [place] [address], upon entering the hospital, take elevator B to the 3rd floor. EDUCATION INSTRUCTIONS:      [] Knee or hip replacement booklet & exercise pamphlets given. [] Marix[address] placed in chart. [] Pre-admission Testing educational folder given  [x] Incentive Spirometry,coughing & deep breathing exercises reviewed. []Medication information sheet(s)   [x]Fluoroscopy-Xray used in surgery reviewed with patient. Educational pamphlet placed in chart. [x]Pain: Post-op pain is normal and to be expected. You will be asked to rate your pain from 0-10(a zero is not acceptable-education is needed). Your post-op pain goal is:  [x] Ask your nurse for your pain medication. [] Joint camp offered. [] Joint replacement booklets given. [] Other:___________________________    MEDICATION INSTRUCTIONS:   [x]Bring a complete list of your medications, please write the last time you took the medicine, give this list to the nurse. [x] Take the following medications the morning of surgery with 1-2 ounces of water: SEE LIST  [x] Stop herbal supplements and vitamins 5 days before your surgery. [] DO NOT take any diabetic medicine the morning of surgery. Follow instructions for insulin the day before surgery. [] If you are diabetic and your blood sugar is low or you feel symptomatic, you may drink 1-2 ounces of apple juice or take a glucose tablet.   The morning of your procedure, you may call the pre-op area if you have concerns about your blood sugar [phone]. [] Use your inhalers the morning of surgery. Bring your emergency inhaler with you day of surgery. [x] Follow physician instructions regarding any blood thinners you may be taking. WHAT TO EXPECT:  [x] The day of surgery you will be greeted and checked in by the [employer].  In addition, you will be registered in the [place] by a Patient Access Representative. Please bring your photo ID and insurance card. A nurse will greet you in accordance to the time you are needed in the pre-op area to prepare you for surgery. Please do not be discouraged if you are not greeted in the order you arrive as there are many variables that are involved in patient preparation. Your patience is greatly appreciated as you wait for your nurse. Please bring in items such as: books, magazines, newspapers, electronics, or any other items  to occupy your time in the waiting area. [x]  Delays may occur with surgery and staff will make a sincere effort to keep you informed of delays. If any delays occur with your procedure, we apologize ahead of time for your inconvenience as we recognize the value of your time.

## 2021-08-24 ENCOUNTER — PREP FOR PROCEDURE (OUTPATIENT)
Dept: SURGERY | Age: 66
End: 2021-08-24

## 2021-08-24 RX ORDER — SODIUM CHLORIDE 9 MG/ML
INJECTION, SOLUTION INTRAVENOUS CONTINUOUS
Status: CANCELLED | OUTPATIENT
Start: 2021-08-25

## 2021-08-24 RX ORDER — SODIUM CHLORIDE 0.9 % (FLUSH) 0.9 %
10 SYRINGE (ML) INJECTION PRN
Status: CANCELLED | OUTPATIENT
Start: 2021-08-24

## 2021-08-24 RX ORDER — SODIUM CHLORIDE 0.9 % (FLUSH) 0.9 %
10 SYRINGE (ML) INJECTION EVERY 12 HOURS SCHEDULED
Status: CANCELLED | OUTPATIENT
Start: 2021-08-25

## 2021-08-24 RX ORDER — SODIUM CHLORIDE 9 MG/ML
25 INJECTION, SOLUTION INTRAVENOUS PRN
Status: CANCELLED | OUTPATIENT
Start: 2021-08-24

## 2021-08-25 ENCOUNTER — ANESTHESIA EVENT (OUTPATIENT)
Dept: OPERATING ROOM | Age: 66
End: 2021-08-25
Payer: MEDICARE

## 2021-08-25 ENCOUNTER — HOSPITAL ENCOUNTER (OUTPATIENT)
Age: 66
Setting detail: OUTPATIENT SURGERY
Discharge: HOME | End: 2021-08-25
Attending: SURGERY | Admitting: SURGERY
Payer: MEDICARE

## 2021-08-25 ENCOUNTER — ANESTHESIA (OUTPATIENT)
Dept: OPERATING ROOM | Age: 66
End: 2021-08-25
Payer: MEDICARE

## 2021-08-25 VITALS
DIASTOLIC BLOOD PRESSURE: 76 MMHG | OXYGEN SATURATION: 98 % | HEIGHT: 63 IN | TEMPERATURE: 97 F | HEART RATE: 54 BPM | BODY MASS INDEX: 40.75 KG/M2 | WEIGHT: 230 LBS | RESPIRATION RATE: 16 BRPM | SYSTOLIC BLOOD PRESSURE: 142 MMHG

## 2021-08-25 VITALS — OXYGEN SATURATION: 99 % | SYSTOLIC BLOOD PRESSURE: 132 MMHG | DIASTOLIC BLOOD PRESSURE: 68 MMHG

## 2021-08-25 PROCEDURE — 36590 REMOVAL TUNNELED CV CATH: CPT | Performed by: SURGERY

## 2021-08-25 PROCEDURE — 3700000000 HC ANESTHESIA ATTENDED CARE: Performed by: SURGERY

## 2021-08-25 PROCEDURE — 11400 EXC TR-EXT B9+MARG 0.5 CM<: CPT | Performed by: SURGERY

## 2021-08-25 PROCEDURE — 88304 TISSUE EXAM BY PATHOLOGIST: CPT

## 2021-08-25 PROCEDURE — 2709999900 HC NON-CHARGEABLE SUPPLY: Performed by: SURGERY

## 2021-08-25 PROCEDURE — 3700000001 HC ADD 15 MINUTES (ANESTHESIA): Performed by: SURGERY

## 2021-08-25 PROCEDURE — 6360000002 HC RX W HCPCS

## 2021-08-25 PROCEDURE — 3600000012 HC SURGERY LEVEL 2 ADDTL 15MIN: Performed by: SURGERY

## 2021-08-25 PROCEDURE — 2580000003 HC RX 258

## 2021-08-25 PROCEDURE — 3600000002 HC SURGERY LEVEL 2 BASE: Performed by: SURGERY

## 2021-08-25 PROCEDURE — 2500000003 HC RX 250 WO HCPCS: Performed by: SURGERY

## 2021-08-25 PROCEDURE — 7100000011 HC PHASE II RECOVERY - ADDTL 15 MIN: Performed by: SURGERY

## 2021-08-25 PROCEDURE — 7100000010 HC PHASE II RECOVERY - FIRST 15 MIN: Performed by: SURGERY

## 2021-08-25 RX ORDER — MIDAZOLAM HYDROCHLORIDE 1 MG/ML
INJECTION INTRAMUSCULAR; INTRAVENOUS PRN
Status: DISCONTINUED | OUTPATIENT
Start: 2021-08-25 | End: 2021-08-25 | Stop reason: SDUPTHER

## 2021-08-25 RX ORDER — CEFAZOLIN SODIUM 1 G/3ML
INJECTION, POWDER, FOR SOLUTION INTRAMUSCULAR; INTRAVENOUS PRN
Status: DISCONTINUED | OUTPATIENT
Start: 2021-08-25 | End: 2021-08-25 | Stop reason: SDUPTHER

## 2021-08-25 RX ORDER — SODIUM CHLORIDE 9 MG/ML
25 INJECTION, SOLUTION INTRAVENOUS PRN
Status: DISCONTINUED | OUTPATIENT
Start: 2021-08-25 | End: 2021-08-25 | Stop reason: HOSPADM

## 2021-08-25 RX ORDER — SODIUM CHLORIDE 9 MG/ML
INJECTION, SOLUTION INTRAVENOUS CONTINUOUS PRN
Status: DISCONTINUED | OUTPATIENT
Start: 2021-08-25 | End: 2021-08-25 | Stop reason: SDUPTHER

## 2021-08-25 RX ORDER — BUPIVACAINE HYDROCHLORIDE 2.5 MG/ML
INJECTION, SOLUTION EPIDURAL; INFILTRATION; INTRACAUDAL PRN
Status: DISCONTINUED | OUTPATIENT
Start: 2021-08-25 | End: 2021-08-25 | Stop reason: ALTCHOICE

## 2021-08-25 RX ORDER — PROPOFOL 10 MG/ML
INJECTION, EMULSION INTRAVENOUS CONTINUOUS PRN
Status: DISCONTINUED | OUTPATIENT
Start: 2021-08-25 | End: 2021-08-25 | Stop reason: SDUPTHER

## 2021-08-25 RX ORDER — ONDANSETRON 2 MG/ML
INJECTION INTRAMUSCULAR; INTRAVENOUS PRN
Status: DISCONTINUED | OUTPATIENT
Start: 2021-08-25 | End: 2021-08-25 | Stop reason: SDUPTHER

## 2021-08-25 RX ORDER — FENTANYL CITRATE 50 UG/ML
INJECTION, SOLUTION INTRAMUSCULAR; INTRAVENOUS PRN
Status: DISCONTINUED | OUTPATIENT
Start: 2021-08-25 | End: 2021-08-25 | Stop reason: SDUPTHER

## 2021-08-25 RX ORDER — SODIUM CHLORIDE 9 MG/ML
INJECTION, SOLUTION INTRAVENOUS CONTINUOUS
Status: DISCONTINUED | OUTPATIENT
Start: 2021-08-25 | End: 2021-08-25 | Stop reason: HOSPADM

## 2021-08-25 RX ORDER — LIDOCAINE HYDROCHLORIDE 20 MG/ML
INJECTION, SOLUTION INTRAVENOUS PRN
Status: DISCONTINUED | OUTPATIENT
Start: 2021-08-25 | End: 2021-08-25 | Stop reason: SDUPTHER

## 2021-08-25 RX ORDER — SODIUM CHLORIDE 0.9 % (FLUSH) 0.9 %
10 SYRINGE (ML) INJECTION EVERY 12 HOURS SCHEDULED
Status: DISCONTINUED | OUTPATIENT
Start: 2021-08-25 | End: 2021-08-25 | Stop reason: HOSPADM

## 2021-08-25 RX ORDER — SODIUM CHLORIDE 0.9 % (FLUSH) 0.9 %
10 SYRINGE (ML) INJECTION PRN
Status: DISCONTINUED | OUTPATIENT
Start: 2021-08-25 | End: 2021-08-25 | Stop reason: HOSPADM

## 2021-08-25 RX ADMIN — ONDANSETRON HYDROCHLORIDE 4 MG: 2 INJECTION, SOLUTION INTRAMUSCULAR; INTRAVENOUS at 10:21

## 2021-08-25 RX ADMIN — FENTANYL CITRATE 25 MCG: 50 INJECTION, SOLUTION INTRAMUSCULAR; INTRAVENOUS at 09:56

## 2021-08-25 RX ADMIN — PROPOFOL 100 MCG/KG/MIN: 10 INJECTION, EMULSION INTRAVENOUS at 09:53

## 2021-08-25 RX ADMIN — MIDAZOLAM 1 MG: 1 INJECTION INTRAMUSCULAR; INTRAVENOUS at 09:46

## 2021-08-25 RX ADMIN — LIDOCAINE HYDROCHLORIDE 40 MG: 20 INJECTION, SOLUTION INTRAVENOUS at 09:53

## 2021-08-25 RX ADMIN — FENTANYL CITRATE 50 MCG: 50 INJECTION, SOLUTION INTRAMUSCULAR; INTRAVENOUS at 10:05

## 2021-08-25 RX ADMIN — SODIUM CHLORIDE: 9 INJECTION, SOLUTION INTRAVENOUS at 09:46

## 2021-08-25 RX ADMIN — FENTANYL CITRATE 25 MCG: 50 INJECTION, SOLUTION INTRAMUSCULAR; INTRAVENOUS at 09:53

## 2021-08-25 RX ADMIN — CEFAZOLIN 2000 MG: 1 INJECTION, POWDER, FOR SOLUTION INTRAMUSCULAR; INTRAVENOUS at 09:56

## 2021-08-25 ASSESSMENT — PULMONARY FUNCTION TESTS
PIF_VALUE: 16
PIF_VALUE: 15
PIF_VALUE: 16
PIF_VALUE: 1
PIF_VALUE: 15
PIF_VALUE: 15
PIF_VALUE: 1
PIF_VALUE: 16
PIF_VALUE: 16
PIF_VALUE: 14
PIF_VALUE: 16
PIF_VALUE: 15
PIF_VALUE: 1
PIF_VALUE: 15
PIF_VALUE: 0
PIF_VALUE: 14
PIF_VALUE: 15
PIF_VALUE: 14
PIF_VALUE: 1
PIF_VALUE: 14
PIF_VALUE: 15
PIF_VALUE: 14
PIF_VALUE: 1
PIF_VALUE: 1
PIF_VALUE: 16
PIF_VALUE: 15
PIF_VALUE: 16
PIF_VALUE: 15
PIF_VALUE: 15
PIF_VALUE: 14
PIF_VALUE: 15
PIF_VALUE: 1
PIF_VALUE: 14
PIF_VALUE: 16
PIF_VALUE: 14
PIF_VALUE: 15
PIF_VALUE: 16
PIF_VALUE: 15
PIF_VALUE: 14
PIF_VALUE: 14
PIF_VALUE: 1
PIF_VALUE: 1
PIF_VALUE: 15
PIF_VALUE: 16
PIF_VALUE: 1
PIF_VALUE: 15
PIF_VALUE: 14
PIF_VALUE: 16
PIF_VALUE: 15
PIF_VALUE: 15

## 2021-08-25 ASSESSMENT — PAIN SCALES - GENERAL
PAINLEVEL_OUTOF10: 0

## 2021-08-25 ASSESSMENT — PAIN DESCRIPTION - LOCATION: LOCATION: CHEST

## 2021-08-25 ASSESSMENT — PAIN DESCRIPTION - PAIN TYPE: TYPE: SURGICAL PAIN

## 2021-08-25 ASSESSMENT — PAIN DESCRIPTION - ORIENTATION: ORIENTATION: RIGHT

## 2021-08-25 ASSESSMENT — LIFESTYLE VARIABLES: SMOKING_STATUS: 0

## 2021-08-25 ASSESSMENT — PAIN - FUNCTIONAL ASSESSMENT: PAIN_FUNCTIONAL_ASSESSMENT: 0-10

## 2021-08-25 NOTE — OP NOTE
[address]  OPERATIVE REPORT    Doris Peter  1955      DATE OF PROCEDURE: 8/25/2021     SURGEON: Daysi Du MD, MSc, FACS     ASSISTANT: Trino Chinchilla MD, PGY-I     PREOPERATIVE DIAGNOSIS:  Left breast cancer, ER/AR-, HER2+; cyst of left axilla . POSTOPERATIVE DIAGNOSIS: Same    OPERATION: right IJ mediport removal; excision of left axillary cyst 5mm     ANESTHESIA: LMAC     ESTIMATED BLOOD LOSS: 36OQ     COMPLICATIONS: None    SPECIMEN:  Left axillary cyst    DRAINS:  none    HISTORY:  This is a 73 y. o.female who originally presented 2018 for left breast cancer. She is was seen by me for a Mediport placement this was placed via the right IJ. She presents today for Mediport removal.  I also noted in reviewing her chart that she has had a left axillary cyst that is been bothering her. I asked if she would like this removed today she said she would. DESCRIPTION OF PROCEDURE: The patient was identified and the procedure was confirmed. Ancef 2 g IV was given, bilateral SCDs in place, patient was prepped and draped standard surgical fashion. We anesthetize the prior incision of the right Mediport with 0.25% Marcaine. We used a 15 blade scalpel to open up the prior incision. We dissected down with Bovie electrocautery we released the Mediport from its capsule. We removed 2 Prolene sutures. And then remove the port in total.  Pressure was held on the right IJ for 2 minutes. We removed part of the capsule with cautery. We irrigated the wound with saline. We achieved hemostasis. Then we closed the wound with 3-0 and 4-0 Vicryl in the usual fashion. We cleaned the patient wet dry sponge applied Dermabond. Then turned my attention to the left axillary cyst.  This was marked in preop. Anesthetized with 0.25% Marcaine. Using 15 blade scalpel made elliptical incision around the cyst.  Remove the cyst with scalpel only. It was sent to pathology.   And she is hemostasis of the subcutaneous tissue with cautery. Cleaned the wound with saline and closed the wound with 4-0 and 5-0 Vicryl in the usual fashion. Cleaned the patient wet dry sponge and applied Dermabond. Needle, sponge, and instrument counts were reported as correct x2. The patient tolerated the procedure and was transferred to the recovery area in satisfactory condition. Her  was updated at the end the case.     Lady Francisco MD, MSc, FACS  8/25/2021  10:43 AM

## 2021-08-25 NOTE — H&P
GENERAL SURGERY  HISTORY AND PHYSICAL  2021    No chief complaint on file. CHAI Machado is a 73 y.o. female who presents for removal of right internal jugular vein central venous catheter. She has a history of T2 N0 M0 left sided invasive carcinoma that was Her-2/francisco receptor positive. The central venous catheter was placed in 2019 by Dr. Chad Davison. She had neoadjuvant TCHP chemotherapy for 6 cycles from 2019 to 2019 and then underwent needle-localized lumpectomy, blue dye injection, left axillary sentinel lymph node excision, and BioZorb insertion in 2019. She received 14 cycles of Ado-Trastuzumab from 2020 to 2020. She is feeling well overall and does not endorse any fevers, chills, nausea, vomiting, or changes in her bowel habits. She does not take any blood thinners. Past Medical History:   Diagnosis Date    Anxiety     Breast cancer ([facility] [address] 2019    lt breast    Cancer ([facility] [address] 2019    breast    Heart murmur     History of therapeutic radiation     Hx antineoplastic chemo     Hypertension     Post-menopausal bleeding     S/P hyst/ polypectomy 6/10/14       Past Surgical History:   Procedure Laterality Date    BREAST BIOPSY      BREAST BIOPSY Left 2019    LEFT BREAST  NEEDLE LOCALIZED LUMPECTOMY BLUE DYE INJECTION LEFT AXILL. SENTINEL LYMPH NODE EXCISION, INSERTION OF BIOZORB performed by Adalgisa Khan MD at . [address] LUMPECTOMY Left 2019    patient had a left axillary SLN     SECTION      COLONOSCOPY      normal - Dr [facility] x 10 years    DILATION AND CURETTAGE      INSERTION / REMOVAL / REPLACEMENT VENOUS ACCESS CATHETER N/A 2019    MEDI PORT PLACEMENT performed by Ramana Orellana MD at [address] [place] [address]       Prior to Admission medications    Medication Sig Start Date End Date Taking?  Authorizing Provider   Omega-3 Fatty Acids (FISH OIL) 1000 MG CAPS Take 3,000 mg by mouth 3 times daily   Yes Historical Provider, MD   b complex vitamins capsule Take 1 capsule by mouth daily   Yes Historical Provider, MD   Multiple Vitamins-Minerals (MULTIVITAMIN WOMEN 50+ PO) Take 1 tablet by mouth daily    Yes Historical Provider, MD   Biotin 5000 MCG TABS Take 5,000 mcg by mouth 2 times daily    Yes Historical Provider, MD   metoprolol succinate (TOPROL XL) 50 MG extended release tablet TAKE ONE TABLET BY MOUTH EVERY night 3/7/17  Yes Historical Provider, MD   valsartan-hydrochlorothiazide (DIOVAN-HCT) 320-25 MG per tablet TAKE ONE TABLET BY MOUTH EVERY DAY 3/7/17  Yes Historical Provider, MD   clonazePAM (KLONOPIN) 0.5 MG tablet Take 0.5 mg by mouth daily as needed for Anxiety. Yes Historical Provider, MD   fluocinonide (LIDEX) 0.05 % cream Apply to affected area twice daily as needed 6/8/21   Historical Provider, MD   acetaminophen (TYLENOL) 500 MG tablet Take 500 mg by mouth every 6 hours as needed for Pain     Historical Provider, MD   lidocaine-prilocaine (EMLA) 2.5-2.5 % cream Apply 1 hour before your scheduled chemotherapy,cream will last up to 3 hours. Squeeze a small amount,the size of a quarter onto your port. 6/1/20   Clayton Root MD       Allergies   Allergen Reactions    Tetracyclines & Related Hives       Family History   Problem Relation Age of Onset    Bleeding Prob Sister     Bleeding Prob Sister     Heart Disease Father     Heart Attack Father 79    High Blood Pressure Father     Cancer Mother 61        lung    Stroke Maternal Grandmother     Stroke Paternal Grandfather     Cancer Paternal Grandfather 48        GI cancer       Social History     Tobacco Use    Smoking status: Never Smoker    Smokeless tobacco: Never Used   Vaping Use    Vaping Use: Never used   Substance Use Topics    Alcohol use:  Yes     Alcohol/week: 0.0 standard drinks     Comment: socially    Drug use: No         Review of Systems - History obtained from patient and   General ROS: negative for - chills jugular vein central venous catheter placement in 2019 for chemotherapy. PLAN:  Patient seen and examined. Port will be removed in the operating room on 8/25/21. Risks, benefits, and complications of the procedure was dicussed with the patient and her . The patient expressed understanding and agrees to proceed with the procedure.     Electronically signed by Asher Guadarrama MD on 8/25/21 at 9:12 AM EDT

## 2021-08-25 NOTE — ANESTHESIA PRE PROCEDURE
Department of Anesthesiology  Preprocedure Note       Name:  Sandeep Butcher   Age:  73 y.o.  :  1955                                          MRN:  [id number]         Date:  2021      Surgeon: Vira Decker):  Claudia Tillman MD    Procedure: Procedure(s):  MEDI PORT REMOVAL    Medications prior to admission:   Prior to Admission medications    Medication Sig Start Date End Date Taking? Authorizing Provider   Omega-3 Fatty Acids (FISH OIL) 1000 MG CAPS Take 3,000 mg by mouth 3 times daily   Yes Historical Provider, MD   b complex vitamins capsule Take 1 capsule by mouth daily   Yes Historical Provider, MD   Multiple Vitamins-Minerals (MULTIVITAMIN WOMEN 50+ PO) Take 1 tablet by mouth daily    Yes Historical Provider, MD   Biotin 5000 MCG TABS Take 5,000 mcg by mouth 2 times daily    Yes Historical Provider, MD   metoprolol succinate (TOPROL XL) 50 MG extended release tablet TAKE ONE TABLET BY MOUTH EVERY night 3/7/17  Yes Historical Provider, MD   valsartan-hydrochlorothiazide (DIOVAN-HCT) 320-25 MG per tablet TAKE ONE TABLET BY MOUTH EVERY DAY 3/7/17  Yes Historical Provider, MD   clonazePAM (KLONOPIN) 0.5 MG tablet Take 0.5 mg by mouth daily as needed for Anxiety. Yes Historical Provider, MD   fluocinonide (LIDEX) 0.05 % cream Apply to affected area twice daily as needed 21   Historical Provider, MD   acetaminophen (TYLENOL) 500 MG tablet Take 500 mg by mouth every 6 hours as needed for Pain     Historical Provider, MD   lidocaine-prilocaine (EMLA) 2.5-2.5 % cream Apply 1 hour before your scheduled chemotherapy,cream will last up to 3 hours. Squeeze a small amount,the size of a quarter onto your port.  20   Neftali Hammond MD       Current medications:    Current Facility-Administered Medications   Medication Dose Route Frequency Provider Last Rate Last Admin    0.9 % sodium chloride infusion   IntraVENous Continuous Claudia Tillman MD        0.9 % sodium chloride infusion  25 mL IntraVENous PRN Farhat Nixon MD        sodium chloride flush 0.9 % injection 10 mL  10 mL IntraVENous 2 times per day Farhat Nixon MD        sodium chloride flush 0.9 % injection 10 mL  10 mL IntraVENous PRN Farhat Nixon MD           Allergies: Allergies   Allergen Reactions    Tetracyclines & Related Hives       Problem List:    Patient Active Problem List   Diagnosis Code    Invasive ductal carcinoma of left breast ([facility] [address] C50.912    Malignant neoplasm of upper-outer quadrant of left breast in female, [facility] receptor [facility] C50.412, Z17.0    Post-op pain G89.18       Past Medical History:        Diagnosis Date    Anxiety     Breast cancer ([facility] 2019    lt breast    Cancer ([facility] 2019    breast    Heart murmur     History of therapeutic radiation     Hx antineoplastic chemo 2019    Hypertension     Post-menopausal bleeding     S/P hyst/ polypectomy 6/10/14       Past Surgical History:        Procedure Laterality Date    BREAST BIOPSY      BREAST BIOPSY Left 2019    LEFT BREAST  NEEDLE LOCALIZED LUMPECTOMY BLUE DYE INJECTION LEFT AXILL. SENTINEL LYMPH NODE EXCISION, INSERTION OF BIOZORB performed by Isi Carrasco MD at [address] LUMPECTOMY Left 2019    patient had a left axillary SLN     SECTION      COLONOSCOPY      normal - Dr Sheyla Lanza x 10 years    DILATION AND CURETTAGE      INSERTION / REMOVAL / REPLACEMENT VENOUS ACCESS CATHETER N/A 2019    MEDI PORT PLACEMENT performed by Farhat Nixon MD at [address] History:    Social History     Tobacco Use    Smoking status: Never Smoker    Smokeless tobacco: Never Used   Substance Use Topics    Alcohol use:  Yes     Alcohol/week: 0.0 standard drinks     Comment: socially                                Counseling given: Not Answered      Vital Signs (Current):   Vitals:    21 1047 21 0831   BP:  (!) 156/69   Pulse:  68   Resp:  16   Temp:  36.1 °C (97 °F) TempSrc:  Temporal   SpO2:  96%   Weight: 230 lb (104.3 kg) 230 lb (104.3 kg)   Height: 5' 3\" (1.6 m) 5' 3\" (1.6 m)                                              BP Readings from Last 3 Encounters:   08/25/21 (!) 156/69   07/15/21 (!) 144/67   06/01/21 138/76       NPO Status: Time of last liquid consumption: 2230                        Time of last solid consumption: 1730                        Date of last liquid consumption: 08/24/21                        Date of last solid food consumption: 08/24/21    BMI:   Wt Readings from Last 3 Encounters:   08/25/21 230 lb (104.3 kg)   07/15/21 238 lb (108 kg)   06/01/21 225 lb (102.1 kg)     Body mass index is 40.74 kg/m². CBC:   Lab Results   Component Value Date    WBC 6.6 07/14/2021    RBC 4.43 07/14/2021    HGB 12.9 07/14/2021    HCT 38.9 07/14/2021    MCV 87.8 07/14/2021    RDW 15.0 07/14/2021     07/14/2021       CMP:   Lab Results   Component Value Date     07/14/2021    K 4.0 07/14/2021     07/14/2021    CO2 28 07/14/2021    BUN 23 07/14/2021    CREATININE 1.4 07/14/2021    GFRAA 46 07/14/2021    LABGLOM 38 07/14/2021    GLUCOSE 103 07/14/2021    GLUCOSE 96 04/25/2011    PROT 7.4 07/14/2021    CALCIUM 9.8 07/14/2021    BILITOT 0.3 07/14/2021    ALKPHOS 84 07/14/2021    AST 27 07/14/2021    ALT 32 07/14/2021       POC Tests: No results for input(s): POCGLU, POCNA, POCK, POCCL, POCBUN, POCHEMO, POCHCT in the last 72 hours.     Coags: No results found for: PROTIME, INR, APTT    HCG (If Applicable): No results found for: PREGTESTUR, PREGSERUM, HCG, HCGQUANT     ABGs: No results found for: PHART, PO2ART, QPY1RVO, FDG1YJV, BEART, G5RKFNUL     Type & Screen (If Applicable):  No results found for: LABABO, LABRH    Drug/Infectious Status (If Applicable):  No results found for: HIV, HEPCAB    COVID-19 Screening (If Applicable): No results found for: COVID19              ECHO 11/5/20  Findings EF 70%      Left Ventricle   Ejection fraction is visually spouse. Use of blood products discussed with patient whom consented to blood products. Plan discussed with CRNA and attending.                 Tania Maya RN   8/25/2021

## 2021-08-25 NOTE — PROGRESS NOTES
Patient admitted to [facility] Patient placed on appropriate monitors. Dressing dry and intact. . Patient assisted with liquids and nutrition. Family in with patient.

## 2021-08-26 NOTE — ANESTHESIA POSTPROCEDURE EVALUATION
Department of Anesthesiology  Postprocedure Note    Patient: Bijal Carballo  MRN: [id number]  YOB: 1955  Date of evaluation: 8/26/2021  Time:  6:27 AM     Procedure Summary     Date: 08/25/21 Room / Location: [facility] OR 09 / [facility]    Anesthesia Start: 0314 Anesthesia Stop: 1055    Procedure: MEDI PORT REMOVAL (Right Chest) Diagnosis: (HISTORY OF BREAST CANCER)    Surgeons: Jl Garcia MD Responsible Provider: Carlos Albert DO    Anesthesia Type: MAC ASA Status: 3          Anesthesia Type: MAC    Alisa Phase I: Alisa Score: 10    Alisa Phase II: Alisa Score: 10    Last vitals: Reviewed and per EMR flowsheets.        Anesthesia Post Evaluation    Patient location during evaluation: bedside  Patient participation: complete - patient cannot participate  Level of consciousness: awake and alert  Airway patency: patent  Nausea & Vomiting: no nausea and no vomiting  Complications: no  Cardiovascular status: blood pressure returned to baseline  Respiratory status: acceptable  Hydration status: euvolemic

## 2021-08-31 ENCOUNTER — TELEPHONE (OUTPATIENT)
Dept: SURGERY | Age: 66
End: 2021-08-31

## 2021-09-01 ENCOUNTER — HOSPITAL ENCOUNTER (OUTPATIENT)
Dept: OCCUPATIONAL THERAPY | Age: 66
Setting detail: THERAPIES SERIES
Discharge: HOME | End: 2021-09-01
Payer: MEDICARE

## 2021-09-01 PROCEDURE — 97530 THERAPEUTIC ACTIVITIES: CPT | Performed by: OCCUPATIONAL THERAPIST

## 2021-09-03 NOTE — PROGRESS NOTES
Occupational Therapy  OCCUPATIONAL THERAPY DISCHARGE NOTE  [facility] AT [address] [facility]    Phone: [phone]  Fax: [phone]     Date:  9/3/2021  Initial Evaluation Date: josé miguelda Heal 2021     Evaluating Therapist: RICKY Carrion/Maribell DEL VALLE    Patient Name:  Heather An    :  1955    Restrictions/Precautions:  fall risk  Diagnosis:  History of breast cancer (z85.3)        Date of Surgery/Injury: n/a    Insurance/Certification information:  [employer] - [id number]  Plan of care signed (Y/N): Yes  Visit# / total visits: visist #4  Total Visits to date:  4 Cancels/No-shows to date: 0    Referring Practitioner:  Jayde Gamino MD  Specific Practitioner Orders: Evaluation and Treatment     Assessment of current deficits   []Pain  []Skin Integrity   [x]Lymphedema   []Functional transfers/mobility   []ADLs   []Strength    []Cognition  []IADLs   []Safety Awareness   []  Motor Endurance    []Fine Motor Coordination   []Balance   []Vision/perception  []Sensation []Gross Motor Coordination  []ROM     OT PLAN OF CARE   OT POC based on physician orders, patient diagnosis and results of clinical assessment    Frequency/Duration:  Patient currently discharged from lymphedema clinic  Specific OT Treatment to include:     Plan of Care:     [x]97140-Manual Lymph Drainage and Combined Decongestive Therapy  [x]60475- Skilled Multilayer Short Stretch Compression Bandaging/ Therapeutic Exercise  [x]Skin Care Education [x]HEP including Self MLD Education and/or Self Bandaging  [x]Education for Lymphedema Risks/Precautions     [x] Caregiver Education   []other:      Patient Specific Goal: to feel comfortable managing fluid in arm    Goals Status:    STG: 3 weeks  1.  Patient will demonstrate knowledge and understanding for lymphedema precautions, skin care and self management to decrease progression of lymphedema and risk of infection. Goal met.   2.  Patient will present with decreased limb volume in the involved extremity from minimal to trace for improved functional mobility. Therapist continued patient education regarding manual lymphatic massage and use of lymphedema pump. Patient attended treatment session with decreased edema. Therapist took measurements and recorded below. Patient utilizes lymphedema pump and stated it works well to manage fluid at this time. Patient has had no need to perform hand manual massage since receiving pump. Patient exhibited trace edema bilateral upper extremity, flank, and chest.  Goal met. 3.  Patient will demonstrate compliance with compression therapy for independent management of lymphedema. Therapist continued patient education regarding compression garment and benefits. Patient currently utilizes compression bra daily. Patient utilizes compression sleeve as needed. Patient stated she feels pump covers fluid adequately that she does not need sleeve daily. Patient does utilize sleeve when performing increased activity. patient goal met          LT weeks  1.  Patient will be independent with self management of lymphedema including understanding garment wear schedule, self compression bandaging, HEP and self care. Therapist continued patient education regarding self lymphedema management. Therapist reviewed current home program and how to adjust as needed. Patient currently adjusting program pending fluid amounts and how she feels. Therapist further discussed compression, self care, eating lifestyle, and wear schedule of garments. Patient verbalized understanding and able to provide examples of how she is currently independently managing her lymphedema. Patient goal met. 2.  Patient will be fitted for appropriate compression garments for long term management of lymphedema. Therapist continued patient education regarding compression garments. Patient current compression bra and compression sleeve fit well and work as intended.   Patient understand                                                                 plam  19  19.25  19.25  20 19.5    20  21  20.5 20      Fingers are measured in cm and between mp and pip and between pip and dip each digit.     Digit Follow up #4  Left -  Follow up #3  Left -  Follow up #2  Left -  Follow up #1  Left -  Evaluation -  Left -  Follow up #4  Right -  Follow up #3  Right -  Follow up #2  Right -  Follow up #1  Right -  Evaluation - Right -                                                    First Digit  6.25, 5.25  6.25, 5.25  6.25, 5  6.25, 5.25 6.25, 5.25    6.25, 5.25  6.5, 5.5  6.5, 5.5 6.5, 5.25   Second Digit  6.25, 5.25  6.5, 5.5  6.25, 5  6, 5.25 6, 5.25    6.25, 5.25  6.5, 5.25  6.5, 5.5 6, 5.5   Third Digit  5.75, 5  6.25, 5  5.75, 5  6, 5 5.75, 5    6, 54  6, 5.25  5.75, 5.25 6, 5.25   Fourth Digit  5.5, 4.5  5.25, 4.5  5.25, 4.5  5.5, 4.5 5.75, 4.25    5.75, 4.75  6, 5  6, 5.75 6, 4.75   Fifth Digit  6  6.25  6  6.25 6.25    6.25  6.25  6.25 6.25                                  Restrictions/Precautions:  [] No blood pressure/blood draw in: [] Left Upper Extremity     [] Right Upper Extremity        [x] Fall Risk       Intervention:   []Other    Subjective:      Rehab Potential: [] Excellent [x] Good [] Fair  [] Poor     Goal Status:  [] Achieved [x] Partially Achieved  [] Not Achieved     Patient Status: [x] Patient now discharged             Time In: 1500            Time Out: 1530                      Timed Code Treatment Minutes: 30 minutes      CODE  Minutes  Units   68474 OT Eval Low     55644 OT Eval Medium     85983 OT Eval High     35666 Fluidotherapy     65185 Manual     86499 Therapeutic Ex     61120 Therapeutic Activity 30 2   45752 ADL/COMP Tech Train     [id number] Neuromuscular Re-Ed     17399 OrthoManagementTraining     01480 Paraffin     57933 Electrical Stim - Attended     56696 Iontophoresis     58815 Ultrasound      Other       30 2     Electronically signed by:  Dc Sorenson OTR/L, [employer] [de-identified] Certification / Comments      Patient now discharged     I have reviewed the Plan of Care established for skilled therapy services and certify that the services are required and that they will be provided while the patient is under my care. Physician's Comments/Revisions:                   Physicians's Printed Name:  Kalani Bauer MD                                   [de-identified] Signature:                                                               Date:      Please review Patient's OT evaluation and if you agree sign/date and fax back to us at our [address] [facility] Fax: [phone].  Thank you for your referral!

## 2021-11-17 ENCOUNTER — HOSPITAL ENCOUNTER (OUTPATIENT)
Dept: INFUSION THERAPY | Age: 66
Discharge: HOME | End: 2021-11-17
Payer: MEDICARE

## 2021-11-17 DIAGNOSIS — C50.912 INVASIVE DUCTAL CARCINOMA OF LEFT BREAST (HCC): ICD-10-CM

## 2021-11-17 LAB
ALBUMIN SERPL-MCNC: 4.4 G/DL (ref 3.5–5.2)
ALP BLD-CCNC: 94 U/L (ref 35–104)
ALT SERPL-CCNC: 31 U/L (ref 0–32)
ANION GAP SERPL CALCULATED.3IONS-SCNC: 11 MMOL/L (ref 7–16)
AST SERPL-CCNC: 25 U/L (ref 0–31)
BASOPHILS ABSOLUTE: 0.05 E9/L (ref 0–0.2)
BASOPHILS RELATIVE PERCENT: 0.6 % (ref 0–2)
BILIRUB SERPL-MCNC: 0.3 MG/DL (ref 0–1.2)
BUN BLDV-MCNC: 13 MG/DL (ref 6–23)
CALCIUM SERPL-MCNC: 10.3 MG/DL (ref 8.6–10.2)
CHLORIDE BLD-SCNC: 94 MMOL/L (ref 98–107)
CO2: 28 MMOL/L (ref 22–29)
CREAT SERPL-MCNC: 0.8 MG/DL (ref 0.5–1)
EOSINOPHILS ABSOLUTE: 0.16 E9/L (ref 0.05–0.5)
EOSINOPHILS RELATIVE PERCENT: 2 % (ref 0–6)
GFR AFRICAN AMERICAN: >60
GFR NON-AFRICAN AMERICAN: >60 ML/MIN/1.73
GLUCOSE BLD-MCNC: 122 MG/DL (ref 74–99)
HCT VFR BLD CALC: 38.5 % (ref 34–48)
HEMOGLOBIN: 13 G/DL (ref 11.5–15.5)
LYMPHOCYTES ABSOLUTE: 2.57 E9/L (ref 1.5–4)
LYMPHOCYTES RELATIVE PERCENT: 32.4 % (ref 20–42)
MAGNESIUM: 1.8 MG/DL (ref 1.6–2.6)
MCH RBC QN AUTO: 29.1 PG (ref 26–35)
MCHC RBC AUTO-ENTMCNC: 33.8 % (ref 32–34.5)
MCV RBC AUTO: 85.7 FL (ref 80–99.9)
MONOCYTES ABSOLUTE: 0.6 E9/L (ref 0.1–0.95)
MONOCYTES RELATIVE PERCENT: 7.6 % (ref 2–12)
NEUTROPHILS ABSOLUTE: 4.52 E9/L (ref 1.8–7.3)
NEUTROPHILS RELATIVE PERCENT: 57.1 % (ref 43–80)
PDW BLD-RTO: 15.3 FL (ref 11.5–15)
PLATELET # BLD: 273 E9/L (ref 130–450)
PMV BLD AUTO: 10.8 FL (ref 7–12)
POTASSIUM SERPL-SCNC: 3.9 MMOL/L (ref 3.5–5)
RBC # BLD: 4.49 E12/L (ref 3.5–5.5)
SODIUM BLD-SCNC: 133 MMOL/L (ref 132–146)
TOTAL PROTEIN: 7.5 G/DL (ref 6.4–8.3)
WBC # BLD: 7.9 E9/L (ref 4.5–11.5)

## 2021-11-17 PROCEDURE — 36415 COLL VENOUS BLD VENIPUNCTURE: CPT

## 2021-11-17 PROCEDURE — 83735 ASSAY OF MAGNESIUM: CPT

## 2021-11-17 PROCEDURE — 85025 COMPLETE CBC W/AUTO DIFF WBC: CPT

## 2021-11-17 PROCEDURE — 80053 COMPREHEN METABOLIC PANEL: CPT

## 2021-11-18 ENCOUNTER — OFFICE VISIT (OUTPATIENT)
Dept: ONCOLOGY | Age: 66
End: 2021-11-18
Payer: MEDICARE

## 2021-11-18 ENCOUNTER — HOSPITAL ENCOUNTER (OUTPATIENT)
Dept: INFUSION THERAPY | Age: 66
Discharge: HOME | End: 2021-11-18

## 2021-11-18 VITALS
OXYGEN SATURATION: 98 % | RESPIRATION RATE: 16 BRPM | TEMPERATURE: 97.4 F | BODY MASS INDEX: 43.1 KG/M2 | SYSTOLIC BLOOD PRESSURE: 162 MMHG | WEIGHT: 243.3 LBS | DIASTOLIC BLOOD PRESSURE: 84 MMHG | HEART RATE: 92 BPM

## 2021-11-18 DIAGNOSIS — Z85.3 PERSONAL HISTORY OF BREAST CANCER: Primary | ICD-10-CM

## 2021-11-18 PROCEDURE — 99214 OFFICE O/P EST MOD 30 MIN: CPT | Performed by: INTERNAL MEDICINE

## 2021-11-18 PROCEDURE — 99212 OFFICE O/P EST SF 10 MIN: CPT

## 2021-11-18 NOTE — PROGRESS NOTES
Department of [facility] Oncology       Attending Clinic Note    Reason for Visit: Follow-up on a patient with Left Breast Cancer    PCP:  Candida Vasques MD    History of Present Illness: The mass was located in the 2 o'clock position of left breast.     Breast cancer risk factors include age, gender, menarche before age 16. Age of menarche was 7. Age of menopause was 48. Patient was on birth control for 6 months in her 25s. Patient is . Age of first live birth was 30. Patient did breast feed. Bilateral screening mammogram on 2019: There is a high density, irregular mass with indistinct margins seen in the posterior upper outer quadrant of left breast.     Left Breast U/S on 2019:  Irregular solid mass with angular margins measuring 25 x 20 x 21 mm in the left breast at 2 o'clock located 8 centimeters from the nipple. No axillary LN. On 2019:  Mass, left breast at 2:00, core needle biopsy: Invasive ductal carcinoma,nuclear grade 3, see comment. 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 Breast Cancer Marker Studies:  ER: Negative  MT: Negative  Her-2/francisco Positive/3+    CXR PA/Lateral on 2019:  Normal chest.    MRI Bilateral Breast 2019: An irregular, enhancing mass is again noted in the left breast 2:00 which measures 2.0 x 2.2 x 2.6 cm. No axillary LN. No evidence of neoplasm on right. T2 N0 M0  We recommended neoadjuvant chemotherapy consisting of 6 cycles of TCHP. Side effects of TCHP reviewed with patient. She agreed to proceed. 2D-ECHO EF50-55%. Mediport placed. Cycle # 1 TCHP was on 2019. Cycle # 2 TCHP was on 2019. Cycle # 3 TCHP was on 2019. 2D-ECHO 2019 EF 65%  Cycle # 4 TCHP was on 2019  Cycle # 5 TCHP was on 2019. Cycle # 6 TCHP was on 10/23/2019. MRI Breast Bilateral 2019:   Near complete response to therapy on the left.    There is no axillary lymphadenopathy. No evidence of neoplasm on the right. 2D-ECHO 12/16/2019 EF 60%  Left needle localized lumpectomy, blue dye injection, and left axillary sentinel lymph node excision was done on 12/18/2019:  A.  Left breast, new superior-lateral margin, excision:   -Focal adenosis, columnar cell changes and stromal fibrosis/hyalinosis, negative for malignancy;   -Rare microcalcifications in benign tubules. B.  [place] lymph node #1, biopsy: Reactive node, negative for malignancy. C.  [place] lymph node #2, biopsy: Reactive node, negative for malignancy. D.  Left breast, lumpectomy with needle localization:   - Invasive adenocarcinoma of no special type (ductal, NOS), grade 3;   - Ductal carcinoma in situ, high nuclear grade, comedo/solid with necrosis;   - Scar of previous biopsy site, see comment. Comment:  Although the invasive carcinoma is very close to the superior margin in specimen D (3 mm), additional superior-lateral margins in specimen A (at least 2.0 cm in thickness) is completely negative for carcinoma.      CANCER CASE SUMMARY  Procedure: Excision  Specimen Laterality: Left  Tumor Site: Invasive Carcinoma: 12:00 (per report: HES-)  Tumor Size: Size of Largest Invasive Carcinoma: 1.4 X 1.3 X1.3 cm  Histologic Type of Invasive Carcinoma: Invasive carcinoma of no special type (ductal, NOS)  Histologic Grade: [place] Histologic Score  Glandular/tubular differentiation: Score 3  Nuclear pleomorphism: Score 3  Mitotic rate: Score 3  Overall grade: Grade 3 (scores of 9)  Tumor Focality: Single focus of invasive carcinoma  Ductal Carcinoma In Situ (DCIS): Present  Size of DCIS: Intermingled with invasive carcinoma  Number of blocks with DCIS: 3  Number of blocks examined: 14  Architectural patternS: Comedo/solid  Nuclear grade: High  Ncrosis present: Central and focal  Lobular Carcinoma In Situ (LCIS): Not identified  Margins: Uninvolved by invasive carcinoma and DCIS  Distance from closest margin: 3 mm  Specify closest margin: surperior (see comment)  Regional lymph Nodes: Uninvolved by tumor cells  Total number of lymph nodes examined: 2  Number of sentinel nodes examined: 2  Treatment Effect: No known presurgical therapy  Lymph-Vascular Invasion: Not identified  Pathologic Staging (pTNM, AJCC 8TH Edition)  Primary tumor: pT1c  Regional lymph nodes (modifier- (sn) sentinel nodes examined: (sn)0  ER: Negative  PA: Negative  Her-2/francisco Positive/3+    Pathology report reviewed extensively with patient. Recommended Ado-Trastuzumab alone for 14 cycles. Side effects of Ado-Trastuzumab reviewed with patient. She agreed to proceed. Cycle # 1 Ado-Trastuzumab was on 01/06/2020. Cycle # 2 Ado-Trastuzumab was on 01/27/2020. RT was started on 01/27/2020 and completed on 03/11/2020. Cycle # 3 Ado-Trastuzumab was on 02/17/2020. Cycle # 4 Ado-Trastuzumab was on 03/16/2020.  2D-ECHO 04/15/2020: EF 65%  Cycle # 5 Ado-Trastuzumab was on 04/20/2020. Cycle # 6 Ado-Trastuzumab was on 05/11/2020. Bilateral Diagnostic Mammogram on 05/21/2020 Negative for malignancy. Left Breast U/S 05/29/2020 Area of post surgical change in the left breast is benign. Cycle # 7 Ado-Trastuzumab was on 06/01/2020. Cycle # 8 Ado-Trastuzumab was on 06/22/2020.  2d-Echo 07/20/2020 noted EF 65-70%  Cycle # 9 Ado-Trastuzumab was on 07/22/2020. Cycle # 10 Ado-Trastuzumab was on 08/12/2020. Cycle # 11 Ado-Trastuzumab was on 09/02/2020. Cycle # 12 Ado-Trastuzumab was on 09/23/2020. Cycle # 13 Ado-Trastuzumab was on 10/22/2020. Cycle # 14 Ado-Trastuzumab was on 11/12/2020. Bilateral Screening Mammogram 06/01/2021: No evidence of malignancy. She presented today 11/18/2021 for f/u and is doing well. Fair appetite and energy level. No nausea/vomiting. Review of Systems;  CONSTITUTIONAL: No fever, chills. Fair appetite and energy level. ENMT: Eyes: No diplopia. Mouth: No sore throat.   RESPIRATORY: No hemoptysis, shortness of breath, cough. CARDIOVASCULAR: No chest pain, palpitations. GASTROINTESTINAL: No nausea/vomiting abdominal pain. GENITOURINARY: No dysuria, urinary frequency, hematuria. NEURO: No syncope, presyncope, headache. Remainder:  ROS NEGATIVE    Past Medical History:      Diagnosis Date    Anxiety     Breast cancer ([facility] [address] 2019    lt breast    Cancer ([facility] [address] 2019    breast    Heart murmur     History of therapeutic radiation 2019    Hx antineoplastic chemo 2019    Hypertension     Post-menopausal bleeding     S/P hyst/ polypectomy 6/10/14     Medications:  Reviewed and reconciled. Allergies: Allergies   Allergen Reactions    Tetracyclines & Related Hives     Physical Exam:  BP (!) 162/84   Pulse 92   Temp 97.4 °F (36.3 °C)   Resp 16   Wt 243 lb 4.8 oz (110.4 kg)   SpO2 98%   BMI 43.10 kg/m²   GENERAL: Alert, oriented x 3, not in acute distress. LUNGS: CTA Andrew  CVS: RRR  EXTREMITIES: Without clubbing, cyanosis, or edema. ECOG PS 1    Lab Results   Component Value Date    WBC 7.9 11/17/2021    HGB 13.0 11/17/2021    HCT 38.5 11/17/2021    MCV 85.7 11/17/2021     11/17/2021     Lab Results   Component Value Date     11/17/2021    K 3.9 11/17/2021    CL 94 (L) 11/17/2021    CO2 28 11/17/2021    BUN 13 11/17/2021    CREATININE 0.8 11/17/2021    GLUCOSE 122 (H) 11/17/2021    CALCIUM 10.3 (H) 11/17/2021    PROT 7.5 11/17/2021    LABALBU 4.4 11/17/2021    BILITOT 0.3 11/17/2021    ALKPHOS 94 11/17/2021    AST 25 11/17/2021    ALT 31 11/17/2021    LABGLOM >60 11/17/2021    GFRAA >60 11/17/2021     Impression/Plan:  73 y.o. female with Left Breast Cancer    Mass, left breast at 2:00, core needle biopsy: Invasive ductal carcinoma,nuclear grade 3, see comment. 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 Breast Cancer Marker Studies:  ER: Negative  MO: Negative  Her-2/francisco Positive/3+    MRI Bilateral Breast 05/23/2019:   An irregular, enhancing mass is again noted in the left breast 2:00 which measures 2.0 x 2.2 x 2.6 cm. No axillary LN. No evidence of neoplasm on right. T2 N0 M0  We recommended neoadjuvant chemotherapy consisting of 6 cycles of TCHP. Side effects of TCHP reviewed with patient. She agreed to proceed. 2D-ECHO EF50-55%. Mediport placed. Cycle # 1 TCHP was on 06/24/2019. Cycle # 2 TCHP was on 07/24/2019. Cycle # 3 TCHP was on 08/14/2019. 2D-ECHO 08/23/2019 EF 65%  Cycle # 4 TCHP was on 09/04/2019. Cycle # 5 TCHP was on 09/25/2019. Cycle # 6 TCHP was on 10/23/2019. MRI Breast Bilateral 11/07/2019:   Near complete response to therapy on the left. There is no axillary lymphadenopathy. No evidence of neoplasm on the right. Herceptin/Perjeta was on 12/04/2019. 2D-ECHO 12/16/2019 EF 60%    Left needle localized lumpectomy, blue dye injection, and left axillary sentinel lymph node excision on 12/18/2019:  A.  Left breast, new superior-lateral margin, excision:   -Focal adenosis, columnar cell changes and stromal fibrosis/hyalinosis, negative for malignancy;   -Rare microcalcifications in benign tubules. B.  [place] lymph node #1, biopsy: Reactive node, negative for malignancy. C.  [place] lymph node #2, biopsy: Reactive node, negative for malignancy. D.  Left breast, lumpectomy with needle localization:   - Invasive adenocarcinoma of no special type (ductal, NOS), grade 3;   - Ductal carcinoma in situ, high nuclear grade, comedo/solid with necrosis;   - Scar of previous biopsy site, see comment. Comment:  Although the invasive carcinoma is very close to the superior margin in specimen D (3 mm), additional superior-lateral margins in specimen A (at least 2.0 cm in thickness) is completely negative for carcinoma.      CANCER CASE SUMMARY  Procedure: Excision  Specimen Laterality: Left  Tumor Site: Invasive Carcinoma: 12:00 (per report: HES-)  Tumor Size: Size of Largest Invasive Carcinoma: 1.4 X 1.3 X1.3 cm  Histologic Type of Invasive Carcinoma: Invasive carcinoma of no special type (ductal, NOS)  Histologic Grade: [place] Histologic Score  Glandular/tubular differentiation: Score 3  Nuclear pleomorphism: Score 3  Mitotic rate: Score 3  Overall grade: Grade 3 (scores of 9)  Tumor Focality: Single focus of invasive carcinoma  Ductal Carcinoma In Situ (DCIS): Present  Size of DCIS: Intermingled with invasive carcinoma  Number of blocks with DCIS: 3  Number of blocks examined: 14  Architectural patternS: Comedo/solid  Nuclear grade: High  Ncrosis present: Central and focal  Lobular Carcinoma In Situ (LCIS): Not identified  Margins: Uninvolved by invasive carcinoma and DCIS  Distance from closest margin: 3 mm  Specify closest margin: surperior (see comment)  Regional lymph Nodes: Uninvolved by tumor cells  Total number of lymph nodes examined: 2  Number of sentinel nodes examined: 2  Treatment Effect: No known presurgical therapy  Lymph-Vascular Invasion: Not identified  Pathologic Staging (pTNM, AJCC 8TH Edition)  Primary tumor: pT1c  Regional lymph nodes (modifier- (sn) sentinel nodes examined: (sn)0  ER: Negative  [place]: Negative  Her-2/francsico Positive/3+    Pathology report reviewed extensively with patient. Radiation Oncology team consulted for adjuvant RT. Recommended Ado-Trastuzumab alone for 14 cycles. Side effects of Ado-Trastuzumab reviewed with patient. She agreed to proceed. Cycle # 1 Ado-Trastuzumab was on 01/06/2020. RT was started on 01/27/2020 and completed on 03/11/2020. 2d-echo 11/05/2020 EF 70%   Cycle # 14 Ado-Trastuzumab was on 11/12/2020. Bilateral Screening Mammogram 06/01/2021: No evidence of malignancy. Imaging reviewed. Labs reviewed  [facility]    RTC 4 months with labs.      Shantanu Alonzo MD   [id number]  Board Certified Medical Oncologist

## 2021-11-24 ASSESSMENT — ENCOUNTER SYMPTOMS
SHORTNESS OF BREATH: 0
CONSTIPATION: 0
SORE THROAT: 0
SINUS PAIN: 0
VOMITING: 0
CHOKING: 0
TROUBLE SWALLOWING: 0
SINUS PRESSURE: 0
WHEEZING: 0
BACK PAIN: 0
BLOOD IN STOOL: 0
ABDOMINAL DISTENTION: 0
NAUSEA: 0
ABDOMINAL PAIN: 0
COUGH: 0
CHEST TIGHTNESS: 0
EYE ITCHING: 0
EYE DISCHARGE: 0
RHINORRHEA: 0
VOICE CHANGE: 0

## 2021-11-24 NOTE — PROGRESS NOTES
Subjective: This note was copied forward from the last encounter. Essential components for this patient record were reviewed and verified on this visit including:  recent hospitalizations, recent imaging, PMH, PSH, FH, SOC HX, Allergies, and Medications were reviewed and updated as appropriate. In addition, the assessment and plan were copied from prior office note and updated accordingly. Patient ID: Cuong Fisher is a 73 y.o. female. 2021  HPI     Patient presents today for 6 mos follow up with history breast cancer, left. The cancer was located in the 2 o'clock position of left breast.     Breast cancer risk factors include age, gender, menarche before age 16. Age of menarche was 7. Age of menopause was 48. Patient was on birth control for 6 months in her 25s. Patient is . Age of first live birth was 30. Patient did breast feed. Bilateral screening mammogram on 2019: There is a high density, irregular mass with indistinct margins seen in the posterior upper outer quadrant of left breast.     Left Breast U/S on 2019:  Irregular solid mass with angular margins measuring 25 x 20 x 21 mm in the left breast at 2 o'clock located 8 centimeters from the nipple. No axillary LN. On 2019:  Mass, left breast at 2:00, core needle biopsy: Invasive ductal carcinoma,nuclear grade 3, see comment. 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 Breast Cancer Marker Studies:  ER: Negative  AL: Negative  Her-2/francisco Positive/3+    CXR PA/Lateral on 2019:  Normal chest.    MRI Bilateral Breast 2019: An irregular, enhancing mass is again noted in the left breast 2:00 which measures 2.0 x 2.2 x 2.6 cm. No axillary LN. No evidence of neoplasm on right. T2 N0 M0    -10/23/2019 Completed NACT [facility] per medical oncology, Dr. Johanny Morgan.         MRI Breast Bilateral 2019:   Near complete response to therapy on the left. There is no axillary lymphadenopathy. No evidence of neoplasm on the right. 12/18/2019 Left needle localized lumpectomy, blue dye injection, and left axillary sentinel lymph node excision:  A.  Left breast, new superior-lateral margin, excision:   -Focal adenosis, columnar cell changes and stromal fibrosis/hyalinosis, negative for malignancy;   -Rare microcalcifications in benign tubules. B.  [place] lymph node #1, biopsy: Reactive node, negative for malignancy. C.  [place] lymph node #2, biopsy: Reactive node, negative for malignancy. D.  Left breast, lumpectomy with needle localization:   - Invasive adenocarcinoma of no special type (ductal, NOS), grade 3;   - Ductal carcinoma in situ, high nuclear grade, comedo/solid with necrosis;   - Scar of previous biopsy site, see comment. Comment:  Although the invasive carcinoma is very close to the superior margin in specimen D (3 mm), additional superior-lateral margins in specimen A (at least 2.0 cm in thickness) is completely negative for carcinoma.      CANCER CASE SUMMARY  Procedure: Excision  Specimen Laterality: Left  Tumor Site: Invasive Carcinoma: 12:00 (per report: [facility]-)  Tumor Size: Size of Largest Invasive Carcinoma: 1.4 X 1.3 X1.3 cm  Histologic Type of Invasive Carcinoma: Invasive carcinoma of no special type (ductal, NOS)  Histologic Grade: [place] Histologic Score  Glandular/tubular differentiation: Score 3  Nuclear pleomorphism: Score 3  Mitotic rate: Score 3  Overall grade: Grade 3 (scores of 9)  Tumor Focality: Single focus of invasive carcinoma  Ductal Carcinoma In Situ (DCIS): Present  Size of DCIS: Intermingled with invasive carcinoma  Number of blocks with DCIS: 3  Number of blocks examined: 14  Architectural patternS: Comedo/solid  Nuclear grade: High  Ncrosis present: Central and focal  Lobular Carcinoma In Situ (LCIS): Not identified  Margins: Uninvolved by invasive carcinoma and DCIS  Distance from closest margin: 3 mm  Specify closest margin: surperior (see comment)  Regional lymph Nodes: Uninvolved by tumor cells  Total number of lymph nodes examined: 2  Number of sentinel nodes examined: 2  Treatment Effect: No known presurgical therapy  Lymph-Vascular Invasion: Not identified  Pathologic Staging (pTNM, AJCC 8TH Edition)  Primary tumor: pT1c  Regional lymph nodes (modifier- (sn) sentinel nodes examined: (sn)0  ER: Negative  [place]: Negative  Her-2/francisco Positive/3+    [facility] Recommended Ado-Trastuzumab alone for 14 cycles. Cycle # 1 Ado-Trastuzumab was on 01/06/2020. Cycle # 2 Ado-Trastuzumab was on 01/27/2020. RT was started on 01/27/2020 and completed on 03/11/2020. Cycle # 3 Ado-Trastuzumab was on 02/17/2020. Cycle # 4 Ado-Trastuzumab was on 03/16/2020.  2D-ECHO 04/15/2020: EF 65%  Cycle # 5 Ado-Trastuzumab was on 04/20/2020. Cycle # 6 Ado-Trastuzumab was 05/11/2020 05/21/2020 Bilateral Diagnostic Mammogram:  Negative for malignancy. 05/29/2020 Left Breast U/S:  Area of post surgical change in the left breast is benign. Cycle # 7 Ado-Trastuzumab was on 06/01/2020. Cycle # 8 Ado-Trastuzumab was on 06/22/2020.  2d-Echo 07/20/2020 noted EF 65-70%  Cycle # 9 Ado-Trastuzumab was on 07/22/2020. Cycle # 10 Ado-Trastuzumab was on 08/12/2020. Cycle # 11 Ado-Trastuzumab was on 09/02/2020. Cycle # 12 Ado-Trastuzumab was on 09/23/2020  Cycle # 13 Ado-Trastuzumab was on 10/22/2020. Cycle # 14 Ado-Trastuzumab was on 11/12/2020.       Past Medical History:   Diagnosis Date    Anxiety     Breast cancer ([address] 2019    lt breast    Cancer ([address] 2019    breast    Heart murmur     History of therapeutic radiation 2019    Hx antineoplastic chemo 2019    Hypertension     Post-menopausal bleeding     S/P hyst/ polypectomy 6/10/14       Past Surgical History:   Procedure Laterality Date    BREAST BIOPSY      BREAST BIOPSY Left 12/18/2019    LEFT BREAST  NEEDLE LOCALIZED LUMPECTOMY BLUE DYE INJECTION LEFT AXILL. SENTINEL LYMPH NODE EXCISION, INSERTION OF BIOZORB performed by Senthil Bowden [facility] at [facility] BREAST LUMPECTOMY Left 2019    patient had a left axillary SLN     SECTION      COLONOSCOPY      normal - Dr Carmen Garcia x 10 years    DILATION AND CURETTAGE      INSERTION / REMOVAL / REPLACEMENT VENOUS ACCESS CATHETER N/A 2019    MEDI PORT PLACEMENT performed by Adal Cardoso MD at [address] [place] [address] Right 2021    MEDI PORT REMOVAL performed by Adal Cardoso [facility] at [facility] OR       Current Outpatient Medications   Medication Sig Dispense Refill    fluocinonide (LIDEX) 0.05 % cream Apply to affected area twice daily as needed      acetaminophen (TYLENOL) 500 MG tablet Take 500 mg by mouth every 6 hours as needed for Pain       Omega-3 Fatty Acids (FISH OIL) 1000 MG CAPS Take 3,000 mg by mouth 3 times daily      b complex vitamins capsule Take 1 capsule by mouth daily      Multiple Vitamins-Minerals (MULTIVITAMIN WOMEN 50+ PO) Take 1 tablet by mouth daily       Biotin 5000 MCG TABS Take 5,000 mcg by mouth 2 times daily       metoprolol succinate (TOPROL XL) 50 MG extended release tablet TAKE ONE TABLET BY MOUTH EVERY night  3    valsartan-hydrochlorothiazide (DIOVAN-HCT) 320-25 MG per tablet TAKE ONE TABLET BY MOUTH EVERY DAY  1    clonazePAM (KLONOPIN) 0.5 MG tablet Take 0.5 mg by mouth daily as needed for Anxiety. No current facility-administered medications for this visit.        Allergies   Allergen Reactions    Tetracyclines & Related Hives       Family History   Problem Relation Age of Onset    Bleeding Prob Sister     Bleeding Prob Sister     Heart Disease Father     Heart Attack Father 79    High Blood Pressure Father     Cancer Mother 61        lung    Stroke Maternal Grandmother     Stroke Paternal Grandfather     Cancer Paternal Grandfather 48        GI cancer       Social History     Socioeconomic History    Marital status:  Spouse name: Not on file    Number of children: Not on file    Years of education: Not on file    Highest education level: Not on file   Occupational History    Not on file   Tobacco Use    Smoking status: Never Smoker    Smokeless tobacco: Never Used   Vaping Use    Vaping Use: Never used   Substance and Sexual Activity    Alcohol use: Yes     Alcohol/week: 0.0 standard drinks     Comment: socially    Drug use: No    Sexual activity: Not Currently     Partners: Male     Birth control/protection: Post-menopausal   Other Topics Concern    Not on file   Social History Narrative    Lives in [place]. Social Determinants of Health     Financial Resource Strain:     Difficulty of Paying Living Expenses: Not on file   Food Insecurity:     Worried About Running Out of Food in the Last Year: Not on file    Nila of Food in the Last Year: Not on file   Transportation Needs:     Lack of Transportation (Medical): Not on file    Lack of Transportation (Non-Medical):  Not on file   Physical Activity:     Days of Exercise per Week: Not on file    Minutes of Exercise per Session: Not on file   Stress:     Feeling of Stress : Not on file   Social Connections:     Frequency of Communication with Friends and Family: Not on file    Frequency of Social Gatherings with Friends and Family: Not on file    Attends Taoist Services: Not on file    Active Member of [address] [place] [address] or Organizations: Not on file    Attends Club or Organization Meetings: Not on file    Marital Status: Not on file   Intimate Partner Violence:     Fear of Current or Ex-Partner: Not on file    Emotionally Abused: Not on file    Physically Abused: Not on file    Sexually Abused: Not on file   Housing Stability:     Unable to Pay for Housing in the Last Year: Not on file    Number of [place] in the Last Year: Not on file    Unstable Housing in the Last Year: Not on file       Review of Systems   Constitutional: Negative for activity change, appetite change, chills, fatigue, fever and unexpected weight change. Doing well. Completed [place] per Medical Oncology and is on active surveillance at this time. HENT: Negative for congestion, postnasal drip, rhinorrhea, sinus pressure, sinus pain, sore throat, trouble swallowing and voice change. Eyes: Negative for discharge, itching and visual disturbance. Respiratory: Negative for cough, choking, chest tightness, shortness of breath and wheezing. Cardiovascular: Negative for chest pain, palpitations and leg swelling. Gastrointestinal: Positive for diarrhea (Intermittent since completion of monoclonal antibody. She also has a history of irritable bowel. A colonoscopy in October was reported as negative. ). Negative for abdominal distention, abdominal pain, blood in stool, constipation, nausea and vomiting. Endocrine: Negative for cold intolerance and heat intolerance. Genitourinary: Negative for difficulty urinating, dysuria, frequency and hematuria. Musculoskeletal: Negative for arthralgias, back pain, gait problem, joint swelling, myalgias, neck pain and neck stiffness. Allergic/Immunologic: Negative for environmental allergies and food allergies. Neurological: Negative for dizziness, seizures, syncope, speech difficulty, weakness, light-headedness and headaches. Hematological: Negative for adenopathy. Does not bruise/bleed easily. Psychiatric/Behavioral: Negative for agitation, confusion and decreased concentration. The patient is not nervous/anxious. Objective:   Physical Exam  Vitals and nursing note reviewed. Constitutional:       General: She is not in acute distress. Appearance: She is well-developed. She is not diaphoretic. Comments: ECOG is stable at 0. Pleasant and conversant. HENT:      Head: Normocephalic and atraumatic. Mouth/Throat:      Pharynx: No oropharyngeal exudate. Eyes:      General: No scleral icterus. Right eye: No discharge. Left eye: No discharge. Conjunctiva/sclera: Conjunctivae normal.   Neck:      Thyroid: No thyromegaly. Vascular: No JVD. Trachea: No tracheal deviation. Cardiovascular:      Rate and Rhythm: Normal rate and regular rhythm. Heart sounds: No murmur heard. No friction rub. No gallop. Pulmonary:      Effort: Pulmonary effort is normal. No respiratory distress or retractions. Breath sounds: Normal breath sounds. No stridor. No wheezing or rales. Chest:      Chest wall: No mass, lacerations, deformity, swelling, tenderness or edema. Breasts: Breasts are symmetrical.      Right: No inverted nipple, mass, nipple discharge, skin change or tenderness. Left: No inverted nipple, mass, nipple discharge, skin change or tenderness. Comments: Right breast exam is stable and unremarkable. Abdominal:      General: There is no distension. Palpations: Abdomen is soft. Tenderness: There is no abdominal tenderness. There is no guarding or rebound. Musculoskeletal:         General: No tenderness or deformity. Normal range of motion. Right shoulder: Normal. Normal range of motion. Left shoulder: Normal. Normal range of motion. Left upper arm: Swelling (  Trace) present. Cervical back: Normal range of motion and neck supple. Comments: Lymphedema is controlled with compression vest daily. Lymphadenopathy:      Cervical: No cervical adenopathy. Right cervical: No superficial, deep or posterior cervical adenopathy. Left cervical: No superficial, deep or posterior cervical adenopathy. Upper Body:      Right upper body: No pectoral adenopathy. Left upper body: No pectoral adenopathy. Skin:     General: Skin is warm and dry. Coloration: Skin is not pale. Findings: No erythema or rash. Neurological:      Mental Status: She is alert and oriented to person, place, and time.       Coordination: Coordination normal.   Psychiatric:         Behavior: Behavior normal.         Thought Content: Thought content normal.         Judgment: Judgment normal.        Assessment:     Wilver Ramirez is a 73 y.o. extremely pleasant female who presents for follow-up of her left breast cancer. Wilver Ramirez is a pleasant 73 y.o. female who had a Left breast cancer; the malignancy was located in the 2 o'clock position of left breast.     On 05/06/2019: Mass, left breast at 2:00, core needle biopsy: Invasive ductal carcinoma,nuclear grade 3, see comment. 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 Breast Cancer Marker Studies:  ER: Negative  NH: Negative  Her-2/francisco Positive/3+    CXR PA/Lateral on 05/20/2019:  Normal chest.    MRI Bilateral Breast 05/23/2019: An irregular, enhancing mass is again noted in the left breast 2:00 which measures 2.0 x 2.2 x 2.6 cm. No axillary LN. No evidence of neoplasm on right. T2 N0 M0      -10/23/2019 Completed NACT [facility] per medical oncology, Dr. Virgilio Patel. MRI Breast Bilateral 11/07/2019:   Near complete response to therapy on the left. There is no axillary lymphadenopathy. No evidence of neoplasm on the right. 12/18/2019 Left needle localized lumpectomy, blue dye injection, and left axillary sentinel lymph node excision:  A.  Left breast, new superior-lateral margin, excision:   -Focal adenosis, columnar cell changes and stromal fibrosis/hyalinosis, negative for malignancy;   -Rare microcalcifications in benign tubules. B.  [place] lymph node #1, biopsy: Reactive node, negative for malignancy. C.  [place] lymph node #2, biopsy: Reactive node, negative for malignancy. D.  Left breast, lumpectomy with needle localization:   - Invasive adenocarcinoma of no special type (ductal, NOS), grade 3;   - Ductal carcinoma in situ, high nuclear grade, comedo/solid with necrosis;   - Scar of previous biopsy site, see comment.   Comment: Although the invasive carcinoma is very close to the superior margin in specimen D (3 mm), additional superior-lateral margins in specimen A (at least 2.0 cm in thickness) is completely negative for carcinoma. CANCER CASE SUMMARY  Procedure: Excision  Specimen Laterality: Left  Tumor Site: Invasive Carcinoma: 12:00 (per report: [facility]-)  Tumor Size: Size of Largest Invasive Carcinoma: 1.4 X 1.3 X1.3 cm  Histologic Type of Invasive Carcinoma: Invasive carcinoma of no special type (ductal, NOS)  Histologic Grade: [place] Histologic Score  Glandular/tubular differentiation: Score 3  Nuclear pleomorphism: Score 3  Mitotic rate: Score 3  Overall grade: Grade 3 (scores of 9)  Tumor Focality: Single focus of invasive carcinoma  Ductal Carcinoma In Situ (DCIS): Present  Size of DCIS: Intermingled with invasive carcinoma  Number of blocks with DCIS: 3  Number of blocks examined: 14  Architectural patternS: [place]/solid  Nuclear grade: High  Ncrosis present: Central and focal  Lobular Carcinoma In Situ (LCIS): Not identified  Margins: Uninvolved by invasive carcinoma and DCIS  Distance from closest margin: 3 mm  Specify closest margin: surperior (see comment)  Regional lymph Nodes: Uninvolved by tumor cells  Total number of lymph nodes examined: 2  Number of sentinel nodes examined: 2  Treatment Effect: No known presurgical therapy  Lymph-Vascular Invasion: Not identified  Pathologic Staging (pTNM, AJCC 8TH Edition)  Primary tumor: pT1c  Regional lymph nodes (modifier- (sn) sentinel nodes examined: (sn)0  ER: Negative  AK: Negative  Her-2/francisco Positive/3+    Medical Oncology[facility] Recommended Ado-Trastuzumab alone for 14 cycles. Cycle # 1 Ado-Trastuzumab was on 01/06/2020.    -03/11/2020 completed adjuvant RT   -05/21/2020 Bilateral Diagnostic Mammogram:  Negative for malignancy.   -05/29/2020 Left Breast U/S:  Area of post surgical change in the left breast is benign.   -11/12/2020 completed cycle # 14 Ado-Trastuzumab.  -06/01/2021 bilateral screening mammogram: Negative, BI-RADS 1.  -08/25/2021 Right IJ mediport removal and excision of left axillary epidermal inclusion cyst, 5 mm: Negative for malignancy. -09/03/2021 completed 4 visits with occupational therapy; discharged to continue at home. 12/01/2021 Clinical follow up is without evidence of recurrent disease. We will plan to see her back in June with mammogram same day. Continue lymphedema management at home. Plan:   1. Continue monthly breast/chest wall self examination; detailed instructions reviewed today. Bring any changes to your physician's attention. 2. Continue healthy diet and exercise routinely as tolerated. 3. Avoid alcohol. 4. Limit caffeine intake. 5. Wear a good supportive bra. 6. Repeat mammogram June 2022. 7. Continue follow up with Primary Care/Medical Oncology. 8. RTC June 2022 with mammogram same day. During today's visit, face-to-face time 15 minutes, greater than 50% in counseling education and coordination of care. All questions were answered to her apparent satisfaction, and she is agreeable to the plan as outlined above.         Karine Senior, APRN - CNP

## 2021-12-01 ENCOUNTER — OFFICE VISIT (OUTPATIENT)
Dept: BREAST CENTER | Age: 66
End: 2021-12-01
Payer: MEDICARE

## 2021-12-01 VITALS
HEART RATE: 76 BPM | OXYGEN SATURATION: 98 % | DIASTOLIC BLOOD PRESSURE: 80 MMHG | TEMPERATURE: 97.6 F | SYSTOLIC BLOOD PRESSURE: 130 MMHG | HEIGHT: 63 IN | BODY MASS INDEX: 42.17 KG/M2 | RESPIRATION RATE: 18 BRPM | WEIGHT: 238 LBS

## 2021-12-01 DIAGNOSIS — Z12.31 VISIT FOR SCREENING MAMMOGRAM: Primary | ICD-10-CM

## 2021-12-01 PROCEDURE — 99213 OFFICE O/P EST LOW 20 MIN: CPT | Performed by: NURSE PRACTITIONER

## 2021-12-01 PROCEDURE — 99212 OFFICE O/P EST SF 10 MIN: CPT | Performed by: NURSE PRACTITIONER

## 2021-12-01 ASSESSMENT — ENCOUNTER SYMPTOMS: DIARRHEA: 1

## 2022-03-22 ENCOUNTER — HOSPITAL ENCOUNTER (OUTPATIENT)
Dept: INFUSION THERAPY | Age: 67
Discharge: HOME | End: 2022-03-22
Payer: MEDICARE

## 2022-03-22 DIAGNOSIS — C50.912 INVASIVE DUCTAL CARCINOMA OF LEFT BREAST (HCC): ICD-10-CM

## 2022-03-22 LAB
ALBUMIN SERPL-MCNC: 4.1 G/DL (ref 3.5–5.2)
ALP BLD-CCNC: 78 U/L (ref 35–104)
ALT SERPL-CCNC: 24 U/L (ref 0–32)
ANION GAP SERPL CALCULATED.3IONS-SCNC: 10 MMOL/L (ref 7–16)
AST SERPL-CCNC: 23 U/L (ref 0–31)
BASOPHILS ABSOLUTE: 0.04 E9/L (ref 0–0.2)
BASOPHILS RELATIVE PERCENT: 0.6 % (ref 0–2)
BILIRUB SERPL-MCNC: 0.3 MG/DL (ref 0–1.2)
BUN BLDV-MCNC: 14 MG/DL (ref 6–23)
CALCIUM SERPL-MCNC: 9.7 MG/DL (ref 8.6–10.2)
CHLORIDE BLD-SCNC: 91 MMOL/L (ref 98–107)
CO2: 29 MMOL/L (ref 22–29)
CREAT SERPL-MCNC: 0.9 MG/DL (ref 0.5–1)
EOSINOPHILS ABSOLUTE: 0.12 E9/L (ref 0.05–0.5)
EOSINOPHILS RELATIVE PERCENT: 1.7 % (ref 0–6)
GFR AFRICAN AMERICAN: >60
GFR NON-AFRICAN AMERICAN: >60 ML/MIN/1.73
GLUCOSE BLD-MCNC: 108 MG/DL (ref 74–99)
HCT VFR BLD CALC: 38.5 % (ref 34–48)
HEMOGLOBIN: 13.2 G/DL (ref 11.5–15.5)
IMMATURE GRANULOCYTES #: 0.03 E9/L
IMMATURE GRANULOCYTES %: 0.4 % (ref 0–5)
LYMPHOCYTES ABSOLUTE: 2.19 E9/L (ref 1.5–4)
LYMPHOCYTES RELATIVE PERCENT: 31.7 % (ref 20–42)
MAGNESIUM: 1.8 MG/DL (ref 1.6–2.6)
MCH RBC QN AUTO: 29.3 PG (ref 26–35)
MCHC RBC AUTO-ENTMCNC: 34.3 % (ref 32–34.5)
MCV RBC AUTO: 85.6 FL (ref 80–99.9)
MONOCYTES ABSOLUTE: 0.54 E9/L (ref 0.1–0.95)
MONOCYTES RELATIVE PERCENT: 7.8 % (ref 2–12)
NEUTROPHILS ABSOLUTE: 3.99 E9/L (ref 1.8–7.3)
NEUTROPHILS RELATIVE PERCENT: 57.8 % (ref 43–80)
PDW BLD-RTO: 15.2 FL (ref 11.5–15)
PLATELET # BLD: 248 E9/L (ref 130–450)
PMV BLD AUTO: 10.9 FL (ref 7–12)
POTASSIUM SERPL-SCNC: 3.9 MMOL/L (ref 3.5–5)
RBC # BLD: 4.5 E12/L (ref 3.5–5.5)
SODIUM BLD-SCNC: 130 MMOL/L (ref 132–146)
TOTAL PROTEIN: 7.3 G/DL (ref 6.4–8.3)
WBC # BLD: 6.9 E9/L (ref 4.5–11.5)

## 2022-03-22 PROCEDURE — 85025 COMPLETE CBC W/AUTO DIFF WBC: CPT

## 2022-03-22 PROCEDURE — 36415 COLL VENOUS BLD VENIPUNCTURE: CPT

## 2022-03-22 PROCEDURE — 83735 ASSAY OF MAGNESIUM: CPT

## 2022-03-22 PROCEDURE — 80053 COMPREHEN METABOLIC PANEL: CPT

## 2022-03-23 ENCOUNTER — OFFICE VISIT (OUTPATIENT)
Dept: ONCOLOGY | Age: 67
End: 2022-03-23
Payer: MEDICARE

## 2022-03-23 ENCOUNTER — HOSPITAL ENCOUNTER (OUTPATIENT)
Dept: INFUSION THERAPY | Age: 67
Discharge: HOME | End: 2022-03-23

## 2022-03-23 VITALS
HEIGHT: 63 IN | DIASTOLIC BLOOD PRESSURE: 72 MMHG | OXYGEN SATURATION: 100 % | RESPIRATION RATE: 16 BRPM | WEIGHT: 247 LBS | BODY MASS INDEX: 43.77 KG/M2 | TEMPERATURE: 97.3 F | SYSTOLIC BLOOD PRESSURE: 172 MMHG | HEART RATE: 74 BPM

## 2022-03-23 DIAGNOSIS — Z85.3 PERSONAL HISTORY OF BREAST CANCER: Primary | ICD-10-CM

## 2022-03-23 PROCEDURE — 99214 OFFICE O/P EST MOD 30 MIN: CPT | Performed by: INTERNAL MEDICINE

## 2022-03-23 PROCEDURE — 99212 OFFICE O/P EST SF 10 MIN: CPT

## 2022-03-23 NOTE — PROGRESS NOTES
Department of [facility] Oncology       Attending Clinic Note    Reason for Visit: Follow-up on a patient with Left Breast Cancer    PCP:  Oswald Armstrong MD    History of Present Illness: The mass was located in the 2 o'clock position of left breast.     Breast cancer risk factors include age, gender, menarche before age 16. Age of menarche was 7. Age of menopause was 48. Patient was on birth control for 6 months in her 25s. Patient is . Age of first live birth was 30. Patient did breast feed. Bilateral screening mammogram on 2019: There is a high density, irregular mass with indistinct margins seen in the posterior upper outer quadrant of left breast.     Left Breast U/S on 2019:  Irregular solid mass with angular margins measuring 25 x 20 x 21 mm in the left breast at 2 o'clock located 8 centimeters from the nipple. No axillary LN. On 2019:  Mass, left breast at 2:00, core needle biopsy: Invasive ductal carcinoma,nuclear grade 3, see comment. 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 Breast Cancer Marker Studies:  ER: Negative  OR: Negative  Her-2/francisco Positive/3+    CXR PA/Lateral on 2019:  Normal chest.    MRI Bilateral Breast 2019: An irregular, enhancing mass is again noted in the left breast 2:00 which measures 2.0 x 2.2 x 2.6 cm. No axillary LN. No evidence of neoplasm on right. T2 N0 M0  We recommended neoadjuvant chemotherapy consisting of 6 cycles of TCHP. Side effects of TCHP reviewed with patient. She agreed to proceed. 2D-ECHO EF50-55%. Mediport placed. Cycle # 1 TCHP was on 2019. Cycle # 2 TCHP was on 2019. Cycle # 3 TCHP was on 2019. 2D-ECHO 2019 EF 65%  Cycle # 4 TCHP was on 2019  Cycle # 5 TCHP was on 2019. Cycle # 6 TCHP was on 10/23/2019. MRI Breast Bilateral 2019:   Near complete response to therapy on the left.    There is no axillary lymphadenopathy. No evidence of neoplasm on the right. 2D-ECHO 12/16/2019 EF 60%  Left needle localized lumpectomy, blue dye injection, and left axillary sentinel lymph node excision was done on 12/18/2019:  A.  Left breast, new superior-lateral margin, excision:   -Focal adenosis, columnar cell changes and stromal fibrosis/hyalinosis, negative for malignancy;   -Rare microcalcifications in benign tubules. B.  [place] lymph node #1, biopsy: Reactive node, negative for malignancy. C.  [place] lymph node #2, biopsy: Reactive node, negative for malignancy. D.  Left breast, lumpectomy with needle localization:   - Invasive adenocarcinoma of no special type (ductal, NOS), grade 3;   - Ductal carcinoma in situ, high nuclear grade, comedo/solid with necrosis;   - Scar of previous biopsy site, see comment. Comment:  Although the invasive carcinoma is very close to the superior margin in specimen D (3 mm), additional superior-lateral margins in specimen A (at least 2.0 cm in thickness) is completely negative for carcinoma.      CANCER CASE SUMMARY  Procedure: Excision  Specimen Laterality: Left  Tumor Site: Invasive Carcinoma: 12:00 (per report: HES-)  Tumor Size: Size of Largest Invasive Carcinoma: 1.4 X 1.3 X1.3 cm  Histologic Type of Invasive Carcinoma: Invasive carcinoma of no special type (ductal, NOS)  Histologic Grade: [place] Histologic Score  Glandular/tubular differentiation: Score 3  Nuclear pleomorphism: Score 3  Mitotic rate: Score 3  Overall grade: Grade 3 (scores of 9)  Tumor Focality: Single focus of invasive carcinoma  Ductal Carcinoma In Situ (DCIS): Present  Size of DCIS: Intermingled with invasive carcinoma  Number of blocks with DCIS: 3  Number of blocks examined: 14  Architectural patternS: Comedo/solid  Nuclear grade: High  Ncrosis present: Central and focal  Lobular Carcinoma In Situ (LCIS): Not identified  Margins: Uninvolved by invasive carcinoma and DCIS  Distance from closest margin: 3 mm  Specify closest margin: surperior (see comment)  Regional lymph Nodes: Uninvolved by tumor cells  Total number of lymph nodes examined: 2  Number of sentinel nodes examined: 2  Treatment Effect: No known presurgical therapy  Lymph-Vascular Invasion: Not identified  Pathologic Staging (pTNM, AJCC 8TH Edition)  Primary tumor: pT1c  Regional lymph nodes (modifier- (sn) sentinel nodes examined: (sn)0  ER: Negative  AL: Negative  Her-2/francisco Positive/3+    Pathology report reviewed extensively with patient. Recommended Ado-Trastuzumab alone for 14 cycles. Side effects of Ado-Trastuzumab reviewed with patient. She agreed to proceed. Cycle # 1 Ado-Trastuzumab was on 01/06/2020. Cycle # 2 Ado-Trastuzumab was on 01/27/2020. RT was started on 01/27/2020 and completed on 03/11/2020. Cycle # 3 Ado-Trastuzumab was on 02/17/2020. Cycle # 4 Ado-Trastuzumab was on 03/16/2020.  2D-ECHO 04/15/2020: EF 65%  Cycle # 5 Ado-Trastuzumab was on 04/20/2020. Cycle # 6 Ado-Trastuzumab was on 05/11/2020. Bilateral Diagnostic Mammogram on 05/21/2020 Negative for malignancy. Left Breast U/S 05/29/2020 Area of post surgical change in the left breast is benign. Cycle # 7 Ado-Trastuzumab was on 06/01/2020. Cycle # 8 Ado-Trastuzumab was on 06/22/2020.  2d-Echo 07/20/2020 noted EF 65-70%  Cycle # 9 Ado-Trastuzumab was on 07/22/2020. Cycle # 10 Ado-Trastuzumab was on 08/12/2020. Cycle # 11 Ado-Trastuzumab was on 09/02/2020. Cycle # 12 Ado-Trastuzumab was on 09/23/2020. Cycle # 13 Ado-Trastuzumab was on 10/22/2020. Cycle # 14 Ado-Trastuzumab was on 11/12/2020. Bilateral Screening Mammogram 06/01/2021: No evidence of malignancy. She presented today 03/23/2022 for f/u and is doing well. Fair appetite and energy level. No complaints offered. Review of Systems;  CONSTITUTIONAL: No fever, chills. Fair appetite and energy level. ENMT: Eyes: No diplopia. Mouth: No sore throat.   RESPIRATORY: No hemoptysis, shortness of breath, cough. CARDIOVASCULAR: No chest pain, palpitations. GASTROINTESTINAL: No nausea/vomiting abdominal pain. GENITOURINARY: No dysuria, urinary frequency, hematuria. NEURO: No syncope, presyncope, headache. Remainder: ROS NEGATIVE    Past Medical History:      Diagnosis Date    Anxiety     Breast cancer ([facility] [address] 2019    lt breast    Cancer ([facility] 2019    breast    Heart murmur     History of therapeutic radiation 2019    Hx antineoplastic chemo 2019    Hypertension     Post-menopausal bleeding     S/P hyst/ polypectomy 6/10/14     Medications:  Reviewed and reconciled. Allergies: Allergies   Allergen Reactions    Tetracyclines & Related Hives     Physical Exam:  Temp 97.3 °F (36.3 °C)   Ht 5' 3\" (1.6 m)   Wt 247 lb (112 kg)   BMI 43.75 kg/m²   GENERAL: Alert, oriented x 3, not in acute distress. LUNGS: CTA Andrew  CVS: RRR  Chest: surgical scar over prior port    EXTREMITIES: Without clubbing, cyanosis, or edema. ECOG PS 1    Lab Results   Component Value Date    WBC 6.9 03/22/2022    HGB 13.2 03/22/2022    HCT 38.5 03/22/2022    MCV 85.6 03/22/2022     03/22/2022     Lab Results   Component Value Date     (L) 03/22/2022    K 3.9 03/22/2022    CL 91 (L) 03/22/2022    CO2 29 03/22/2022    BUN 14 03/22/2022    CREATININE 0.9 03/22/2022    GLUCOSE 108 (H) 03/22/2022    CALCIUM 9.7 03/22/2022    PROT 7.3 03/22/2022    LABALBU 4.1 03/22/2022    BILITOT 0.3 03/22/2022    ALKPHOS 78 03/22/2022    AST 23 03/22/2022    ALT 24 03/22/2022    LABGLOM >60 03/22/2022    GFRAA >60 03/22/2022     Lab Results   Component Value Date    MG 1.8 03/22/2022     Impression/Plan:  73 y.o. female with Left Breast Cancer    Mass, left breast at 2:00, core needle biopsy: Invasive ductal carcinoma,nuclear grade 3, see comment. 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   Breast Cancer Marker Studies:  ER: Negative  [place]: Negative  Her-2/francisco Positive/3+    MRI Bilateral Breast 05/23/2019: An irregular, enhancing mass is again noted in the left breast 2:00 which measures 2.0 x 2.2 x 2.6 cm. No axillary LN. No evidence of neoplasm on right. T2 N0 M0  We recommended neoadjuvant chemotherapy consisting of 6 cycles of TCHP. Side effects of TCHP reviewed with patient. She agreed to proceed. 2D-ECHO EF50-55%. Mediport placed. Cycle # 1 TCHP was on 06/24/2019. Cycle # 2 TCHP was on 07/24/2019. Cycle # 3 TCHP was on 08/14/2019. 2D-ECHO 08/23/2019 EF 65%  Cycle # 4 TCHP was on 09/04/2019. Cycle # 5 TCHP was on 09/25/2019. Cycle # 6 TCHP was on 10/23/2019. MRI Breast Bilateral 11/07/2019:   Near complete response to therapy on the left. There is no axillary lymphadenopathy. No evidence of neoplasm on the right. Herceptin/Perjeta was on 12/04/2019. 2D-ECHO 12/16/2019 EF 60%    Left needle localized lumpectomy, blue dye injection, and left axillary sentinel lymph node excision on 12/18/2019:  A.  Left breast, new superior-lateral margin, excision:   -Focal adenosis, columnar cell changes and stromal fibrosis/hyalinosis, negative for malignancy;   -Rare microcalcifications in benign tubules. B.  [place] lymph node #1, biopsy: Reactive node, negative for malignancy. C.  [place] lymph node #2, biopsy: Reactive node, negative for malignancy. D.  Left breast, lumpectomy with needle localization:   - Invasive adenocarcinoma of no special type (ductal, NOS), grade 3;   - Ductal carcinoma in situ, high nuclear grade, comedo/solid with necrosis;   - Scar of previous biopsy site, see comment. Comment:  Although the invasive carcinoma is very close to the superior margin in specimen D (3 mm), additional superior-lateral margins in specimen A (at least 2.0 cm in thickness) is completely negative for carcinoma.      CANCER CASE SUMMARY  Procedure: Excision  Specimen Laterality: Left  Tumor Site: Invasive Carcinoma: 12:00 (per report: GFI-)  Tumor Size:

## 2022-06-02 ENCOUNTER — HOSPITAL ENCOUNTER (OUTPATIENT)
Dept: GENERAL RADIOLOGY | Age: 67
Discharge: HOME | End: 2022-06-04
Payer: COMMERCIAL

## 2022-06-02 DIAGNOSIS — Z12.31 VISIT FOR SCREENING MAMMOGRAM: ICD-10-CM

## 2022-06-02 PROCEDURE — 77067 SCR MAMMO BI INCL CAD: CPT

## 2022-06-06 ASSESSMENT — ENCOUNTER SYMPTOMS
SINUS PAIN: 0
RHINORRHEA: 0
BACK PAIN: 0
SINUS PRESSURE: 0
COUGH: 0
VOICE CHANGE: 0
VOMITING: 0
TROUBLE SWALLOWING: 0
DIARRHEA: 1
ABDOMINAL PAIN: 0
CONSTIPATION: 0
WHEEZING: 0
BLOOD IN STOOL: 0
CHOKING: 0
ABDOMINAL DISTENTION: 0
SHORTNESS OF BREATH: 0
NAUSEA: 0
EYE ITCHING: 0
EYE DISCHARGE: 0
CHEST TIGHTNESS: 0
SORE THROAT: 0

## 2022-06-06 NOTE — PROGRESS NOTES
Subjective: This note was copied forward from the last encounter. Essential components for this patient record were reviewed and verified on this visit including:  recent hospitalizations, recent imaging, PMH, PSH, FH, SOC HX, Allergies, and Medications were reviewed and updated as appropriate. In addition, the assessment and plan were copied from prior office note and updated accordingly. Patient ID: Ana Jarrett is a 78 y.o. female. HPI   Patient presents today for 6 mos follow up with history breast cancer, left. The cancer was located in the 2 o'clock position of left breast.     Breast cancer risk factors include age, gender, menarche before age 16. Age of menarche was 7. Age of menopause was 48. Patient was on birth control for 6 months in her 25s. Patient is . Age of first live birth was 30. Patient did breast feed. Bilateral screening mammogram on 2019: There is a high density, irregular mass with indistinct margins seen in the posterior upper outer quadrant of left breast.     Left Breast U/S on 2019:  Irregular solid mass with angular margins measuring 25 x 20 x 21 mm in the left breast at 2 o'clock located 8 centimeters from the nipple. No axillary LN. On 2019:  Mass, left breast at 2:00, core needle biopsy: Invasive ductal carcinoma,nuclear grade 3, see comment. 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 Breast Cancer Marker Studies:  ER: Negative  [place]: Negative  Her-2/francisco Positive/3+    CXR PA/Lateral on 2019:  Normal chest.    MRI Bilateral Breast 2019: An irregular, enhancing mass is again noted in the left breast 2:00 which measures 2.0 x 2.2 x 2.6 cm. No axillary LN. No evidence of neoplasm on right. T2 N0 M0    -10/23/2019 Completed NACT TCHP per medical oncology, Dr. Edvin Rico. MRI Breast Bilateral 2019:   Near complete response to therapy on the left.    There is no axillary lymphadenopathy. No evidence of neoplasm on the right. 12/18/2019 Left needle localized lumpectomy, blue dye injection, and left axillary sentinel lymph node excision:  A.  Left breast, new superior-lateral margin, excision:   -Focal adenosis, columnar cell changes and stromal fibrosis/hyalinosis, negative for malignancy;   -Rare microcalcifications in benign tubules. B.  [place] lymph node #1, biopsy: Reactive node, negative for malignancy. C.  [place] lymph node #2, biopsy: Reactive node, negative for malignancy. D.  Left breast, lumpectomy with needle localization:   - Invasive adenocarcinoma of no special type (ductal, NOS), grade 3;   - Ductal carcinoma in situ, high nuclear grade, comedo/solid with necrosis;   - Scar of previous biopsy site, see comment. Comment:  Although the invasive carcinoma is very close to the superior margin in specimen D (3 mm), additional superior-lateral margins in specimen A (at least 2.0 cm in thickness) is completely negative for carcinoma.      CANCER CASE SUMMARY  Procedure: Excision  Specimen Laterality: Left  Tumor Site: Invasive Carcinoma: 12:00 (per report: HES-)  Tumor Size: Size of Largest Invasive Carcinoma: 1.4 X 1.3 X1.3 cm  Histologic Type of Invasive Carcinoma: Invasive carcinoma of no special type (ductal, NOS)  Histologic Grade: [place] Histologic Score  Glandular/tubular differentiation: Score 3  Nuclear pleomorphism: Score 3  Mitotic rate: Score 3  Overall grade: Grade 3 (scores of 9)  Tumor Focality: Single focus of invasive carcinoma  Ductal Carcinoma In Situ (DCIS): Present  Size of DCIS: Intermingled with invasive carcinoma  Number of blocks with DCIS: 3  Number of blocks examined: 14  Architectural patternS: [place]/solid  Nuclear grade: High  Ncrosis present: Central and focal  Lobular Carcinoma In Situ (LCIS): Not identified  Margins: Uninvolved by invasive carcinoma and DCIS  Distance from closest margin: 3 mm  Specify closest margin: surperior (see comment)  Regional lymph Nodes: Uninvolved by tumor cells  Total number of lymph nodes examined: 2  Number of sentinel nodes examined: 2  Treatment Effect: No known presurgical therapy  Lymph-Vascular Invasion: Not identified  Pathologic Staging (pTNM, AJCC 8TH Edition)  Primary tumor: pT1c  Regional lymph nodes (modifier- (sn) sentinel nodes examined: (sn)0  ER: Negative  [place]: Negative  Her-2/francisco Positive/3+    Medical OncologyEdvin Recommended Ado-Trastuzumab alone for 14 cycles. Cycle # 1 Ado-Trastuzumab was on 01/06/2020. Cycle # 2 Ado-Trastuzumab was on 01/27/2020. RT was started on 01/27/2020 and completed on 03/11/2020. Cycle # 3 Ado-Trastuzumab was on 02/17/2020. Cycle # 4 Ado-Trastuzumab was on 03/16/2020.  2D-ECHO 04/15/2020: EF 65%  Cycle # 5 Ado-Trastuzumab was on 04/20/2020. Cycle # 6 Ado-Trastuzumab was 05/11/2020 05/21/2020 Bilateral Diagnostic Mammogram:  Negative for malignancy. 05/29/2020 Left Breast U/S:  Area of post surgical change in the left breast is benign. Cycle # 7 Ado-Trastuzumab was on 06/01/2020. Cycle # 8 Ado-Trastuzumab was on 06/22/2020.  2d-Echo 07/20/2020 noted EF 65-70%  Cycle # 9 Ado-Trastuzumab was on 07/22/2020. Cycle # 10 Ado-Trastuzumab was on 08/12/2020. Cycle # 11 Ado-Trastuzumab was on 09/02/2020. Cycle # 12 Ado-Trastuzumab was on 09/23/2020  Cycle # 13 Ado-Trastuzumab was on 10/22/2020. Cycle # 14 Ado-Trastuzumab was on 11/12/2020. Review of Systems   Constitutional: Negative for activity change, appetite change, chills, fatigue, fever and unexpected weight change. Doing well. Completed Kadcyla per Medical Oncology and is on active surveillance at this time. HENT: Negative for congestion, postnasal drip, rhinorrhea, sinus pressure, sinus pain, sore throat, trouble swallowing and voice change. Eyes: Negative for discharge, itching and visual disturbance.    Respiratory: Negative for cough, choking, chest tightness, shortness of breath and wheezing. Cardiovascular: Negative for chest pain, palpitations and leg swelling. Gastrointestinal: Positive for diarrhea (Intermittent since completion of monoclonal antibody. She also has a history of irritable bowel. A colonoscopy in October was reported as negative. ). Negative for abdominal distention, abdominal pain, blood in stool, constipation, nausea and vomiting. Endocrine: Negative for cold intolerance and heat intolerance. Genitourinary: Negative for difficulty urinating, dysuria, frequency and hematuria. Musculoskeletal: Negative for arthralgias, back pain, gait problem, joint swelling, myalgias, neck pain and neck stiffness. Allergic/Immunologic: Negative for environmental allergies and food allergies. Neurological: Negative for dizziness, seizures, syncope, speech difficulty, weakness, light-headedness and headaches. Hematological: Negative for adenopathy. Does not bruise/bleed easily. Psychiatric/Behavioral: Negative for agitation, confusion and decreased concentration. The patient is not nervous/anxious. Objective:   Physical Exam  Vitals and nursing note reviewed. Constitutional:       General: She is not in acute distress. Appearance: She is well-developed. She is not diaphoretic. Comments: ECOG is stable at 0. Pleasant and conversant. HENT:      Head: Normocephalic and atraumatic. Mouth/Throat:      Pharynx: No oropharyngeal exudate. Eyes:      General: No scleral icterus. Right eye: No discharge. Left eye: No discharge. Conjunctiva/sclera: Conjunctivae normal.   Neck:      Thyroid: No thyromegaly. Vascular: No JVD. Trachea: No tracheal deviation. Cardiovascular:      Rate and Rhythm: Normal rate and regular rhythm. Heart sounds: No murmur heard. No friction rub. No gallop. Pulmonary:      Effort: Pulmonary effort is normal. No respiratory distress or retractions. Breath sounds: Normal breath sounds. No stridor. No wheezing or rales. Chest:      Chest wall: No mass, lacerations, deformity, swelling, tenderness or edema. Breasts: Breasts are symmetrical.      Right: No inverted nipple, mass, nipple discharge, skin change or tenderness. Left: No inverted nipple, mass, nipple discharge, skin change or tenderness. Comments: Right breast exam is stable and unremarkable. Abdominal:      General: There is no distension. Palpations: Abdomen is soft. Tenderness: There is no abdominal tenderness. There is no guarding or rebound. Musculoskeletal:         General: No tenderness or deformity. Normal range of motion. Right shoulder: Normal. Normal range of motion. Left shoulder: Normal. Normal range of motion. Left upper arm: Swelling (  Trace) present. Cervical back: Normal range of motion and neck supple. Comments: Lymphedema is controlled with compression vest daily. Lymphadenopathy:      Cervical: No cervical adenopathy. Right cervical: No superficial, deep or posterior cervical adenopathy. Left cervical: No superficial, deep or posterior cervical adenopathy. Upper Body:      Right upper body: No pectoral adenopathy. Left upper body: No pectoral adenopathy. Skin:     General: Skin is warm and dry. Coloration: Skin is not pale. Findings: No erythema or rash. Neurological:      Mental Status: She is alert and oriented to person, place, and time. Coordination: Coordination normal.   Psychiatric:         Behavior: Behavior normal.         Thought Content: Thought content normal.         Judgment: Judgment normal.        Assessment:     Raffi Reynolds is a 78 y.o. extremely pleasant female who presents for follow-up of her left breast cancer.   Raffi Reynolds is a pleasant 78 y.o. female who had a Left breast cancer; the malignancy was located in the 2 o'clock position of left breast.     On 05/06/2019: Carcinoma: Invasive carcinoma of no special type (ductal, NOS)  Histologic Grade: [place] Histologic Score  Glandular/tubular differentiation: Score 3  Nuclear pleomorphism: Score 3  Mitotic rate: Score 3  Overall grade: Grade 3 (scores of 9)  Tumor Focality: Single focus of invasive carcinoma    Ductal Carcinoma In Situ (DCIS): Present. Size of DCIS: Intermingled with invasive carcinoma Number of blocks with DCIS: 3  Number of blocks examined: 14 Architectural patternS: [place]/solid. Nuclear grade: High. Necrosis present: Central and focal  Lobular Carcinoma In Situ (LCIS): Not identified  Margins: Uninvolved by invasive carcinoma and DCIS  Regional lymph Nodes: Uninvolved by tumor cells (0/2). Treatment Effect: No known presurgical therapy. Lymph-Vascular Invasion: Not identified  Pathologic Staging (pTNM, AJCC 8TH Edition)  Primary tumor: pT1c  Regional lymph nodes (modifier- (sn) sentinel nodes examined: (sn)0  ER: Negative  [place]: Negative  Her-2/francisco Positive/3+    Medical OncologyMamta Recommended Ado-Trastuzumab alone for 14 cycles. Cycle # 1 Ado-Trastuzumab was on 01/06/2020.    -03/11/2020 completed adjuvant RT   -05/21/2020 Bilateral Diagnostic Mammogram:  Negative for malignancy.   -05/29/2020 Left Breast U/S:  Area of post surgical change in the left breast is benign. -11/12/2020 completed cycle # 14 Ado-Trastuzumab.  -06/01/2021 bilateral screening mammogram: Negative, BI-RADS 1.  -08/25/2021 Right IJ mediport removal and excision of left axillary epidermal inclusion cyst, 5 mm: Negative for malignancy. -09/03/2021 completed 4 visits with occupational therapy; discharged to continue at home. 12/01/2021 Clinical follow up is without evidence of recurrent disease. We will plan to see her back in June with mammogram same day. Continue lymphedema management at home. 06/13/2022 bilateral screening mammogram  06/13/2022 clinical follow up      Plan:   1.    Continue monthly breast/chest wall self examination; detailed instructions reviewed today. Bring any changes to your physician's attention. 2. Continue healthy diet and exercise routinely as tolerated. 3. Avoid alcohol. 4. Limit caffeine intake. 5. Wear a good supportive bra. 6. Repeat mammogram June 2023.  7. Continue follow up with Primary Care/Medical Oncology. 8. RTC 6 months. During today's visit, face-to-face time 15 minutes, greater than 50% in counseling education and coordination of care. All questions were answered to her apparent satisfaction, and she is agreeable to the plan as outlined above.

## 2022-06-13 ENCOUNTER — OFFICE VISIT (OUTPATIENT)
Dept: BREAST CENTER | Age: 67
End: 2022-06-13
Payer: MEDICARE

## 2022-06-13 VITALS
HEART RATE: 76 BPM | SYSTOLIC BLOOD PRESSURE: 144 MMHG | RESPIRATION RATE: 18 BRPM | DIASTOLIC BLOOD PRESSURE: 80 MMHG | TEMPERATURE: 97 F | OXYGEN SATURATION: 98 % | WEIGHT: 247.5 LBS | HEIGHT: 63 IN | BODY MASS INDEX: 43.85 KG/M2

## 2022-06-13 DIAGNOSIS — C50.912 INVASIVE DUCTAL CARCINOMA OF LEFT BREAST (HCC): ICD-10-CM

## 2022-06-13 PROCEDURE — 99213 OFFICE O/P EST LOW 20 MIN: CPT | Performed by: SURGERY

## 2022-06-13 PROCEDURE — 99213 OFFICE O/P EST LOW 20 MIN: CPT

## 2022-06-13 PROCEDURE — 99212 OFFICE O/P EST SF 10 MIN: CPT | Performed by: SURGERY

## 2022-06-13 PROCEDURE — 1123F ACP DISCUSS/DSCN MKR DOCD: CPT | Performed by: SURGERY

## 2022-07-27 ENCOUNTER — HOSPITAL ENCOUNTER (OUTPATIENT)
Dept: INFUSION THERAPY | Age: 67
Discharge: HOME | End: 2022-07-27
Payer: MEDICARE

## 2022-07-27 DIAGNOSIS — Z85.3 PERSONAL HISTORY OF BREAST CANCER: Primary | ICD-10-CM

## 2022-07-27 DIAGNOSIS — Z85.3 PERSONAL HISTORY OF BREAST CANCER: ICD-10-CM

## 2022-07-27 LAB
ALBUMIN SERPL-MCNC: 4.3 G/DL (ref 3.5–5.2)
ALP BLD-CCNC: 73 U/L (ref 35–104)
ALT SERPL-CCNC: 24 U/L (ref 0–32)
ANION GAP SERPL CALCULATED.3IONS-SCNC: 15 MMOL/L (ref 7–16)
AST SERPL-CCNC: 21 U/L (ref 0–31)
BASOPHILS ABSOLUTE: 0.04 E9/L (ref 0–0.2)
BASOPHILS RELATIVE PERCENT: 0.6 % (ref 0–2)
BILIRUB SERPL-MCNC: 0.4 MG/DL (ref 0–1.2)
BUN BLDV-MCNC: 23 MG/DL (ref 6–23)
CALCIUM SERPL-MCNC: 10.3 MG/DL (ref 8.6–10.2)
CHLORIDE BLD-SCNC: 100 MMOL/L (ref 98–107)
CO2: 25 MMOL/L (ref 22–29)
CREAT SERPL-MCNC: 0.9 MG/DL (ref 0.5–1)
EOSINOPHILS ABSOLUTE: 0.09 E9/L (ref 0.05–0.5)
EOSINOPHILS RELATIVE PERCENT: 1.4 % (ref 0–6)
GFR AFRICAN AMERICAN: >60
GFR NON-AFRICAN AMERICAN: >60 ML/MIN/1.73
GLUCOSE BLD-MCNC: 112 MG/DL (ref 74–99)
HCT VFR BLD CALC: 39.2 % (ref 34–48)
HEMOGLOBIN: 13.3 G/DL (ref 11.5–15.5)
IMMATURE GRANULOCYTES #: 0.02 E9/L
IMMATURE GRANULOCYTES %: 0.3 % (ref 0–5)
LYMPHOCYTES ABSOLUTE: 2.01 E9/L (ref 1.5–4)
LYMPHOCYTES RELATIVE PERCENT: 30.2 % (ref 20–42)
MCH RBC QN AUTO: 29.1 PG (ref 26–35)
MCHC RBC AUTO-ENTMCNC: 33.9 % (ref 32–34.5)
MCV RBC AUTO: 85.8 FL (ref 80–99.9)
MONOCYTES ABSOLUTE: 0.41 E9/L (ref 0.1–0.95)
MONOCYTES RELATIVE PERCENT: 6.2 % (ref 2–12)
NEUTROPHILS ABSOLUTE: 4.09 E9/L (ref 1.8–7.3)
NEUTROPHILS RELATIVE PERCENT: 61.3 % (ref 43–80)
PDW BLD-RTO: 16.1 FL (ref 11.5–15)
PLATELET # BLD: 273 E9/L (ref 130–450)
PMV BLD AUTO: 10.7 FL (ref 7–12)
POTASSIUM SERPL-SCNC: 4.1 MMOL/L (ref 3.5–5)
RBC # BLD: 4.57 E12/L (ref 3.5–5.5)
SODIUM BLD-SCNC: 140 MMOL/L (ref 132–146)
TOTAL PROTEIN: 7.2 G/DL (ref 6.4–8.3)
WBC # BLD: 6.7 E9/L (ref 4.5–11.5)

## 2022-07-27 PROCEDURE — 85025 COMPLETE CBC W/AUTO DIFF WBC: CPT

## 2022-07-27 PROCEDURE — 36415 COLL VENOUS BLD VENIPUNCTURE: CPT

## 2022-07-27 PROCEDURE — 80053 COMPREHEN METABOLIC PANEL: CPT

## 2022-07-28 ENCOUNTER — HOSPITAL ENCOUNTER (OUTPATIENT)
Dept: INFUSION THERAPY | Age: 67
Discharge: HOME | End: 2022-07-28

## 2022-07-28 ENCOUNTER — OFFICE VISIT (OUTPATIENT)
Dept: ONCOLOGY | Age: 67
End: 2022-07-28
Payer: MEDICARE

## 2022-07-28 VITALS
WEIGHT: 240.2 LBS | HEART RATE: 86 BPM | DIASTOLIC BLOOD PRESSURE: 69 MMHG | OXYGEN SATURATION: 96 % | SYSTOLIC BLOOD PRESSURE: 141 MMHG | TEMPERATURE: 97.5 F | HEIGHT: 63 IN | BODY MASS INDEX: 42.56 KG/M2

## 2022-07-28 DIAGNOSIS — Z85.3 PERSONAL HISTORY OF BREAST CANCER: Primary | ICD-10-CM

## 2022-07-28 PROCEDURE — 99214 OFFICE O/P EST MOD 30 MIN: CPT | Performed by: INTERNAL MEDICINE

## 2022-07-28 PROCEDURE — 99212 OFFICE O/P EST SF 10 MIN: CPT

## 2022-07-28 PROCEDURE — 1123F ACP DISCUSS/DSCN MKR DOCD: CPT | Performed by: INTERNAL MEDICINE

## 2022-07-28 NOTE — PROGRESS NOTES
Department of [facility] Oncology       Attending Clinic Note    Reason for Visit: Follow-up on a patient with Left Breast Cancer    PCP:  Billy Sams MD    History of Present Illness: The mass was located in the 2 o'clock position of left breast.     Breast cancer risk factors include age, gender, menarche before age 16. Age of menarche was 7. Age of menopause was 48. Patient was on birth control for 6 months in her 25s. Patient is . Age of first live birth was 30. Patient did breast feed. Bilateral screening mammogram on 2019: There is a high density, irregular mass with indistinct margins seen in the posterior upper outer quadrant of left breast.     Left Breast U/S on 2019:  Irregular solid mass with angular margins measuring 25 x 20 x 21 mm in the left breast at 2 o'clock located 8 centimeters from the nipple. No axillary LN. On 2019:  Mass, left breast at 2:00, core needle biopsy: Invasive ductal carcinoma,nuclear grade 3, see comment. 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 Breast Cancer Marker Studies:  ER: Negative  [place]: Negative  Her-2/francisco Positive/3+    CXR PA/Lateral on 2019:  Normal chest.    MRI Bilateral Breast 2019: An irregular, enhancing mass is again noted in the left breast 2:00 which measures 2.0 x 2.2 x 2.6 cm. No axillary LN. No evidence of neoplasm on right. T2 N0 M0  We recommended neoadjuvant chemotherapy consisting of 6 cycles of TCHP. Side effects of TCHP reviewed with patient. She agreed to proceed. 2D-ECHO EF50-55%. Mediport placed. Cycle # 1 TCHP was on 2019. Cycle # 2 TCHP was on 2019. Cycle # 3 TCHP was on 2019. 2D-ECHO 2019 EF 65%  Cycle # 4 TCHP was on 2019  Cycle # 5 TCHP was on 2019. Cycle # 6 TCHP was on 10/23/2019. MRI Breast Bilateral 2019:   Near complete response to therapy on the left.    There is no axillary lymphadenopathy. No evidence of neoplasm on the right. 2D-ECHO 12/16/2019 EF 60%  Left needle localized lumpectomy, blue dye injection, and left axillary sentinel lymph node excision was done on 12/18/2019:  A. Left breast, new superior-lateral margin, excision:   -Focal adenosis, columnar cell changes and stromal fibrosis/hyalinosis, negative for malignancy;   -Rare microcalcifications in benign tubules. B. [place] lymph node #1, biopsy: Reactive node, negative for malignancy. C.  [place] lymph node #2, biopsy: Reactive node, negative for malignancy. D.  Left breast, lumpectomy with needle localization:   - Invasive adenocarcinoma of no special type (ductal, NOS), grade 3;   - Ductal carcinoma in situ, high nuclear grade, comedo/solid with necrosis;   - Scar of previous biopsy site, see comment. Comment:  Although the invasive carcinoma is very close to the superior margin in specimen D (3 mm), additional superior-lateral margins in specimen A (at least 2.0 cm in thickness) is completely negative for carcinoma.      CANCER CASE SUMMARY  Procedure: Excision  Specimen Laterality: Left  Tumor Site: Invasive Carcinoma: 12:00 (per report: HES-)  Tumor Size: Size of Largest Invasive Carcinoma: 1.4 X 1.3 X1.3 cm  Histologic Type of Invasive Carcinoma: Invasive carcinoma of no special type (ductal, NOS)  Histologic Grade: [place] Histologic Score  Glandular/tubular differentiation: Score 3  Nuclear pleomorphism: Score 3  Mitotic rate: Score 3  Overall grade: Grade 3 (scores of 9)  Tumor Focality: Single focus of invasive carcinoma  Ductal Carcinoma In Situ (DCIS): Present  Size of DCIS: Intermingled with invasive carcinoma  Number of blocks with DCIS: 3  Number of blocks examined: 14  Architectural patternS: [place]/solid  Nuclear grade: High  Ncrosis present: Central and focal  Lobular Carcinoma In Situ (LCIS): Not identified  Margins: Uninvolved by invasive carcinoma and DCIS  Distance from closest margin: 3 mm  Specify closest margin: surperior (see comment)  Regional lymph Nodes: Uninvolved by tumor cells  Total number of lymph nodes examined: 2  Number of sentinel nodes examined: 2  Treatment Effect: No known presurgical therapy  Lymph-Vascular Invasion: Not identified  Pathologic Staging (pTNM, AJCC 8TH Edition)  Primary tumor: pT1c  Regional lymph nodes (modifier- (sn) sentinel nodes examined: (sn)0  ER: Negative  [place]: Negative  Her-2/francisco Positive/3+    Pathology report reviewed extensively with patient. Recommended Ado-Trastuzumab alone for 14 cycles. Side effects of Ado-Trastuzumab reviewed with patient. She agreed to proceed. Cycle # 1 Ado-Trastuzumab was on 01/06/2020. Cycle # 2 Ado-Trastuzumab was on 01/27/2020. RT was started on 01/27/2020 and completed on 03/11/2020. Cycle # 3 Ado-Trastuzumab was on 02/17/2020. Cycle # 4 Ado-Trastuzumab was on 03/16/2020.  2D-ECHO 04/15/2020: EF 65%  Cycle # 5 Ado-Trastuzumab was on 04/20/2020. Cycle # 6 Ado-Trastuzumab was on 05/11/2020. Bilateral Diagnostic Mammogram on 05/21/2020 Negative for malignancy. Left Breast U/S 05/29/2020 Area of post surgical change in the left breast is benign. Cycle # 7 Ado-Trastuzumab was on 06/01/2020. Cycle # 8 Ado-Trastuzumab was on 06/22/2020.  2d-Echo 07/20/2020 noted EF 65-70%  Cycle # 9 Ado-Trastuzumab was on 07/22/2020. Cycle # 10 Ado-Trastuzumab was on 08/12/2020. Cycle # 11 Ado-Trastuzumab was on 09/02/2020. Cycle # 12 Ado-Trastuzumab was on 09/23/2020. Cycle # 13 Ado-Trastuzumab was on 10/22/2020. Cycle # 14 Ado-Trastuzumab was on 11/12/2020. Bilateral Screening Mammogram 06/02/2022: No evidence of malignancy. She presented today 07/28/2022 for f/u and is doing well. Fair appetite and energy level. No complaints offered. Came back from vacation and had a good time. Review of Systems;  CONSTITUTIONAL: No fever, chills. Fair appetite and energy level. ENMT: Eyes: No diplopia.  Mouth: No sore throat. RESPIRATORY: No hemoptysis, shortness of breath, cough. CARDIOVASCULAR: No chest pain, palpitations. GASTROINTESTINAL: No nausea/vomiting abdominal pain. GENITOURINARY: No dysuria, urinary frequency, hematuria. NEURO: No syncope, presyncope, headache. Remainder: ROS NEGATIVE    Past Medical History:      Diagnosis Date    Anxiety     Breast cancer ([facility] [address] 2019    lt breast    Cancer ([facility] 2019    breast    Heart murmur     History of therapeutic radiation 2019    Hx antineoplastic chemo 2019    Hypertension     Post-menopausal bleeding     S/P hyst/ polypectomy 6/10/14     Medications:  Reviewed and reconciled. Allergies: Allergies   Allergen Reactions    Tetracyclines & Related Hives     Physical Exam:  BP (!) 141/69   Pulse 86   Temp 97.5 °F (36.4 °C)   Ht 5' 3\" (1.6 m)   Wt 240 lb 3.2 oz (109 kg)   SpO2 96%   BMI 42.55 kg/m²   GENERAL: Alert, oriented x 3, not in acute distress. LUNGS: CTA Andrew  CVS: RRR  EXTREMITIES: Without clubbing, cyanosis, or edema. ECOG PS 1    Lab Results   Component Value Date    WBC 6.7 07/27/2022    HGB 13.3 07/27/2022    HCT 39.2 07/27/2022    MCV 85.8 07/27/2022     07/27/2022     Lab Results   Component Value Date     07/27/2022    K 4.1 07/27/2022     07/27/2022    CO2 25 07/27/2022    BUN 23 07/27/2022    CREATININE 0.9 07/27/2022    GLUCOSE 112 (H) 07/27/2022    CALCIUM 10.3 (H) 07/27/2022    PROT 7.2 07/27/2022    LABALBU 4.3 07/27/2022    BILITOT 0.4 07/27/2022    ALKPHOS 73 07/27/2022    AST 21 07/27/2022    ALT 24 07/27/2022    LABGLOM >60 07/27/2022    GFRAA >60 07/27/2022     Impression/Plan:  78 y.o. female with Left Breast Cancer    Mass, left breast at 2:00, core needle biopsy: Invasive ductal carcinoma,nuclear grade 3, see comment. 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   Breast Cancer Marker Studies:  ER: Negative  [place]: Negative  Her-2/francisco Positive/3+    MRI Bilateral Breast Invasive Carcinoma: 1.4 X 1.3 X1.3 cm  Histologic Type of Invasive Carcinoma: Invasive carcinoma of no special type (ductal, NOS)  Histologic Grade: [place] Histologic Score  Glandular/tubular differentiation: Score 3  Nuclear pleomorphism: Score 3  Mitotic rate: Score 3  Overall grade: Grade 3 (scores of 9)  Tumor Focality: Single focus of invasive carcinoma  Ductal Carcinoma In Situ (DCIS): Present  Size of DCIS: Intermingled with invasive carcinoma  Number of blocks with DCIS: 3  Number of blocks examined: 14  Architectural patternS: [place]/solid  Nuclear grade: High  Ncrosis present: Central and focal  Lobular Carcinoma In Situ (LCIS): Not identified  Margins: Uninvolved by invasive carcinoma and DCIS  Distance from closest margin: 3 mm  Specify closest margin: surperior (see comment)  Regional lymph Nodes: Uninvolved by tumor cells  Total number of lymph nodes examined: 2  Number of sentinel nodes examined: 2  Treatment Effect: No known presurgical therapy  Lymph-Vascular Invasion: Not identified  Pathologic Staging (pTNM, AJCC 8TH Edition)  Primary tumor: pT1c  Regional lymph nodes (modifier- (sn) sentinel nodes examined: (sn)0  ER: Negative  [place]: Negative  Her-2/francisco Positive/3+    Pathology report reviewed extensively with patient. Radiation Oncology team consulted for adjuvant RT. Recommended Ado-Trastuzumab alone for 14 cycles. Side effects of Ado-Trastuzumab reviewed with patient. She agreed to proceed. Cycle # 1 Ado-Trastuzumab was on 01/06/2020. RT was started on 01/27/2020 and completed on 03/11/2020. 2d-echo 11/05/2020 EF 70%   Cycle # 14 Ado-Trastuzumab was on 11/12/2020. Bilateral Screening Mammogram 06/02/2022: No evidence of malignancy. Imaging reviewed. Labs reviewed. CINTHYA    RTC 4 months with labs.      Max Aguilar MD   01/00/1900  Board Certified Medical Oncologist

## 2022-11-16 ENCOUNTER — HOSPITAL ENCOUNTER (OUTPATIENT)
Dept: INFUSION THERAPY | Age: 67
Discharge: HOME | End: 2022-11-16
Payer: MEDICARE

## 2022-11-16 DIAGNOSIS — Z85.3 PERSONAL HISTORY OF BREAST CANCER: ICD-10-CM

## 2022-11-16 LAB
ALBUMIN SERPL-MCNC: 4.3 G/DL (ref 3.5–5.2)
ALP BLD-CCNC: 74 U/L (ref 35–104)
ALT SERPL-CCNC: 24 U/L (ref 0–32)
ANION GAP SERPL CALCULATED.3IONS-SCNC: 9 MMOL/L (ref 7–16)
AST SERPL-CCNC: 26 U/L (ref 0–31)
BASOPHILS ABSOLUTE: 0.05 E9/L (ref 0–0.2)
BASOPHILS RELATIVE PERCENT: 0.7 % (ref 0–2)
BILIRUB SERPL-MCNC: 0.4 MG/DL (ref 0–1.2)
BUN BLDV-MCNC: 22 MG/DL (ref 6–23)
CALCIUM SERPL-MCNC: 10.5 MG/DL (ref 8.6–10.2)
CHLORIDE BLD-SCNC: 99 MMOL/L (ref 98–107)
CO2: 30 MMOL/L (ref 22–29)
CREAT SERPL-MCNC: 1 MG/DL (ref 0.5–1)
EOSINOPHILS ABSOLUTE: 0.1 E9/L (ref 0.05–0.5)
EOSINOPHILS RELATIVE PERCENT: 1.3 % (ref 0–6)
GFR SERPL CREATININE-BSD FRML MDRD: >60 ML/MIN/1.73
GLUCOSE BLD-MCNC: 118 MG/DL (ref 74–99)
HCT VFR BLD CALC: 40.8 % (ref 34–48)
HEMOGLOBIN: 13.9 G/DL (ref 11.5–15.5)
IMMATURE GRANULOCYTES #: 0.02 E9/L
IMMATURE GRANULOCYTES %: 0.3 % (ref 0–5)
LYMPHOCYTES ABSOLUTE: 2.51 E9/L (ref 1.5–4)
LYMPHOCYTES RELATIVE PERCENT: 33.8 % (ref 20–42)
MCH RBC QN AUTO: 29.9 PG (ref 26–35)
MCHC RBC AUTO-ENTMCNC: 34.1 % (ref 32–34.5)
MCV RBC AUTO: 87.7 FL (ref 80–99.9)
MONOCYTES ABSOLUTE: 0.49 E9/L (ref 0.1–0.95)
MONOCYTES RELATIVE PERCENT: 6.6 % (ref 2–12)
NEUTROPHILS ABSOLUTE: 4.26 E9/L (ref 1.8–7.3)
NEUTROPHILS RELATIVE PERCENT: 57.3 % (ref 43–80)
PDW BLD-RTO: 15.9 FL (ref 11.5–15)
PLATELET # BLD: 268 E9/L (ref 130–450)
PMV BLD AUTO: 11 FL (ref 7–12)
POTASSIUM SERPL-SCNC: 3.8 MMOL/L (ref 3.5–5)
RBC # BLD: 4.65 E12/L (ref 3.5–5.5)
SODIUM BLD-SCNC: 138 MMOL/L (ref 132–146)
TOTAL PROTEIN: 7.5 G/DL (ref 6.4–8.3)
WBC # BLD: 7.4 E9/L (ref 4.5–11.5)

## 2022-11-16 PROCEDURE — 80053 COMPREHEN METABOLIC PANEL: CPT

## 2022-11-16 PROCEDURE — 85025 COMPLETE CBC W/AUTO DIFF WBC: CPT

## 2022-11-16 PROCEDURE — 36415 COLL VENOUS BLD VENIPUNCTURE: CPT

## 2022-11-17 ENCOUNTER — HOSPITAL ENCOUNTER (OUTPATIENT)
Dept: INFUSION THERAPY | Age: 67
Discharge: HOME | End: 2022-11-17

## 2022-11-17 ENCOUNTER — OFFICE VISIT (OUTPATIENT)
Dept: ONCOLOGY | Age: 67
End: 2022-11-17
Payer: MEDICARE

## 2022-11-17 VITALS
HEART RATE: 70 BPM | TEMPERATURE: 97.2 F | SYSTOLIC BLOOD PRESSURE: 142 MMHG | WEIGHT: 241.6 LBS | BODY MASS INDEX: 42.8 KG/M2 | DIASTOLIC BLOOD PRESSURE: 67 MMHG | OXYGEN SATURATION: 98 %

## 2022-11-17 DIAGNOSIS — Z85.3 PERSONAL HISTORY OF BREAST CANCER: Primary | ICD-10-CM

## 2022-11-17 PROCEDURE — 99212 OFFICE O/P EST SF 10 MIN: CPT

## 2022-11-17 PROCEDURE — 1123F ACP DISCUSS/DSCN MKR DOCD: CPT | Performed by: INTERNAL MEDICINE

## 2022-11-17 PROCEDURE — 99213 OFFICE O/P EST LOW 20 MIN: CPT | Performed by: INTERNAL MEDICINE

## 2022-11-17 NOTE — PROGRESS NOTES
Department of [facility] Oncology       Attending Clinic Note    Reason for Visit: Follow-up on a patient with Left Breast Cancer    PCP:  Mani Ray MD    History of Present Illness: The mass was located in the 2 o'clock position of left breast.     Breast cancer risk factors include age, gender, menarche before age 16. Age of menarche was 7. Age of menopause was 48. Patient was on birth control for 6 months in her 25s. Patient is . Age of first live birth was 30. Patient did breast feed. Bilateral screening mammogram on 2019: There is a high density, irregular mass with indistinct margins seen in the posterior upper outer quadrant of left breast.     Left Breast U/S on 2019:  Irregular solid mass with angular margins measuring 25 x 20 x 21 mm in the left breast at 2 o'clock located 8 centimeters from the nipple. No axillary LN. On 2019:  Mass, left breast at 2:00, core needle biopsy: Invasive ductal carcinoma,nuclear grade 3, see comment. 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 Breast Cancer Marker Studies:  ER: Negative  [place]: Negative  Her-2/francisco Positive/3+    CXR PA/Lateral on 2019:  Normal chest.    MRI Bilateral Breast 2019: An irregular, enhancing mass is again noted in the left breast 2:00 which measures 2.0 x 2.2 x 2.6 cm. No axillary LN. No evidence of neoplasm on right. T2 N0 M0  We recommended neoadjuvant chemotherapy consisting of 6 cycles of TCHP. Side effects of TCHP reviewed with patient. She agreed to proceed. 2D-ECHO EF50-55%. Mediport placed. Cycle # 1 TCHP was on 2019. Cycle # 2 TCHP was on 2019. Cycle # 3 TCHP was on 2019. 2D-ECHO 2019 EF 65%  Cycle # 4 TCHP was on 2019  Cycle # 5 TCHP was on 2019. Cycle # 6 TCHP was on 10/23/2019. MRI Breast Bilateral 2019:   Near complete response to therapy on the left.    There is no axillary lymphadenopathy. No evidence of neoplasm on the right. 2D-ECHO 12/16/2019 EF 60%  Left needle localized lumpectomy, blue dye injection, and left axillary sentinel lymph node excision was done on 12/18/2019:  A. Left breast, new superior-lateral margin, excision:   -Focal adenosis, columnar cell changes and stromal fibrosis/hyalinosis, negative for malignancy;   -Rare microcalcifications in benign tubules. B. [place] lymph node #1, biopsy: Reactive node, negative for malignancy. C.  [place] lymph node #2, biopsy: Reactive node, negative for malignancy. D.  Left breast, lumpectomy with needle localization:   - Invasive adenocarcinoma of no special type (ductal, NOS), grade 3;   - Ductal carcinoma in situ, high nuclear grade, comedo/solid with necrosis;   - Scar of previous biopsy site, see comment. Comment:  Although the invasive carcinoma is very close to the superior margin in specimen D (3 mm), additional superior-lateral margins in specimen A (at least 2.0 cm in thickness) is completely negative for carcinoma.      CANCER CASE SUMMARY  Procedure: Excision  Specimen Laterality: Left  Tumor Site: Invasive Carcinoma: 12:00 (per report: HES-)  Tumor Size: Size of Largest Invasive Carcinoma: 1.4 X 1.3 X1.3 cm  Histologic Type of Invasive Carcinoma: Invasive carcinoma of no special type (ductal, NOS)  Histologic Grade: Lidya Histologic Score  Glandular/tubular differentiation: Score 3  Nuclear pleomorphism: Score 3  Mitotic rate: Score 3  Overall grade: Grade 3 (scores of 9)  Tumor Focality: Single focus of invasive carcinoma  Ductal Carcinoma In Situ (DCIS): Present  Size of DCIS: Intermingled with invasive carcinoma  Number of blocks with DCIS: 3  Number of blocks examined: 14  Architectural patternS: [place]/solid  Nuclear grade: High  Ncrosis present: Central and focal  Lobular Carcinoma In Situ (LCIS): Not identified  Margins: Uninvolved by invasive carcinoma and DCIS  Distance from closest margin: 3 mm  Specify closest margin: surperior (see comment)  Regional lymph Nodes: Uninvolved by tumor cells  Total number of lymph nodes examined: 2  Number of sentinel nodes examined: 2  Treatment Effect: No known presurgical therapy  Lymph-Vascular Invasion: Not identified  Pathologic Staging (pTNM, AJCC 8TH Edition)  Primary tumor: pT1c  Regional lymph nodes (modifier- (sn) sentinel nodes examined: (sn)0  ER: Negative  CT: Negative  Her-2/francisco Positive/3+    Pathology report reviewed extensively with patient. Recommended Ado-Trastuzumab alone for 14 cycles. Side effects of Ado-Trastuzumab reviewed with patient. She agreed to proceed. Cycle # 1 Ado-Trastuzumab was on 01/06/2020. Cycle # 2 Ado-Trastuzumab was on 01/27/2020. RT was started on 01/27/2020 and completed on 03/11/2020. Cycle # 3 Ado-Trastuzumab was on 02/17/2020. Cycle # 4 Ado-Trastuzumab was on 03/16/2020.  2D-ECHO 04/15/2020: EF 65%  Cycle # 5 Ado-Trastuzumab was on 04/20/2020. Cycle # 6 Ado-Trastuzumab was on 05/11/2020. Bilateral Diagnostic Mammogram on 05/21/2020 Negative for malignancy. Left Breast U/S 05/29/2020 Area of post surgical change in the left breast is benign. Cycle # 7 Ado-Trastuzumab was on 06/01/2020. Cycle # 8 Ado-Trastuzumab was on 06/22/2020.  2d-Echo 07/20/2020 noted EF 65-70%  Cycle # 9 Ado-Trastuzumab was on 07/22/2020. Cycle # 10 Ado-Trastuzumab was on 08/12/2020. Cycle # 11 Ado-Trastuzumab was on 09/02/2020. Cycle # 12 Ado-Trastuzumab was on 09/23/2020. Cycle # 13 Ado-Trastuzumab was on 10/22/2020. Cycle # 14 Ado-Trastuzumab was on 11/12/2020. Bilateral Screening Mammogram 06/02/2022: No evidence of malignancy. Today 11/17/2022; No fever, chills. Fair appetite and energy level. No chest pain or shortness of breath. No nausea/vomiting. She continues to do well. Review of Systems;  CONSTITUTIONAL: No fever, chills. Fair appetite and energy level. ENMT: Eyes: No diplopia.  Mouth: No sore throat. RESPIRATORY: No hemoptysis, shortness of breath, cough. CARDIOVASCULAR: No chest pain, palpitations. GASTROINTESTINAL: No nausea/vomiting abdominal pain. GENITOURINARY: No dysuria, urinary frequency, hematuria. NEURO: No syncope, presyncope, headache. Remainder: ROS NEGATIVE    Past Medical History:      Diagnosis Date    Anxiety     Breast cancer ([facility] [address] 2019    lt breast    Cancer ([facility] [address] 2019    breast    Heart murmur     History of therapeutic radiation 2019    Hx antineoplastic chemo 2019    Hypertension     Post-menopausal bleeding     S/P hyst/ polypectomy 6/10/14     Medications:  Reviewed and reconciled. Allergies: Allergies   Allergen Reactions    Tetracyclines & Related Hives     Physical Exam:  BP (!) 142/67   Pulse 70   Temp 97.2 °F (36.2 °C)   Wt 241 lb 9.6 oz (109.6 kg)   SpO2 98%   BMI 42.80 kg/m²   GENERAL: Alert, oriented x 3, not in acute distress. LUNGS: CTA Andrew  CVS: RRR  EXTREMITIES: Without clubbing, cyanosis, or edema. ECOG PS 1    Lab Results   Component Value Date    WBC 7.4 11/16/2022    HGB 13.9 11/16/2022    HCT 40.8 11/16/2022    MCV 87.7 11/16/2022     11/16/2022     Lab Results   Component Value Date     11/16/2022    K 3.8 11/16/2022    CL 99 11/16/2022    CO2 30 (H) 11/16/2022    BUN 22 11/16/2022    CREATININE 1.0 11/16/2022    GLUCOSE 118 (H) 11/16/2022    CALCIUM 10.5 (H) 11/16/2022    PROT 7.5 11/16/2022    LABALBU 4.3 11/16/2022    BILITOT 0.4 11/16/2022    ALKPHOS 74 11/16/2022    AST 26 11/16/2022    ALT 24 11/16/2022    LABGLOM >60 11/16/2022    GFRAA >60 07/27/2022     Impression/Plan:  78 y.o. female with Left Breast Cancer    Mass, left breast at 2:00, core needle biopsy: Invasive ductal carcinoma,nuclear grade 3, see comment. 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 Breast Cancer Marker Studies:  ER: Negative  [place]: Negative  Her-2/francisco Positive/3+    MRI Bilateral Breast 05/23/2019:   An irregular, enhancing mass is again noted in the left breast 2:00 which measures 2.0 x 2.2 x 2.6 cm. No axillary LN. No evidence of neoplasm on right. T2 N0 M0  We recommended neoadjuvant chemotherapy consisting of 6 cycles of TCHP. Side effects of TCHP reviewed with patient. She agreed to proceed. 2D-ECHO EF50-55%. Mediport placed. Cycle # 1 TCHP was on 06/24/2019. Cycle # 2 TCHP was on 07/24/2019. Cycle # 3 TCHP was on 08/14/2019. 2D-ECHO 08/23/2019 EF 65%  Cycle # 4 TCHP was on 09/04/2019. Cycle # 5 TCHP was on 09/25/2019. Cycle # 6 TCHP was on 10/23/2019. MRI Breast Bilateral 11/07/2019:   Near complete response to therapy on the left. There is no axillary lymphadenopathy. No evidence of neoplasm on the right. Herceptin/Perjeta was on 12/04/2019. 2D-ECHO 12/16/2019 EF 60%    Left needle localized lumpectomy, blue dye injection, and left axillary sentinel lymph node excision on 12/18/2019:  A. Left breast, new superior-lateral margin, excision:   -Focal adenosis, columnar cell changes and stromal fibrosis/hyalinosis, negative for malignancy;   -Rare microcalcifications in benign tubules. B. [place] lymph node #1, biopsy: Reactive node, negative for malignancy. C.  [place] lymph node #2, biopsy: Reactive node, negative for malignancy. D.  Left breast, lumpectomy with needle localization:   - Invasive adenocarcinoma of no special type (ductal, NOS), grade 3;   - Ductal carcinoma in situ, high nuclear grade, comedo/solid with necrosis;   - Scar of previous biopsy site, see comment. Comment:  Although the invasive carcinoma is very close to the superior margin in specimen D (3 mm), additional superior-lateral margins in specimen A (at least 2.0 cm in thickness) is completely negative for carcinoma.      CANCER CASE SUMMARY  Procedure: Excision  Specimen Laterality: Left  Tumor Site: Invasive Carcinoma: 12:00 (per report: KSQ-)  Tumor Size: Size of Largest Invasive Carcinoma: 1.4 X 1.3 X1.3 cm  Histologic Type of Invasive Carcinoma: Invasive carcinoma of no special type (ductal, NOS)  Histologic Grade: [place] Histologic Score  Glandular/tubular differentiation: Score 3  Nuclear pleomorphism: Score 3  Mitotic rate: Score 3  Overall grade: Grade 3 (scores of 9)  Tumor Focality: Single focus of invasive carcinoma  Ductal Carcinoma In Situ (DCIS): Present  Size of DCIS: Intermingled with invasive carcinoma  Number of blocks with DCIS: 3  Number of blocks examined: 14  Architectural patternS: [place]/solid  Nuclear grade: High  Ncrosis present: Central and focal  Lobular Carcinoma In Situ (LCIS): Not identified  Margins: Uninvolved by invasive carcinoma and DCIS  Distance from closest margin: 3 mm  Specify closest margin: surperior (see comment)  Regional lymph Nodes: Uninvolved by tumor cells  Total number of lymph nodes examined: 2  Number of sentinel nodes examined: 2  Treatment Effect: No known presurgical therapy  Lymph-Vascular Invasion: Not identified  Pathologic Staging (pTNM, AJCC 8TH Edition)  Primary tumor: pT1c  Regional lymph nodes (modifier- (sn) sentinel nodes examined: (sn)0  ER: Negative  [place]: Negative  Her-2/francisco Positive/3+    Pathology report reviewed extensively with patient. Radiation Oncology team consulted for adjuvant RT. Recommended Ado-Trastuzumab alone for 14 cycles. Side effects of Ado-Trastuzumab reviewed with patient. She agreed to proceed. Cycle # 1 Ado-Trastuzumab was on 01/06/2020. RT was started on 01/27/2020 and completed on 03/11/2020. 2d-echo 11/05/2020 EF 70%   Cycle # 14 Ado-Trastuzumab was on 11/12/2020. Bilateral Screening Mammogram 06/02/2022: No evidence of malignancy. Imaging reviewed. Labs reviewed.    CINTHYA    RTC 4 months with labs     Herrera Sevilla MD   14/31/2301  Board Certified Medical Oncologist

## 2022-12-09 ASSESSMENT — ENCOUNTER SYMPTOMS
COUGH: 0
SHORTNESS OF BREATH: 0
BLOOD IN STOOL: 0
NAUSEA: 0
CONSTIPATION: 0

## 2022-12-14 ENCOUNTER — OFFICE VISIT (OUTPATIENT)
Dept: BREAST CENTER | Age: 67
End: 2022-12-14
Payer: MEDICARE

## 2022-12-14 VITALS
WEIGHT: 242 LBS | TEMPERATURE: 98 F | OXYGEN SATURATION: 100 % | HEIGHT: 63 IN | DIASTOLIC BLOOD PRESSURE: 80 MMHG | HEART RATE: 68 BPM | BODY MASS INDEX: 42.88 KG/M2 | RESPIRATION RATE: 16 BRPM | SYSTOLIC BLOOD PRESSURE: 148 MMHG

## 2022-12-14 DIAGNOSIS — Z12.31 VISIT FOR SCREENING MAMMOGRAM: Primary | ICD-10-CM

## 2022-12-14 PROCEDURE — 99213 OFFICE O/P EST LOW 20 MIN: CPT | Performed by: NURSE PRACTITIONER

## 2022-12-14 ASSESSMENT — ENCOUNTER SYMPTOMS
DIARRHEA: 0
BACK PAIN: 0

## 2023-03-15 ENCOUNTER — HOSPITAL ENCOUNTER (OUTPATIENT)
Dept: INFUSION THERAPY | Age: 68
Discharge: HOME | End: 2023-03-15
Payer: MEDICARE

## 2023-03-15 DIAGNOSIS — Z85.3 PERSONAL HISTORY OF BREAST CANCER: ICD-10-CM

## 2023-03-15 LAB
ALBUMIN SERPL-MCNC: 4.1 G/DL (ref 3.5–5.2)
ALP SERPL-CCNC: 74 U/L (ref 35–104)
ALT SERPL-CCNC: 23 U/L (ref 0–32)
ANION GAP SERPL CALCULATED.3IONS-SCNC: 10 MMOL/L (ref 7–16)
AST SERPL-CCNC: 25 U/L (ref 0–31)
BASOPHILS # BLD: 0.06 E9/L (ref 0–0.2)
BASOPHILS NFR BLD: 0.8 % (ref 0–2)
BILIRUB SERPL-MCNC: 0.4 MG/DL (ref 0–1.2)
BUN SERPL-MCNC: 18 MG/DL (ref 6–23)
CALCIUM SERPL-MCNC: 10.1 MG/DL (ref 8.6–10.2)
CHLORIDE SERPL-SCNC: 99 MMOL/L (ref 98–107)
CO2 SERPL-SCNC: 28 MMOL/L (ref 22–29)
CREAT SERPL-MCNC: 0.9 MG/DL (ref 0.5–1)
EOSINOPHIL # BLD: 0.15 E9/L (ref 0.05–0.5)
EOSINOPHIL NFR BLD: 2 % (ref 0–6)
ERYTHROCYTE [DISTWIDTH] IN BLOOD BY AUTOMATED COUNT: 15.7 FL (ref 11.5–15)
GLUCOSE SERPL-MCNC: 100 MG/DL (ref 74–99)
HCT VFR BLD AUTO: 40.4 % (ref 34–48)
HGB BLD-MCNC: 13.5 G/DL (ref 11.5–15.5)
IMM GRANULOCYTES # BLD: 0.02 E9/L
IMM GRANULOCYTES NFR BLD: 0.3 % (ref 0–5)
LYMPHOCYTES # BLD: 2.59 E9/L (ref 1.5–4)
LYMPHOCYTES NFR BLD: 33.7 % (ref 20–42)
MCH RBC QN AUTO: 29.2 PG (ref 26–35)
MCHC RBC AUTO-ENTMCNC: 33.4 % (ref 32–34.5)
MCV RBC AUTO: 87.4 FL (ref 80–99.9)
MONOCYTES # BLD: 0.57 E9/L (ref 0.1–0.95)
MONOCYTES NFR BLD: 7.4 % (ref 2–12)
NEUTROPHILS # BLD: 4.3 E9/L (ref 1.8–7.3)
NEUTS SEG NFR BLD: 55.8 % (ref 43–80)
PLATELET # BLD AUTO: 261 E9/L (ref 130–450)
PMV BLD AUTO: 10.9 FL (ref 7–12)
POTASSIUM SERPL-SCNC: 4.3 MMOL/L (ref 3.5–5)
PROT SERPL-MCNC: 7.5 G/DL (ref 6.4–8.3)
RBC # BLD AUTO: 4.62 E12/L (ref 3.5–5.5)
SODIUM SERPL-SCNC: 137 MMOL/L (ref 132–146)
WBC # BLD: 7.7 E9/L (ref 4.5–11.5)

## 2023-03-15 PROCEDURE — 36415 COLL VENOUS BLD VENIPUNCTURE: CPT

## 2023-03-15 PROCEDURE — 85025 COMPLETE CBC W/AUTO DIFF WBC: CPT

## 2023-03-15 PROCEDURE — 80053 COMPREHEN METABOLIC PANEL: CPT

## 2023-03-16 ENCOUNTER — HOSPITAL ENCOUNTER (OUTPATIENT)
Dept: INFUSION THERAPY | Age: 68
Discharge: HOME | End: 2023-03-16

## 2023-03-16 ENCOUNTER — OFFICE VISIT (OUTPATIENT)
Dept: ONCOLOGY | Age: 68
End: 2023-03-16
Payer: MEDICARE

## 2023-03-16 VITALS
HEIGHT: 63 IN | BODY MASS INDEX: 42.63 KG/M2 | DIASTOLIC BLOOD PRESSURE: 73 MMHG | HEART RATE: 73 BPM | OXYGEN SATURATION: 99 % | SYSTOLIC BLOOD PRESSURE: 165 MMHG | TEMPERATURE: 97.6 F | WEIGHT: 240.6 LBS

## 2023-03-16 DIAGNOSIS — Z85.3 PERSONAL HISTORY OF BREAST CANCER: Primary | ICD-10-CM

## 2023-03-16 PROCEDURE — 1123F ACP DISCUSS/DSCN MKR DOCD: CPT | Performed by: INTERNAL MEDICINE

## 2023-03-16 PROCEDURE — 99213 OFFICE O/P EST LOW 20 MIN: CPT | Performed by: INTERNAL MEDICINE

## 2023-03-16 PROCEDURE — 99212 OFFICE O/P EST SF 10 MIN: CPT

## 2023-03-16 NOTE — PROGRESS NOTES
Department of [facility] Oncology       Attending Clinic Note    Reason for Visit: Follow-up on a patient with Left Breast Cancer    PCP:  Lashawn Hummel MD    History of Present Illness: The mass was located in the 2 o'clock position of left breast.     Breast cancer risk factors include age, gender, menarche before age 16. Age of menarche was 7. Age of menopause was 48. Patient was on birth control for 6 months in her 25s. Patient is . Age of first live birth was 30. Patient did breast feed. Bilateral screening mammogram on 2019: There is a high density, irregular mass with indistinct margins seen in the posterior upper outer quadrant of left breast.     Left Breast U/S on 2019:  Irregular solid mass with angular margins measuring 25 x 20 x 21 mm in the left breast at 2 o'clock located 8 centimeters from the nipple. No axillary LN. On 2019:  Mass, left breast at 2:00, core needle biopsy: Invasive ductal carcinoma,nuclear grade 3, see comment. 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 Breast Cancer Marker Studies:  ER: Negative  MS: Negative  Her-2/francisco Positive/3+    CXR PA/Lateral on 2019:  Normal chest.    MRI Bilateral Breast 2019: An irregular, enhancing mass is again noted in the left breast 2:00 which measures 2.0 x 2.2 x 2.6 cm. No axillary LN. No evidence of neoplasm on right. T2 N0 M0  We recommended neoadjuvant chemotherapy consisting of 6 cycles of TCHP. Side effects of TCHP reviewed with patient. She agreed to proceed. 2D-ECHO EF50-55%. Mediport placed. Cycle # 1 TCHP was on 2019. Cycle # 2 TCHP was on 2019. Cycle # 3 TCHP was on 2019. 2D-ECHO 2019 EF 65%  Cycle # 4 TCHP was on 2019  Cycle # 5 TCHP was on 2019. Cycle # 6 TCHP was on 10/23/2019. MRI Breast Bilateral 2019:   Near complete response to therapy on the left.    There is no axillary lymphadenopathy. No evidence of neoplasm on the right. 2D-ECHO 12/16/2019 EF 60%  Left needle localized lumpectomy, blue dye injection, and left axillary sentinel lymph node excision was done on 12/18/2019:  A. Left breast, new superior-lateral margin, excision:   -Focal adenosis, columnar cell changes and stromal fibrosis/hyalinosis, negative for malignancy;   -Rare microcalcifications in benign tubules. B. [place] lymph node #1, biopsy: Reactive node, negative for malignancy. C.  [place] lymph node #2, biopsy: Reactive node, negative for malignancy. D.  Left breast, lumpectomy with needle localization:   - Invasive adenocarcinoma of no special type (ductal, NOS), grade 3;   - Ductal carcinoma in situ, high nuclear grade, comedo/solid with necrosis;   - Scar of previous biopsy site, see comment. Comment:  Although the invasive carcinoma is very close to the superior margin in specimen D (3 mm), additional superior-lateral margins in specimen A (at least 2.0 cm in thickness) is completely negative for carcinoma.      CANCER CASE SUMMARY  Procedure: Excision  Specimen Laterality: Left  Tumor Site: Invasive Carcinoma: 12:00 (per report: [facility]-)  Tumor Size: Size of Largest Invasive Carcinoma: 1.4 X 1.3 X1.3 cm  Histologic Type of Invasive Carcinoma: Invasive carcinoma of no special type (ductal, NOS)  Histologic Grade: Lidya Histologic Score  Glandular/tubular differentiation: Score 3  Nuclear pleomorphism: Score 3  Mitotic rate: Score 3  Overall grade: Grade 3 (scores of 9)  Tumor Focality: Single focus of invasive carcinoma  Ductal Carcinoma In Situ (DCIS): Present  Size of DCIS: Intermingled with invasive carcinoma  Number of blocks with DCIS: 3  Number of blocks examined: 14  Architectural patternS: Comedo/solid  Nuclear grade: High  Ncrosis present: Central and focal  Lobular Carcinoma In Situ (LCIS): Not identified  Margins: Uninvolved by invasive carcinoma and DCIS  Distance from closest margin: 3 mm  Specify closest margin: surperior (see comment)  Regional lymph Nodes: Uninvolved by tumor cells  Total number of lymph nodes examined: 2  Number of sentinel nodes examined: 2  Treatment Effect: No known presurgical therapy  Lymph-Vascular Invasion: Not identified  Pathologic Staging (pTNM, AJCC 8TH Edition)  Primary tumor: pT1c  Regional lymph nodes (modifier- (sn) sentinel nodes examined: (sn)0  ER: Negative  [place]: Negative  Her-2/francisco Positive/3+    Recommended Ado-Trastuzumab alone for 14 cycles. Cycle # 1 Ado-Trastuzumab was on 01/06/2020. Cycle # 2 Ado-Trastuzumab was on 01/27/2020. RT was started on 01/27/2020 and completed on 03/11/2020. Cycle # 3 Ado-Trastuzumab was on 02/17/2020. Cycle # 4 Ado-Trastuzumab was on 03/16/2020.  2D-ECHO 04/15/2020: EF 65%  Cycle # 5 Ado-Trastuzumab was on 04/20/2020. Cycle # 6 Ado-Trastuzumab was on 05/11/2020. Bilateral Diagnostic Mammogram on 05/21/2020 Negative for malignancy. Left Breast U/S 05/29/2020 Area of post surgical change in the left breast is benign. Cycle # 7 Ado-Trastuzumab was on 06/01/2020. Cycle # 8 Ado-Trastuzumab was on 06/22/2020.  2d-Echo 07/20/2020 noted EF 65-70%  Cycle # 9 Ado-Trastuzumab was on 07/22/2020. Cycle # 10 Ado-Trastuzumab was on 08/12/2020. Cycle # 11 Ado-Trastuzumab was on 09/02/2020. Cycle # 12 Ado-Trastuzumab was on 09/23/2020. Cycle # 13 Ado-Trastuzumab was on 10/22/2020. Cycle # 14 Ado-Trastuzumab was on 11/12/2020. Bilateral Screening Mammogram 06/02/2022: No evidence of malignancy. Today 03/16/2023; No fever, chills. Fair appetite and energy level. No chest pain or SOB. No N.V. She continues to do well. Review of Systems;  CONSTITUTIONAL: No fever, chills. Fair appetite and energy level. ENMT: Eyes: No diplopia. Mouth: No sore throat. RESPIRATORY: No hemoptysis, shortness of breath, cough. CARDIOVASCULAR: No chest pain, palpitations. GASTROINTESTINAL: No nausea/vomiting abdominal pain. GENITOURINARY: No dysuria, urinary frequency, hematuria. NEURO: No syncope, presyncope, headache. Remainder: ROS NEGATIVE    Past Medical History:      Diagnosis Date    Anxiety     Breast cancer ([facility] [address] 2019    lt breast    Cancer ([facility] [address] 2019    breast    Heart murmur     History of therapeutic radiation 2019    Hx antineoplastic chemo 2019    Hypertension     Post-menopausal bleeding     S/P hyst/ polypectomy 6/10/14     Medications:  Reviewed and reconciled. Allergies: Allergies   Allergen Reactions    Tetracyclines & Related Hives     Physical Exam:  BP (!) 165/73   Pulse 73   Temp 97.6 °F (36.4 °C)   Ht 5' 3\" (1.6 m)   Wt 240 lb 9.6 oz (109.1 kg)   SpO2 99%   BMI 42.62 kg/m²   GENERAL: Alert, oriented x 3, not in acute distress. LUNGS: CTA Andrew  CVS:       RRR  EXTREMITIES: Without clubbing, cyanosis, or edema. ECOG PS 1    Lab Results   Component Value Date    WBC 7.7 03/15/2023    HGB 13.5 03/15/2023    HCT 40.4 03/15/2023    MCV 87.4 03/15/2023     03/15/2023     Lab Results   Component Value Date     03/15/2023    K 4.3 03/15/2023    CL 99 03/15/2023    CO2 28 03/15/2023    BUN 18 03/15/2023    CREATININE 0.9 03/15/2023    GLUCOSE 100 (H) 03/15/2023    CALCIUM 10.1 03/15/2023    PROT 7.5 03/15/2023    LABALBU 4.1 03/15/2023    BILITOT 0.4 03/15/2023    ALKPHOS 74 03/15/2023    AST 25 03/15/2023    ALT 23 03/15/2023    LABGLOM >60 03/15/2023    GFRAA >60 07/27/2022     Impression/Plan:  78 y.o. female with Left Breast Cancer    Mass, left breast at 2:00, core needle biopsy: Invasive ductal carcinoma,nuclear grade 3, see comment. 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 Breast Cancer Marker Studies:  ER: Negative  [place]: Negative  Her-2/francisco Positive/3+    MRI Bilateral Breast 05/23/2019: An irregular, enhancing mass is again noted in the left breast 2:00 which measures 2.0 x 2.2 x 2.6 cm. No axillary LN.   No evidence of neoplasm on right.    T2 N0 M0  We recommended neoadjuvant chemotherapy consisting of 6 cycles of TCHP. Side effects of TCHP reviewed with patient. She agreed to proceed. 2D-ECHO EF50-55%. Mediport placed. Cycle # 1 TCHP was on 06/24/2019. Cycle # 2 TCHP was on 07/24/2019. Cycle # 3 TCHP was on 08/14/2019. 2D-ECHO 08/23/2019 EF 65%  Cycle # 4 TCHP was on 09/04/2019. Cycle # 5 TCHP was on 09/25/2019. Cycle # 6 TCHP was on 10/23/2019. MRI Breast Bilateral 11/07/2019:   Near complete response to therapy on the left. There is no axillary lymphadenopathy. No evidence of neoplasm on the right. Herceptin/Perjeta was on 12/04/2019. 2D-ECHO 12/16/2019 EF 60%    Left needle localized lumpectomy, blue dye injection, and left axillary sentinel lymph node excision on 12/18/2019:  A. Left breast, new superior-lateral margin, excision:   -Focal adenosis, columnar cell changes and stromal fibrosis/hyalinosis, negative for malignancy;   -Rare microcalcifications in benign tubules. B. [place] lymph node #1, biopsy: Reactive node, negative for malignancy. C.  [place] lymph node #2, biopsy: Reactive node, negative for malignancy. D.  Left breast, lumpectomy with needle localization:   - Invasive adenocarcinoma of no special type (ductal, NOS), grade 3;   - Ductal carcinoma in situ, high nuclear grade, comedo/solid with necrosis;   - Scar of previous biopsy site, see comment. Comment:  Although the invasive carcinoma is very close to the superior margin in specimen D (3 mm), additional superior-lateral margins in specimen A (at least 2.0 cm in thickness) is completely negative for carcinoma.      CANCER CASE SUMMARY  Procedure: Excision  Specimen Laterality: Left  Tumor Site: Invasive Carcinoma: 12:00 (per report: [facility]-)  Tumor Size: Size of Largest Invasive Carcinoma: 1.4 X 1.3 X1.3 cm  Histologic Type of Invasive Carcinoma: Invasive carcinoma of no special type (ductal, NOS)  Histologic Grade: Gregg Wood Histologic Score  Glandular/tubular differentiation: Score 3  Nuclear pleomorphism: Score 3  Mitotic rate: Score 3  Overall grade: Grade 3 (scores of 9)  Tumor Focality: Single focus of invasive carcinoma  Ductal Carcinoma In Situ (DCIS): Present  Size of DCIS: Intermingled with invasive carcinoma  Number of blocks with DCIS: 3  Number of blocks examined: 14  Architectural patternS: Comedo/solid  Nuclear grade: High  Ncrosis present: Central and focal  Lobular Carcinoma In Situ (LCIS): Not identified  Margins: Uninvolved by invasive carcinoma and DCIS  Distance from closest margin: 3 mm  Specify closest margin: surperior (see comment)  Regional lymph Nodes: Uninvolved by tumor cells  Total number of lymph nodes examined: 2  Number of sentinel nodes examined: 2  Treatment Effect: No known presurgical therapy  Lymph-Vascular Invasion: Not identified  Pathologic Staging (pTNM, AJCC 8TH Edition)  Primary tumor: pT1c  Regional lymph nodes (modifier- (sn) sentinel nodes examined: (sn)0  ER: Negative  [place]: Negative  Her-2/francisco Positive/3+    Pathology report reviewed extensively with patient. Radiation Oncology team consulted for adjuvant RT. Recommended Ado-Trastuzumab alone for 14 cycles. Side effects of Ado-Trastuzumab reviewed with patient. She agreed to proceed. Cycle # 1 Ado-Trastuzumab was on 01/06/2020. RT was started on 01/27/2020 and completed on 03/11/2020. 2d-echo 11/05/2020 EF 70%   Cycle # 14 Ado-Trastuzumab was on 11/12/2020. Bilateral Screening Mammogram 06/02/2022: No evidence of malignancy. Labs reviewed. CINTHYA.  Mammogram in June 2023    RTC 6 months with labs     Maura Slade MD   [id number]  Board Certified Medical Oncologist

## 2023-05-24 ASSESSMENT — ENCOUNTER SYMPTOMS
SHORTNESS OF BREATH: 0
NAUSEA: 0
COUGH: 0
CONSTIPATION: 0
BACK PAIN: 0

## 2023-06-06 ENCOUNTER — HOSPITAL ENCOUNTER (OUTPATIENT)
Dept: GENERAL RADIOLOGY | Age: 68
Discharge: HOME | End: 2023-06-08
Payer: MEDICARE

## 2023-06-06 ENCOUNTER — OFFICE VISIT (OUTPATIENT)
Dept: BREAST CENTER | Age: 68
End: 2023-06-06
Payer: MEDICARE

## 2023-06-06 VITALS
HEIGHT: 63 IN | BODY MASS INDEX: 41.46 KG/M2 | DIASTOLIC BLOOD PRESSURE: 62 MMHG | OXYGEN SATURATION: 97 % | WEIGHT: 234 LBS | SYSTOLIC BLOOD PRESSURE: 140 MMHG | HEART RATE: 65 BPM | RESPIRATION RATE: 18 BRPM | TEMPERATURE: 97.6 F

## 2023-06-06 DIAGNOSIS — Z85.3 HISTORY OF BREAST CANCER: Primary | ICD-10-CM

## 2023-06-06 DIAGNOSIS — Z12.31 VISIT FOR SCREENING MAMMOGRAM: ICD-10-CM

## 2023-06-06 PROCEDURE — 99213 OFFICE O/P EST LOW 20 MIN: CPT | Performed by: NURSE PRACTITIONER

## 2023-06-06 PROCEDURE — 1123F ACP DISCUSS/DSCN MKR DOCD: CPT | Performed by: NURSE PRACTITIONER

## 2023-06-06 PROCEDURE — 77063 BREAST TOMOSYNTHESIS BI: CPT

## 2023-06-06 RX ORDER — POTASSIUM CHLORIDE 750 MG/1
10 CAPSULE, EXTENDED RELEASE ORAL DAILY
COMMUNITY

## 2023-06-06 ASSESSMENT — ENCOUNTER SYMPTOMS
ABDOMINAL DISTENTION: 0
ABDOMINAL PAIN: 0
DIARRHEA: 0

## 2023-09-18 DIAGNOSIS — Z85.3 PERSONAL HISTORY OF BREAST CANCER: Primary | ICD-10-CM

## 2023-10-11 ENCOUNTER — HOSPITAL ENCOUNTER (OUTPATIENT)
Dept: INFUSION THERAPY | Age: 68
Discharge: HOME | End: 2023-10-11
Payer: MEDICARE

## 2023-10-11 DIAGNOSIS — Z85.3 PERSONAL HISTORY OF BREAST CANCER: ICD-10-CM

## 2023-10-11 LAB
ALBUMIN SERPL-MCNC: 4.2 G/DL (ref 3.5–5.2)
ALP SERPL-CCNC: 70 U/L (ref 35–104)
ALT SERPL-CCNC: 20 U/L (ref 0–32)
ANION GAP SERPL CALCULATED.3IONS-SCNC: 13 MMOL/L (ref 7–16)
AST SERPL-CCNC: 25 U/L (ref 0–31)
BASOPHILS # BLD: 0.03 K/UL (ref 0–0.2)
BASOPHILS NFR BLD: 1 % (ref 0–2)
BILIRUB SERPL-MCNC: 0.4 MG/DL (ref 0–1.2)
BUN SERPL-MCNC: 13 MG/DL (ref 6–23)
CALCIUM SERPL-MCNC: 9.6 MG/DL (ref 8.6–10.2)
CHLORIDE SERPL-SCNC: 96 MMOL/L (ref 98–107)
CO2 SERPL-SCNC: 26 MMOL/L (ref 22–29)
CREAT SERPL-MCNC: 0.9 MG/DL (ref 0.5–1)
EOSINOPHIL # BLD: 0.17 K/UL (ref 0.05–0.5)
EOSINOPHILS RELATIVE PERCENT: 3 % (ref 0–6)
ERYTHROCYTE [DISTWIDTH] IN BLOOD BY AUTOMATED COUNT: 15.8 % (ref 11.5–15)
GFR SERPL CREATININE-BSD FRML MDRD: >60 ML/MIN/1.73M2
GLUCOSE SERPL-MCNC: 106 MG/DL (ref 74–99)
HCT VFR BLD AUTO: 38.6 % (ref 34–48)
HGB BLD-MCNC: 12.9 G/DL (ref 11.5–15.5)
IMM GRANULOCYTES # BLD AUTO: <0.03 K/UL (ref 0–0.58)
IMM GRANULOCYTES NFR BLD: 0 % (ref 0–5)
LYMPHOCYTES NFR BLD: 2.53 K/UL (ref 1.5–4)
LYMPHOCYTES RELATIVE PERCENT: 39 % (ref 20–42)
MCH RBC QN AUTO: 28.7 PG (ref 26–35)
MCHC RBC AUTO-ENTMCNC: 33.4 G/DL (ref 32–34.5)
MCV RBC AUTO: 85.8 FL (ref 80–99.9)
MONOCYTES NFR BLD: 0.45 K/UL (ref 0.1–0.95)
MONOCYTES NFR BLD: 7 % (ref 2–12)
NEUTROPHILS NFR BLD: 51 % (ref 43–80)
NEUTS SEG NFR BLD: 3.28 K/UL (ref 1.8–7.3)
PLATELET # BLD AUTO: 258 K/UL (ref 130–450)
PMV BLD AUTO: 11.3 FL (ref 7–12)
POTASSIUM SERPL-SCNC: 3.4 MMOL/L (ref 3.5–5)
PROT SERPL-MCNC: 7.1 G/DL (ref 6.4–8.3)
RBC # BLD AUTO: 4.5 M/UL (ref 3.5–5.5)
SODIUM SERPL-SCNC: 135 MMOL/L (ref 132–146)
WBC OTHER # BLD: 6.5 K/UL (ref 4.5–11.5)

## 2023-10-11 PROCEDURE — 85025 COMPLETE CBC W/AUTO DIFF WBC: CPT

## 2023-10-11 PROCEDURE — 80053 COMPREHEN METABOLIC PANEL: CPT

## 2023-10-11 PROCEDURE — 36415 COLL VENOUS BLD VENIPUNCTURE: CPT

## 2023-10-12 ENCOUNTER — OFFICE VISIT (OUTPATIENT)
Dept: ONCOLOGY | Age: 68
End: 2023-10-12

## 2023-10-12 ENCOUNTER — HOSPITAL ENCOUNTER (OUTPATIENT)
Dept: INFUSION THERAPY | Age: 68
Discharge: HOME | End: 2023-10-12

## 2023-10-12 VITALS
BODY MASS INDEX: 41.46 KG/M2 | DIASTOLIC BLOOD PRESSURE: 70 MMHG | OXYGEN SATURATION: 97 % | HEART RATE: 73 BPM | WEIGHT: 234 LBS | TEMPERATURE: 97.1 F | HEIGHT: 63 IN | SYSTOLIC BLOOD PRESSURE: 180 MMHG

## 2023-10-12 DIAGNOSIS — Z85.3 PERSONAL HISTORY OF BREAST CANCER: Primary | ICD-10-CM

## 2023-10-12 NOTE — PROGRESS NOTES
Patient came in today requesting print outs of his lab work that he had on 4/27/2017. I printed and gave to him  
Patient provided with discharge instructions, received printed AVS.  All questions answered. Patient understands follow up plan of care.
Reactive node, negative for malignancy. D.  Left breast, lumpectomy with needle localization:   - Invasive adenocarcinoma of no special type (ductal, NOS), grade 3;   - Ductal carcinoma in situ, high nuclear grade, comedo/solid with necrosis;   - Scar of previous biopsy site, see comment. Comment:  Although the invasive carcinoma is very close to the superior margin in specimen D (3 mm), additional superior-lateral margins in specimen A (at least 2.0 cm in thickness) is completely negative for carcinoma. CANCER CASE SUMMARY  Procedure: Excision  Specimen Laterality: Left  Tumor Site: Invasive Carcinoma: 12:00 (per report: [facility]-)  Tumor Size: Size of Largest Invasive Carcinoma: 1.4 X 1.3 X1.3 cm  Histologic Type of Invasive Carcinoma: Invasive carcinoma of no special type (ductal, NOS)  Histologic Grade: Lidya Histologic Score  Glandular/tubular differentiation: Score 3  Nuclear pleomorphism: Score 3  Mitotic rate: Score 3  Overall grade: Grade 3 (scores of 9)  Tumor Focality: Single focus of invasive carcinoma  Ductal Carcinoma In Situ (DCIS): Present  Size of DCIS: Intermingled with invasive carcinoma  Number of blocks with DCIS: 3  Number of blocks examined: 14  Architectural patternS: Comedo/solid  Nuclear grade: High  Ncrosis present: Central and focal  Lobular Carcinoma In Situ (LCIS): Not identified  Margins: Uninvolved by invasive carcinoma and DCIS  Distance from closest margin: 3 mm  Specify closest margin: surperior (see comment)  Regional lymph Nodes: Uninvolved by tumor cells  Total number of lymph nodes examined: 2  Number of sentinel nodes examined: 2  Treatment Effect: No known presurgical therapy  Lymph-Vascular Invasion: Not identified  Pathologic Staging (pTNM, AJCC 8TH Edition)  Primary tumor: pT1c  Regional lymph nodes (modifier- (sn) sentinel nodes examined: (sn)0  ER: Negative  [place]: Negative  Her-2/francisco Positive/3+    Pathology report reviewed extensively with patient.

## 2023-12-01 ASSESSMENT — ENCOUNTER SYMPTOMS
SHORTNESS OF BREATH: 0
COUGH: 0
BACK PAIN: 0

## 2023-12-01 NOTE — PROGRESS NOTES
for malignancy. D.  Left breast, lumpectomy with needle localization:   - Invasive adenocarcinoma of no special type (ductal, NOS), grade 3;   - Ductal carcinoma in situ, high nuclear grade, comedo/solid with necrosis;   - Scar of previous biopsy site, see comment. Comment:  Although the invasive carcinoma is very close to the superior margin in specimen D (3 mm), additional superior-lateral margins in specimen A (at least 2.0 cm in thickness) is completely negative for carcinoma. CANCER CASE SUMMARY-for full summary, see pathology report: Left invasive carcinoma, 12:00. Tumor size 1.4 x 1.3 x 1.3 cm. Invasive carcinoma of no special type, ductal, NOS. Overall grade 3 (scores of 9). Single focus of invasive carcinoma. Ductal Carcinoma In Situ (DCIS): Present. Size of DCIS: Intermingled with invasive carcinoma Number of blocks with DCIS: 3  Number of blocks examined: 14 Architectural patternS: Comedo/solid. Nuclear grade: High. Necrosis present: Central and focal.  No LCIS. Margins negative for Invasive and DCIS.  0/2 lymph nodes involved. No LVI    Adjuvant therapy:  Medical Oncology, Anne Rivera Recommended Ado-Trastuzumab alone for 14 cycles. Cycle # 1 Ado-Trastuzumab was on 01/06/2020.  03/11/2020 completed adjuvant RT   11/12/2020 completed cycle # 14 Ado-Trastuzumab. Now follows with Dr. Garnetta Sicard treatment imaging:  Grade C breast density. 06/06/2023 Declined bilateral complete breast US.  05/21/2020 Bilateral Diagnostic Mammogram:  Negative for malignancy. 05/29/2020 Left Breast U/S:  Area of post surgical change in the left breast is benign. 06/01/2021 bilateral screening mammogram: Negative, BI-RADS 1.  06/02/2022 bilateral screening mammogram: Negative, BI-RADS 1.  06/06/2023 bilateral screening mammogram: Negative, BI-RADS 1. Occupational therapy:  09/03/2021 completed 4 visits with occupational therapy; discharged to continue at home; has compression vest..     Key clinical

## 2023-12-06 ENCOUNTER — OFFICE VISIT (OUTPATIENT)
Dept: BREAST CENTER | Age: 68
End: 2023-12-06
Payer: MEDICARE

## 2023-12-06 VITALS
SYSTOLIC BLOOD PRESSURE: 134 MMHG | BODY MASS INDEX: 40.4 KG/M2 | OXYGEN SATURATION: 98 % | WEIGHT: 228 LBS | HEIGHT: 63 IN | HEART RATE: 77 BPM | DIASTOLIC BLOOD PRESSURE: 78 MMHG | RESPIRATION RATE: 20 BRPM | TEMPERATURE: 98.7 F

## 2023-12-06 DIAGNOSIS — Z12.31 VISIT FOR SCREENING MAMMOGRAM: Primary | ICD-10-CM

## 2023-12-06 PROCEDURE — 99213 OFFICE O/P EST LOW 20 MIN: CPT | Performed by: NURSE PRACTITIONER

## 2023-12-06 PROCEDURE — 99213 OFFICE O/P EST LOW 20 MIN: CPT

## 2023-12-06 PROCEDURE — 1123F ACP DISCUSS/DSCN MKR DOCD: CPT | Performed by: NURSE PRACTITIONER

## 2023-12-06 RX ORDER — KETOCONAZOLE 20 MG/G
CREAM TOPICAL
COMMUNITY
Start: 2023-11-29

## 2024-02-14 ENCOUNTER — HOSPITAL ENCOUNTER (OUTPATIENT)
Dept: INFUSION THERAPY | Age: 69
Discharge: HOME | End: 2024-02-14
Payer: MEDICARE

## 2024-02-14 DIAGNOSIS — Z85.3 PERSONAL HISTORY OF BREAST CANCER: ICD-10-CM

## 2024-02-14 LAB
ALBUMIN SERPL-MCNC: 4.4 G/DL (ref 3.5–5.2)
ALP SERPL-CCNC: 72 U/L (ref 35–104)
ALT SERPL-CCNC: 20 U/L (ref 0–32)
ANION GAP SERPL CALCULATED.3IONS-SCNC: 9 MMOL/L (ref 7–16)
AST SERPL-CCNC: 21 U/L (ref 0–31)
BASOPHILS # BLD: 0.05 K/UL (ref 0–0.2)
BASOPHILS NFR BLD: 1 % (ref 0–2)
BILIRUB SERPL-MCNC: 0.3 MG/DL (ref 0–1.2)
BUN SERPL-MCNC: 19 MG/DL (ref 6–23)
CALCIUM SERPL-MCNC: 10.2 MG/DL (ref 8.6–10.2)
CHLORIDE SERPL-SCNC: 95 MMOL/L (ref 98–107)
CO2 SERPL-SCNC: 31 MMOL/L (ref 22–29)
CREAT SERPL-MCNC: 0.8 MG/DL (ref 0.5–1)
EOSINOPHIL # BLD: 0.12 K/UL (ref 0.05–0.5)
EOSINOPHILS RELATIVE PERCENT: 2 % (ref 0–6)
ERYTHROCYTE [DISTWIDTH] IN BLOOD BY AUTOMATED COUNT: 15.9 % (ref 11.5–15)
GFR SERPL CREATININE-BSD FRML MDRD: >60 ML/MIN/1.73M2
GLUCOSE SERPL-MCNC: 104 MG/DL (ref 74–99)
HCT VFR BLD AUTO: 39.9 % (ref 34–48)
HGB BLD-MCNC: 13.1 G/DL (ref 11.5–15.5)
IMM GRANULOCYTES # BLD AUTO: 0.03 K/UL (ref 0–0.58)
IMM GRANULOCYTES NFR BLD: 1 % (ref 0–5)
LYMPHOCYTES NFR BLD: 2.61 K/UL (ref 1.5–4)
LYMPHOCYTES RELATIVE PERCENT: 42 % (ref 20–42)
MCH RBC QN AUTO: 28.2 PG (ref 26–35)
MCHC RBC AUTO-ENTMCNC: 32.8 G/DL (ref 32–34.5)
MCV RBC AUTO: 86 FL (ref 80–99.9)
MONOCYTES NFR BLD: 0.42 K/UL (ref 0.1–0.95)
MONOCYTES NFR BLD: 7 % (ref 2–12)
NEUTROPHILS NFR BLD: 48 % (ref 43–80)
NEUTS SEG NFR BLD: 3 K/UL (ref 1.8–7.3)
PLATELET # BLD AUTO: 273 K/UL (ref 130–450)
PMV BLD AUTO: 10.6 FL (ref 7–12)
POTASSIUM SERPL-SCNC: 3.9 MMOL/L (ref 3.5–5)
PROT SERPL-MCNC: 7.5 G/DL (ref 6.4–8.3)
RBC # BLD AUTO: 4.64 M/UL (ref 3.5–5.5)
SODIUM SERPL-SCNC: 135 MMOL/L (ref 132–146)
WBC OTHER # BLD: 6.2 K/UL (ref 4.5–11.5)

## 2024-02-14 PROCEDURE — 80053 COMPREHEN METABOLIC PANEL: CPT

## 2024-02-14 PROCEDURE — 36415 COLL VENOUS BLD VENIPUNCTURE: CPT

## 2024-02-14 PROCEDURE — 85025 COMPLETE CBC W/AUTO DIFF WBC: CPT

## 2024-02-15 ENCOUNTER — OFFICE VISIT (OUTPATIENT)
Dept: ONCOLOGY | Age: 69
End: 2024-02-15

## 2024-02-15 ENCOUNTER — HOSPITAL ENCOUNTER (OUTPATIENT)
Dept: INFUSION THERAPY | Age: 69
Discharge: HOME | End: 2024-02-15

## 2024-02-15 VITALS
HEART RATE: 65 BPM | HEIGHT: 63 IN | BODY MASS INDEX: 40.2 KG/M2 | TEMPERATURE: 97.3 F | WEIGHT: 226.9 LBS | SYSTOLIC BLOOD PRESSURE: 172 MMHG | OXYGEN SATURATION: 99 % | DIASTOLIC BLOOD PRESSURE: 73 MMHG

## 2024-02-15 DIAGNOSIS — Z85.3 PERSONAL HISTORY OF BREAST CANCER: Primary | ICD-10-CM

## 2024-02-16 NOTE — PROGRESS NOTES
Patient provided with discharge instructions, received printed AVS.  All questions answered.  Patient understands follow up plan of care.     
examined: 14  Architectural patternS: Comedo/solid  Nuclear grade: High  Ncrosis present: Central and focal  Lobular Carcinoma In Situ (LCIS): Not identified  Margins: Uninvolved by invasive carcinoma and DCIS  Distance from closest margin: 3 mm  Specify closest margin: surperior (see comment)  Regional lymph Nodes: Uninvolved by tumor cells  Total number of lymph nodes examined: 2  Number of sentinel nodes examined: 2  Treatment Effect: No known presurgical therapy  Lymph-Vascular Invasion: Not identified  Pathologic Staging (pTNM, AJCC 8TH Edition)  Primary tumor: pT1c  Regional lymph nodes (modifier- (sn) sentinel nodes examined: (sn)0  ER: Negative  [place]: Negative  Her-2/francisco Positive/3+    [End surgical pathology report]        Recommended Ado-Trastuzumab alone for 14 cycles.   Cycle # 1 Ado-Trastuzumab was on 01/06/2020.  Cycle # 2 Ado-Trastuzumab was on 01/27/2020.   RT was started on 01/27/2020 and completed on 03/11/2020.  Cycle # 3 Ado-Trastuzumab was on 02/17/2020.  Cycle # 4 Ado-Trastuzumab was on 03/16/2020.  2D-ECHO 04/15/2020: EF 65%  Cycle # 5 Ado-Trastuzumab was on 04/20/2020.  Cycle # 6 Ado-Trastuzumab was on 05/11/2020.   Bilateral Diagnostic Mammogram on 05/21/2020 Negative for malignancy.   Left Breast U/S 05/29/2020 Area of post surgical change in the left breast is benign.  Cycle # 7 Ado-Trastuzumab was on 06/01/2020.  Cycle # 8 Ado-Trastuzumab was on 06/22/2020.  2d-Echo 07/20/2020 noted EF 65-70%  Cycle # 9 Ado-Trastuzumab was on 07/22/2020.   Cycle # 10 Ado-Trastuzumab was on 08/12/2020.  Cycle # 11 Ado-Trastuzumab was on 09/02/2020.  Cycle # 12 Ado-Trastuzumab was on 09/23/2020.   Cycle # 13 Ado-Trastuzumab was on 10/22/2020.  Cycle # 14 Ado-Trastuzumab was on 11/12/2020.   Bilateral Screening Mammogram 06/02/2022: No evidence of malignancy.  Today 03/16/2023; No fever, chills. Fair appetite and energy level. No chest pain or SOB. No N.V. She continues to do well.     Review of

## 2024-04-16 NOTE — TELEPHONE ENCOUNTER
Met with patient and  Angela Fercho) regarding her recent breast cancer diagnosis. Instructed in detail on her breast biopsy pathology findings including cancer type (IDC) and hormone receptor status (ER-, AL-, Her2+). Instructed on next steps including breast surgery options, lymph node biopsy procedures and additional imaging that may be required. Informed patient that this is the beginning of their breast cancer journey and when treatment has been completed she will receive a [address] detailing the events of her specific treatment plan. Provided with extensive literature including \"Be A Survivor: Your guide to breast cancer treatment\", Your Guide to Your Breast Cancer Pathology Report, American Cancer society Exercises after breast surgery and Lymphedema Early Signs and Symptoms. Written materials on local and national support and informational groups. Today patient received copies of their pathology and imaging reports (if available) as well as a list of local medical oncology providers. Provided patient with my contact information, office hours, and encouragement to call me with questions or concerns. Patient verbalizes understanding and appreciative of nurse navigator visit. Emotional support provided and greater than 30 minutes spent with patient. Nurse navigator will continue to follow. Per pt, he recently had his stomach drained x 1 week ago

## 2024-06-06 NOTE — PROGRESS NOTES
lymph node #1, biopsy: Reactive node, negative for malignancy.  C.  [place] lymph node #2, biopsy: Reactive node, negative for malignancy.  D.  Left breast, lumpectomy with needle localization:   - Invasive adenocarcinoma of no special type (ductal, NOS), grade 3;   - Ductal carcinoma in situ, high nuclear grade, comedo/solid with necrosis;   - Scar of previous biopsy site, see comment.  Comment:  Although the invasive carcinoma is very close to the superior margin in specimen D (3 mm), additional superior-lateral margins in specimen A (at least 2.0 cm in thickness) is completely negative for carcinoma.     CANCER CASE SUMMARY-for full summary, see pathology report: Left invasive carcinoma, 12:00.  Tumor size 1.4 x 1.3 x 1.3 cm.  Invasive carcinoma of no special type, ductal, NOS.  Overall grade 3 (scores of 9).  Single focus of invasive carcinoma.  Ductal Carcinoma In Situ (DCIS): Present.  Size of DCIS: Intermingled with invasive carcinoma Number of blocks with DCIS: 3.  Number of blocks examined: 14 Architectural patternS: Comedo/solid. Nuclear grade: High.  Necrosis present: Central and focal.  No LCIS.  Margins negative for Invasive and DCIS.  0/2 lymph nodes involved.  No LVI    Adjuvant therapy:  Medical Oncology, Anton Boo Recommended Ado-Trastuzumab alone for 14 cycles.   Cycle # 1 Ado-Trastuzumab was on 01/06/2020.  03/11/2020 completed adjuvant RT   11/12/2020 completed cycle # 14 Ado-Trastuzumab.  Now follows with Dr. Johnson    Post treatment imaging:  Grade C breast density.  06/06/2023 Declined bilateral complete breast US.  05/21/2020 Bilateral Diagnostic Mammogram:  Negative for malignancy.   05/29/2020 Left Breast U/S:  Area of post surgical change in the left breast is benign.  06/01/2021 bilateral screening mammogram: Negative, BI-RADS 1.  06/02/2022 bilateral screening mammogram: Negative, BI-RADS 1.  06/06/2023 bilateral screening mammogram: Negative, BI-RADS 1.  06/11/2024 bilateral screening

## 2024-06-11 ENCOUNTER — HOSPITAL ENCOUNTER (OUTPATIENT)
Dept: GENERAL RADIOLOGY | Age: 69
Discharge: HOME | End: 2024-06-13
Payer: MEDICARE

## 2024-06-11 ENCOUNTER — OFFICE VISIT (OUTPATIENT)
Dept: BREAST CENTER | Age: 69
End: 2024-06-11
Payer: MEDICARE

## 2024-06-11 ENCOUNTER — HOSPITAL ENCOUNTER (OUTPATIENT)
Dept: INFUSION THERAPY | Age: 69
Discharge: HOME | End: 2024-06-11
Payer: MEDICARE

## 2024-06-11 VITALS — BODY MASS INDEX: 40.75 KG/M2 | HEIGHT: 63 IN | WEIGHT: 230 LBS

## 2024-06-11 VITALS
RESPIRATION RATE: 20 BRPM | DIASTOLIC BLOOD PRESSURE: 76 MMHG | HEART RATE: 63 BPM | TEMPERATURE: 98 F | SYSTOLIC BLOOD PRESSURE: 142 MMHG | OXYGEN SATURATION: 99 % | BODY MASS INDEX: 40.75 KG/M2 | HEIGHT: 63 IN | WEIGHT: 230 LBS

## 2024-06-11 DIAGNOSIS — R92.30 DENSE BREAST: ICD-10-CM

## 2024-06-11 DIAGNOSIS — Z12.31 VISIT FOR SCREENING MAMMOGRAM: ICD-10-CM

## 2024-06-11 DIAGNOSIS — Z85.3 PERSONAL HISTORY OF BREAST CANCER: ICD-10-CM

## 2024-06-11 DIAGNOSIS — R23.4 CHANGE OF SKIN OF BREAST: ICD-10-CM

## 2024-06-11 DIAGNOSIS — Z85.3 HISTORY OF BREAST CANCER: Primary | ICD-10-CM

## 2024-06-11 LAB
ALBUMIN SERPL-MCNC: 4.3 G/DL (ref 3.5–5.2)
ALP SERPL-CCNC: 75 U/L (ref 35–104)
ALT SERPL-CCNC: 20 U/L (ref 0–32)
ANION GAP SERPL CALCULATED.3IONS-SCNC: 15 MMOL/L (ref 7–16)
AST SERPL-CCNC: 22 U/L (ref 0–31)
BASOPHILS # BLD: 0.05 K/UL (ref 0–0.2)
BASOPHILS NFR BLD: 1 % (ref 0–2)
BILIRUB SERPL-MCNC: 0.3 MG/DL (ref 0–1.2)
BUN SERPL-MCNC: 19 MG/DL (ref 6–23)
CALCIUM SERPL-MCNC: 10.7 MG/DL (ref 8.6–10.2)
CHLORIDE SERPL-SCNC: 97 MMOL/L (ref 98–107)
CO2 SERPL-SCNC: 26 MMOL/L (ref 22–29)
CREAT SERPL-MCNC: 0.9 MG/DL (ref 0.5–1)
EOSINOPHIL # BLD: 0.12 K/UL (ref 0.05–0.5)
EOSINOPHILS RELATIVE PERCENT: 1 % (ref 0–6)
ERYTHROCYTE [DISTWIDTH] IN BLOOD BY AUTOMATED COUNT: 15.6 % (ref 11.5–15)
GFR, ESTIMATED: 72 ML/MIN/1.73M2
GLUCOSE SERPL-MCNC: 105 MG/DL (ref 74–99)
HCT VFR BLD AUTO: 40.7 % (ref 34–48)
HGB BLD-MCNC: 13.6 G/DL (ref 11.5–15.5)
IMM GRANULOCYTES # BLD AUTO: <0.03 K/UL (ref 0–0.58)
IMM GRANULOCYTES NFR BLD: 0 % (ref 0–5)
LYMPHOCYTES NFR BLD: 3.03 K/UL (ref 1.5–4)
LYMPHOCYTES RELATIVE PERCENT: 36 % (ref 20–42)
MCH RBC QN AUTO: 29.1 PG (ref 26–35)
MCHC RBC AUTO-ENTMCNC: 33.4 G/DL (ref 32–34.5)
MCV RBC AUTO: 87 FL (ref 80–99.9)
MONOCYTES NFR BLD: 0.56 K/UL (ref 0.1–0.95)
MONOCYTES NFR BLD: 7 % (ref 2–12)
NEUTROPHILS NFR BLD: 55 % (ref 43–80)
NEUTS SEG NFR BLD: 4.55 K/UL (ref 1.8–7.3)
PLATELET # BLD AUTO: 272 K/UL (ref 130–450)
PMV BLD AUTO: 11 FL (ref 7–12)
POTASSIUM SERPL-SCNC: 3.9 MMOL/L (ref 3.5–5)
PROT SERPL-MCNC: 7.9 G/DL (ref 6.4–8.3)
RBC # BLD AUTO: 4.68 M/UL (ref 3.5–5.5)
SODIUM SERPL-SCNC: 138 MMOL/L (ref 132–146)
WBC OTHER # BLD: 8.3 K/UL (ref 4.5–11.5)

## 2024-06-11 PROCEDURE — 1123F ACP DISCUSS/DSCN MKR DOCD: CPT | Performed by: NURSE PRACTITIONER

## 2024-06-11 PROCEDURE — 99213 OFFICE O/P EST LOW 20 MIN: CPT | Performed by: NURSE PRACTITIONER

## 2024-06-11 PROCEDURE — 85025 COMPLETE CBC W/AUTO DIFF WBC: CPT

## 2024-06-11 PROCEDURE — 99214 OFFICE O/P EST MOD 30 MIN: CPT | Performed by: NURSE PRACTITIONER

## 2024-06-11 PROCEDURE — 77063 BREAST TOMOSYNTHESIS BI: CPT

## 2024-06-11 PROCEDURE — 80053 COMPREHEN METABOLIC PANEL: CPT

## 2024-06-11 PROCEDURE — 36415 COLL VENOUS BLD VENIPUNCTURE: CPT

## 2024-06-11 NOTE — PATIENT INSTRUCTIONS
Office will get breast MRI authorized. Once done schedulers will call to schedule. If you dont get lab work at Oncology on 06/12/2024 please notify office.     Follow up with  once MRI is complete to get skin punch biopsy.     Any questions or concerns, please contact the office at [phone].

## 2024-06-12 ENCOUNTER — TELEPHONE (OUTPATIENT)
Dept: BREAST CENTER | Age: 69
End: 2024-06-12

## 2024-06-12 NOTE — TELEPHONE ENCOUNTER
Authorization is pending for breast MRI [id number] with [employer] eligibility department - may take up to 2 business days for approval.  Case#[id number] spoke with Sam ALFONSO

## 2024-06-13 ENCOUNTER — OFFICE VISIT (OUTPATIENT)
Dept: ONCOLOGY | Age: 69
End: 2024-06-13
Payer: MEDICARE

## 2024-06-13 ENCOUNTER — TELEPHONE (OUTPATIENT)
Dept: BREAST CENTER | Age: 69
End: 2024-06-13

## 2024-06-13 ENCOUNTER — HOSPITAL ENCOUNTER (OUTPATIENT)
Dept: INFUSION THERAPY | Age: 69
Discharge: HOME | End: 2024-06-13

## 2024-06-13 VITALS
OXYGEN SATURATION: 97 % | WEIGHT: 239.1 LBS | SYSTOLIC BLOOD PRESSURE: 172 MMHG | HEART RATE: 77 BPM | TEMPERATURE: 97.9 F | DIASTOLIC BLOOD PRESSURE: 77 MMHG | BODY MASS INDEX: 42.35 KG/M2

## 2024-06-13 DIAGNOSIS — R23.4 CHANGE OF SKIN OF BREAST: Primary | ICD-10-CM

## 2024-06-13 DIAGNOSIS — Z85.3 PERSONAL HISTORY OF BREAST CANCER: ICD-10-CM

## 2024-06-13 PROCEDURE — 99213 OFFICE O/P EST LOW 20 MIN: CPT | Performed by: STUDENT IN AN ORGANIZED HEALTH CARE EDUCATION/TRAINING PROGRAM

## 2024-06-13 PROCEDURE — 1123F ACP DISCUSS/DSCN MKR DOCD: CPT | Performed by: STUDENT IN AN ORGANIZED HEALTH CARE EDUCATION/TRAINING PROGRAM

## 2024-06-13 NOTE — TELEPHONE ENCOUNTER
Patient breast MRI has been denied by [employer] --- Perr to Peer is recommended and will need to be done by 6/14/24 -  [phone]  Ref# [id number]  / Patient ID # [id number]

## 2024-06-13 NOTE — TELEPHONE ENCOUNTER
Peer to peer for breast MRI for approval of MRI of the bilateral breast.  Approval number a [id number] and the approval is valid from June 12, 2024 through December 10, 2024    Nova Ren RN (Terrie), MSN, APRN-CNP, AOCNP  Advanced Oncology Certified Nurse Practitioner  Department of Breast Surgery  [facility]/  [facility] in collaboration with Dr. Sana Horowitz/Dr. Anjana Paniagua/Dr. George Ren APRN-CNP

## 2024-06-13 NOTE — PROGRESS NOTES
Patient provided with discharge instructions, received printed AVS.  All questions answered.  Patient understands follow up plan of care.       
carcinoma  Number of blocks with DCIS: 3  Number of blocks examined: 14  Architectural patternS: Comedo/solid  Nuclear grade: High  Ncrosis present: Central and focal  Lobular Carcinoma In Situ (LCIS): Not identified  Margins: Uninvolved by invasive carcinoma and DCIS  Distance from closest margin: 3 mm  Specify closest margin: surperior (see comment)  Regional lymph Nodes: Uninvolved by tumor cells  Total number of lymph nodes examined: 2  Number of sentinel nodes examined: 2  Treatment Effect: No known presurgical therapy  Lymph-Vascular Invasion: Not identified  Pathologic Staging (pTNM, AJCC 8TH Edition)  Primary tumor: pT1c  Regional lymph nodes (modifier- (sn) sentinel nodes examined: (sn)0  ER: Negative  UT: Negative  Her-2/francisco Positive/3+    [End surgical pathology report]        Recommended Ado-Trastuzumab alone for 14 cycles.   Cycle # 1 Ado-Trastuzumab was on 01/06/2020.  Cycle # 2 Ado-Trastuzumab was on 01/27/2020.   RT was started on 01/27/2020 and completed on 03/11/2020.  Cycle # 3 Ado-Trastuzumab was on 02/17/2020.  Cycle # 4 Ado-Trastuzumab was on 03/16/2020.  2D-ECHO 04/15/2020: EF 65%  Cycle # 5 Ado-Trastuzumab was on 04/20/2020.  Cycle # 6 Ado-Trastuzumab was on 05/11/2020.   Bilateral Diagnostic Mammogram on 05/21/2020 Negative for malignancy.   Left Breast U/S 05/29/2020 Area of post surgical change in the left breast is benign.  Cycle # 7 Ado-Trastuzumab was on 06/01/2020.  Cycle # 8 Ado-Trastuzumab was on 06/22/2020.  2d-Echo 07/20/2020 noted EF 65-70%  Cycle # 9 Ado-Trastuzumab was on 07/22/2020.   Cycle # 10 Ado-Trastuzumab was on 08/12/2020.  Cycle # 11 Ado-Trastuzumab was on 09/02/2020.  Cycle # 12 Ado-Trastuzumab was on 09/23/2020.   Cycle # 13 Ado-Trastuzumab was on 10/22/2020.  Cycle # 14 Ado-Trastuzumab was on 11/12/2020.   Bilateral Screening Mammogram 06/02/2022: No evidence of malignancy.  Today 03/16/2023; No fever, chills. Fair appetite and energy level. No chest pain or SOB. No

## 2024-06-13 NOTE — TELEPHONE ENCOUNTER
Patient information has been faxed to [employer] - Breast MRI is under Clinical Review.

## 2024-06-27 ENCOUNTER — HOSPITAL ENCOUNTER (OUTPATIENT)
Dept: MRI IMAGING | Age: 69
Discharge: HOME | End: 2024-06-29
Payer: MEDICARE

## 2024-06-27 DIAGNOSIS — R92.30 DENSE BREAST: ICD-10-CM

## 2024-06-27 DIAGNOSIS — R23.4 CHANGE OF SKIN OF BREAST: ICD-10-CM

## 2024-06-27 DIAGNOSIS — Z85.3 HISTORY OF BREAST CANCER: ICD-10-CM

## 2024-06-27 PROCEDURE — 6360000004 HC RX CONTRAST MEDICATION: Performed by: RADIOLOGY

## 2024-06-27 PROCEDURE — C8908 MRI W/O FOL W/CONT, BREAST,: HCPCS

## 2024-06-27 PROCEDURE — A9585 GADOBUTROL INJECTION: HCPCS | Performed by: RADIOLOGY

## 2024-06-27 RX ORDER — GADOBUTROL 604.72 MG/ML
10 INJECTION INTRAVENOUS
Status: COMPLETED | OUTPATIENT
Start: 2024-06-27 | End: 2024-06-27

## 2024-06-27 RX ADMIN — GADOBUTROL 10 ML: 604.72 INJECTION INTRAVENOUS at 11:19

## 2024-07-26 ENCOUNTER — OFFICE VISIT (OUTPATIENT)
Dept: BREAST CENTER | Age: 69
End: 2024-07-26
Payer: MEDICARE

## 2024-07-26 VITALS
HEIGHT: 63 IN | DIASTOLIC BLOOD PRESSURE: 78 MMHG | BODY MASS INDEX: 41.46 KG/M2 | WEIGHT: 234 LBS | HEART RATE: 77 BPM | OXYGEN SATURATION: 99 % | SYSTOLIC BLOOD PRESSURE: 136 MMHG | TEMPERATURE: 98.1 F

## 2024-07-26 DIAGNOSIS — R23.4 BREAST SKIN CHANGES: Primary | ICD-10-CM

## 2024-07-26 PROCEDURE — 11401 EXC TR-EXT B9+MARG 0.6-1 CM: CPT | Performed by: SURGERY

## 2024-07-26 PROCEDURE — 99213 OFFICE O/P EST LOW 20 MIN: CPT | Performed by: SURGERY

## 2024-07-26 RX ORDER — LIDOCAINE HYDROCHLORIDE AND EPINEPHRINE 10; 10 MG/ML; UG/ML
20 INJECTION, SOLUTION INFILTRATION; PERINEURAL ONCE
Status: COMPLETED | OUTPATIENT
Start: 2024-07-26 | End: 2024-07-26

## 2024-07-26 RX ADMIN — LIDOCAINE HYDROCHLORIDE AND EPINEPHRINE 5 ML: 10; 10 INJECTION, SOLUTION INFILTRATION; PERINEURAL at 09:07

## 2024-07-26 NOTE — PROGRESS NOTES
[facility]  PROGRESS NOTE  ATTENDING NOTE    Chief Complaint   Patient presents with    Follow-up     ESTABLISHED : Skin punch biopsy left breast-seen NP 06/11/2024-MRI 6/27/24       S: 69-year-old female who has a history of left breast cancer who had undergone a left lumpectomy with sentinel lymph node biopsy several years ago with Dr. Horowitz.  She is complete her treatment she is about 5 years out from her initial cancer diagnosis.  She presents today because there is a palpable mass in the skin of her left breast.  I reviewed her MRI with her and it looks like it is small cyst.  It feels like a sebaceous cyst on exam.  She wishes to have it removed and sent for biopsy.    PROCEDURE NOTE:  Consent obtained.  Patient skin cleansed with ChloraPrep.  1% lidocaine with epinephrine was used to anesthetize the area.  I then used a 4 mm punch biopsy to help excise the skin and cyst.  The cyst was less than 5 mm in size.  I removed the sac in total.  The wound was then closed with 5-0 nylon x 2.  The wound dressed with a Band-Aid.    -- Patient advised she can take the Band-Aid off  She showers.  --Patient advised she can even leave it open to air or cover with a Band-Aid or place Neosporin on it.  --Return to clinic in about a week for suture removal  --I will call her with biopsy results which may take up to 2 weeks.    -- Return to clinic in 6 months with Patti Paniagua MD, MSc, FACS  7/26/2024  9:22 AM

## 2024-08-05 ENCOUNTER — NURSE ONLY (OUTPATIENT)
Dept: BREAST CENTER | Age: 69
End: 2024-08-05

## 2024-08-05 VITALS — HEIGHT: 63 IN | BODY MASS INDEX: 42.17 KG/M2 | WEIGHT: 238 LBS

## 2024-08-05 DIAGNOSIS — R23.4 CHANGE OF SKIN OF BREAST: Primary | ICD-10-CM

## 2024-08-08 ENCOUNTER — HOSPITAL ENCOUNTER (OUTPATIENT)
Dept: INFUSION THERAPY | Age: 69
Discharge: HOME | End: 2024-08-08
Payer: MEDICARE

## 2024-08-08 ENCOUNTER — OFFICE VISIT (OUTPATIENT)
Dept: ONCOLOGY | Age: 69
End: 2024-08-08
Payer: MEDICARE

## 2024-08-08 VITALS
OXYGEN SATURATION: 96 % | WEIGHT: 239.4 LBS | DIASTOLIC BLOOD PRESSURE: 80 MMHG | TEMPERATURE: 97.6 F | BODY MASS INDEX: 42.41 KG/M2 | HEART RATE: 88 BPM | SYSTOLIC BLOOD PRESSURE: 150 MMHG

## 2024-08-08 DIAGNOSIS — Z85.3 PERSONAL HISTORY OF BREAST CANCER: Primary | ICD-10-CM

## 2024-08-08 DIAGNOSIS — Z85.3 PERSONAL HISTORY OF BREAST CANCER: ICD-10-CM

## 2024-08-08 LAB
ALBUMIN SERPL-MCNC: 4.1 G/DL (ref 3.5–5.2)
ALP SERPL-CCNC: 75 U/L (ref 35–104)
ALT SERPL-CCNC: 20 U/L (ref 0–32)
ANION GAP SERPL CALCULATED.3IONS-SCNC: 11 MMOL/L (ref 7–16)
AST SERPL-CCNC: 22 U/L (ref 0–31)
BASOPHILS # BLD: 0.05 K/UL (ref 0–0.2)
BASOPHILS NFR BLD: 1 % (ref 0–2)
BILIRUB SERPL-MCNC: 0.2 MG/DL (ref 0–1.2)
BUN SERPL-MCNC: 15 MG/DL (ref 6–23)
CALCIUM SERPL-MCNC: 10.3 MG/DL (ref 8.6–10.2)
CHLORIDE SERPL-SCNC: 101 MMOL/L (ref 98–107)
CO2 SERPL-SCNC: 27 MMOL/L (ref 22–29)
CREAT SERPL-MCNC: 0.9 MG/DL (ref 0.5–1)
EOSINOPHIL # BLD: 0.19 K/UL (ref 0.05–0.5)
EOSINOPHILS RELATIVE PERCENT: 2 % (ref 0–6)
ERYTHROCYTE [DISTWIDTH] IN BLOOD BY AUTOMATED COUNT: 15.7 % (ref 11.5–15)
GFR, ESTIMATED: 70 ML/MIN/1.73M2
GLUCOSE SERPL-MCNC: 100 MG/DL (ref 74–99)
HCT VFR BLD AUTO: 37.9 % (ref 34–48)
HGB BLD-MCNC: 12.7 G/DL (ref 11.5–15.5)
IMM GRANULOCYTES # BLD AUTO: <0.03 K/UL (ref 0–0.58)
IMM GRANULOCYTES NFR BLD: 0 % (ref 0–5)
LYMPHOCYTES NFR BLD: 2.94 K/UL (ref 1.5–4)
LYMPHOCYTES RELATIVE PERCENT: 38 % (ref 20–42)
MCH RBC QN AUTO: 29.2 PG (ref 26–35)
MCHC RBC AUTO-ENTMCNC: 33.5 G/DL (ref 32–34.5)
MCV RBC AUTO: 87.1 FL (ref 80–99.9)
MONOCYTES NFR BLD: 0.61 K/UL (ref 0.1–0.95)
MONOCYTES NFR BLD: 8 % (ref 2–12)
NEUTROPHILS NFR BLD: 51 % (ref 43–80)
NEUTS SEG NFR BLD: 4.01 K/UL (ref 1.8–7.3)
PLATELET # BLD AUTO: 249 K/UL (ref 130–450)
PMV BLD AUTO: 11.3 FL (ref 7–12)
POTASSIUM SERPL-SCNC: 4 MMOL/L (ref 3.5–5)
PROT SERPL-MCNC: 7.4 G/DL (ref 6.4–8.3)
RBC # BLD AUTO: 4.35 M/UL (ref 3.5–5.5)
SODIUM SERPL-SCNC: 139 MMOL/L (ref 132–146)
WBC OTHER # BLD: 7.8 K/UL (ref 4.5–11.5)

## 2024-08-08 PROCEDURE — 85025 COMPLETE CBC W/AUTO DIFF WBC: CPT

## 2024-08-08 PROCEDURE — 1123F ACP DISCUSS/DSCN MKR DOCD: CPT | Performed by: NURSE PRACTITIONER

## 2024-08-08 PROCEDURE — 80053 COMPREHEN METABOLIC PANEL: CPT

## 2024-08-08 PROCEDURE — 36415 COLL VENOUS BLD VENIPUNCTURE: CPT

## 2024-08-08 PROCEDURE — 99213 OFFICE O/P EST LOW 20 MIN: CPT | Performed by: NURSE PRACTITIONER

## 2024-08-08 PROCEDURE — 99212 OFFICE O/P EST SF 10 MIN: CPT

## 2024-08-08 NOTE — PROGRESS NOTES
[facility] PHYSICIANS [facility] MED ONCOLOGY  [address]  [facility] [address]  Dept: [phone]  Loc: [phone]    Attending Clinic Note    Reason for Visit: Follow-up on a patient with Left Breast Cancer    PCP:  Juan Campo MD    Subjective:  No new complaints or issues.     Mammogram was done 24,. Birads 2. Also breast MRI was done 24, also birads 2. Punch biopsy from left breast was negative for cancer.    Oncologic History:  The mass was located in the 2 o'clock position of left breast.     Breast cancer risk factors include age, gender, menarche before age 13.  Age of menarche was 12. Age of menopause was 55.  Patient was on birth control for 6 months in her 20s. Patient is . Age of first live birth was 29. Patient did breast feed.    Bilateral screening mammogram on 2019:  There is a high density, irregular mass with indistinct margins seen in the posterior upper outer quadrant of left breast.     Left Breast U/S on 2019:  Irregular solid mass with angular margins measuring 25 x 20 x 21 mm in the left breast at 2 o'clock located 8 centimeters from the nipple.  No axillary LN.     On 2019:    Mass, left breast at 2:00, core needle biopsy: Invasive ductal carcinoma,nuclear grade 3, see comment.  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  Breast Cancer Marker Studies:  ER: Negative  IL: Negative  Her-2/francisco Positive/3+    [End biopsy report]        CXR PA/Lateral on 2019:  Normal chest.    MRI Bilateral Breast 2019:  An irregular, enhancing mass is again noted in the left breast 2:00 which measures 2.0 x 2.2 x 2.6 cm.  No axillary LN.  No evidence of neoplasm on right.    T2 N0 M0  We recommended neoadjuvant chemotherapy consisting of 6 cycles of TCHP. Side effects of TCHP reviewed with patient. She agreed to proceed. 2D-ECHO EF50-55%. Mediport placed.  Cycle # 1 TCHP was on

## 2024-08-22 LAB — SURGICAL PATHOLOGY REPORT: NORMAL

## 2024-11-27 NOTE — PROGRESS NOTES
recurrent disease.  Nisha looks great overall.  She has chronic posttreatment changes to the left breast and chronic lymphedema which is controlled with compression vest daily.  Her range of motion on this visit of the left upper extremity is slightly diminished however, she reports she has not been as compliant with stretching as before.  We discussed use of a exercise band and she is committed to resuming her stretching.  She will be having a left knee replacement in February.  We will plan to see her in June with bilateral screening mammogram same day and as needed.      Plan:   Continue monthly breast/chest wall self examination; detailed instructions reviewed today. Bring any changes to your physician's attention.  Continue healthy diet and exercise routinely as tolerated.    Avoid alcohol.    Limit caffeine intake.  Wear a good supportive bra.  Continue follow up with Primary Care/Medical Oncology, and all specialties as directed.  RTC June with bilateral screening mammogram same day and PRN        I spent a total of 26 minutes on the date of the service which included preparing to see the patient (including review of medical oncology notes) , face-to-face patient care, completing clinical documentation, obtaining and/or reviewing separately obtained history, performing a medically appropriate examination, counseling and educating the patient/family/caregiver, ordering medications, tests, or procedures, communicating with other HCPs (not separately reported), independently interpreting results (not separately reported), communicating results to the patient/family/caregiver and care coordination (not separately reported).  In addition, all questions were answered to her apparent satisfaction.    This document is generated, in part, by voice recognition software and thus syntax and grammatical errors are possible.    Nova Ren RN (Terrie), MSN, APRN-CNP, AOCNP  Advanced Oncology Certified Nurse

## 2024-12-19 ENCOUNTER — OFFICE VISIT (OUTPATIENT)
Dept: BREAST CENTER | Age: 69
End: 2024-12-19
Payer: MEDICARE

## 2024-12-19 VITALS
RESPIRATION RATE: 16 BRPM | HEART RATE: 68 BPM | BODY MASS INDEX: 41.64 KG/M2 | HEIGHT: 63 IN | OXYGEN SATURATION: 98 % | WEIGHT: 235 LBS | SYSTOLIC BLOOD PRESSURE: 132 MMHG | TEMPERATURE: 98 F | DIASTOLIC BLOOD PRESSURE: 64 MMHG

## 2024-12-19 DIAGNOSIS — Z12.31 VISIT FOR SCREENING MAMMOGRAM: ICD-10-CM

## 2024-12-19 DIAGNOSIS — Z85.3 PERSONAL HISTORY OF BREAST CANCER: Primary | ICD-10-CM

## 2024-12-19 PROCEDURE — 99213 OFFICE O/P EST LOW 20 MIN: CPT | Performed by: NURSE PRACTITIONER

## 2024-12-19 ASSESSMENT — ENCOUNTER SYMPTOMS: GASTROINTESTINAL NEGATIVE: 1

## 2025-01-17 ENCOUNTER — HOSPITAL ENCOUNTER (OUTPATIENT)
Dept: GENERAL RADIOLOGY | Age: 70
Discharge: HOME | End: 2025-01-19
Payer: MEDICARE

## 2025-01-17 ENCOUNTER — HOSPITAL ENCOUNTER (OUTPATIENT)
Age: 70
Discharge: HOME | End: 2025-01-19
Payer: MEDICARE

## 2025-01-17 DIAGNOSIS — R05.9 COMPLAINING OF COUGH: ICD-10-CM

## 2025-01-17 PROCEDURE — 71046 X-RAY EXAM CHEST 2 VIEWS: CPT

## 2025-02-04 ENCOUNTER — HOSPITAL ENCOUNTER (OUTPATIENT)
Age: 70
Discharge: HOME | End: 2025-02-06

## 2025-02-04 LAB
ABO + RH BLD: NORMAL
ARM BAND NUMBER: NORMAL
BLOOD BANK SAMPLE EXPIRATION: NORMAL
BLOOD GROUP ANTIBODIES SERPL: NEGATIVE

## 2025-02-04 PROCEDURE — 86901 BLOOD TYPING SEROLOGIC RH(D): CPT

## 2025-02-04 PROCEDURE — 86900 BLOOD TYPING SEROLOGIC ABO: CPT

## 2025-02-04 PROCEDURE — 86850 RBC ANTIBODY SCREEN: CPT

## 2025-02-05 ENCOUNTER — HOSPITAL ENCOUNTER (OUTPATIENT)
Dept: INFUSION THERAPY | Age: 70
Discharge: HOME | End: 2025-02-05
Payer: MEDICARE

## 2025-02-05 DIAGNOSIS — Z17.0 MALIGNANT NEOPLASM OF UPPER-OUTER QUADRANT OF LEFT BREAST IN FEMALE, ESTROGEN RECEPTOR POSITIVE (HCC): Primary | ICD-10-CM

## 2025-02-05 DIAGNOSIS — C50.912 INVASIVE DUCTAL CARCINOMA OF LEFT BREAST (HCC): ICD-10-CM

## 2025-02-05 DIAGNOSIS — C50.412 MALIGNANT NEOPLASM OF UPPER-OUTER QUADRANT OF LEFT BREAST IN FEMALE, ESTROGEN RECEPTOR POSITIVE (HCC): Primary | ICD-10-CM

## 2025-02-05 LAB
ALBUMIN SERPL-MCNC: 4.3 G/DL (ref 3.5–5.2)
ALP SERPL-CCNC: 69 U/L (ref 35–104)
ALT SERPL-CCNC: 19 U/L (ref 0–32)
ANION GAP SERPL CALCULATED.3IONS-SCNC: 9 MMOL/L (ref 7–16)
AST SERPL-CCNC: 20 U/L (ref 0–31)
BASOPHILS # BLD: 0.04 K/UL (ref 0–0.2)
BASOPHILS NFR BLD: 1 % (ref 0–2)
BILIRUB SERPL-MCNC: 0.3 MG/DL (ref 0–1.2)
BUN SERPL-MCNC: 26 MG/DL (ref 6–23)
CALCIUM SERPL-MCNC: 10 MG/DL (ref 8.6–10.2)
CHLORIDE SERPL-SCNC: 100 MMOL/L (ref 98–107)
CO2 SERPL-SCNC: 29 MMOL/L (ref 22–29)
CREAT SERPL-MCNC: 1 MG/DL (ref 0.5–1)
EOSINOPHIL # BLD: 0.21 K/UL (ref 0.05–0.5)
EOSINOPHILS RELATIVE PERCENT: 3 % (ref 0–6)
ERYTHROCYTE [DISTWIDTH] IN BLOOD BY AUTOMATED COUNT: 15.7 % (ref 11.5–15)
GFR, ESTIMATED: 64 ML/MIN/1.73M2
GLUCOSE SERPL-MCNC: 100 MG/DL (ref 74–99)
HCT VFR BLD AUTO: 39.8 % (ref 34–48)
HGB BLD-MCNC: 13 G/DL (ref 11.5–15.5)
IMM GRANULOCYTES # BLD AUTO: <0.03 K/UL (ref 0–0.58)
IMM GRANULOCYTES NFR BLD: 0 % (ref 0–5)
LYMPHOCYTES NFR BLD: 2.92 K/UL (ref 1.5–4)
LYMPHOCYTES RELATIVE PERCENT: 39 % (ref 20–42)
MCH RBC QN AUTO: 28.5 PG (ref 26–35)
MCHC RBC AUTO-ENTMCNC: 32.7 G/DL (ref 32–34.5)
MCV RBC AUTO: 87.3 FL (ref 80–99.9)
MONOCYTES NFR BLD: 0.6 K/UL (ref 0.1–0.95)
MONOCYTES NFR BLD: 8 % (ref 2–12)
NEUTROPHILS NFR BLD: 49 % (ref 43–80)
NEUTS SEG NFR BLD: 3.63 K/UL (ref 1.8–7.3)
PLATELET # BLD AUTO: 284 K/UL (ref 130–450)
PMV BLD AUTO: 10.9 FL (ref 7–12)
POTASSIUM SERPL-SCNC: 4.3 MMOL/L (ref 3.5–5)
PROT SERPL-MCNC: 7.3 G/DL (ref 6.4–8.3)
RBC # BLD AUTO: 4.56 M/UL (ref 3.5–5.5)
SODIUM SERPL-SCNC: 138 MMOL/L (ref 132–146)
WBC OTHER # BLD: 7.4 K/UL (ref 4.5–11.5)

## 2025-02-05 PROCEDURE — 36415 COLL VENOUS BLD VENIPUNCTURE: CPT

## 2025-02-05 PROCEDURE — 85025 COMPLETE CBC W/AUTO DIFF WBC: CPT

## 2025-02-05 PROCEDURE — 80053 COMPREHEN METABOLIC PANEL: CPT

## 2025-02-06 ENCOUNTER — OFFICE VISIT (OUTPATIENT)
Dept: ONCOLOGY | Age: 70
End: 2025-02-06

## 2025-02-06 ENCOUNTER — HOSPITAL ENCOUNTER (OUTPATIENT)
Dept: INFUSION THERAPY | Age: 70
Discharge: HOME | End: 2025-02-06

## 2025-02-06 VITALS
BODY MASS INDEX: 41.53 KG/M2 | OXYGEN SATURATION: 97 % | HEART RATE: 63 BPM | WEIGHT: 234.4 LBS | HEIGHT: 63 IN | DIASTOLIC BLOOD PRESSURE: 68 MMHG | SYSTOLIC BLOOD PRESSURE: 155 MMHG | TEMPERATURE: 97.8 F

## 2025-02-06 DIAGNOSIS — Z85.3 PERSONAL HISTORY OF BREAST CANCER: Primary | ICD-10-CM

## 2025-02-06 NOTE — PROGRESS NOTES
[facility] PHYSICIANS [facility] MED ONCOLOGY  [address]  [facility] [address]  Dept: [phone]  Loc: [phone]    Attending Clinic Note    Reason for Visit: Follow-up on a patient with Left Breast Cancer    PCP:  Jaun Campo MD    Subjective:  Overall doing well.  Denies any pain, fevers, chills, night sweats, headaches and vision changes, nausea or vomiting.  Denies of new bony pain or back pain that is unusual.    Oncologic History:  The mass was located in the 2 o'clock position of left breast.     Breast cancer risk factors include age, gender, menarche before age 13.  Age of menarche was 12. Age of menopause was 55.  Patient was on birth control for 6 months in her 20s. Patient is . Age of first live birth was 29. Patient did breast feed.    Bilateral screening mammogram on 2019:  There is a high density, irregular mass with indistinct margins seen in the posterior upper outer quadrant of left breast.     Left Breast U/S on 2019:  Irregular solid mass with angular margins measuring 25 x 20 x 21 mm in the left breast at 2 o'clock located 8 centimeters from the nipple.  No axillary LN.     On 2019:    Mass, left breast at 2:00, core needle biopsy: Invasive ductal carcinoma,nuclear grade 3, see comment.  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  Breast Cancer Marker Studies:  ER: Negative  [place]: Negative  Her-2/francisco Positive/3+    [End biopsy report]        CXR PA/Lateral on 2019:  Normal chest.    MRI Bilateral Breast 2019:  An irregular, enhancing mass is again noted in the left breast 2:00 which measures 2.0 x 2.2 x 2.6 cm.  No axillary LN.  No evidence of neoplasm on right.    T2 N0 M0  We recommended neoadjuvant chemotherapy consisting of 6 cycles of TCHP. Side effects of TCHP reviewed with patient. She agreed to proceed. 2D-ECHO EF50-55%. Mediport placed.  Cycle # 1 TCHP was on

## 2025-02-12 ENCOUNTER — HOSPITAL ENCOUNTER (OUTPATIENT)
Age: 70
Discharge: HOME | End: 2025-02-14

## 2025-02-12 LAB
ANION GAP SERPL CALCULATED.3IONS-SCNC: 12 MMOL/L (ref 7–16)
BUN SERPL-MCNC: 17 MG/DL (ref 6–23)
CALCIUM SERPL-MCNC: 9.2 MG/DL (ref 8.6–10.2)
CHLORIDE SERPL-SCNC: 96 MMOL/L (ref 98–107)
CO2 SERPL-SCNC: 23 MMOL/L (ref 22–29)
CREAT SERPL-MCNC: 0.8 MG/DL (ref 0.5–1)
ERYTHROCYTE [DISTWIDTH] IN BLOOD BY AUTOMATED COUNT: 15.4 % (ref 11.5–15)
GFR, ESTIMATED: 79 ML/MIN/1.73M2
GLUCOSE SERPL-MCNC: 123 MG/DL (ref 74–99)
HCT VFR BLD AUTO: 37.8 % (ref 34–48)
HGB BLD-MCNC: 12.1 G/DL (ref 11.5–15.5)
MCH RBC QN AUTO: 28.3 PG (ref 26–35)
MCHC RBC AUTO-ENTMCNC: 32 G/DL (ref 32–34.5)
MCV RBC AUTO: 88.3 FL (ref 80–99.9)
PLATELET # BLD AUTO: 251 K/UL (ref 130–450)
PMV BLD AUTO: 11 FL (ref 7–12)
POTASSIUM SERPL-SCNC: 3.8 MMOL/L (ref 3.5–5)
RBC # BLD AUTO: 4.28 M/UL (ref 3.5–5.5)
SODIUM SERPL-SCNC: 131 MMOL/L (ref 132–146)
WBC OTHER # BLD: 11.7 K/UL (ref 4.5–11.5)

## 2025-02-12 PROCEDURE — 80048 BASIC METABOLIC PNL TOTAL CA: CPT

## 2025-02-12 PROCEDURE — 85027 COMPLETE CBC AUTOMATED: CPT

## 2025-06-02 ENCOUNTER — HOSPITAL ENCOUNTER (OUTPATIENT)
Dept: GENERAL RADIOLOGY | Age: 70
Discharge: HOME | End: 2025-06-04
Payer: MEDICARE

## 2025-06-02 ENCOUNTER — TRANSCRIBE ORDERS (OUTPATIENT)
Dept: GENERAL RADIOLOGY | Age: 70
End: 2025-06-02

## 2025-06-02 DIAGNOSIS — M54.50 LOW BACK PAIN, UNSPECIFIED BACK PAIN LATERALITY, UNSPECIFIED CHRONICITY, UNSPECIFIED WHETHER SCIATICA PRESENT: Primary | ICD-10-CM

## 2025-06-02 DIAGNOSIS — M54.50 LOW BACK PAIN, UNSPECIFIED BACK PAIN LATERALITY, UNSPECIFIED CHRONICITY, UNSPECIFIED WHETHER SCIATICA PRESENT: ICD-10-CM

## 2025-06-02 PROCEDURE — 72110 X-RAY EXAM L-2 SPINE 4/>VWS: CPT

## 2025-06-06 NOTE — PROGRESS NOTES
microcalcifications in benign tubules.  B.  Clyde lymph node #1, biopsy: Reactive node, negative for malignancy.  C.  Clyde lymph node #2, biopsy: Reactive node, negative for malignancy.  D.  Left breast, lumpectomy with needle localization:   - Invasive adenocarcinoma of no special type (ductal, NOS), grade 3;   - Ductal carcinoma in situ, high nuclear grade, comedo/solid with necrosis;   - Scar of previous biopsy site, see comment.  Comment:  Although the invasive carcinoma is very close to the superior margin in specimen D (3 mm), additional superior-lateral margins in specimen A (at least 2.0 cm in thickness) is completely negative for carcinoma.     CANCER CASE SUMMARY-for full summary, see pathology report: Left invasive carcinoma, 12:00.  Tumor size 1.4 x 1.3 x 1.3 cm.  Invasive carcinoma of no special type, ductal, NOS.  Overall grade 3 (scores of 9).  Single focus of invasive carcinoma.    Ductal Carcinoma In Situ (DCIS): Present.  Size of DCIS: Intermingled with invasive carcinoma Number of blocks with DCIS: 3.  Number of blocks examined: 14 Architectural patternS: Comedo/solid. Nuclear grade: High.  Necrosis present: Central and focal.  No LCIS.  Margins negative for Invasive and DCIS.  0/2 lymph nodes involved.  No LVI    Adjuvant therapy:  Medical Oncology, Anton Boo Recommended Ado-Trastuzumab alone for 14 cycles.   Cycle # 1 Ado-Trastuzumab was on 01/06/2020.  03/11/2020 completed adjuvant RT   11/12/2020 completed cycle # 14 Ado-Trastuzumab.  Now follows with Dr. Johnson    Post treatment imaging:  Grade C breast density.  06/06/2023 Declined bilateral complete breast US.  05/21/2020 Bilateral Diagnostic Mammogram:  Negative for malignancy.   05/29/2020 Left Breast U/S:  Area of post surgical change in the left breast is benign.  06/01/2021 bilateral screening mammogram: Negative, BI-RADS 1.  06/02/2022 bilateral screening mammogram: Negative, BI-RADS 1.  06/06/2023 bilateral screening

## 2025-06-19 ENCOUNTER — OFFICE VISIT (OUTPATIENT)
Age: 70
End: 2025-06-19
Payer: MEDICARE

## 2025-06-19 ENCOUNTER — HOSPITAL ENCOUNTER (OUTPATIENT)
Dept: GENERAL RADIOLOGY | Age: 70
Discharge: HOME | End: 2025-06-21
Payer: MEDICARE

## 2025-06-19 VITALS
WEIGHT: 225 LBS | BODY MASS INDEX: 39.87 KG/M2 | HEIGHT: 63 IN | HEART RATE: 61 BPM | OXYGEN SATURATION: 96 % | RESPIRATION RATE: 12 BRPM | DIASTOLIC BLOOD PRESSURE: 74 MMHG | SYSTOLIC BLOOD PRESSURE: 136 MMHG | TEMPERATURE: 98.3 F

## 2025-06-19 DIAGNOSIS — Z12.31 VISIT FOR SCREENING MAMMOGRAM: ICD-10-CM

## 2025-06-19 DIAGNOSIS — Z01.818 PREOP TESTING: ICD-10-CM

## 2025-06-19 DIAGNOSIS — Z85.3 PERSONAL HISTORY OF BREAST CANCER: Primary | ICD-10-CM

## 2025-06-19 DIAGNOSIS — R92.30 DENSE BREAST: ICD-10-CM

## 2025-06-19 DIAGNOSIS — M25.512 PAIN AND SWELLING OF LEFT SHOULDER: ICD-10-CM

## 2025-06-19 DIAGNOSIS — M25.412 PAIN AND SWELLING OF LEFT SHOULDER: ICD-10-CM

## 2025-06-19 PROCEDURE — 77067 SCR MAMMO BI INCL CAD: CPT

## 2025-06-19 PROCEDURE — 1123F ACP DISCUSS/DSCN MKR DOCD: CPT | Performed by: NURSE PRACTITIONER

## 2025-06-19 PROCEDURE — 1160F RVW MEDS BY RX/DR IN RCRD: CPT | Performed by: NURSE PRACTITIONER

## 2025-06-19 PROCEDURE — 1159F MED LIST DOCD IN RCRD: CPT | Performed by: NURSE PRACTITIONER

## 2025-06-19 PROCEDURE — 99213 OFFICE O/P EST LOW 20 MIN: CPT | Performed by: NURSE PRACTITIONER

## 2025-06-19 RX ORDER — CEFADROXIL 500 MG/1
CAPSULE ORAL
COMMUNITY
Start: 2025-05-11

## 2025-06-19 NOTE — PATIENT INSTRUCTIONS
Office will call in October to discuss moving forward with breast MRI.   Get xray of the shoulder done.     Any questions or concerns, please contact the office at [phone].

## 2025-06-24 ENCOUNTER — TELEPHONE (OUTPATIENT)
Age: 70
End: 2025-06-24

## 2025-07-03 ENCOUNTER — TELEPHONE (OUTPATIENT)
Age: 70
End: 2025-07-03

## 2025-07-03 NOTE — TELEPHONE ENCOUNTER
----- Message from BUD GARCIA RN sent at 6/19/2025 11:52 AM EDT -----  Patient was going to [place] to get xray of shoulder. Check to see if patient went and that NP called with results.       Electronically signed by Nora Suggs RN on 6/19/25 at 11:53 AM EDT

## 2025-07-03 NOTE — TELEPHONE ENCOUNTER
Left message for patient in regards to her shoulder xray being completed. It has not been completed as of yet. Will delay reminder to check back in with patient soon.

## 2025-07-08 ENCOUNTER — HOSPITAL ENCOUNTER (OUTPATIENT)
Dept: GENERAL RADIOLOGY | Age: 70
Discharge: HOME | End: 2025-07-10
Payer: MEDICARE

## 2025-07-08 DIAGNOSIS — M25.412 PAIN AND SWELLING OF LEFT SHOULDER: ICD-10-CM

## 2025-07-08 DIAGNOSIS — M25.512 PAIN AND SWELLING OF LEFT SHOULDER: ICD-10-CM

## 2025-07-08 DIAGNOSIS — Z85.3 PERSONAL HISTORY OF BREAST CANCER: ICD-10-CM

## 2025-07-08 PROCEDURE — 73030 X-RAY EXAM OF SHOULDER: CPT

## 2025-07-09 ENCOUNTER — RESULTS FOLLOW-UP (OUTPATIENT)
Age: 70
End: 2025-07-09

## 2025-07-09 DIAGNOSIS — M54.41 LOW BACK PAIN WITH RIGHT-SIDED SCIATICA, UNSPECIFIED BACK PAIN LATERALITY, UNSPECIFIED CHRONICITY: ICD-10-CM

## 2025-07-09 DIAGNOSIS — M25.412 PAIN AND SWELLING OF LEFT SHOULDER: Primary | ICD-10-CM

## 2025-07-09 DIAGNOSIS — M25.512 PAIN AND SWELLING OF LEFT SHOULDER: Primary | ICD-10-CM

## 2025-07-09 NOTE — TELEPHONE ENCOUNTER
Order given to JERAD Heard.     Electronically signed by Nora Suggs RN on 7/9/25 at 11:05 AM EDT

## 2025-07-09 NOTE — TELEPHONE ENCOUNTER
I spoke to Nisha today to review her left shoulder x-ray which revealed degenerative changes.  We also discussed her pain in her lumbar spine and an x-ray of the lumbar spine related to low back pain.  After further discussion and review of her pathology of invasive ductal carcinoma, grade 3 and HER2 positive disease recommend bone scan to her further evaluate areas of bony discomfort.      Orders placed.  Further recommendations will be made once bone scan results are available.    Nova Ren RN (Terrie), MSN, APRN-CNP, AOCNP  Advanced Oncology Certified Nurse Practitioner  [facility] Breast [facility]/Department of Breast Surgery   Office Phone [phone]; Fax [phone]  Located within the [facility] Breast [facility]

## 2025-07-22 ENCOUNTER — TELEPHONE (OUTPATIENT)
Age: 70
End: 2025-07-22

## 2025-07-23 NOTE — TELEPHONE ENCOUNTER
NM Bone scan scheduled for 8/5/25 [facility] - Arrival 10:30am / Patient has been notified -- No prep

## 2025-08-05 ENCOUNTER — HOSPITAL ENCOUNTER (OUTPATIENT)
Dept: NUCLEAR MEDICINE | Age: 70
Discharge: HOME | End: 2025-08-05
Payer: MEDICARE

## 2025-08-05 DIAGNOSIS — M25.412 PAIN AND SWELLING OF LEFT SHOULDER: ICD-10-CM

## 2025-08-05 DIAGNOSIS — M54.41 LOW BACK PAIN WITH RIGHT-SIDED SCIATICA, UNSPECIFIED BACK PAIN LATERALITY, UNSPECIFIED CHRONICITY: ICD-10-CM

## 2025-08-05 DIAGNOSIS — M25.512 PAIN AND SWELLING OF LEFT SHOULDER: ICD-10-CM

## 2025-08-05 PROCEDURE — A9503 TC99M MEDRONATE: HCPCS | Performed by: RADIOLOGY

## 2025-08-05 PROCEDURE — 3430000000 HC RX DIAGNOSTIC RADIOPHARMACEUTICAL: Performed by: RADIOLOGY

## 2025-08-05 PROCEDURE — 78306 BONE IMAGING WHOLE BODY: CPT | Performed by: NURSE PRACTITIONER

## 2025-08-05 RX ORDER — TC 99M MEDRONATE 20 MG/10ML
25 INJECTION, POWDER, LYOPHILIZED, FOR SOLUTION INTRAVENOUS
Status: COMPLETED | OUTPATIENT
Start: 2025-08-05 | End: 2025-08-05

## 2025-08-05 RX ADMIN — TC 99M MEDRONATE 25 MILLICURIE: 20 INJECTION, POWDER, LYOPHILIZED, FOR SOLUTION INTRAVENOUS at 11:14

## 2025-08-07 ENCOUNTER — TELEPHONE (OUTPATIENT)
Age: 70
End: 2025-08-07

## 2025-08-13 ENCOUNTER — HOSPITAL ENCOUNTER (OUTPATIENT)
Dept: INFUSION THERAPY | Age: 70
Discharge: HOME | End: 2025-08-13
Payer: MEDICARE

## 2025-08-13 DIAGNOSIS — C50.412 MALIGNANT NEOPLASM OF UPPER-OUTER QUADRANT OF LEFT BREAST IN FEMALE, ESTROGEN RECEPTOR POSITIVE (HCC): ICD-10-CM

## 2025-08-13 DIAGNOSIS — C50.912 INVASIVE DUCTAL CARCINOMA OF LEFT BREAST (HCC): ICD-10-CM

## 2025-08-13 DIAGNOSIS — Z17.0 MALIGNANT NEOPLASM OF UPPER-OUTER QUADRANT OF LEFT BREAST IN FEMALE, ESTROGEN RECEPTOR POSITIVE (HCC): ICD-10-CM

## 2025-08-13 LAB
ALBUMIN SERPL-MCNC: 4 G/DL (ref 3.5–5.2)
ALP SERPL-CCNC: 72 U/L (ref 35–104)
ALT SERPL-CCNC: 18 U/L (ref 0–35)
ANION GAP SERPL CALCULATED.3IONS-SCNC: 11 MMOL/L (ref 7–16)
AST SERPL-CCNC: 22 U/L (ref 0–35)
BASOPHILS # BLD: 0.03 K/UL (ref 0–0.2)
BASOPHILS NFR BLD: 0 % (ref 0–2)
BILIRUB SERPL-MCNC: 0.3 MG/DL (ref 0–1.2)
BUN SERPL-MCNC: 17 MG/DL (ref 8–23)
CALCIUM SERPL-MCNC: 10.2 MG/DL (ref 8.8–10.2)
CHLORIDE SERPL-SCNC: 98 MMOL/L (ref 98–107)
CO2 SERPL-SCNC: 28 MMOL/L (ref 22–29)
CREAT SERPL-MCNC: 0.9 MG/DL (ref 0.5–1)
EOSINOPHIL # BLD: 0.15 K/UL (ref 0.05–0.5)
EOSINOPHILS RELATIVE PERCENT: 2 % (ref 0–6)
ERYTHROCYTE [DISTWIDTH] IN BLOOD BY AUTOMATED COUNT: 15.1 % (ref 11.5–15)
GFR, ESTIMATED: 70 ML/MIN/1.73M2
GLUCOSE SERPL-MCNC: 106 MG/DL (ref 74–99)
HCT VFR BLD AUTO: 36.7 % (ref 34–48)
HGB BLD-MCNC: 12.5 G/DL (ref 11.5–15.5)
IMM GRANULOCYTES # BLD AUTO: <0.03 K/UL (ref 0–0.58)
IMM GRANULOCYTES NFR BLD: 0 % (ref 0–5)
LYMPHOCYTES NFR BLD: 2.84 K/UL (ref 1.5–4)
LYMPHOCYTES RELATIVE PERCENT: 40 % (ref 20–42)
MCH RBC QN AUTO: 28.9 PG (ref 26–35)
MCHC RBC AUTO-ENTMCNC: 34.1 G/DL (ref 32–34.5)
MCV RBC AUTO: 85 FL (ref 80–99.9)
MONOCYTES NFR BLD: 0.49 K/UL (ref 0.1–0.95)
MONOCYTES NFR BLD: 7 % (ref 2–12)
NEUTROPHILS NFR BLD: 50 % (ref 43–80)
NEUTS SEG NFR BLD: 3.57 K/UL (ref 1.8–7.3)
PLATELET # BLD AUTO: 245 K/UL (ref 130–450)
PMV BLD AUTO: 11.2 FL (ref 7–12)
POTASSIUM SERPL-SCNC: 4 MMOL/L (ref 3.5–5.1)
PROT SERPL-MCNC: 7.1 G/DL (ref 6.4–8.3)
RBC # BLD AUTO: 4.32 M/UL (ref 3.5–5.5)
SODIUM SERPL-SCNC: 137 MMOL/L (ref 136–145)
WBC OTHER # BLD: 7.1 K/UL (ref 4.5–11.5)

## 2025-08-13 PROCEDURE — 85025 COMPLETE CBC W/AUTO DIFF WBC: CPT

## 2025-08-13 PROCEDURE — 36415 COLL VENOUS BLD VENIPUNCTURE: CPT

## 2025-08-13 PROCEDURE — 80053 COMPREHEN METABOLIC PANEL: CPT

## 2025-08-14 ENCOUNTER — HOSPITAL ENCOUNTER (OUTPATIENT)
Dept: INFUSION THERAPY | Age: 70
Discharge: HOME | End: 2025-08-14

## 2025-08-14 ENCOUNTER — OFFICE VISIT (OUTPATIENT)
Age: 70
End: 2025-08-14

## 2025-08-14 VITALS
TEMPERATURE: 97.6 F | HEART RATE: 66 BPM | SYSTOLIC BLOOD PRESSURE: 119 MMHG | BODY MASS INDEX: 40.41 KG/M2 | DIASTOLIC BLOOD PRESSURE: 62 MMHG | WEIGHT: 228.1 LBS | HEIGHT: 63 IN | OXYGEN SATURATION: 97 %

## 2025-08-14 DIAGNOSIS — Z85.3 PERSONAL HISTORY OF BREAST CANCER: Primary | ICD-10-CM

## (undated) DEVICE — GLOVE ORANGE PI 7   MSG9070

## (undated) DEVICE — APPLICATOR MEDICATED 26 CC SOLUTION HI LT ORNG CHLORAPREP

## (undated) DEVICE — PACK,UNIV, II AURORA: Brand: MEDLINE

## (undated) DEVICE — ADHESIVE SKIN CLOSURE TOP 36 CC HI VISC DERMBND MINI

## (undated) DEVICE — STANDARD HYPODERMIC NEEDLE,ALUMINUM HUB: Brand: MONOJECT

## (undated) DEVICE — PATIENT RETURN ELECTRODE, SINGLE-USE, CONTACT QUALITY MONITORING, ADULT, WITH 9FT CORD, FOR PATIENTS WEIGING OVER 33LBS. (15KG): Brand: MEGADYNE

## (undated) DEVICE — TUBING, SUCTION, 3/16" X 12', STRAIGHT: Brand: MEDLINE

## (undated) DEVICE — SURGICAL PROCEDURE PACK BASIC

## (undated) DEVICE — PLASMABLADE PS210-030S 3.0S LOCK: Brand: PLASMABLADE™

## (undated) DEVICE — GLOVE SURG L12IN SZ 65FNGR THK94MIL TRNSLUC YEL LTX

## (undated) DEVICE — TOWEL,OR,DSP,ST,BLUE,STD,6/PK,12PK/CS: Brand: MEDLINE

## (undated) DEVICE — SKIN AFFIX SURG ADHESIVE 72/CS 0.55ML: Brand: MEDLINE

## (undated) DEVICE — CHLORAPREP 26ML ORANGE

## (undated) DEVICE — DRAPE,LAPAROTOMY,PED,STERILE: Brand: MEDLINE

## (undated) DEVICE — INTENDED FOR TISSUE SEPARATION, AND OTHER PROCEDURES THAT REQUIRE A SHARP SURGICAL BLADE TO PUNCTURE OR CUT.: Brand: BARD-PARKER ® STAINLESS STEEL BLADES

## (undated) DEVICE — DRAPE,REIN 53X77,STERILE: Brand: MEDLINE

## (undated) DEVICE — DECANTER BAG 9": Brand: MEDLINE INDUSTRIES, INC.

## (undated) DEVICE — GLOVE SURG SZ 65 THK91MIL LTX FREE SYN POLYISOPRENE

## (undated) DEVICE — STRIP,CLOSURE,WOUND,MEDI-STRIP,1/2X4: Brand: MEDLINE

## (undated) DEVICE — SET INST MINOR PROCEDURE

## (undated) DEVICE — SET SURG INSTR MINI VASC

## (undated) DEVICE — GARMENT,MEDLINE,DVT,INT,CALF,LG, GEN2: Brand: MEDLINE

## (undated) DEVICE — ELECTRODE PT RET AD L9FT HI MOIST COND ADH HYDRGEL CORDED

## (undated) DEVICE — DRAPE C ARM UNIV W41XL74IN CLR PLAS XR VELC CLSR POLY STRP

## (undated) DEVICE — SYRINGE 20ML LL S/C 50

## (undated) DEVICE — PROBE GAM TRUNODE DISP EACH

## (undated) DEVICE — LABEL MED 4 IN SURG PANEL W/ PEN STRL

## (undated) DEVICE — Device

## (undated) DEVICE — SYRINGE MED 10ML TRNSLUC BRL PLUNG BLK MRK POLYPR CTRL

## (undated) DEVICE — CONTROL SYRINGE LUER-LOCK TIP: Brand: MONOJECT

## (undated) DEVICE — SUTURE BOOTIES, YELLOW, STERILE, 5 PAIR/PAD; 5 PADS/BOX: Brand: KEY SURGICAL SUTURE BOOTIES

## (undated) DEVICE — 18 GA N.G. KIT, 10 PACK: Brand: SITE-RITE

## (undated) DEVICE — APPLIER LIG CLP M L11IN TI STR RNG HNDL FOR 20 CLP DISP

## (undated) DEVICE — DRAPE THER FLUID WARMING 66X44 IN FLAT SLUSH DBL DISC ORS

## (undated) DEVICE — SOLUTION IV IRRIG WATER 1000ML POUR BRL 2F7114

## (undated) DEVICE — 3 ML SYRINGE LUER-LOCK TIP: Brand: MONOJECT

## (undated) DEVICE — SOLUTION IV IRRIG POUR BRL 0.9% SODIUM CHL 2F7124

## (undated) DEVICE — GOWN,SIRUS,FABRNF,L,20/CS: Brand: MEDLINE